# Patient Record
Sex: MALE | Race: WHITE | URBAN - METROPOLITAN AREA
[De-identification: names, ages, dates, MRNs, and addresses within clinical notes are randomized per-mention and may not be internally consistent; named-entity substitution may affect disease eponyms.]

---

## 2020-01-01 ENCOUNTER — APPOINTMENT (OUTPATIENT)
Dept: NUCLEAR MEDICINE | Age: 65
DRG: 720 | End: 2020-01-01
Attending: ORTHOPAEDIC SURGERY
Payer: MEDICAID

## 2020-01-01 ENCOUNTER — APPOINTMENT (OUTPATIENT)
Dept: INTERVENTIONAL RADIOLOGY/VASCULAR | Age: 65
DRG: 720 | End: 2020-01-01
Attending: INTERNAL MEDICINE
Payer: MEDICAID

## 2020-01-01 ENCOUNTER — APPOINTMENT (OUTPATIENT)
Dept: GENERAL RADIOLOGY | Age: 65
DRG: 720 | End: 2020-01-01
Attending: INTERNAL MEDICINE
Payer: MEDICAID

## 2020-01-01 ENCOUNTER — HOSPITAL ENCOUNTER (INPATIENT)
Age: 65
LOS: 12 days | Discharge: NURSING, REHAB OR LONG-TERM CARE | DRG: 720 | End: 2021-01-07
Attending: EMERGENCY MEDICINE | Admitting: INTERNAL MEDICINE
Payer: MEDICAID

## 2020-01-01 ENCOUNTER — APPOINTMENT (OUTPATIENT)
Dept: NUCLEAR MEDICINE | Age: 65
DRG: 720 | End: 2020-01-01
Attending: INTERNAL MEDICINE
Payer: MEDICAID

## 2020-01-01 ENCOUNTER — APPOINTMENT (OUTPATIENT)
Dept: ULTRASOUND IMAGING | Age: 65
DRG: 720 | End: 2020-01-01
Attending: INTERNAL MEDICINE
Payer: MEDICAID

## 2020-01-01 ENCOUNTER — APPOINTMENT (OUTPATIENT)
Dept: CT IMAGING | Age: 65
DRG: 720 | End: 2020-01-01
Attending: EMERGENCY MEDICINE
Payer: MEDICAID

## 2020-01-01 ENCOUNTER — APPOINTMENT (OUTPATIENT)
Dept: NON INVASIVE DIAGNOSTICS | Age: 65
DRG: 720 | End: 2020-01-01
Attending: INTERNAL MEDICINE
Payer: MEDICAID

## 2020-01-01 DIAGNOSIS — R52 INTRACTABLE PAIN: ICD-10-CM

## 2020-01-01 DIAGNOSIS — K74.60 CIRRHOSIS OF LIVER WITH ASCITES, UNSPECIFIED HEPATIC CIRRHOSIS TYPE (HCC): ICD-10-CM

## 2020-01-01 DIAGNOSIS — R18.8 CIRRHOSIS OF LIVER WITH ASCITES, UNSPECIFIED HEPATIC CIRRHOSIS TYPE (HCC): ICD-10-CM

## 2020-01-01 DIAGNOSIS — R91.8 LUNG MASS: Primary | ICD-10-CM

## 2020-01-01 LAB
25(OH)D3 SERPL-MCNC: 17.7 NG/ML (ref 30–100)
ABO + RH BLD: NORMAL
ALBUMIN SERPL-MCNC: 2 G/DL (ref 3.5–5)
ALBUMIN SERPL-MCNC: 2.1 G/DL (ref 3.5–5)
ALBUMIN SERPL-MCNC: 2.2 G/DL (ref 3.5–5)
ALBUMIN SERPL-MCNC: 2.5 G/DL (ref 3.5–5)
ALBUMIN/GLOB SERPL: 0.4 {RATIO} (ref 1.1–2.2)
ALBUMIN/GLOB SERPL: 0.5 {RATIO} (ref 1.1–2.2)
ALP SERPL-CCNC: 111 U/L (ref 45–117)
ALP SERPL-CCNC: 121 U/L (ref 45–117)
ALP SERPL-CCNC: 122 U/L (ref 45–117)
ALP SERPL-CCNC: 125 U/L (ref 45–117)
ALP SERPL-CCNC: 137 U/L (ref 45–117)
ALP SERPL-CCNC: 145 U/L (ref 45–117)
ALT SERPL-CCNC: 12 U/L (ref 12–78)
ALT SERPL-CCNC: 13 U/L (ref 12–78)
ALT SERPL-CCNC: 16 U/L (ref 12–78)
ALT SERPL-CCNC: 17 U/L (ref 12–78)
ALT SERPL-CCNC: 18 U/L (ref 12–78)
ALT SERPL-CCNC: 20 U/L (ref 12–78)
AMMONIA PLAS-SCNC: 107 UMOL/L
AMMONIA PLAS-SCNC: 62 UMOL/L
AMMONIA PLAS-SCNC: 63 UMOL/L
AMMONIA PLAS-SCNC: 97 UMOL/L
ANION GAP SERPL CALC-SCNC: 5 MMOL/L (ref 5–15)
ANION GAP SERPL CALC-SCNC: 6 MMOL/L (ref 5–15)
ANION GAP SERPL CALC-SCNC: 6 MMOL/L (ref 5–15)
ANION GAP SERPL CALC-SCNC: 7 MMOL/L (ref 5–15)
APPEARANCE UR: CLEAR
ARTERIAL PATENCY WRIST A: ABNORMAL
AST SERPL W P-5'-P-CCNC: 27 U/L (ref 15–37)
AST SERPL W P-5'-P-CCNC: 29 U/L (ref 15–37)
AST SERPL W P-5'-P-CCNC: 32 U/L (ref 15–37)
AST SERPL W P-5'-P-CCNC: 45 U/L (ref 15–37)
AST SERPL W P-5'-P-CCNC: 53 U/L (ref 15–37)
AST SERPL W P-5'-P-CCNC: 83 U/L (ref 15–37)
ATRIAL RATE: 120 BPM
BACTERIA SPEC CULT: ABNORMAL
BACTERIA URNS QL MICRO: NEGATIVE /HPF
BASE DEFICIT BLDA-SCNC: 4.8 MMOL/L (ref 0–2)
BASOPHILS # BLD: 0 K/UL (ref 0–0.1)
BASOPHILS NFR BLD: 0 % (ref 0–1)
BASOPHILS NFR BLD: 1 % (ref 0–1)
BDY SITE: ABNORMAL
BILIRUB DIRECT SERPL-MCNC: 0.4 MG/DL (ref 0–0.2)
BILIRUB SERPL-MCNC: 0.8 MG/DL (ref 0.2–1)
BILIRUB SERPL-MCNC: 0.8 MG/DL (ref 0.2–1)
BILIRUB SERPL-MCNC: 0.9 MG/DL (ref 0.2–1)
BILIRUB SERPL-MCNC: 1 MG/DL (ref 0.2–1)
BILIRUB UR QL: NEGATIVE
BLOOD GROUP ANTIBODIES SERPL: NEGATIVE
BUN SERPL-MCNC: 19 MG/DL (ref 6–20)
BUN SERPL-MCNC: 19 MG/DL (ref 6–20)
BUN SERPL-MCNC: 20 MG/DL (ref 6–20)
BUN SERPL-MCNC: 21 MG/DL (ref 6–20)
BUN SERPL-MCNC: 21 MG/DL (ref 6–20)
BUN SERPL-MCNC: 36 MG/DL (ref 6–20)
BUN/CREAT SERPL: 12 (ref 12–20)
BUN/CREAT SERPL: 13 (ref 12–20)
BUN/CREAT SERPL: 13 (ref 12–20)
BUN/CREAT SERPL: 14 (ref 12–20)
BUN/CREAT SERPL: 14 (ref 12–20)
BUN/CREAT SERPL: 19 (ref 12–20)
CA-I BLD-MCNC: 7.8 MG/DL (ref 8.5–10.1)
CA-I BLD-MCNC: 8.1 MG/DL (ref 8.5–10.1)
CA-I BLD-MCNC: 8.5 MG/DL (ref 8.5–10.1)
CA-I BLD-MCNC: 8.6 MG/DL (ref 8.5–10.1)
CA-I BLD-MCNC: 8.7 MG/DL (ref 8.5–10.1)
CA-I BLD-MCNC: 8.7 MG/DL (ref 8.5–10.1)
CALCULATED P AXIS, ECG09: 61 DEGREES
CALCULATED R AXIS, ECG10: -146 DEGREES
CALCULATED T AXIS, ECG11: 42 DEGREES
CHLORIDE SERPL-SCNC: 109 MMOL/L (ref 97–108)
CHLORIDE SERPL-SCNC: 110 MMOL/L (ref 97–108)
CHLORIDE SERPL-SCNC: 112 MMOL/L (ref 97–108)
CHLORIDE SERPL-SCNC: 114 MMOL/L (ref 97–108)
CHLORIDE SERPL-SCNC: 115 MMOL/L (ref 97–108)
CHLORIDE SERPL-SCNC: 116 MMOL/L (ref 97–108)
CO2 SERPL-SCNC: 20 MMOL/L (ref 21–32)
CO2 SERPL-SCNC: 23 MMOL/L (ref 21–32)
CO2 SERPL-SCNC: 24 MMOL/L (ref 21–32)
CO2 SERPL-SCNC: 24 MMOL/L (ref 21–32)
CO2 SERPL-SCNC: 25 MMOL/L (ref 21–32)
CO2 SERPL-SCNC: 25 MMOL/L (ref 21–32)
COLOR UR: ABNORMAL
CREAT SERPL-MCNC: 1.47 MG/DL (ref 0.7–1.3)
CREAT SERPL-MCNC: 1.49 MG/DL (ref 0.7–1.3)
CREAT SERPL-MCNC: 1.5 MG/DL (ref 0.7–1.3)
CREAT SERPL-MCNC: 1.55 MG/DL (ref 0.7–1.3)
CREAT SERPL-MCNC: 1.56 MG/DL (ref 0.7–1.3)
CREAT SERPL-MCNC: 1.91 MG/DL (ref 0.7–1.3)
CRP SERPL-MCNC: 9.24 MG/DL (ref 0–0.6)
DIAGNOSIS, 93000: NORMAL
DIFFERENTIAL METHOD BLD: ABNORMAL
ECHO PV PEAK INSTANTANEOUS GRADIENT SYSTOLIC: 7 MMHG
ECHO PV REGURGITANT MAX VELOCITY: 130 CM/S
ECHO TV MAX VELOCITY: 131 CM/S
ECHO TV REGURGITANT PEAK GRADIENT: 7 MMHG
EOSINOPHIL # BLD: 0 K/UL (ref 0–0.4)
EOSINOPHIL # BLD: 0.1 K/UL (ref 0–0.4)
EOSINOPHIL # BLD: 0.2 K/UL (ref 0–0.4)
EOSINOPHIL NFR BLD: 0 % (ref 0–7)
EOSINOPHIL NFR BLD: 0 % (ref 0–7)
EOSINOPHIL NFR BLD: 2 % (ref 0–7)
EOSINOPHIL NFR BLD: 3 % (ref 0–7)
ERYTHROCYTE [DISTWIDTH] IN BLOOD BY AUTOMATED COUNT: 19.7 % (ref 11.5–14.5)
ERYTHROCYTE [DISTWIDTH] IN BLOOD BY AUTOMATED COUNT: 19.8 % (ref 11.5–14.5)
ERYTHROCYTE [DISTWIDTH] IN BLOOD BY AUTOMATED COUNT: 20 % (ref 11.5–14.5)
ERYTHROCYTE [DISTWIDTH] IN BLOOD BY AUTOMATED COUNT: 20.7 % (ref 11.5–14.5)
ERYTHROCYTE [DISTWIDTH] IN BLOOD BY AUTOMATED COUNT: 21 % (ref 11.5–14.5)
GAS FLOW.O2 O2 DELIVERY SYS: 6 L/MIN
GLOBULIN SER CALC-MCNC: 4.6 G/DL (ref 2–4)
GLOBULIN SER CALC-MCNC: 4.8 G/DL (ref 2–4)
GLOBULIN SER CALC-MCNC: 4.9 G/DL (ref 2–4)
GLOBULIN SER CALC-MCNC: 5.2 G/DL (ref 2–4)
GLOBULIN SER CALC-MCNC: 5.5 G/DL (ref 2–4)
GLOBULIN SER CALC-MCNC: 5.6 G/DL (ref 2–4)
GLUCOSE BLD STRIP.AUTO-MCNC: 126 MG/DL (ref 65–100)
GLUCOSE SERPL-MCNC: 101 MG/DL (ref 65–100)
GLUCOSE SERPL-MCNC: 109 MG/DL (ref 65–100)
GLUCOSE SERPL-MCNC: 120 MG/DL (ref 65–100)
GLUCOSE SERPL-MCNC: 125 MG/DL (ref 65–100)
GLUCOSE SERPL-MCNC: 127 MG/DL (ref 65–100)
GLUCOSE SERPL-MCNC: 128 MG/DL (ref 65–100)
GLUCOSE UR STRIP.AUTO-MCNC: NEGATIVE MG/DL
HCO3 BLDA-SCNC: 20 MMOL/L (ref 22–26)
HCT VFR BLD AUTO: 28.3 % (ref 36.6–50.3)
HCT VFR BLD AUTO: 29.9 % (ref 36.6–50.3)
HCT VFR BLD AUTO: 31.7 % (ref 36.6–50.3)
HCT VFR BLD AUTO: 31.9 % (ref 36.6–50.3)
HCT VFR BLD AUTO: 32.9 % (ref 36.6–50.3)
HGB BLD-MCNC: 10.1 G/DL (ref 12.1–17)
HGB BLD-MCNC: 8.5 G/DL (ref 12.1–17)
HGB BLD-MCNC: 9 G/DL (ref 12.1–17)
HGB BLD-MCNC: 9.2 G/DL (ref 12.1–17)
HGB BLD-MCNC: 9.6 G/DL (ref 12.1–17)
HGB UR QL STRIP: ABNORMAL
IMM GRANULOCYTES # BLD AUTO: 0 K/UL (ref 0–0.04)
IMM GRANULOCYTES NFR BLD AUTO: 0 % (ref 0–0.5)
INR PPP: 1.4 (ref 0.9–1.1)
INR PPP: 1.5 (ref 0.9–1.1)
INR PPP: 1.7 (ref 0.9–1.1)
KETONES UR QL STRIP.AUTO: NEGATIVE MG/DL
LACTATE SERPL-SCNC: 1.4 MMOL/L (ref 0.4–2)
LEUKOCYTE ESTERASE UR QL STRIP.AUTO: NEGATIVE
LYMPHOCYTES # BLD: 0.7 K/UL (ref 0.8–3.5)
LYMPHOCYTES # BLD: 0.7 K/UL (ref 0.8–3.5)
LYMPHOCYTES # BLD: 1.1 K/UL (ref 0.8–3.5)
LYMPHOCYTES NFR BLD: 10 % (ref 12–49)
LYMPHOCYTES NFR BLD: 11 % (ref 12–49)
LYMPHOCYTES NFR BLD: 24 % (ref 12–49)
LYMPHOCYTES NFR BLD: 6 % (ref 12–49)
MCH RBC QN AUTO: 26.4 PG (ref 26–34)
MCH RBC QN AUTO: 26.5 PG (ref 26–34)
MCH RBC QN AUTO: 26.9 PG (ref 26–34)
MCH RBC QN AUTO: 27.4 PG (ref 26–34)
MCH RBC QN AUTO: 27.6 PG (ref 26–34)
MCHC RBC AUTO-ENTMCNC: 28.8 G/DL (ref 30–36.5)
MCHC RBC AUTO-ENTMCNC: 30 G/DL (ref 30–36.5)
MCHC RBC AUTO-ENTMCNC: 30.1 G/DL (ref 30–36.5)
MCHC RBC AUTO-ENTMCNC: 30.3 G/DL (ref 30–36.5)
MCHC RBC AUTO-ENTMCNC: 30.7 G/DL (ref 30–36.5)
MCV RBC AUTO: 87.6 FL (ref 80–99)
MCV RBC AUTO: 87.7 FL (ref 80–99)
MCV RBC AUTO: 91.3 FL (ref 80–99)
MCV RBC AUTO: 91.4 FL (ref 80–99)
MCV RBC AUTO: 91.7 FL (ref 80–99)
MONOCYTES # BLD: 0.8 K/UL (ref 0–1)
MONOCYTES # BLD: 1.7 K/UL (ref 0–1)
MONOCYTES # BLD: 1.8 K/UL (ref 0–1)
MONOCYTES NFR BLD: 17 % (ref 5–13)
MONOCYTES NFR BLD: 18 % (ref 5–13)
MONOCYTES NFR BLD: 22 % (ref 5–13)
MONOCYTES NFR BLD: 23 % (ref 5–13)
MUCOUS THREADS URNS QL MICRO: ABNORMAL /LPF
NEUTS SEG # BLD: 2.4 K/UL (ref 1.8–8)
NEUTS SEG # BLD: 5.1 K/UL (ref 1.8–8)
NEUTS SEG # BLD: 7.8 K/UL (ref 1.8–8)
NEUTS SEG NFR BLD: 54 % (ref 32–75)
NEUTS SEG NFR BLD: 67 % (ref 32–75)
NEUTS SEG NFR BLD: 67 % (ref 32–75)
NEUTS SEG NFR BLD: 75 % (ref 32–75)
NITRITE UR QL STRIP.AUTO: NEGATIVE
P-R INTERVAL, ECG05: 192 MS
PCO2 BLDA: 55 MMHG (ref 35–45)
PERFORMED BY, TECHID: ABNORMAL
PH BLDA: 7.23 [PH] (ref 7.35–7.45)
PH UR STRIP: 6 [PH] (ref 5–8)
PLATELET # BLD AUTO: 101 K/UL (ref 150–400)
PLATELET # BLD AUTO: 160 K/UL (ref 150–400)
PLATELET # BLD AUTO: 167 K/UL (ref 150–400)
PLATELET # BLD AUTO: 96 K/UL (ref 150–400)
PLATELET # BLD AUTO: 99 K/UL (ref 150–400)
PMV BLD AUTO: 10 FL (ref 8.9–12.9)
PMV BLD AUTO: 10.3 FL (ref 8.9–12.9)
PMV BLD AUTO: 10.4 FL (ref 8.9–12.9)
PMV BLD AUTO: 10.5 FL (ref 8.9–12.9)
PMV BLD AUTO: 9.6 FL (ref 8.9–12.9)
PO2 BLDA: 106 MMHG (ref 75–100)
POTASSIUM SERPL-SCNC: 3.2 MMOL/L (ref 3.5–5.1)
POTASSIUM SERPL-SCNC: 3.4 MMOL/L (ref 3.5–5.1)
POTASSIUM SERPL-SCNC: 3.5 MMOL/L (ref 3.5–5.1)
POTASSIUM SERPL-SCNC: 3.6 MMOL/L (ref 3.5–5.1)
POTASSIUM SERPL-SCNC: 3.8 MMOL/L (ref 3.5–5.1)
POTASSIUM SERPL-SCNC: 4.1 MMOL/L (ref 3.5–5.1)
PROCALCITONIN SERPL-MCNC: 0.38 NG/ML
PROT SERPL-MCNC: 6.7 G/DL (ref 6.4–8.2)
PROT SERPL-MCNC: 7 G/DL (ref 6.4–8.2)
PROT SERPL-MCNC: 7.2 G/DL (ref 6.4–8.2)
PROT SERPL-MCNC: 7.4 G/DL (ref 6.4–8.2)
PROT SERPL-MCNC: 7.6 G/DL (ref 6.4–8.2)
PROT SERPL-MCNC: 7.7 G/DL (ref 6.4–8.2)
PROT UR STRIP-MCNC: NEGATIVE MG/DL
PROTHROMBIN TIME: 16.9 SEC (ref 11.9–14.7)
PROTHROMBIN TIME: 18.5 SEC (ref 11.9–14.7)
PROTHROMBIN TIME: 19.7 SEC (ref 11.9–14.7)
Q-T INTERVAL, ECG07: 334 MS
QRS DURATION, ECG06: 100 MS
QTC CALCULATION (BEZET), ECG08: 472 MS
RBC # BLD AUTO: 3.1 M/UL (ref 4.1–5.7)
RBC # BLD AUTO: 3.26 M/UL (ref 4.1–5.7)
RBC # BLD AUTO: 3.49 M/UL (ref 4.1–5.7)
RBC # BLD AUTO: 3.62 M/UL (ref 4.1–5.7)
RBC # BLD AUTO: 3.75 M/UL (ref 4.1–5.7)
RBC #/AREA URNS HPF: ABNORMAL /HPF (ref 0–5)
SAO2 % BLD: 97 %
SAO2% DEVICE SAO2% SENSOR NAME: ABNORMAL
SODIUM SERPL-SCNC: 139 MMOL/L (ref 136–145)
SODIUM SERPL-SCNC: 140 MMOL/L (ref 136–145)
SODIUM SERPL-SCNC: 140 MMOL/L (ref 136–145)
SODIUM SERPL-SCNC: 142 MMOL/L (ref 136–145)
SODIUM SERPL-SCNC: 145 MMOL/L (ref 136–145)
SODIUM SERPL-SCNC: 145 MMOL/L (ref 136–145)
SP GR UR REFRACTOMETRY: 1.01 (ref 1–1.03)
SPECIAL REQUESTS,SREQ: ABNORMAL
SPECIMEN EXP DATE BLD: NORMAL
UROBILINOGEN UR QL STRIP.AUTO: 0.1 EU/DL (ref 0.1–1)
VANCOMYCIN SERPL-MCNC: 13.9 UG/ML
VENTRICULAR RATE, ECG03: 120 BPM
WBC # BLD AUTO: 10.6 K/UL (ref 4.1–11.1)
WBC # BLD AUTO: 4.4 K/UL (ref 4.1–11.1)
WBC # BLD AUTO: 6.7 K/UL (ref 4.1–11.1)
WBC # BLD AUTO: 7.4 K/UL (ref 4.1–11.1)
WBC # BLD AUTO: 7.6 K/UL (ref 4.1–11.1)
WBC URNS QL MICRO: ABNORMAL /HPF (ref 0–4)

## 2020-01-01 PROCEDURE — 74011000250 HC RX REV CODE- 250: Performed by: INTERNAL MEDICINE

## 2020-01-01 PROCEDURE — 80076 HEPATIC FUNCTION PANEL: CPT

## 2020-01-01 PROCEDURE — 80048 BASIC METABOLIC PNL TOTAL CA: CPT

## 2020-01-01 PROCEDURE — 76770 US EXAM ABDO BACK WALL COMP: CPT

## 2020-01-01 PROCEDURE — 49083 ABD PARACENTESIS W/IMAGING: CPT

## 2020-01-01 PROCEDURE — 77010033678 HC OXYGEN DAILY

## 2020-01-01 PROCEDURE — 83605 ASSAY OF LACTIC ACID: CPT

## 2020-01-01 PROCEDURE — 92610 EVALUATE SWALLOWING FUNCTION: CPT

## 2020-01-01 PROCEDURE — 65270000029 HC RM PRIVATE

## 2020-01-01 PROCEDURE — 94640 AIRWAY INHALATION TREATMENT: CPT

## 2020-01-01 PROCEDURE — 87186 SC STD MICRODIL/AGAR DIL: CPT

## 2020-01-01 PROCEDURE — 85025 COMPLETE CBC W/AUTO DIFF WBC: CPT

## 2020-01-01 PROCEDURE — 74011000258 HC RX REV CODE- 258: Performed by: INTERNAL MEDICINE

## 2020-01-01 PROCEDURE — 82803 BLOOD GASES ANY COMBINATION: CPT

## 2020-01-01 PROCEDURE — 74011250636 HC RX REV CODE- 250/636: Performed by: NURSE PRACTITIONER

## 2020-01-01 PROCEDURE — A9503 TC99M MEDRONATE: HCPCS

## 2020-01-01 PROCEDURE — 74011000250 HC RX REV CODE- 250: Performed by: HOSPITALIST

## 2020-01-01 PROCEDURE — 96361 HYDRATE IV INFUSION ADD-ON: CPT

## 2020-01-01 PROCEDURE — 81001 URINALYSIS AUTO W/SCOPE: CPT

## 2020-01-01 PROCEDURE — 74011250636 HC RX REV CODE- 250/636: Performed by: INTERNAL MEDICINE

## 2020-01-01 PROCEDURE — 85610 PROTHROMBIN TIME: CPT

## 2020-01-01 PROCEDURE — 74176 CT ABD & PELVIS W/O CONTRAST: CPT

## 2020-01-01 PROCEDURE — 94760 N-INVAS EAR/PLS OXIMETRY 1: CPT

## 2020-01-01 PROCEDURE — 99285 EMERGENCY DEPT VISIT HI MDM: CPT

## 2020-01-01 PROCEDURE — 36415 COLL VENOUS BLD VENIPUNCTURE: CPT

## 2020-01-01 PROCEDURE — 80053 COMPREHEN METABOLIC PANEL: CPT

## 2020-01-01 PROCEDURE — 74011250637 HC RX REV CODE- 250/637: Performed by: INTERNAL MEDICINE

## 2020-01-01 PROCEDURE — 71045 X-RAY EXAM CHEST 1 VIEW: CPT

## 2020-01-01 PROCEDURE — 74011250636 HC RX REV CODE- 250/636: Performed by: EMERGENCY MEDICINE

## 2020-01-01 PROCEDURE — 87077 CULTURE AEROBIC IDENTIFY: CPT

## 2020-01-01 PROCEDURE — 99223 1ST HOSP IP/OBS HIGH 75: CPT | Performed by: INTERNAL MEDICINE

## 2020-01-01 PROCEDURE — A9521 TC99M EXAMETAZIME: HCPCS

## 2020-01-01 PROCEDURE — 82140 ASSAY OF AMMONIA: CPT

## 2020-01-01 PROCEDURE — 87040 BLOOD CULTURE FOR BACTERIA: CPT

## 2020-01-01 PROCEDURE — 97530 THERAPEUTIC ACTIVITIES: CPT

## 2020-01-01 PROCEDURE — 86901 BLOOD TYPING SEROLOGIC RH(D): CPT

## 2020-01-01 PROCEDURE — 74011250636 HC RX REV CODE- 250/636: Performed by: HOSPITALIST

## 2020-01-01 PROCEDURE — 86140 C-REACTIVE PROTEIN: CPT

## 2020-01-01 PROCEDURE — 82962 GLUCOSE BLOOD TEST: CPT

## 2020-01-01 PROCEDURE — 97165 OT EVAL LOW COMPLEX 30 MIN: CPT

## 2020-01-01 PROCEDURE — 90715 TDAP VACCINE 7 YRS/> IM: CPT | Performed by: EMERGENCY MEDICINE

## 2020-01-01 PROCEDURE — 96376 TX/PRO/DX INJ SAME DRUG ADON: CPT

## 2020-01-01 PROCEDURE — 85027 COMPLETE CBC AUTOMATED: CPT

## 2020-01-01 PROCEDURE — 80202 ASSAY OF VANCOMYCIN: CPT

## 2020-01-01 PROCEDURE — 90471 IMMUNIZATION ADMIN: CPT

## 2020-01-01 PROCEDURE — 97162 PT EVAL MOD COMPLEX 30 MIN: CPT

## 2020-01-01 PROCEDURE — 93005 ELECTROCARDIOGRAM TRACING: CPT

## 2020-01-01 PROCEDURE — 74011000250 HC RX REV CODE- 250: Performed by: EMERGENCY MEDICINE

## 2020-01-01 PROCEDURE — 0W9G3ZZ DRAINAGE OF PERITONEAL CAVITY, PERCUTANEOUS APPROACH: ICD-10-PCS | Performed by: RADIOLOGY

## 2020-01-01 PROCEDURE — 96375 TX/PRO/DX INJ NEW DRUG ADDON: CPT

## 2020-01-01 PROCEDURE — 82306 VITAMIN D 25 HYDROXY: CPT

## 2020-01-01 PROCEDURE — C8929 TTE W OR WO FOL WCON,DOPPLER: HCPCS

## 2020-01-01 PROCEDURE — 96374 THER/PROPH/DIAG INJ IV PUSH: CPT

## 2020-01-01 PROCEDURE — 84145 PROCALCITONIN (PCT): CPT

## 2020-01-01 RX ORDER — FLUTICASONE PROPIONATE 50 MCG
2 SPRAY, SUSPENSION (ML) NASAL DAILY
Status: DISCONTINUED | OUTPATIENT
Start: 2020-01-01 | End: 2021-01-01 | Stop reason: HOSPADM

## 2020-01-01 RX ORDER — MORPHINE SULFATE 4 MG/ML
4 INJECTION INTRAVENOUS ONCE
Status: COMPLETED | OUTPATIENT
Start: 2020-01-01 | End: 2020-01-01

## 2020-01-01 RX ORDER — IPRATROPIUM BROMIDE 0.5 MG/2.5ML
500 SOLUTION RESPIRATORY (INHALATION)
Status: DISCONTINUED | OUTPATIENT
Start: 2020-01-01 | End: 2020-01-01

## 2020-01-01 RX ORDER — GUAIFENESIN 400 MG/1
400 TABLET ORAL
COMMUNITY
End: 2021-01-01 | Stop reason: ALTCHOICE

## 2020-01-01 RX ORDER — SODIUM CHLORIDE 9 MG/ML
1000 INJECTION, SOLUTION INTRAVENOUS ONCE
Status: COMPLETED | OUTPATIENT
Start: 2020-01-01 | End: 2020-01-01

## 2020-01-01 RX ORDER — MORPHINE SULFATE 2 MG/ML
2 INJECTION, SOLUTION INTRAMUSCULAR; INTRAVENOUS ONCE
Status: COMPLETED | OUTPATIENT
Start: 2020-01-01 | End: 2020-01-01

## 2020-01-01 RX ORDER — FOLIC ACID 1 MG/1
1 TABLET ORAL DAILY
COMMUNITY

## 2020-01-01 RX ORDER — PROPRANOLOL HYDROCHLORIDE 20 MG/1
20 TABLET ORAL 2 TIMES DAILY
Status: ON HOLD | COMMUNITY
End: 2021-01-01 | Stop reason: SDUPTHER

## 2020-01-01 RX ORDER — ACETAMINOPHEN 325 MG/1
650 TABLET ORAL
Status: DISCONTINUED | OUTPATIENT
Start: 2020-01-01 | End: 2021-01-01 | Stop reason: HOSPADM

## 2020-01-01 RX ORDER — LORAZEPAM 1 MG/1
2 TABLET ORAL ONCE
Status: COMPLETED | OUTPATIENT
Start: 2020-01-01 | End: 2020-01-01

## 2020-01-01 RX ORDER — DIAZEPAM 10 MG/2ML
5 INJECTION INTRAMUSCULAR
Status: COMPLETED | OUTPATIENT
Start: 2020-01-01 | End: 2020-01-01

## 2020-01-01 RX ORDER — POLYETHYLENE GLYCOL 3350 17 G/17G
17 POWDER, FOR SOLUTION ORAL DAILY PRN
Status: DISCONTINUED | OUTPATIENT
Start: 2020-01-01 | End: 2021-01-01 | Stop reason: HOSPADM

## 2020-01-01 RX ORDER — PANTOPRAZOLE SODIUM 40 MG/1
40 TABLET, DELAYED RELEASE ORAL
Status: DISCONTINUED | OUTPATIENT
Start: 2020-01-01 | End: 2021-01-01 | Stop reason: HOSPADM

## 2020-01-01 RX ORDER — FOLIC ACID 1 MG/1
1 TABLET ORAL DAILY
Status: DISCONTINUED | OUTPATIENT
Start: 2020-01-01 | End: 2021-01-01 | Stop reason: HOSPADM

## 2020-01-01 RX ORDER — MORPHINE SULFATE 2 MG/ML
2 INJECTION, SOLUTION INTRAMUSCULAR; INTRAVENOUS
Status: DISCONTINUED | OUTPATIENT
Start: 2020-01-01 | End: 2021-01-01 | Stop reason: HOSPADM

## 2020-01-01 RX ORDER — CYCLOBENZAPRINE HCL 10 MG
10 TABLET ORAL 3 TIMES DAILY
Status: COMPLETED | OUTPATIENT
Start: 2020-01-01 | End: 2020-01-01

## 2020-01-01 RX ORDER — FUROSEMIDE 10 MG/ML
40 INJECTION INTRAMUSCULAR; INTRAVENOUS ONCE
Status: COMPLETED | OUTPATIENT
Start: 2020-01-01 | End: 2020-01-01

## 2020-01-01 RX ORDER — SODIUM CHLORIDE 0.9 % (FLUSH) 0.9 %
5-40 SYRINGE (ML) INJECTION AS NEEDED
Status: DISCONTINUED | OUTPATIENT
Start: 2020-01-01 | End: 2021-01-01 | Stop reason: HOSPADM

## 2020-01-01 RX ORDER — HEPARIN SODIUM 10000 [USP'U]/ML
5000 INJECTION, SOLUTION INTRAVENOUS; SUBCUTANEOUS EVERY 8 HOURS
Status: DISCONTINUED | OUTPATIENT
Start: 2020-01-01 | End: 2020-01-01

## 2020-01-01 RX ORDER — ACETAMINOPHEN 650 MG/1
650 SUPPOSITORY RECTAL
Status: DISCONTINUED | OUTPATIENT
Start: 2020-01-01 | End: 2021-01-01 | Stop reason: HOSPADM

## 2020-01-01 RX ORDER — PROMETHAZINE HYDROCHLORIDE 25 MG/1
12.5 TABLET ORAL
Status: DISCONTINUED | OUTPATIENT
Start: 2020-01-01 | End: 2021-01-01 | Stop reason: HOSPADM

## 2020-01-01 RX ORDER — OXYCODONE HYDROCHLORIDE 5 MG/1
5 TABLET ORAL
Status: DISCONTINUED | OUTPATIENT
Start: 2020-01-01 | End: 2021-01-01

## 2020-01-01 RX ORDER — KETOROLAC TROMETHAMINE 15 MG/ML
15 INJECTION, SOLUTION INTRAMUSCULAR; INTRAVENOUS ONCE
Status: COMPLETED | OUTPATIENT
Start: 2020-01-01 | End: 2020-01-01

## 2020-01-01 RX ORDER — SODIUM CHLORIDE 0.9 % (FLUSH) 0.9 %
5-40 SYRINGE (ML) INJECTION EVERY 8 HOURS
Status: DISCONTINUED | OUTPATIENT
Start: 2020-01-01 | End: 2020-01-01

## 2020-01-01 RX ORDER — PROPRANOLOL HYDROCHLORIDE 10 MG/1
10 TABLET ORAL 2 TIMES DAILY
Status: DISCONTINUED | OUTPATIENT
Start: 2020-01-01 | End: 2021-01-01 | Stop reason: HOSPADM

## 2020-01-01 RX ORDER — IPRATROPIUM BROMIDE AND ALBUTEROL SULFATE 2.5; .5 MG/3ML; MG/3ML
3 SOLUTION RESPIRATORY (INHALATION)
Status: DISCONTINUED | OUTPATIENT
Start: 2020-01-01 | End: 2020-01-01

## 2020-01-01 RX ORDER — PHYTONADIONE 10 MG/ML
10 INJECTION, EMULSION INTRAMUSCULAR; INTRAVENOUS; SUBCUTANEOUS DAILY
Status: COMPLETED | OUTPATIENT
Start: 2020-01-01 | End: 2020-01-01

## 2020-01-01 RX ORDER — IPRATROPIUM BROMIDE AND ALBUTEROL SULFATE 2.5; .5 MG/3ML; MG/3ML
3 SOLUTION RESPIRATORY (INHALATION)
Status: DISCONTINUED | OUTPATIENT
Start: 2020-01-01 | End: 2021-01-01 | Stop reason: HOSPADM

## 2020-01-01 RX ORDER — ONDANSETRON 2 MG/ML
4 INJECTION INTRAMUSCULAR; INTRAVENOUS
Status: DISCONTINUED | OUTPATIENT
Start: 2020-01-01 | End: 2021-01-01 | Stop reason: HOSPADM

## 2020-01-01 RX ORDER — HEPARIN SODIUM 5000 [USP'U]/ML
5000 INJECTION, SOLUTION INTRAVENOUS; SUBCUTANEOUS EVERY 8 HOURS
Status: DISCONTINUED | OUTPATIENT
Start: 2020-01-01 | End: 2021-01-01 | Stop reason: HOSPADM

## 2020-01-01 RX ORDER — FUROSEMIDE 40 MG/1
40 TABLET ORAL DAILY
Status: DISCONTINUED | OUTPATIENT
Start: 2020-01-01 | End: 2021-01-01 | Stop reason: HOSPADM

## 2020-01-01 RX ORDER — FUROSEMIDE 40 MG/1
40 TABLET ORAL DAILY
Status: ON HOLD | COMMUNITY
End: 2021-01-01 | Stop reason: SDUPTHER

## 2020-01-01 RX ORDER — ISOSORBIDE MONONITRATE 30 MG/1
30 TABLET, EXTENDED RELEASE ORAL DAILY
COMMUNITY

## 2020-01-01 RX ORDER — ISOSORBIDE MONONITRATE 30 MG/1
30 TABLET, EXTENDED RELEASE ORAL DAILY
Status: DISCONTINUED | OUTPATIENT
Start: 2020-01-01 | End: 2021-01-01 | Stop reason: HOSPADM

## 2020-01-01 RX ORDER — PROPRANOLOL HYDROCHLORIDE 10 MG/1
20 TABLET ORAL 2 TIMES DAILY
Status: DISCONTINUED | OUTPATIENT
Start: 2020-01-01 | End: 2020-01-01

## 2020-01-01 RX ADMIN — OXYCODONE 5 MG: 5 TABLET ORAL at 14:05

## 2020-01-01 RX ADMIN — CYCLOBENZAPRINE 10 MG: 10 TABLET, FILM COATED ORAL at 21:18

## 2020-01-01 RX ADMIN — VANCOMYCIN HYDROCHLORIDE 1000 MG: 1 INJECTION, POWDER, LYOPHILIZED, FOR SOLUTION INTRAVENOUS at 17:58

## 2020-01-01 RX ADMIN — MORPHINE SULFATE 2 MG: 2 INJECTION, SOLUTION INTRAMUSCULAR; INTRAVENOUS at 02:59

## 2020-01-01 RX ADMIN — HEPARIN SODIUM 5000 UNITS: 5000 INJECTION INTRAVENOUS; SUBCUTANEOUS at 20:39

## 2020-01-01 RX ADMIN — HEPARIN SODIUM 5000 UNITS: 5000 INJECTION INTRAVENOUS; SUBCUTANEOUS at 05:50

## 2020-01-01 RX ADMIN — RIFAXIMIN 550 MG: 550 TABLET ORAL at 17:27

## 2020-01-01 RX ADMIN — PROPRANOLOL HYDROCHLORIDE 10 MG: 10 TABLET ORAL at 09:59

## 2020-01-01 RX ADMIN — LACTULOSE 45 ML: 20 SOLUTION ORAL at 14:05

## 2020-01-01 RX ADMIN — CEFAZOLIN SODIUM 1 G: 1 INJECTION, POWDER, FOR SOLUTION INTRAMUSCULAR; INTRAVENOUS at 03:30

## 2020-01-01 RX ADMIN — CYCLOBENZAPRINE 10 MG: 10 TABLET, FILM COATED ORAL at 17:27

## 2020-01-01 RX ADMIN — HEPARIN SODIUM 5000 UNITS: 5000 INJECTION INTRAVENOUS; SUBCUTANEOUS at 13:09

## 2020-01-01 RX ADMIN — MORPHINE SULFATE 4 MG: 4 INJECTION INTRAVENOUS at 05:10

## 2020-01-01 RX ADMIN — LACTULOSE 45 ML: 20 SOLUTION ORAL at 21:18

## 2020-01-01 RX ADMIN — IPRATROPIUM BROMIDE AND ALBUTEROL SULFATE 3 ML: .5; 3 SOLUTION RESPIRATORY (INHALATION) at 09:32

## 2020-01-01 RX ADMIN — MORPHINE SULFATE 2 MG: 2 INJECTION, SOLUTION INTRAMUSCULAR; INTRAVENOUS at 11:58

## 2020-01-01 RX ADMIN — IPRATROPIUM BROMIDE AND ALBUTEROL SULFATE 3 ML: .5; 3 SOLUTION RESPIRATORY (INHALATION) at 09:04

## 2020-01-01 RX ADMIN — SODIUM CHLORIDE 1000 ML: 9 INJECTION, SOLUTION INTRAVENOUS at 06:34

## 2020-01-01 RX ADMIN — CEFTRIAXONE 1 G: 1 INJECTION, POWDER, FOR SOLUTION INTRAMUSCULAR; INTRAVENOUS at 15:02

## 2020-01-01 RX ADMIN — HEPARIN SODIUM 5000 UNITS: 5000 INJECTION INTRAVENOUS; SUBCUTANEOUS at 05:19

## 2020-01-01 RX ADMIN — LACTULOSE 45 ML: 20 SOLUTION ORAL at 10:08

## 2020-01-01 RX ADMIN — LACTULOSE 45 ML: 20 SOLUTION ORAL at 23:39

## 2020-01-01 RX ADMIN — MORPHINE SULFATE 2 MG: 2 INJECTION, SOLUTION INTRAMUSCULAR; INTRAVENOUS at 22:55

## 2020-01-01 RX ADMIN — HEPARIN SODIUM 5000 UNITS: 5000 INJECTION INTRAVENOUS; SUBCUTANEOUS at 05:03

## 2020-01-01 RX ADMIN — LACTULOSE 45 ML: 20 SOLUTION ORAL at 10:10

## 2020-01-01 RX ADMIN — CYCLOBENZAPRINE 10 MG: 10 TABLET, FILM COATED ORAL at 10:08

## 2020-01-01 RX ADMIN — PERFLUTREN 2 ML: 6.52 INJECTION, SUSPENSION INTRAVENOUS at 15:30

## 2020-01-01 RX ADMIN — FOLIC ACID 1 MG: 1 TABLET ORAL at 09:24

## 2020-01-01 RX ADMIN — ISOSORBIDE MONONITRATE 30 MG: 30 TABLET, EXTENDED RELEASE ORAL at 09:06

## 2020-01-01 RX ADMIN — LACTULOSE 45 ML: 20 SOLUTION ORAL at 15:00

## 2020-01-01 RX ADMIN — SODIUM CHLORIDE 500 ML: 9 INJECTION, SOLUTION INTRAVENOUS at 21:45

## 2020-01-01 RX ADMIN — HEPARIN SODIUM 5000 UNITS: 5000 INJECTION INTRAVENOUS; SUBCUTANEOUS at 22:34

## 2020-01-01 RX ADMIN — CYCLOBENZAPRINE 10 MG: 10 TABLET, FILM COATED ORAL at 15:02

## 2020-01-01 RX ADMIN — PANTOPRAZOLE SODIUM 40 MG: 40 TABLET, DELAYED RELEASE ORAL at 05:19

## 2020-01-01 RX ADMIN — FLUTICASONE PROPIONATE 2 SPRAY: 50 SPRAY, METERED NASAL at 09:24

## 2020-01-01 RX ADMIN — MORPHINE SULFATE 2 MG: 2 INJECTION, SOLUTION INTRAMUSCULAR; INTRAVENOUS at 10:10

## 2020-01-01 RX ADMIN — CYCLOBENZAPRINE 10 MG: 10 TABLET, FILM COATED ORAL at 20:39

## 2020-01-01 RX ADMIN — IPRATROPIUM BROMIDE AND ALBUTEROL SULFATE 3 ML: .5; 3 SOLUTION RESPIRATORY (INHALATION) at 01:02

## 2020-01-01 RX ADMIN — LACTULOSE 45 ML: 20 SOLUTION ORAL at 22:34

## 2020-01-01 RX ADMIN — FOLIC ACID 1 MG: 1 TABLET ORAL at 09:06

## 2020-01-01 RX ADMIN — RIFAXIMIN 550 MG: 550 TABLET ORAL at 09:24

## 2020-01-01 RX ADMIN — PROPRANOLOL HYDROCHLORIDE 10 MG: 10 TABLET ORAL at 21:18

## 2020-01-01 RX ADMIN — CYCLOBENZAPRINE 10 MG: 10 TABLET, FILM COATED ORAL at 09:06

## 2020-01-01 RX ADMIN — SODIUM CHLORIDE 1000 ML: 9 INJECTION, SOLUTION INTRAVENOUS at 04:12

## 2020-01-01 RX ADMIN — LACTULOSE 45 ML: 20 SOLUTION ORAL at 17:50

## 2020-01-01 RX ADMIN — KETOROLAC TROMETHAMINE 15 MG: 15 INJECTION, SOLUTION INTRAMUSCULAR; INTRAVENOUS at 02:59

## 2020-01-01 RX ADMIN — LACTULOSE 45 ML: 20 SOLUTION ORAL at 09:23

## 2020-01-01 RX ADMIN — OXYCODONE 5 MG: 5 TABLET ORAL at 10:12

## 2020-01-01 RX ADMIN — AMPICILLIN SODIUM AND SULBACTAM SODIUM 3 G: 2; 1 INJECTION, POWDER, FOR SOLUTION INTRAMUSCULAR; INTRAVENOUS at 23:19

## 2020-01-01 RX ADMIN — LORAZEPAM 2 MG: 1 TABLET ORAL at 13:50

## 2020-01-01 RX ADMIN — PROPRANOLOL HYDROCHLORIDE 10 MG: 10 TABLET ORAL at 10:10

## 2020-01-01 RX ADMIN — RIFAXIMIN 550 MG: 550 TABLET ORAL at 17:52

## 2020-01-01 RX ADMIN — IPRATROPIUM BROMIDE AND ALBUTEROL SULFATE 3 ML: .5; 3 SOLUTION RESPIRATORY (INHALATION) at 01:22

## 2020-01-01 RX ADMIN — Medication 10 ML: at 14:00

## 2020-01-01 RX ADMIN — ONDANSETRON 4 MG: 2 INJECTION INTRAMUSCULAR; INTRAVENOUS at 16:12

## 2020-01-01 RX ADMIN — LACTULOSE 45 ML: 20 SOLUTION ORAL at 09:07

## 2020-01-01 RX ADMIN — LACTULOSE 45 ML: 20 SOLUTION ORAL at 17:26

## 2020-01-01 RX ADMIN — PROPRANOLOL HYDROCHLORIDE 10 MG: 10 TABLET ORAL at 22:34

## 2020-01-01 RX ADMIN — OXYCODONE 5 MG: 5 TABLET ORAL at 05:19

## 2020-01-01 RX ADMIN — CEFTRIAXONE 1 G: 1 INJECTION, POWDER, FOR SOLUTION INTRAMUSCULAR; INTRAVENOUS at 15:55

## 2020-01-01 RX ADMIN — DIAZEPAM 5 MG: 5 INJECTION, SOLUTION INTRAMUSCULAR; INTRAVENOUS at 03:03

## 2020-01-01 RX ADMIN — RIFAXIMIN 550 MG: 550 TABLET ORAL at 21:18

## 2020-01-01 RX ADMIN — IPRATROPIUM BROMIDE AND ALBUTEROL SULFATE 3 ML: .5; 3 SOLUTION RESPIRATORY (INHALATION) at 19:42

## 2020-01-01 RX ADMIN — PROPRANOLOL HYDROCHLORIDE 10 MG: 10 TABLET ORAL at 09:06

## 2020-01-01 RX ADMIN — IPRATROPIUM BROMIDE AND ALBUTEROL SULFATE 3 ML: .5; 3 SOLUTION RESPIRATORY (INHALATION) at 02:00

## 2020-01-01 RX ADMIN — PROPRANOLOL HYDROCHLORIDE 10 MG: 10 TABLET ORAL at 10:08

## 2020-01-01 RX ADMIN — AMPICILLIN SODIUM AND SULBACTAM SODIUM 3 G: 2; 1 INJECTION, POWDER, FOR SOLUTION INTRAMUSCULAR; INTRAVENOUS at 06:24

## 2020-01-01 RX ADMIN — PROPRANOLOL HYDROCHLORIDE 20 MG: 10 TABLET ORAL at 20:39

## 2020-01-01 RX ADMIN — LACTULOSE 45 ML: 20 SOLUTION ORAL at 15:55

## 2020-01-01 RX ADMIN — LACTULOSE 45 ML: 20 SOLUTION ORAL at 20:37

## 2020-01-01 RX ADMIN — PANTOPRAZOLE SODIUM 40 MG: 40 TABLET, DELAYED RELEASE ORAL at 05:50

## 2020-01-01 RX ADMIN — VANCOMYCIN HYDROCHLORIDE 1000 MG: 1 INJECTION, POWDER, LYOPHILIZED, FOR SOLUTION INTRAVENOUS at 05:03

## 2020-01-01 RX ADMIN — MORPHINE SULFATE 4 MG: 4 INJECTION INTRAVENOUS at 07:40

## 2020-01-01 RX ADMIN — AMPICILLIN SODIUM AND SULBACTAM SODIUM 3 G: 2; 1 INJECTION, POWDER, FOR SOLUTION INTRAMUSCULAR; INTRAVENOUS at 18:11

## 2020-01-01 RX ADMIN — FLUTICASONE PROPIONATE 2 SPRAY: 50 SPRAY, METERED NASAL at 10:17

## 2020-01-01 RX ADMIN — FUROSEMIDE 40 MG: 10 INJECTION, SOLUTION INTRAMUSCULAR; INTRAVENOUS at 22:34

## 2020-01-01 RX ADMIN — IPRATROPIUM BROMIDE AND ALBUTEROL SULFATE 3 ML: .5; 3 SOLUTION RESPIRATORY (INHALATION) at 16:53

## 2020-01-01 RX ADMIN — RIFAXIMIN 550 MG: 550 TABLET ORAL at 15:14

## 2020-01-01 RX ADMIN — HEPARIN SODIUM 5000 UNITS: 5000 INJECTION INTRAVENOUS; SUBCUTANEOUS at 13:00

## 2020-01-01 RX ADMIN — PHYTONADIONE 10 MG: 10 INJECTION, EMULSION INTRAMUSCULAR; INTRAVENOUS; SUBCUTANEOUS at 09:24

## 2020-01-01 RX ADMIN — IPRATROPIUM BROMIDE AND ALBUTEROL SULFATE 3 ML: .5; 3 SOLUTION RESPIRATORY (INHALATION) at 19:56

## 2020-01-01 RX ADMIN — IPRATROPIUM BROMIDE AND ALBUTEROL SULFATE 3 ML: .5; 3 SOLUTION RESPIRATORY (INHALATION) at 19:37

## 2020-01-01 RX ADMIN — HEPARIN SODIUM 5000 UNITS: 5000 INJECTION INTRAVENOUS; SUBCUTANEOUS at 22:57

## 2020-01-01 RX ADMIN — RIFAXIMIN 550 MG: 550 TABLET ORAL at 09:59

## 2020-01-01 RX ADMIN — FOLIC ACID 1 MG: 1 TABLET ORAL at 09:59

## 2020-01-01 RX ADMIN — PHYTONADIONE 10 MG: 10 INJECTION, EMULSION INTRAMUSCULAR; INTRAVENOUS; SUBCUTANEOUS at 09:06

## 2020-01-01 RX ADMIN — RIFAXIMIN 550 MG: 550 TABLET ORAL at 15:02

## 2020-01-01 RX ADMIN — ISOSORBIDE MONONITRATE 30 MG: 30 TABLET, EXTENDED RELEASE ORAL at 10:07

## 2020-01-01 RX ADMIN — RIFAXIMIN 550 MG: 550 TABLET ORAL at 10:08

## 2020-01-01 RX ADMIN — RIFAXIMIN 550 MG: 550 TABLET ORAL at 21:46

## 2020-01-01 RX ADMIN — HEPARIN SODIUM 5000 UNITS: 5000 INJECTION INTRAVENOUS; SUBCUTANEOUS at 12:16

## 2020-01-01 RX ADMIN — CYCLOBENZAPRINE 10 MG: 10 TABLET, FILM COATED ORAL at 15:32

## 2020-01-01 RX ADMIN — OXYCODONE 5 MG: 5 TABLET ORAL at 21:46

## 2020-01-01 RX ADMIN — RIFAXIMIN 550 MG: 550 TABLET ORAL at 17:50

## 2020-01-01 RX ADMIN — RIFAXIMIN 550 MG: 550 TABLET ORAL at 10:26

## 2020-01-01 RX ADMIN — PHYTONADIONE 10 MG: 10 INJECTION, EMULSION INTRAMUSCULAR; INTRAVENOUS; SUBCUTANEOUS at 15:02

## 2020-01-01 RX ADMIN — IPRATROPIUM BROMIDE AND ALBUTEROL SULFATE 3 ML: .5; 3 SOLUTION RESPIRATORY (INHALATION) at 07:40

## 2020-01-01 RX ADMIN — ACETAMINOPHEN 650 MG: 325 TABLET, FILM COATED ORAL at 20:39

## 2020-01-01 RX ADMIN — FLUTICASONE PROPIONATE 2 SPRAY: 50 SPRAY, METERED NASAL at 09:58

## 2020-01-01 RX ADMIN — PROPRANOLOL HYDROCHLORIDE 10 MG: 10 TABLET ORAL at 22:53

## 2020-01-01 RX ADMIN — PROPRANOLOL HYDROCHLORIDE 10 MG: 10 TABLET ORAL at 21:46

## 2020-01-01 RX ADMIN — LACTULOSE 45 ML: 20 SOLUTION ORAL at 15:14

## 2020-01-01 RX ADMIN — RIFAXIMIN 550 MG: 550 TABLET ORAL at 23:39

## 2020-01-01 RX ADMIN — HEPARIN SODIUM 5000 UNITS: 5000 INJECTION INTRAVENOUS; SUBCUTANEOUS at 11:54

## 2020-01-01 RX ADMIN — MORPHINE SULFATE 2 MG: 2 INJECTION, SOLUTION INTRAMUSCULAR; INTRAVENOUS at 15:32

## 2020-01-01 RX ADMIN — RIFAXIMIN 550 MG: 550 TABLET ORAL at 09:06

## 2020-01-01 RX ADMIN — PANTOPRAZOLE SODIUM 40 MG: 40 TABLET, DELAYED RELEASE ORAL at 05:44

## 2020-01-01 RX ADMIN — HEPARIN SODIUM 5000 UNITS: 5000 INJECTION INTRAVENOUS; SUBCUTANEOUS at 05:00

## 2020-01-01 RX ADMIN — PANTOPRAZOLE SODIUM 40 MG: 40 TABLET, DELAYED RELEASE ORAL at 05:03

## 2020-01-01 RX ADMIN — MORPHINE SULFATE 2 MG: 2 INJECTION, SOLUTION INTRAMUSCULAR; INTRAVENOUS at 13:09

## 2020-01-01 RX ADMIN — IPRATROPIUM BROMIDE AND ALBUTEROL SULFATE 3 ML: .5; 3 SOLUTION RESPIRATORY (INHALATION) at 21:18

## 2020-01-01 RX ADMIN — PROPRANOLOL HYDROCHLORIDE 10 MG: 10 TABLET ORAL at 20:35

## 2020-01-01 RX ADMIN — LACTULOSE 45 ML: 20 SOLUTION ORAL at 22:53

## 2020-01-01 RX ADMIN — IPRATROPIUM BROMIDE AND ALBUTEROL SULFATE 3 ML: .5; 3 SOLUTION RESPIRATORY (INHALATION) at 01:24

## 2020-01-01 RX ADMIN — HEPARIN SODIUM 5000 UNITS: 5000 INJECTION INTRAVENOUS; SUBCUTANEOUS at 21:18

## 2020-01-01 RX ADMIN — LACTULOSE 45 ML: 20 SOLUTION ORAL at 21:46

## 2020-01-01 RX ADMIN — FUROSEMIDE 40 MG: 40 TABLET ORAL at 09:59

## 2020-01-01 RX ADMIN — CYCLOBENZAPRINE 10 MG: 10 TABLET, FILM COATED ORAL at 20:35

## 2020-01-01 RX ADMIN — ISOSORBIDE MONONITRATE 30 MG: 30 TABLET, EXTENDED RELEASE ORAL at 09:59

## 2020-01-01 RX ADMIN — OXYCODONE 5 MG: 5 TABLET ORAL at 20:35

## 2020-01-01 RX ADMIN — VANCOMYCIN HYDROCHLORIDE 1000 MG: 1 INJECTION, POWDER, LYOPHILIZED, FOR SOLUTION INTRAVENOUS at 05:43

## 2020-01-01 RX ADMIN — CYCLOBENZAPRINE 10 MG: 10 TABLET, FILM COATED ORAL at 10:10

## 2020-01-01 RX ADMIN — LACTULOSE 45 ML: 20 SOLUTION ORAL at 15:01

## 2020-01-01 RX ADMIN — SODIUM CHLORIDE 1500 MG: 9 INJECTION, SOLUTION INTRAVENOUS at 20:36

## 2020-01-01 RX ADMIN — ISOSORBIDE MONONITRATE 30 MG: 30 TABLET, EXTENDED RELEASE ORAL at 09:24

## 2020-01-01 RX ADMIN — HEPARIN SODIUM 5000 UNITS: 5000 INJECTION INTRAVENOUS; SUBCUTANEOUS at 20:35

## 2020-01-01 RX ADMIN — CEFTRIAXONE 1 G: 1 INJECTION, POWDER, FOR SOLUTION INTRAMUSCULAR; INTRAVENOUS at 22:10

## 2020-01-01 RX ADMIN — AZITHROMYCIN MONOHYDRATE 500 MG: 500 INJECTION, POWDER, LYOPHILIZED, FOR SOLUTION INTRAVENOUS at 22:10

## 2020-01-01 RX ADMIN — FUROSEMIDE 40 MG: 40 TABLET ORAL at 09:05

## 2020-01-01 RX ADMIN — RIFAXIMIN 550 MG: 550 TABLET ORAL at 20:36

## 2020-01-01 RX ADMIN — CEFTRIAXONE 1 G: 1 INJECTION, POWDER, FOR SOLUTION INTRAMUSCULAR; INTRAVENOUS at 15:14

## 2020-01-01 RX ADMIN — PROPRANOLOL HYDROCHLORIDE 10 MG: 10 TABLET ORAL at 09:24

## 2020-01-01 RX ADMIN — HEPARIN SODIUM 5000 UNITS: 5000 INJECTION INTRAVENOUS; SUBCUTANEOUS at 21:46

## 2020-01-01 RX ADMIN — PANTOPRAZOLE SODIUM 40 MG: 40 TABLET, DELAYED RELEASE ORAL at 06:25

## 2020-01-01 RX ADMIN — OXYCODONE 5 MG: 5 TABLET ORAL at 22:34

## 2020-01-01 RX ADMIN — HEPARIN SODIUM 5000 UNITS: 5000 INJECTION INTRAVENOUS; SUBCUTANEOUS at 05:43

## 2020-01-01 RX ADMIN — VANCOMYCIN HYDROCHLORIDE 1000 MG: 1 INJECTION, POWDER, LYOPHILIZED, FOR SOLUTION INTRAVENOUS at 18:11

## 2020-01-01 RX ADMIN — AMPICILLIN SODIUM AND SULBACTAM SODIUM 3 G: 2; 1 INJECTION, POWDER, FOR SOLUTION INTRAMUSCULAR; INTRAVENOUS at 13:00

## 2020-01-01 RX ADMIN — AZITHROMYCIN MONOHYDRATE 500 MG: 500 INJECTION, POWDER, LYOPHILIZED, FOR SOLUTION INTRAVENOUS at 22:38

## 2020-01-01 RX ADMIN — RIFAXIMIN 550 MG: 550 TABLET ORAL at 22:34

## 2020-01-01 RX ADMIN — LACTULOSE 45 ML: 20 SOLUTION ORAL at 10:01

## 2020-01-01 RX ADMIN — ONDANSETRON 4 MG: 2 INJECTION INTRAMUSCULAR; INTRAVENOUS at 22:50

## 2020-01-01 RX ADMIN — FUROSEMIDE 40 MG: 40 TABLET ORAL at 09:24

## 2020-01-01 RX ADMIN — IPRATROPIUM BROMIDE AND ALBUTEROL SULFATE 3 ML: .5; 3 SOLUTION RESPIRATORY (INHALATION) at 14:15

## 2020-01-01 RX ADMIN — TETANUS TOXOID, REDUCED DIPHTHERIA TOXOID AND ACELLULAR PERTUSSIS VACCINE, ADSORBED 0.5 ML: 5; 2.5; 8; 8; 2.5 SUSPENSION INTRAMUSCULAR at 03:30

## 2020-01-01 RX ADMIN — RIFAXIMIN 550 MG: 550 TABLET ORAL at 15:55

## 2020-01-01 RX ADMIN — CEFTRIAXONE 1 G: 1 INJECTION, POWDER, FOR SOLUTION INTRAMUSCULAR; INTRAVENOUS at 20:36

## 2020-01-01 RX ADMIN — IPRATROPIUM BROMIDE AND ALBUTEROL SULFATE 3 ML: .5; 3 SOLUTION RESPIRATORY (INHALATION) at 20:36

## 2020-01-01 RX ADMIN — RIFAXIMIN 550 MG: 550 TABLET ORAL at 22:53

## 2020-12-26 PROBLEM — K74.60 LIVER CIRRHOSIS (HCC): Status: ACTIVE | Noted: 2020-01-01

## 2020-12-26 PROBLEM — S22.080B: Status: ACTIVE | Noted: 2020-01-01

## 2020-12-26 PROBLEM — E87.6 HYPOKALEMIA: Status: ACTIVE | Noted: 2020-01-01

## 2020-12-26 PROBLEM — M54.9 INTRACTABLE BACK PAIN: Status: ACTIVE | Noted: 2020-01-01

## 2020-12-26 PROBLEM — S32.010B: Status: ACTIVE | Noted: 2020-01-01

## 2020-12-26 PROBLEM — R91.1 PULMONARY NODULE: Status: ACTIVE | Noted: 2020-01-01

## 2020-12-26 NOTE — ED NOTES
TRANSFER - OUT REPORT: 
 
Verbal report given to erasmo(name) on Vero Koroma  being transferred to (unit) for routine progression of care Report consisted of patients Situation, Background, Assessment and  
Recommendations(SBAR). Information from the following report(s) SBAR was reviewed with the receiving nurse. Lines:  
Peripheral IV 12/26/20 Left Forearm (Active) Site Assessment Clean, dry, & intact 12/26/20 0314 Phlebitis Assessment 0 12/26/20 0314 Infiltration Assessment 0 12/26/20 0314 Dressing Status Clean, dry, & intact 12/26/20 0314 Dressing Type Transparent 12/26/20 0314 Hub Color/Line Status Pink 12/26/20 0314 Opportunity for questions and clarification was provided. Patient transported with: 
 [employer]

## 2020-12-26 NOTE — PROGRESS NOTES
During admission, pt extremely drowsy and not able to answer questions; spouse assisted with admission. Pt. Complaining of inability to urinate, orders received for tellez catheter. Tellez inserted using sterile technique, drained 900 ml. Tea-colored urine. Throughout the evening pt. Became more lethargic and unresponsive. Written orders received from Dr. Tootie Gregory for NG tube insertion and bilateral mittens for medication administration. Will continue to monitor.

## 2020-12-26 NOTE — ED PROVIDER NOTES
EMERGENCY DEPARTMENT HISTORY AND PHYSICAL EXAM 
 
 
Date: 12/26/2020 Patient Name: Wild Lakhani History of Presenting Illness Chief Complaint Patient presents with  Back Pain History Provided By: Patient HPI: Wild Lakhani, 60 y.o. male with a past medical history significant hypertension presents to the ED with intense bilateral lower back pain which stopped him from ambulating earlier today. Pain began 3 days ago does not recall any trauma or injury. Tonight was woken with pain was unable to get up out of bed which brought him to the ED. There are no other complaints, changes, or physical findings at this time. PCP: Whit Gregorio MD 
 
Current Outpatient Medications Medication Sig Dispense Refill  propranoloL (INDERAL) 20 mg tablet Take 20 mg by mouth two (2) times a day.  folic acid (FOLVITE) 1 mg tablet Take 1 mg by mouth daily.  isosorbide mononitrate ER (IMDUR) 30 mg tablet Take 30 mg by mouth daily.  furosemide (LASIX) 40 mg tablet Take 40 mg by mouth daily.  tiotropium (Spiriva with HandiHaler) 18 mcg inhalation capsule Take 1 Cap by inhalation daily.  guaiFENesin (ORGANIDIN) 400 mg tablet Take 400 mg by mouth every four (4) hours as needed for Congestion. Past History Past Medical History: 
History reviewed. No pertinent past medical history. Past Surgical History: 
History reviewed. No pertinent surgical history. Family History: 
History reviewed. No pertinent family history. Social History: 
Social History Tobacco Use  Smoking status: Current Every Day Smoker Packs/day: 1.00 Substance Use Topics  Alcohol use: Yes  Drug use: Not Currently Allergies: 
No Known Allergies Review of Systems *Review of Systems Constitutional: Positive for activity change, appetite change and fatigue. HENT: Negative for congestion. Respiratory: Negative for chest tightness and shortness of breath. Cardiovascular: Negative for chest pain and palpitations. Gastrointestinal: Positive for abdominal distention. Negative for abdominal pain, nausea and vomiting. Genitourinary: Positive for flank pain. Negative for difficulty urinating. Musculoskeletal: Negative for myalgias. Skin: Negative for color change and wound. Neurological: Negative for dizziness and numbness. Psychiatric/Behavioral: Negative for confusion. Physical Exam  
 
Physical Exam 
Vitals signs and nursing note reviewed. Constitutional:   
   General: He is not in acute distress. Appearance: Normal appearance. He is not ill-appearing or toxic-appearing. HENT:  
   Head: Normocephalic and atraumatic. Right Ear: External ear normal.  
   Left Ear: External ear normal.  
   Nose: Nose normal.  
   Mouth/Throat:  
   Mouth: Mucous membranes are moist.  
Eyes:  
   Pupils: Pupils are equal, round, and reactive to light. Neck: Musculoskeletal: Normal range of motion and neck supple. Cardiovascular:  
   Rate and Rhythm: Regular rhythm. Tachycardia present. Heart sounds: No murmur. Pulmonary:  
   Effort: Pulmonary effort is normal.  
   Breath sounds: Normal breath sounds. Abdominal:  
   General: There is distension. Palpations: Abdomen is soft. Tenderness: There is abdominal tenderness (Mild). There is no rebound. Hernia: No hernia is present. Musculoskeletal:     
   General: Tenderness present. Comments: Pain with rom at the hips sensation inact . Skin: 
   General: Skin is warm and dry. Capillary Refill: Capillary refill takes less than 2 seconds. Neurological:  
   General: No focal deficit present. Mental Status: He is alert and oriented to person, place, and time. Mental status is at baseline. Psychiatric:     
   Mood and Affect: Mood normal.  
 
 
 
Lab and Diagnostic Study Results Labs - Recent Results (from the past 12 hour(s)) CBC WITH AUTOMATED DIFF Collection Time: 12/26/20  3:00 AM  
Result Value Ref Range WBC 4.4 4.1 - 11.1 K/uL  
 RBC 3.62 (L) 4.10 - 5.70 M/uL HGB 9.6 (L) 12.1 - 17.0 g/dL HCT 31.7 (L) 36.6 - 50.3 % MCV 87.6 80.0 - 99.0 FL  
 MCH 26.5 26.0 - 34.0 PG  
 MCHC 30.3 30.0 - 36.5 g/dL RDW 20.0 (H) 11.5 - 14.5 % PLATELET 792 (L) 024 - 400 K/uL MPV 9.6 8.9 - 12.9 FL  
 NEUTROPHILS 54 32 - 75 % LYMPHOCYTES 24 12 - 49 % MONOCYTES 18 (H) 5 - 13 % EOSINOPHILS 3 0 - 7 % BASOPHILS 1 0 - 1 % IMMATURE GRANULOCYTES 0 0.0 - 0.5 % ABS. NEUTROPHILS 2.4 1.8 - 8.0 K/UL  
 ABS. LYMPHOCYTES 1.1 0.8 - 3.5 K/UL  
 ABS. MONOCYTES 0.8 0.0 - 1.0 K/UL  
 ABS. EOSINOPHILS 0.1 0.0 - 0.4 K/UL  
 ABS. BASOPHILS 0.0 0.0 - 0.1 K/UL  
 ABS. IMM. GRANS. 0.0 0.00 - 0.04 K/UL  
 DF AUTOMATED METABOLIC PANEL, BASIC Collection Time: 12/26/20  3:00 AM  
Result Value Ref Range Sodium 140 136 - 145 mmol/L Potassium 3.2 (L) 3.5 - 5.1 mmol/L Chloride 110 (H) 97 - 108 mmol/L  
 CO2 25 21 - 32 mmol/L Anion gap 5 5 - 15 mmol/L Glucose 120 (H) 65 - 100 mg/dL BUN 19 6 - 20 mg/dL Creatinine 1.56 (H) 0.70 - 1.30 mg/dL BUN/Creatinine ratio 12 12 - 20 GFR est AA 55 (L) >60 ml/min/1.73m2 GFR est non-AA 45 (L) >60 ml/min/1.73m2 Calcium 8.7 8.5 - 10.1 mg/dL URINALYSIS W/MICROSCOPIC Collection Time: 12/26/20  3:30 AM  
Result Value Ref Range Color Yellow/Straw Appearance Clear Clear Specific gravity 1.014 1.003 - 1.030    
 pH (UA) 6.0 5.0 - 8.0 Protein Negative Negative mg/dL Glucose Negative Negative mg/dL Ketone Negative Negative mg/dL Bilirubin Negative Negative Blood Large (A) Negative Urobilinogen 0.1 0.1 - 1.0 EU/dL Nitrites Negative Negative Leukocyte Esterase Negative Negative WBC 0-4 0 - 4 /hpf  
 RBC 5-10 0 - 5 /hpf Bacteria Negative Negative /hpf Mucus Trace (A) Negative /lpf Radiologic Studies -  
[unfilled] CT Results  (Last 48 hours) 12/26/20 0508  CT ABD PELV WO CONT Final result Impression:  IMPRESSION:  
   
Liver cirrhosis. Mild splenomegaly. Upper abdominal and paraesophageal varices. Ascites. No bowel obstruction. Right lower lobe pulmonary nodule may represent tumor or other. Follow up is  
recommended to exclude malignancy. The results were called and verbally  
communicated to Dr. Maxine Elliott, on 12/26/2020 at 6:07 AM. Thoracolumbar compression fractures of indeterminate age, may be old. Small nonobstructive renal stones. Cholelithiasis. Small umbilical hernia. Narrative:  CT abdomen and pelvis without contrast  
   
Dose reduction: All CT scans at this facility are performed using dose reduction  
optimization techniques as appropriate to a performed exam including the  
following: Automated exposure control, adjustments of the mA and/or kV according  
to patient size, or use of iterative reconstruction technique. INDICATION: Abdominal pain FINDINGS:  
   
There is an approximately 2.5 cm irregular pulmonary nodule in the right lower  
lobe. Mild dependent atelectasis. Pacemaker. Coronary calcification. Cirrhotic  
changes of the liver. Varices in the upper abdomen including paraesophageal  
region. Spleen is slightly enlarged. Pancreas is unremarkable on this  
noncontrast study. Cholelithiasis. Small bilateral nonobstructive renal stones. No aneurysm of abdominal aorta. Atherosclerosis. Slightly prominent upper  
abdominal nodes. No bowel obstruction. Colonic stool and underdistention limit the evaluation for  
lesion/mass. No inflammatory changes to suggest diverticulitis or appendicitis. No discrete abscess. No free intraperitoneal air. Small to moderate amount of  
ascites in the abdomen and pelvis. Small umbilical hernia containing omentum/fat and small amount of fluid. Old right lower rib and spinous process fractures. Bony demineralization. Moderate compression fractures of T12, L3 and L4  
vertebral bodies with mild retropulsion into the canal.  
   
  
  
 
CXR Results  (Last 48 hours) None Medical Decision Making and ED Course - I am the first and primary provider for this patient AND AM THE PRIMARY PROVIDER OF RECORD. - I reviewed the vital signs, available nursing notes, past medical history, past surgical history, family history and social history. - Initial assessment performed. The patients presenting problems have been discussed, and the staff are in agreement with the care plan formulated and outlined with them. I have encouraged them to ask questions as they arise throughout their visit. Vital Signs-Reviewed the patient's vital signs. Patient Vitals for the past 12 hrs: 
 Temp Pulse Resp BP SpO2  
12/26/20 0742  100 20 (!) 109/55 97 % 12/26/20 0600  100 18 (!) 112/52 100 % 12/26/20 0500  (!) 103 20 (!) 110/54 100 % 12/26/20 0400  (!) 102 19 (!) 100/57 100 % 12/26/20 0337  (!) 103 24 (!) 113/52 99 % 12/26/20 0310     94 % 12/26/20 0300  (!) 104 24 (!) 111/55 98 % 12/26/20 0250 99.5 °F (37.5 °C) (!) 104 19 129/63 94 % Records Reviewed: Nursing Notes ED Course:  
 
Provider Notes (Medical Decision Making):  
Patient is a 52-year-old male presenting to the emergency department with bilateral lower back pain and difficulty ambulating due to pain. He has no midline tenderness. Having persistent pain in his back decreased ability to ambulate due to pain. Sensation intact distally. Decreased range of motion of the hips due to pain but he is able to flex extend feet with good strength. Abdomen is mildly distended. Mild abdominal discomfort to palpation. Liver feels enlarged. Consultations: Consultations: Hospitalist Consultant: Dr. Stokes Less: We have asked for emergent assistance with regard to this patient. We have discussed the patients HPI, ROS, PE and results this far. They will come and evaluate the patient for admission. Disposition Disposition:  
Likely admission, if poor response to fluid and pain control will  Admit for observation. Diagnosis Clinical Impression: 1. Lung mass 2. Cirrhosis of liver with ascites, unspecified hepatic cirrhosis type ([address] 3. Intractable pain Attestations: 
 
Luís Kitchen MD 
 
Please note that this dictation was completed with [employer], the computer voice recognition software. Quite often unanticipated grammatical, syntax, homophones, and other interpretive errors are inadvertently transcribed by the computer software. Please disregard these errors. Please excuse any errors that have escaped final proofreading. Thank you.

## 2020-12-26 NOTE — PROGRESS NOTES
Admission skin assessment by Chet Machado RN and Fanta Reis RN. Skin is warm, dry, and intact. No evidence of skin breakdown at this time.

## 2020-12-26 NOTE — PROGRESS NOTES
Unsuccessful attempt to insert NG tube. Pt. Became alert and combative. Blood present on tip of NG and it was decided not to advance further r/t history of varices. Pt. Alert enough to take PO meds. Will continue to monitor.

## 2020-12-26 NOTE — H&P
History & Physical 
 
Primary Care Provider: Rosario Robles MD 
Source of Information: Patient staff, ED physician, records. History of Presenting Illness:  
Maik Schmid is a 60 y.o. male who presents with history significant for cirrhosis and appears to follow at [facility] liver clinic, hypertension, appears to have an AICD device in his left upper chest but is unable to provide details. He admits to alcohol, states currently drinking less than he used to. He presents to the emergency room with bilateral lower back pain rendering her unable to ambulate earlier today. Pain started about 3 days ago and he does not recall any trauma or injury. He is quite somnolent now, awakens but provides limited details. CT scan of the abdomen revealing thoracolumbar compression fractures of indeterminate age. Also commenting on liver cirrhosis, mild splenomegaly and mild ascites as well as positive for varices. Here in the ED he has received morphine, Toradol, Valium, contributing to his somnolence. In lieu of his intractable back pain and inability to walk is referred to us for admission and further evaluation. Review of Systems: Limited as he is quite somnolent now. Review of Systems Constitutional: Negative for chills, diaphoresis and fever. HENT: Negative. Eyes: Negative. Respiratory: Negative for cough, shortness of breath and wheezing. Cardiovascular: Negative for chest pain and leg swelling. Gastrointestinal: Negative for abdominal pain. Positive for abdominal distention Musculoskeletal: Positive for back pain. Skin: Negative. Neurological: Positive for weakness. Legs attributing to lower back Endo/Heme/Allergies: Negative. Constitutional: Positive for activity change, appetite change and fatigue. HENT: Negative for congestion. Respiratory: Negative for chest tightness and shortness of breath. Cardiovascular: Negative for chest pain and palpitations. Gastrointestinal: Positive for abdominal distention. Negative for abdominal pain, nausea and vomiting. Genitourinary: Positive for flank pain. Negative for difficulty urinating. Musculoskeletal: Negative for myalgias. Skin: Negative for color change and wound. Neurological: Negative for dizziness and numbness. Psychiatric/Behavioral: Negative for confusion. History reviewed. No pertinent past medical history. History reviewed. No pertinent surgical history. Prior to Admission medications Medication Sig Start Date End Date Taking? Authorizing Provider  
propranoloL (INDERAL) 20 mg tablet Take 20 mg by mouth two (2) times a day. Yes Other, MD Leslie  
folic acid (FOLVITE) 1 mg tablet Take 1 mg by mouth daily. Yes Star, MD Leslie  
isosorbide mononitrate ER (IMDUR) 30 mg tablet Take 30 mg by mouth daily. Yes Star, MD Leslie  
furosemide (LASIX) 40 mg tablet Take 40 mg by mouth daily. Yes Star, MD Leslie  
tiotropium (Spiriva with HandiHaler) 18 mcg inhalation capsule Take 1 Cap by inhalation daily. Yes Star, MD Leslie  
guaiFENesin (ORGANIDIN) 400 mg tablet Take 400 mg by mouth every four (4) hours as needed for Congestion. Yes Other, MD Leslie  
 
No Known Allergies History reviewed. No pertinent family history. SOCIAL HISTORY: 
Patient resides: 
[facility] With [facility] Smoking history:  
None Former Chronic Alcohol history:  
None Social   
Chronic x Ambulates:  
Independently x  
w/cane   
w/walker   
w/wc CODE STATUS: 
DNR Full x Other Objective:  
 
Physical Exam:  
 
Visit Vitals BP (!) 109/55 Pulse 100 Temp 99.5 °F (37.5 °C) Resp 20 Ht 6' 1\" (1.854 m) Wt 99.8 kg (220 lb) SpO2 97% BMI 29.03 kg/m² O2 Flow Rate (L/min): 2 l/min O2 Device: Nasal cannula General:   He appears very somnolent, he does awaken to verbal but appears confused. Does follow commands. Head:  Normocephalic, without obvious abnormality, atraumatic. Eyes:  PERRL, anicteric, there is some conjunctival injection. Throat:  Oral mucosa dry, edentulous. Neck: Supple, symmetrical, trachea midline, no adenopathy, no JVD. Back:    Limited exam as he is lying supine in bed, pain to limited movement. Lungs:   Clear to auscultation bilaterally. Diminished diffusely adventitious sounds appreciated Heart:  Regular rate and rhythm, S1, S2 normal, no murmur, click, rub or gallop. Abdomen:   Soft, non-tender. Distended. Bowel sounds normal. No masses,  No organomegaly. Extremities: Extremities normal, atraumatic, no cyanosis or edema. Pulses: 2+ and symmetric all extremities. Skin:  Warm and dry, anicteric Data Review:  
 
Recent Days: 
Recent Labs  
  12/26/20 
0300 WBC 4.4 HGB 9.6* HCT 31.7* * Recent Labs  
  12/26/20 
0300   
K 3.2*  
* CO2 25 * BUN 19  
CREA 1.56* CA 8.7 No results for input(s): PH, PCO2, PO2, HCO3, FIO2 in the last 72 hours. 24 Hour Results: 
Recent Results (from the past 24 hour(s)) CBC WITH AUTOMATED DIFF Collection Time: 12/26/20  3:00 AM  
Result Value Ref Range WBC 4.4 4.1 - 11.1 K/uL  
 RBC 3.62 (L) 4.10 - 5.70 M/uL HGB 9.6 (L) 12.1 - 17.0 g/dL HCT 31.7 (L) 36.6 - 50.3 % MCV 87.6 80.0 - 99.0 FL  
 MCH 26.5 26.0 - 34.0 PG  
 MCHC 30.3 30.0 - 36.5 g/dL RDW 20.0 (H) 11.5 - 14.5 % PLATELET 509 (L) 468 - 400 K/uL MPV 9.6 8.9 - 12.9 FL  
 NEUTROPHILS 54 32 - 75 % LYMPHOCYTES 24 12 - 49 % MONOCYTES 18 (H) 5 - 13 % EOSINOPHILS 3 0 - 7 % BASOPHILS 1 0 - 1 % IMMATURE GRANULOCYTES 0 0.0 - 0.5 % ABS. NEUTROPHILS 2.4 1.8 - 8.0 K/UL  
 ABS. LYMPHOCYTES 1.1 0.8 - 3.5 K/UL  
 ABS. MONOCYTES 0.8 0.0 - 1.0 K/UL  
 ABS. EOSINOPHILS 0.1 0.0 - 0.4 K/UL ABS. BASOPHILS 0.0 0.0 - 0.1 K/UL  
 ABS. IMM. GRANS. 0.0 0.00 - 0.04 K/UL  
 DF AUTOMATED METABOLIC PANEL, BASIC Collection Time: 12/26/20  3:00 AM  
Result Value Ref Range Sodium 140 136 - 145 mmol/L Potassium 3.2 (L) 3.5 - 5.1 mmol/L Chloride 110 (H) 97 - 108 mmol/L  
 CO2 25 21 - 32 mmol/L Anion gap 5 5 - 15 mmol/L Glucose 120 (H) 65 - 100 mg/dL BUN 19 6 - 20 mg/dL Creatinine 1.56 (H) 0.70 - 1.30 mg/dL BUN/Creatinine ratio 12 12 - 20 GFR est AA 55 (L) >60 ml/min/1.73m2 GFR est non-AA 45 (L) >60 ml/min/1.73m2 Calcium 8.7 8.5 - 10.1 mg/dL URINALYSIS W/MICROSCOPIC Collection Time: 12/26/20  3:30 AM  
Result Value Ref Range Color Yellow/Straw Appearance Clear Clear Specific gravity 1.014 1.003 - 1.030    
 pH (UA) 6.0 5.0 - 8.0 Protein Negative Negative mg/dL Glucose Negative Negative mg/dL Ketone Negative Negative mg/dL Bilirubin Negative Negative Blood Large (A) Negative Urobilinogen 0.1 0.1 - 1.0 EU/dL Nitrites Negative Negative Leukocyte Esterase Negative Negative WBC 0-4 0 - 4 /hpf  
 RBC 5-10 0 - 5 /hpf Bacteria Negative Negative /hpf Mucus Trace (A) Negative /lpf  
 
CT Results  (Last 48 hours) 12/26/20 0508  CT ABD PELV WO CONT Final result Impression:  IMPRESSION:  
   
Liver cirrhosis. Mild splenomegaly. Upper abdominal and paraesophageal varices. Ascites. No bowel obstruction. Right lower lobe pulmonary nodule may represent tumor or other. Follow up is  
recommended to exclude malignancy. The results were called and verbally  
communicated to Dr. Cecily Silverio, on 12/26/2020 at 6:07 AM. Thoracolumbar compression fractures of indeterminate age, may be old. Small nonobstructive renal stones. Cholelithiasis. Small umbilical hernia.   
  
 Narrative:  CT abdomen and pelvis without contrast  
   
 Dose reduction: All CT scans at this facility are performed using dose reduction  
optimization techniques as appropriate to a performed exam including the  
following: Automated exposure control, adjustments of the mA and/or kV according  
to patient size, or use of iterative reconstruction technique. INDICATION: Abdominal pain FINDINGS:  
   
There is an approximately 2.5 cm irregular pulmonary nodule in the right lower  
lobe. Mild dependent atelectasis. Pacemaker. Coronary calcification. Cirrhotic  
changes of the liver. Varices in the upper abdomen including paraesophageal  
region. Spleen is slightly enlarged. Pancreas is unremarkable on this  
noncontrast study. Cholelithiasis. Small bilateral nonobstructive renal stones. No aneurysm of abdominal aorta. Atherosclerosis. Slightly prominent upper  
abdominal nodes. No bowel obstruction. Colonic stool and underdistention limit the evaluation for  
lesion/mass. No inflammatory changes to suggest diverticulitis or appendicitis. No discrete abscess. No free intraperitoneal air. Small to moderate amount of  
ascites in the abdomen and pelvis. Small umbilical hernia containing omentum/fat  
and small amount of fluid. Old right lower rib and spinous process fractures. Bony demineralization. Moderate compression fractures of T12, L3 and L4  
vertebral bodies with mild retropulsion into the canal.  
   
  
  
 
 
Imaging:  
 
Assessment:  
 
Active Problems: 
  Liver cirrhosis ([address] (12/26/2020) Pulmonary nodule (12/26/2020) Intractable back pain (12/26/2020) Open compression fracture of thoracolumbar vertebra ([address] (12/26/2020) Plan: --Intractable back pain: Thoracolumbar compression fractures seen on CT of the abdomen. Been given morphine, Toradol, Valium here in the ED and he is somnolent. He does not recall any traumatic injury. The pain started 3 days ago. We will get orthopedic surgery involved for further recommendations. Hold PT pending Ortho recommendations. Pain control. --Encephalopathy: Patient is quite somnolent for me, appear to be more alert earlier per staff. Likely result of morphine, Valium though ammonia level is pending in lieu of his history of cirrhosis and continued alcohol ingestion. Supportive care. 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 Hgb 9.6: This appears chronic, he appears to follow at [facility] liver clinic. Appears to be on propranolol though his bottle of medicine at bedside is empty, refills remain as well as Lasix. LFTs are pending as well as coags. --Chronic alcohol abuse: States that he does not drink as much now as he did before. Quantifies it 2 beers now daily. Ativan as needed, CIWA. --Appears to have an AICD in his left upper chest that was relatively recently placed but he is unable to provide me with details. Trying to get a hold of significant other to assist with details. Have been able to contact yet. --Hypertension: Appears to only take propranolol and Lasix as well as isosorbide. We will continue these as tolerated. --CHOCO versus CKD: Gentle hydration and follow and will avoid nephrotoxic drugs. VTEP: Heparin subcu GIP: PPI Full CODE STATUS presumptively pending discussion with family member. Disposition: Inpatient admission anticipating more than a 2 night stay, orthopedic consult. Not sure if somnolence now is related to his medications here for pain versus hepatic encephalopathy, ammonia level is pending. Admits to ongoing alcohol, monitor CIWA. Signed By: Renee Woods MD   
 December 26, 2020

## 2020-12-26 NOTE — PROGRESS NOTES
Becca Earthly level returned at 107. Hepatic encephalopathy. Will start lactulose 30 g 3 times daily, achieving a goal of 3 BMs per 24 hours and rifaximin. Will follow ammonia level in the morning. Get SLP for bedside swallow eval to see safety on swallowing. He is complaining to staff also that he cannot urinate. Get a renal ultrasound to see if there is an obstruction, his creatinine is 1.56 letter know if this is CHOCO or chronic disease probably chronic but will follow renal ultrasound, base Marquez catheter and follow DANIELA's closely.

## 2020-12-27 NOTE — CONSULTS
FRACTURE CONSULT NOTE Subjective:  
 
Date of Consultation:  December 27, 2020 Referring Physician:  Hospitalist Dr Ray Stockton Jerome Worrell is a 60 y.o. male who is being seen for Low back pain diffuse back. Injury occurred  many years ago Workup has revealed T12, L3, L4 compression fractures. Patient's ambulatory status includes Cane, Type: Single [employer] and their home environment is home. Patient has history of cirrhosis for last 10 years. Patient also has a recent history of AICD replacement. He denies any prior history of heart attacks or strokes. He denies any history of recent fall he does remember a remote fall. He denies any loss of bowel bladder control. Patient's pain started suddenly last Friday and is bad enough that he cannot move around or even turn in the bed. Patient's ER work-up has included a CT scan of the abdomen pelvis which shows a lumbar spine compression fractures. Chest CT shows a right lower lobe nodule in the lung. Patient has gallstones and kidney stones. Patient's CT also shows atherosclerosis with calcification in coronary arteries. Patient says his cirrhosis is secondary to alcohol use. Patient Active Problem List  
 Diagnosis Date Noted  Liver cirrhosis ([address] 12/26/2020  Pulmonary nodule 12/26/2020  Intractable back pain 12/26/2020  Open compression fracture of thoracolumbar vertebra ([address] 12/26/2020  Hypokalemia 12/26/2020 History reviewed. No pertinent family history. Social History Tobacco Use  Smoking status: Current Every Day Smoker Packs/day: 1.00 Substance Use Topics  Alcohol use: Yes Past Medical History:  
Diagnosis Date  AICD (automatic cardioverter/defibrillator) present  CAD (coronary artery disease)  Cirrhosis of liver ([address]  Gallstones  Hypertension  Nodule of lower lobe of right lung  Renal stones Past Surgical History:  
Procedure Laterality Date  HX PACEMAKER PLACEMENT Prior to Admission medications Medication Sig Start Date End Date Taking? Authorizing Provider  
propranoloL (INDERAL) 20 mg tablet Take 20 mg by mouth two (2) times a day. Yes Other, MD Leslie  
folic acid (FOLVITE) 1 mg tablet Take 1 mg by mouth daily. Yes Other, MD Leslie  
isosorbide mononitrate ER (IMDUR) 30 mg tablet Take 30 mg by mouth daily. Yes Other, MD Leslie  
furosemide (LASIX) 40 mg tablet Take 40 mg by mouth daily. Yes Other, MD Leslie  
tiotropium (Spiriva with HandiHaler) 18 mcg inhalation capsule Take 1 Cap by inhalation daily. Yes Other, MD Leslie  
guaiFENesin (ORGANIDIN) 400 mg tablet Take 400 mg by mouth every four (4) hours as needed for Congestion. Yes Other, MD Leslie  
FLUTICASONE PROPION-SALMETEROL IN Take  by inhalation. Yes Provider, Historical  
 
Current Facility-Administered Medications Medication Dose Route Frequency  propranoloL (INDERAL) tablet 10 mg  10 mg Oral BID  [START ON 12/28/2020] tiotropium (SPIRIVA) inhalation capsule 18 mcg  1 Cap Inhalation DAILY  albuterol-ipratropium (DUO-NEB) 2.5 MG-0.5 MG/3 ML  3 mL Nebulization Q6H PRN  
 sodium chloride (NS) flush 5-40 mL  5-40 mL IntraVENous PRN  
 acetaminophen (TYLENOL) tablet 650 mg  650 mg Oral Q6H PRN Or  
 acetaminophen (TYLENOL) suppository 650 mg  650 mg Rectal Q6H PRN  polyethylene glycol (MIRALAX) packet 17 g  17 g Oral DAILY PRN  promethazine (PHENERGAN) tablet 12.5 mg  12.5 mg Oral Q6H PRN Or  
 ondansetron (ZOFRAN) injection 4 mg  4 mg IntraVENous Q6H PRN  
 isosorbide mononitrate ER (IMDUR) tablet 30 mg  30 mg Oral DAILY  pantoprazole (PROTONIX) tablet 40 mg  40 mg Oral ACB  morphine injection 2 mg  2 mg IntraVENous Q4H PRN  
 cyclobenzaprine (FLEXERIL) tablet 10 mg  10 mg Oral TID  heparin (porcine) injection 5,000 Units  5,000 Units SubCUTAneous Q8H  
 lactulose (CHRONULAC) 10 gram/15 mL solution 45 mL  30 g Oral TID  rifAXIMin (XIFAXAN) tablet 550 mg  550 mg Oral TID  cefTRIAXone (ROCEPHIN) 1 g in sterile water (preservative free) 10 mL IV syringe  1 g IntraVENous Q24H  
 azithromycin (ZITHROMAX) 500 mg in 0.9% sodium chloride 250 mL (VIAL-MATE)  500 mg IntraVENous Q24H No Known Allergies Review of Systems:  A comprehensive review of systems was negative except for that written in the HPI. Mental Status: no dementia Objective:  
 
Patient Vitals for the past 8 hrs: 
 BP Temp Pulse Resp SpO2  
20 0935     97 % 20 0801 (!) 99/55 98.2 °F (36.8 °C) 91 19 96 % Temp (24hrs), Av.8 °F (37.7 °C), Min:98.2 °F (36.8 °C), Max:101.8 °F (38.8 °C) EXAM:  
General:  Alert, cooperative, well noursished, well developed, appears stated age Eyes:  Sclera anicteric. PERRL Mouth/Throat: Mucous membranes normal, oral pharynx clear Neck: Supple Lungs:    No distress, bilaterally good effort expansion CV:  Regular rate and rhythm Abdomen:   Soft, non-tender. bowel sounds normal. distended Extremities: No cyanosis or edema Skin: Skin color, texture, turgor normal. no acute rash or lesions Lymph nodes: Cervical and supraclavicular normal  
Musculoskeletal:   Lumbar spine is tender. Tenderness is diffuse in the lumbar spine. Patient is unable to logroll in the bed. Straight leg raising test is absent. Hip, knee ankle and foot muscle strength is 5/5 though the hip extension is with pain. Light sensory touch is normal in both lower extremities. Deep tendon reflexes are 1/2 knees and ankles. Lines/Devices:  Intact, no erythema, drainage or tenderness Psych: Alert and oriented, normal mood affect given the setting Imaging Review: compression fracture T12, L3, L4 spine on CT abdomen. No significant retropulsion. Labs:  
Recent Results (from the past 24 hour(s)) EKG, 12 LEAD, INITIAL Collection Time: 20  9:04 PM  
Result Value Ref Range Ventricular Rate 120 BPM  
 Atrial Rate 120 BPM  
 P-R Interval 192 ms QRS Duration 100 ms Q-T Interval 334 ms QTC Calculation (Bezet) 472 ms Calculated P Axis 61 degrees Calculated R Axis -146 degrees Calculated T Axis 42 degrees Diagnosis Sinus tachycardia Right superior axis deviation Low voltage QRS Abnormal ECG No previous ECGs available Confirmed by [facility] Melisa TREJO [phone]) on 12/27/2020 10:48:58 AM 
  
CBC WITH AUTOMATED DIFF Collection Time: 12/27/20 12:29 AM  
Result Value Ref Range WBC 7.6 4.1 - 11.1 K/uL  
 RBC 3.10 (L) 4.10 - 5.70 M/uL HGB 8.5 (L) 12.1 - 17.0 g/dL HCT 28.3 (L) 36.6 - 50.3 % MCV 91.3 80.0 - 99.0 FL  
 MCH 27.4 26.0 - 34.0 PG  
 MCHC 30.0 30.0 - 36.5 g/dL  
 RDW 19.7 (H) 11.5 - 14.5 % PLATELET 96 (L) 895 - 400 K/uL MPV 10.4 8.9 - 12.9 FL  
 NEUTROPHILS 67 32 - 75 % LYMPHOCYTES 10 (L) 12 - 49 % MONOCYTES 23 (H) 5 - 13 % EOSINOPHILS 0 0 - 7 % BASOPHILS 0 0 - 1 % IMMATURE GRANULOCYTES 0 0.0 - 0.5 % ABS. NEUTROPHILS 5.1 1.8 - 8.0 K/UL  
 ABS. LYMPHOCYTES 0.7 (L) 0.8 - 3.5 K/UL  
 ABS. MONOCYTES 1.7 (H) 0.0 - 1.0 K/UL  
 ABS. EOSINOPHILS 0.0 0.0 - 0.4 K/UL  
 ABS. BASOPHILS 0.0 0.0 - 0.1 K/UL  
 ABS. IMM. GRANS. 0.0 0.00 - 0.04 K/UL  
 DF AUTOMATED METABOLIC PANEL, COMPREHENSIVE Collection Time: 12/27/20 12:29 AM  
Result Value Ref Range Sodium 145 136 - 145 mmol/L Potassium 3.4 (L) 3.5 - 5.1 mmol/L Chloride 116 (H) 97 - 108 mmol/L  
 CO2 24 21 - 32 mmol/L Anion gap 5 5 - 15 mmol/L Glucose 128 (H) 65 - 100 mg/dL BUN 20 6 - 20 mg/dL Creatinine 1.55 (H) 0.70 - 1.30 mg/dL BUN/Creatinine ratio 13 12 - 20 GFR est AA 55 (L) >60 ml/min/1.73m2 GFR est non-AA 45 (L) >60 ml/min/1.73m2 Calcium 7.8 (L) 8.5 - 10.1 mg/dL Bilirubin, total 0.8 0.2 - 1.0 mg/dL AST (SGOT) 27 15 - 37 U/L  
 ALT (SGPT) 12 12 - 78 U/L Alk. phosphatase 121 (H) 45 - 117 U/L Protein, total 6.7 6.4 - 8.2 g/dL Albumin 2.1 (L) 3.5 - 5.0 g/dL Globulin 4.6 (H) 2.0 - 4.0 g/dL A-G Ratio 0.5 (L) 1.1 - 2.2 LACTIC ACID Collection Time: 12/27/20 12:29 AM  
Result Value Ref Range Lactic acid 1.4 0.4 - 2.0 mmol/L METABOLIC PANEL, COMPREHENSIVE Collection Time: 12/27/20  3:58 AM  
Result Value Ref Range Sodium 145 136 - 145 mmol/L Potassium 3.5 3.5 - 5.1 mmol/L Chloride 115 (H) 97 - 108 mmol/L  
 CO2 24 21 - 32 mmol/L Anion gap 6 5 - 15 mmol/L Glucose 101 (H) 65 - 100 mg/dL BUN 21 (H) 6 - 20 mg/dL Creatinine 1.50 (H) 0.70 - 1.30 mg/dL BUN/Creatinine ratio 14 12 - 20 GFR est AA 57 (L) >60 ml/min/1.73m2 GFR est non-AA 47 (L) >60 ml/min/1.73m2 Calcium 8.1 (L) 8.5 - 10.1 mg/dL Bilirubin, total 0.9 0.2 - 1.0 mg/dL AST (SGOT) 29 15 - 37 U/L  
 ALT (SGPT) 13 12 - 78 U/L Alk. phosphatase 125 (H) 45 - 117 U/L Protein, total 7.0 6.4 - 8.2 g/dL Albumin 2.2 (L) 3.5 - 5.0 g/dL Globulin 4.8 (H) 2.0 - 4.0 g/dL A-G Ratio 0.5 (L) 1.1 - 2.2    
CBC WITH AUTOMATED DIFF Collection Time: 12/27/20  3:58 AM  
Result Value Ref Range WBC 7.4 4.1 - 11.1 K/uL  
 RBC 3.26 (L) 4.10 - 5.70 M/uL HGB 9.0 (L) 12.1 - 17.0 g/dL HCT 29.9 (L) 36.6 - 50.3 % MCV 91.7 80.0 - 99.0 FL  
 MCH 27.6 26.0 - 34.0 PG  
 MCHC 30.1 30.0 - 36.5 g/dL  
 RDW 19.8 (H) 11.5 - 14.5 % PLATELET 99 (L) 445 - 400 K/uL MPV 10.5 8.9 - 12.9 FL  
 NEUTROPHILS 67 32 - 75 % LYMPHOCYTES 11 (L) 12 - 49 % MONOCYTES 22 (H) 5 - 13 % EOSINOPHILS 0 0 - 7 % BASOPHILS 0 0 - 1 % AMMONIA Collection Time: 12/27/20  3:58 AM  
Result Value Ref Range Ammonia 97 (H) <32 umol/L Impression: Active Problems: 
  Liver cirrhosis ([facility] (12/26/2020) Pulmonary nodule (12/26/2020) Intractable back pain (12/26/2020) Open compression fracture of thoracolumbar vertebra ([address] (12/26/2020) Hypokalemia (12/26/2020) Low platelet count Plan: The diagnosis of thoracic T12 and lumbar 3 4 compression fracture was reviewed with the patient. Patient will need further work-up including vitamin D level, PT/INR to see any coagulopathy. Patient's platelets are low. Option of kyphoplasty for pain relief was discussed with patient. Patient CT scan and information will be passed on to the spine team and next week they can decide if he is a candidate based on other comorbidities and the labs for PT/INR. Patient is neurologically intact and the compression fractures appear to be stable without involvement of the spinal cord area. Patient does have a pacemaker and hence cannot have a MRI carried out. Patient may benefit from TLSO a lumbar corset for mobilization but secondary to patient's ascites and abdominal distention not sure if he can fit into it too well. Pain medication can be continued by the primary care team. 
Patient will be followed up next week by the spine team Dr. Justyn Roger MD

## 2020-12-27 NOTE — PROGRESS NOTES
Diet rec: Thin/soft-regular,1:1 assist/supervision. STRICT aspiration and GERD precautions advised. Monitor for overt s/sx of aspiration. Only provided po intake when pt with fully alert. ST to follow for diet tolerance and MBS as/if indicated. Clinician questioning GI consult and ENT consult given pt reports of lumps/mass removal in throat region ~1yr ago, though pt is a poor historian. Please see full note for details.

## 2020-12-27 NOTE — PROGRESS NOTES
Hospitalist Progress Note Daily Progress Note: 12/27/2020 Subjective: The patient is seen for follow  up. He was found to have markedly increased ammonia, hepatic encephalopathy in the emergency department subsequent to being seen yesterday. His mentation is much better though his ammonia levels improved to 97. He is only had one large bowel movement and I am increasing lactulose to 4 times daily. He was not on this at home he states. Fever last night to 101.8 Still complains of severe back pain but is marginally better Urine output 1575 Problem List: 
Problem List as of 12/27/2020 Never Reviewed Codes Class Noted - Resolved Liver cirrhosis (HCC) ICD-10-CM: K74.60 ICD-9-CM: 571.5  12/26/2020 - Present Pulmonary nodule ICD-10-CM: R91.1 ICD-9-CM: 793.11  12/26/2020 - Present Intractable back pain ICD-10-CM: M54.9 ICD-9-CM: 724.5  12/26/2020 - Present Open compression fracture of thoracolumbar vertebra (HCC) ICD-10-CM: S22.080B, S32.010B ICD-9-CM: 805.3  12/26/2020 - Present Hypokalemia ICD-10-CM: E87.6 ICD-9-CM: 276.8  12/26/2020 - Present Medications reviewed Current Facility-Administered Medications Medication Dose Route Frequency  propranoloL (INDERAL) tablet 10 mg  10 mg Oral BID  sodium chloride (NS) flush 5-40 mL  5-40 mL IntraVENous PRN  
 acetaminophen (TYLENOL) tablet 650 mg  650 mg Oral Q6H PRN Or  
 acetaminophen (TYLENOL) suppository 650 mg  650 mg Rectal Q6H PRN  polyethylene glycol (MIRALAX) packet 17 g  17 g Oral DAILY PRN  promethazine (PHENERGAN) tablet 12.5 mg  12.5 mg Oral Q6H PRN Or  
 ondansetron (ZOFRAN) injection 4 mg  4 mg IntraVENous Q6H PRN  
 isosorbide mononitrate ER (IMDUR) tablet 30 mg  30 mg Oral DAILY  pantoprazole (PROTONIX) tablet 40 mg  40 mg Oral ACB  morphine injection 2 mg  2 mg IntraVENous Q4H PRN  
 cyclobenzaprine (FLEXERIL) tablet 10 mg  10 mg Oral TID  heparin (porcine) injection 5,000 Units  5,000 Units SubCUTAneous Q8H  
 lactulose (CHRONULAC) 10 gram/15 mL solution 45 mL  30 g Oral TID  rifAXIMin (XIFAXAN) tablet 550 mg  550 mg Oral TID  cefTRIAXone (ROCEPHIN) 1 g in sterile water (preservative free) 10 mL IV syringe  1 g IntraVENous Q24H  
 azithromycin (ZITHROMAX) 500 mg in 0.9% sodium chloride 250 mL (VIAL-MATE)  500 mg IntraVENous Q24H Review of Systems:  
Review of Systems Constitutional: Positive for fever and malaise/fatigue. HENT: Negative. Eyes: Negative. Respiratory: Negative for shortness of breath. Cardiovascular: Positive for leg swelling. Gastrointestinal: Negative for blood in stool, melena, nausea and vomiting. Genitourinary: Negative. Musculoskeletal: Positive for back pain. Skin: Negative. Neurological: Positive for weakness. Endo/Heme/Allergies: Negative. Psychiatric/Behavioral: Negative. Objective:  
Physical Exam:  
 
Visit Vitals BP (!) 99/55 (BP 1 Location: Left arm, BP Patient Position: At rest;Supine; Head of bed elevated (Comment degrees)) Pulse 91 Temp 98.2 °F (36.8 °C) Resp 19 Ht 6' 1\" (1.854 m) Wt 97 kg (213 lb 13.5 oz) SpO2 97% BMI 28.21 kg/m² O2 Flow Rate (L/min): 2 l/min O2 Device: Nasal cannula Temp (24hrs), Av.8 °F (37.7 °C), Min:98.2 °F (36.8 °C), Max:101.8 °F (38.8 °C) No intake/output data recorded. 1901 -  0700 In: 2500 [P.O.:800; I.V.:1700] Out: 1575 [[id number]] General:  Alert, cooperative, still appears a little lethargic but is alert, cooperative appears lucid Lungs:   Clear to auscultation bilaterally. Diminished Chest wall:  No tenderness or deformity. Heart:  Regular rate and rhythm, S1, S2 normal, no murmur, click, rub or gallop. Abdomen:   Soft, Distended nontender bowel sounds normal. No masses,  No organomegaly.   
Extremities:  Back pain limits mobility of lower extremities,  
 Pulses: 2+ and symmetric all extremities. Skin:  Decreased turgor Neurologic: CNII-XII intact. No gross sensory or motor deficits Data Review:  
   
Recent Days: 
Recent Labs  
  12/27/20 
0358 12/27/20 
0029 12/26/20 
0300 WBC 7.4 7.6 4.4 HGB 9.0* 8.5* 9.6* HCT 29.9* 28.3* 31.7*  
PLT 99* 96* 101* Recent Labs  
  12/27/20 
0358 12/27/20 
0029 12/26/20 
1200 12/26/20 
1129 12/26/20 
0300  145  --   --  140  
K 3.5 3.4*  --   --  3.2*  
* 116*  --   --  110* CO2 24 24  --   --  25 * 128*  --   --  120* BUN 21* 20  --   --  19  
CREA 1.50* 1.55*  --   --  1.56* CA 8.1* 7.8*  --   --  8.7 ALB 2.2* 2.1* 2.5*  --   --   
TBILI 0.9 0.8 1.0  --   --   
ALT 13 12 16  --   --   
INR  --   --   --  1.5*  -- No results for input(s): PH, PCO2, PO2, HCO3, FIO2 in the last 72 hours. 24 Hour Results: 
Recent Results (from the past 24 hour(s)) EKG, 12 LEAD, INITIAL Collection Time: 12/26/20  9:04 PM  
Result Value Ref Range Ventricular Rate 120 BPM  
 Atrial Rate 120 BPM  
 P-R Interval 192 ms QRS Duration 100 ms Q-T Interval 334 ms QTC Calculation (Bezet) 472 ms Calculated P Axis 61 degrees Calculated R Axis -146 degrees Calculated T Axis 42 degrees Diagnosis Sinus tachycardia Right superior axis deviation Low voltage QRS Abnormal ECG No previous ECGs available [facility] [phone]) on 12/27/2020 10:48:58 AM 
  
CBC WITH AUTOMATED DIFF Collection Time: 12/27/20 12:29 AM  
Result Value Ref Range WBC 7.6 4.1 - 11.1 K/uL  
 RBC 3.10 (L) 4.10 - 5.70 M/uL HGB 8.5 (L) 12.1 - 17.0 g/dL HCT 28.3 (L) 36.6 - 50.3 % MCV 91.3 80.0 - 99.0 FL  
 MCH 27.4 26.0 - 34.0 PG  
 MCHC 30.0 30.0 - 36.5 g/dL  
 RDW 19.7 (H) 11.5 - 14.5 % PLATELET 96 (L) 447 - 400 K/uL MPV 10.4 8.9 - 12.9 FL  
 NEUTROPHILS 67 32 - 75 % LYMPHOCYTES 10 (L) 12 - 49 % MONOCYTES 23 (H) 5 - 13 % EOSINOPHILS 0 0 - 7 % BASOPHILS 0 0 - 1 % IMMATURE GRANULOCYTES 0 0.0 - 0.5 % ABS. NEUTROPHILS 5.1 1.8 - 8.0 K/UL  
 ABS. LYMPHOCYTES 0.7 (L) 0.8 - 3.5 K/UL  
 ABS. MONOCYTES 1.7 (H) 0.0 - 1.0 K/UL  
 ABS. EOSINOPHILS 0.0 0.0 - 0.4 K/UL  
 ABS. BASOPHILS 0.0 0.0 - 0.1 K/UL  
 ABS. IMM. GRANS. 0.0 0.00 - 0.04 K/UL  
 DF AUTOMATED METABOLIC PANEL, COMPREHENSIVE Collection Time: 12/27/20 12:29 AM  
Result Value Ref Range Sodium 145 136 - 145 mmol/L Potassium 3.4 (L) 3.5 - 5.1 mmol/L Chloride 116 (H) 97 - 108 mmol/L  
 CO2 24 21 - 32 mmol/L Anion gap 5 5 - 15 mmol/L Glucose 128 (H) 65 - 100 mg/dL BUN 20 6 - 20 mg/dL Creatinine 1.55 (H) 0.70 - 1.30 mg/dL BUN/Creatinine ratio 13 12 - 20 GFR est AA 55 (L) >60 ml/min/1.73m2 GFR est non-AA 45 (L) >60 ml/min/1.73m2 Calcium 7.8 (L) 8.5 - 10.1 mg/dL Bilirubin, total 0.8 0.2 - 1.0 mg/dL AST (SGOT) 27 15 - 37 U/L  
 ALT (SGPT) 12 12 - 78 U/L Alk. phosphatase 121 (H) 45 - 117 U/L Protein, total 6.7 6.4 - 8.2 g/dL Albumin 2.1 (L) 3.5 - 5.0 g/dL Globulin 4.6 (H) 2.0 - 4.0 g/dL A-G Ratio 0.5 (L) 1.1 - 2.2 LACTIC ACID Collection Time: 12/27/20 12:29 AM  
Result Value Ref Range Lactic acid 1.4 0.4 - 2.0 mmol/L METABOLIC PANEL, COMPREHENSIVE Collection Time: 12/27/20  3:58 AM  
Result Value Ref Range Sodium 145 136 - 145 mmol/L Potassium 3.5 3.5 - 5.1 mmol/L Chloride 115 (H) 97 - 108 mmol/L  
 CO2 24 21 - 32 mmol/L Anion gap 6 5 - 15 mmol/L Glucose 101 (H) 65 - 100 mg/dL BUN 21 (H) 6 - 20 mg/dL Creatinine 1.50 (H) 0.70 - 1.30 mg/dL BUN/Creatinine ratio 14 12 - 20 GFR est AA 57 (L) >60 ml/min/1.73m2 GFR est non-AA 47 (L) >60 ml/min/1.73m2 Calcium 8.1 (L) 8.5 - 10.1 mg/dL Bilirubin, total 0.9 0.2 - 1.0 mg/dL AST (SGOT) 29 15 - 37 U/L  
 ALT (SGPT) 13 12 - 78 U/L Alk. phosphatase 125 (H) 45 - 117 U/L Protein, total 7.0 6.4 - 8.2 g/dL Albumin 2.2 (L) 3.5 - 5.0 g/dL Globulin 4.8 (H) 2.0 - 4.0 g/dL A-G Ratio 0.5 (L) 1.1 - 2.2    
CBC WITH AUTOMATED DIFF Collection Time: 12/27/20  3:58 AM  
Result Value Ref Range WBC 7.4 4.1 - 11.1 K/uL  
 RBC 3.26 (L) 4.10 - 5.70 M/uL HGB 9.0 (L) 12.1 - 17.0 g/dL HCT 29.9 (L) 36.6 - 50.3 % MCV 91.7 80.0 - 99.0 FL  
 MCH 27.6 26.0 - 34.0 PG  
 MCHC 30.1 30.0 - 36.5 g/dL  
 RDW 19.8 (H) 11.5 - 14.5 % PLATELET 99 (L) 999 - 400 K/uL MPV 10.5 8.9 - 12.9 FL  
 NEUTROPHILS 67 32 - 75 % LYMPHOCYTES 11 (L) 12 - 49 % MONOCYTES 22 (H) 5 - 13 % EOSINOPHILS 0 0 - 7 % BASOPHILS 0 0 - 1 % AMMONIA Collection Time: 12/27/20  3:58 AM  
Result Value Ref Range Ammonia 97 (H) <32 umol/L Assessment/  
 
Patient Active Problem List  
Diagnosis Code  [facility] K74.60  Pulmonary nodule R91.1  Intractable back pain M54.9  Open compression fracture of thoracolumbar vertebra (HCC) S22.080B, S32.010B  Hypokalemia E87.6  
   
 
--Hepatic encephalopathy mentation is improving though ammonia still at 97 
--Liver cirrhosis, history of alcohol 
--Compression fracture thoracal lumbar vertebra, Ortho input appreciated 
--Coagulopathy secondary to his cirrhosis, INR is 1.7 
--Hypokalemia --Appears to have had a recent AICD in place, patient is still limited in terms of his recollection. Denies any cardiomyopathy though that is unlikely does not appear to be fluid overloaded or in failure, 
--Apparently a history of COPD, he states he takes nebulizer scheduled at home and Spiriva, respiratory status is stable --Bacteremia, initial blood culture reporting gram-positive cocci in chains 
--CHOCO versus CKD, creatinine marginally better Plan: 
Continue lactulose and rifaximin, continue propranolol 10 mg twice daily Orthopedic input is appreciated, His INR is elevated to 1.7 secondary to his liver disease we will add vitamin K for 3 days then check INR 
 In lieu of initial blood culture positive, will add vancomycin, will repeat blood cultures Have not been able to get a hold of family members to see if we can define better when that AICD was placed and why. VTEP: Heparin subcu Full CODE STATUS Disposition pending course Care Plan discussed with: Patient/Family, Nurse and Consultant Ortho Total time spent with patient: 30 minutes.  
 
Kate Joseph MD

## 2020-12-27 NOTE — PROGRESS NOTES
SPEECH LANGUAGE PATHOLOGY BEDSIDE SWALLOW EVALUATIONS Patient: Gen Polanco  (3 y.o. ) Date: 12/27/2020 Primary Diagnosis: liver cirrhosis, per RN lumbar compression fx Precautions:  Aspiration/GERD and Fall ASSESSMENT : 
RN reports consult s/t change in status yesterday afternoon and reduced alertness for po intake. RN reports pt is more alert and cooperative today. Pt repositioned upright with HOB at ~70 degrees per patient tolerance s/t back pain. Ox3. Administering po trials of thin from cup and solids. Poor dentition present. Pt with slow mastication, suspected s/t dentition. Pt talking throughout evaluation and education provided not to talk with bolus in oral cavity. Pt with delayed belching and dry cough post po trials. 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 Pt reports that lumps/masses were removed from throat last year but unable to identify location or cause, poor historian. Pt is impulsive with PO intake and may benefit from supervision during po intake. Upon digital palpation: pt's swallow initiation appears very mildly delayed and HLE appears functional.  
 
Patient will benefit from skilled intervention to address the above impairments. Patients rehabilitation potential is considered to be Good PLAN : 
Recommendations and Planned Interventions: 
Diet: Thin/soft-regular,1:1 assist/supervision. STRICT aspiration and GERD precautions advised. Monitor for overt s/sx of aspiration. Only provided po intake when pt with fully alert. ST to follow for diet tolerance and MBS as/if indicated. Clinician questioning GI consult and ENT consult given pt reports of lumps/mass removal in throat region ~1yr ago, though pt is a poor historian. Frequency/Duration: Patient will be followed by speech-language pathology daily to address goals. Discharge Recommendations: To Be Determined SUBJECTIVE:  
 pt reports of lumps/mass removal ~1yr ago in throat region, though pt is a poor historian. OBJECTIVE:  
 
History reviewed. No pertinent past medical history. CXR Results  (Last 48 hours) 12/26/20 1217  XR CHEST PORT Final result Impression:  Impression: No acute findings. Narrative:  Study: XR CHEST PORT Clinical indication: admit, encephalopathy, cirrhosis, pacer- aicd? chest no  
details. Comparison: None. Findings:  
   
Left chest wall ICD lead projecting over the right ventricle. No consolidative airspace disease, pleural effusion or pneumothorax. Prominent  
central coarsened interstitial markings bilaterally. Cardiomediastinal contours are within normal limits. No pulmonary edema. No acute osseous abdomen identified. Current Diet:  DIET CARDIAC Cognitive and Communication Status: 
Neurologic State: Confused, Restless Orientation Level: Oriented to person, Oriented to place, Oriented to situation Cognition: Impaired decision making Swallowing Evaluation:  
Oral Assessment: 
Oral Assessment Dentition: Limited;Poor P.O. Trials: 
Patient Position: Upright with HOB ~70 degrees s/t to tolerance and compression fx present per chart review Consistency Presented: Thin liquid;Mechanical soft; Solid Bolus Acceptance: No impairment Bolus Formation/Control: No impairment Propulsion: No impairment Oral Residue: None Initiation of Swallow: Delayed (# of seconds) Laryngeal Elevation: Decreased;Weak Aspiration Signs/Symptoms: Delayed cough/throat clear Pharyngeal Phase Characteristics: Poor endurance After treatment:  
Patient left in no apparent distress in bed COMMUNICATION/EDUCATION:  
Patient was educated regarding his deficit(s) of dysphagia, though requires additional training and education concerning safe swallow strategies. The patient's plan of care including recommendations, planned interventions, and recommended diet changes were discussed with: Registered nurse. Patient understands intent and goals of therapy, but is neutral about his/her participation. Thank you for this referral. 
Sussy See M.S., CCC-SLP Time Calculation: 12 mins Problem: Dysphagia (Adult) Goal: *Acute Goals and Plan of Care (Insert Text) Description: Speech Therapy Swallow Goals Initiated 12/27/2020 
-Patient will tolerate Thin/Soft-regular without clinical indicators of aspiration given moderate cues within 2-3 day(s). [ ] Not met  [ ]  MET   [ ] Progressing  [ ] Jyl Dawna 
-Patient will tolerate PO trials without clinical indicators of aspiration given moderate cues within 2-3 day(s). [ ] Not met  [ ]  MET   [ ] Progressing  [ ] Jyl Dawna 
-Patient will participate in modified barium swallow study within 7 day(s) as/if indicated. [ ] Not met  [ ]  MET   [ ] Progressing  [ ] Jyl Dawna 
-Patient will demonstrate understanding of swallow safety precautions and aspiration precautions, diet recs with minimal cues within 2-3 day(s). [ ] Not met  [ ]  MET   [ ] Misael   [ ] Jyl Dawna Outcome: Not Met Problem: Patient Education: Go to Patient Education Activity Goal: Patient/Family Education Outcome: Not Met

## 2020-12-27 NOTE — PROGRESS NOTES
Was paged that patient has elevated HR in the 130s, temp of 103 and now requiring 4L via NC. Of note, blood cultures recently drawn, CXR recently shows no acute process. Will recheck CBC, CMP, lactic acid and EKG. Will worsening shortness of breath and hypoxia, will start on ceftriaxone and azithromycin overnight. If hypoxia worsens, will further evaluate.   BP in 110s/50s, will give 500cc bolus of NS

## 2020-12-28 NOTE — PROGRESS NOTES
Vancomycin Update: 12/28/20 Day:2 Patients's Random level came back at 13.9 today, 16 hrs after receiving the dose of 1500 mg yesterday. Patient is started on a regimen of Vanc 1g q 12hr from today and scheduled a TROUGH on 12/30 AM.

## 2020-12-28 NOTE — PROGRESS NOTES
Reason for Admission:   Liver Cirrhosis RUR Score:          15 Plan for utilizing home health:      No HH prior, patient is open to using if needed. PCP: First and Last name:  Rachel Nava Name of Practice:  
 Are you a current patient: Yes/No: yes Approximate date of last visit: 3 weeks ago. Can you participate in a virtual visit with your PCP:  
                 
Current Advanced Directive/Advance Care Plan: No advanced directive, Patient has a son, patient does not know his sons telephone number. Transition of Care Plan:                   
 
Patient lives in a house with daughter in law and her . Patient uses a cane in the home. Patient is open to SNF or IRF if needed when discharged from the hospital.  CM will continue to follow while hospitalized.

## 2020-12-28 NOTE — PROGRESS NOTES
Problem: Falls - Risk of 
Goal: *Absence of Falls Description: Document Bharat Thompson Fall Risk and appropriate interventions in the flowsheet. Outcome: Progressing Towards Goal 
Note: Fall Risk Interventions: 
Mobility Interventions: Bed/chair exit alarm, PT Consult for mobility concerns, PT Consult for assist device competence, OT consult for ADLs Medication Interventions: Bed/chair exit alarm, Patient to call before getting OOB, Teach patient to arise slowly Elimination Interventions: Bed/chair exit alarm, Call light in reach, Patient to call for help with toileting needs History of Falls Interventions: Bed/chair exit alarm, Door open when patient unattended, Consult care management for discharge planning Problem: Pressure Injury - Risk of 
Goal: *Prevention of pressure injury Description: Document Misha Scale and appropriate interventions in the flowsheet. Outcome: Progressing Towards Goal 
Note: Pressure Injury Interventions: 
Sensory Interventions: Assess changes in LOC, Discuss PT/OT consult with provider, Float heels, Keep linens dry and wrinkle-free, Maintain/enhance activity level, Minimize linen layers, Turn and reposition approx. every two hours (pillows and wedges if needed), Monitor skin under medical devices Activity Interventions: PT/OT evaluation Mobility Interventions: HOB 30 degrees or less, PT/OT evaluation Nutrition Interventions: Document food/fluid/supplement intake, Offer support with meals,snacks and hydration Friction and Shear Interventions: HOB 30 degrees or less, Minimize layers, Transferring/repositioning devices, Feet elevated on foot rest

## 2020-12-28 NOTE — PROGRESS NOTES
Consult for Vancomycin Dosing by Pharmacy by Dr. Tommy Eisenmenger Consult provided for this 60y.o. year old male , for indication of gram + cocci in chains,bacteremia Day of Therapy: 1 Goal of Level(s): 15-20mcg/dL Other Current Antibiotics: Azithromycin,rocephin(Dr. Bunn) Significant Cultures:  
     
Results Procedure Component Value Units Date/Time CULTURE, BLOOD, PAIRED [[id number]] Order Status: Sent Specimen: Blood CULTURE, BLOOD, PAIRED [[id number]] Collected: 12/26/20 1500 Order Status: Completed Specimen: Blood Updated: 12/27/20 1451 Special Requests: No Special Requests Culture result: One of four bottles has been flagged positive by instrument. Bottle has been sent to [facility] laboratory to assess for possible growth. Gram Positive Cocci in pairs and chains CALLED TO AND READ BACK BY George Quintero on 12.27.2020 at 14:50 by tlw Serum Creatinine Creatinine Date Value Ref Range Status 12/27/2020 1.50 (H) 0.70 - 1.30 mg/dL Final  
12/27/2020 1.55 (H) 0.70 - 1.30 mg/dL Final  
12/26/2020 1.56 (H) 0.70 - 1.30 mg/dL Final  
  
Creatinine Clearance Estimated Creatinine Clearance: 60.9 mL/min (A) (based on SCr of 1.5 mg/dL (H)). BUN Lab Results Component Value Date/Time BUN 21 (H) 12/27/2020 03:58 AM  
  
WBC Lab Results Component Value Date/Time WBC 7.4 12/27/2020 03:58 AM  
  
Temp 98.7 °F (37.1 °C) Last Level: No results found for: VANCT No results found for: [facility] Ht Readings from Last 1 Encounters:  
12/27/20 185 cm (72.84\") Wt Readings from Last 1 Encounters:  
12/27/20 97 kg (213 lb 13.5 oz) Ideal body weight: 79.5 kg (175 lb 5 oz) Adjusted ideal body weight: 86.5 kg (190 lb 11.6 oz) New Regimen: Pharmacy ordered Vancomycin 1500mg iv today at 2000. Random level scheduled for 12 noon on 12/28/20 Pharmacy to follow daily and will make changes to dose and/or frequency based on clinical status. _________________________________ JAKE Person [facility]

## 2020-12-28 NOTE — PROGRESS NOTES
Hospitalist Progress Note Daily Progress Note: 12/28/2020 This is a 76-year-old gentleman who with a history of cardiomyopathy, alcoholic cirrhosis, AICD in place, COPD and continued smoking, hypertension, diabetes mellitus type 2, chronic CHF. Presented to the emergency department complaining of severe low back pain. Little is known of his history initially as he was very confused was found to have an hepatic encephalopathy with an ammonia level of 108. Started on rifaximin and lactulose, mentation improved, ammonia only marginally. He had Steri-Strips in place and chest x-ray suggestive of AICD though not able to contact next of kin. Seen by orthopedic surgery secondary to thoracolumbar compression fractures under evaluation. Blood cultures drawn on presentation grew gram-positive cocci in all 4 bottles, started on vancomycin. Subsequent blood culture at 4 hours showing no growth. Finally was able to speak with significant other who describes AICD change, initially placed 10 years ago, at [facility] about a month ago. She was also able to report to me patient's history more clearly. Apparently he does have a history of compression fractures in the past and has a brace for this at home which she wears infrequently. Pt now recalls alcohol on xmas day and recalls a fall. Also reported that he weekly gets paracentesis at [facility]. ID + Ortho on consult. Subjective: The patient is seen for follow  up. He's much more alert. Back pain remains severe and is unable to bear weight. Ortho input is noted and to be seen by back specialist following. S.O at bedside define prior hx and mentions that he has had a brace for his back- apparently prior compression fx, which he infrequently wears after previous improvement. She is also able to tell me that aicd was changed 1 month ago. He remains afebrile, bp low normal, blood cs from 12/26 x 4 growing gpg, 12/27 repeat no growth at 4 hrs. uop ~900(net 325) S.O. inquired about transfer to [facility] all his doctors are\" but after our discussion she indicated ok not to transfer. Problem List: 
Problem List as of 12/28/2020 Date Reviewed: 12/27/2020 Codes Class Noted - Resolved Liver cirrhosis (HCC) ICD-10-CM: K74.60 ICD-9-CM: 571.5  12/26/2020 - Present Pulmonary nodule ICD-10-CM: R91.1 ICD-9-CM: 793.11  12/26/2020 - Present Intractable back pain ICD-10-CM: M54.9 ICD-9-CM: 724.5  12/26/2020 - Present Open compression fracture of thoracolumbar vertebra (HCC) ICD-10-CM: S22.080B, S32.010B ICD-9-CM: 805.3  12/26/2020 - Present Hypokalemia ICD-10-CM: E87.6 ICD-9-CM: 276.8  12/26/2020 - Present Medications reviewed Current Facility-Administered Medications Medication Dose Route Frequency  propranoloL (INDERAL) tablet 10 mg  10 mg Oral BID  lactulose (CHRONULAC) 10 gram/15 mL solution 45 mL  30 g Oral QID  albuterol-ipratropium (DUO-NEB) 2.5 MG-0.5 MG/3 ML  3 mL Nebulization Q6H RT  
 oxyCODONE IR (ROXICODONE) tablet 5 mg  5 mg Oral Q4H PRN  phytonadione (vitamin K1) (AQUA-MEPHYTON) injection 10 mg  10 mg SubCUTAneous DAILY  VANCOMYCIN INFORMATION NOTE   Other Rx Dosing/Monitoring  vancomycin random level schedule for 12 Noon on 12/28/20   Other ONCE  
 sodium chloride (NS) flush 5-40 mL  5-40 mL IntraVENous PRN  
 acetaminophen (TYLENOL) tablet 650 mg  650 mg Oral Q6H PRN Or  
 acetaminophen (TYLENOL) suppository 650 mg  650 mg Rectal Q6H PRN  polyethylene glycol (MIRALAX) packet 17 g  17 g Oral DAILY PRN  promethazine (PHENERGAN) tablet 12.5 mg  12.5 mg Oral Q6H PRN Or  
 ondansetron (ZOFRAN) injection 4 mg  4 mg IntraVENous Q6H PRN  
 isosorbide mononitrate ER (IMDUR) tablet 30 mg  30 mg Oral DAILY  pantoprazole (PROTONIX) tablet 40 mg  40 mg Oral ACB  morphine injection 2 mg  2 mg IntraVENous Q4H PRN  
  cyclobenzaprine (FLEXERIL) tablet 10 mg  10 mg Oral TID  heparin (porcine) injection 5,000 Units  5,000 Units SubCUTAneous Q8H  
 rifAXIMin (XIFAXAN) tablet 550 mg  550 mg Oral TID  cefTRIAXone (ROCEPHIN) 1 g in sterile water (preservative free) 10 mL IV syringe  1 g IntraVENous Q24H  
 azithromycin (ZITHROMAX) 500 mg in 0.9% sodium chloride 250 mL (VIAL-MATE)  500 mg IntraVENous Q24H Review of Systems:  
Review of Systems Constitutional: Positive for malaise/fatigue. Negative for fever. HENT: Negative. Eyes: Negative. Respiratory: Negative for shortness of breath. Cardiovascular: Positive for leg swelling. Gastrointestinal: Negative for blood in stool, melena, nausea and vomiting. Genitourinary: Negative. Musculoskeletal: Positive for back pain. Skin: Negative. Neurological: Positive for weakness. Endo/Heme/Allergies: Negative. Psychiatric/Behavioral: Negative. Objective:  
Physical Exam:  
 
Visit Vitals /72 Pulse 94 Temp 98.2 °F (36.8 °C) Resp 18 Ht 6' 0.84\" (1.85 m) Wt 97 kg (213 lb 13.5 oz) SpO2 90% BMI 28.34 kg/m² O2 Flow Rate (L/min): 1 l/min O2 Device: Nasal cannula Temp (24hrs), Av.4 °F (36.9 °C), Min:98.2 °F (36.8 °C), Max:98.7 °F (37.1 °C) 
   07 -  1900 In: 1200 [P.O.:1200] Out: 635 [Urine:875]   1901 -  0700 In: 1500 [P.O.:800; I.V.:700] Out: 1575 [ZNZ:[id number]] General:  Alert, cooperative, lucid, no withdrawal s/s Lungs:   Clear to auscultation bilaterally. Diminished Chest wall:  No tenderness or deformity. Left upper chest aicd/stri strips present, no erythema or purulence or tenderness. Heart:  Regular rate and rhythm, S1, S2 normal, no murmur, click, rub or gallop. Abdomen:   Soft, Distended nontender bowel sounds normal. No masses,  No organomegaly. Extremities:  Back pain limits mobility of lower extremities, Pain significant with minimal raise off bed of le's. Pulses: 2+ and symmetric all extremities. Skin:  Decreased turgor Neurologic: CNII-XII intact. No gross sensory or motor deficits Data Review:  
   
Recent Days: 
Recent Labs  
  12/27/20 
0358 12/27/20 
0029 12/26/20 
0300 WBC 7.4 7.6 4.4 HGB 9.0* 8.5* 9.6* HCT 29.9* 28.3* 31.7*  
PLT 99* 96* 101* Recent Labs  
  12/28/20 
0755 12/27/20 
1516 12/27/20 
0358 12/27/20 
0029 12/26/20 
1129 12/26/20 
1129   --  145 145  --   --   
K 4.1  --  3.5 3.4*  --   --   
*  --  115* 116*  --   --   
CO2 20*  --  24 24  --   --   
*  --  101* 128*  --   --   
BUN 19  --  21* 20  --   --   
CREA 1.47*  --  1.50* 1.55*  --   --   
CA 8.5  --  8.1* 7.8*  --   --   
ALB 2.1*  --  2.2* 2.1*   < >  --   
TBILI 0.8  --  0.9 0.8   < >  --   
ALT 20  --  13 12   < >  --   
INR  --  1.7*  --   --   --  1.5*  
 < > = values in this interval not displayed. No results for input(s): PH, PCO2, PO2, HCO3, FIO2 in the last 72 hours. 24 Hour Results: 
Recent Results (from the past 24 hour(s)) PROTHROMBIN TIME + INR Collection Time: 12/27/20  3:16 PM  
Result Value Ref Range Prothrombin time 19.7 (H) 11.9 - 14.7 sec INR 1.7 (H) 0.9 - 1.1 ECHO ADULT COMPLETE Collection Time: 12/27/20  4:18 PM  
Result Value Ref Range Pulmonic Regurgitant End Max Velocity 130.00 cm/s Pulmonic Valve Systolic Peak Instantaneous Gradient 7.00 mmHg Tricuspid Valve Max Velocity 131.00 cm/s Triscuspid Valve Regurgitation Peak Gradient 7.00 mmHg CULTURE, BLOOD, PAIRED Collection Time: 12/27/20  8:15 PM  
 Specimen: Blood Result Value Ref Range Special Requests: No Special Requests Culture result: No growth after 4 hours METABOLIC PANEL, COMPREHENSIVE Collection Time: 12/28/20  7:55 AM  
Result Value Ref Range Sodium 139 136 - 145 mmol/L Potassium 4.1 3.5 - 5.1 mmol/L Chloride 112 (H) 97 - 108 mmol/L  
 CO2 20 (L) 21 - 32 mmol/L  Anion gap 7 5 - 15 mmol/L  
 Glucose 127 (H) 65 - 100 mg/dL BUN 19 6 - 20 mg/dL Creatinine 1.47 (H) 0.70 - 1.30 mg/dL BUN/Creatinine ratio 13 12 - 20 GFR est AA 58 (L) >60 ml/min/1.73m2 GFR est non-AA 48 (L) >60 ml/min/1.73m2 Calcium 8.5 8.5 - 10.1 mg/dL Bilirubin, total 0.8 0.2 - 1.0 mg/dL AST (SGOT) 83 (H) 15 - 37 U/L  
 ALT (SGPT) 20 12 - 78 U/L Alk. phosphatase 145 (H) 45 - 117 U/L Protein, total 7.6 6.4 - 8.2 g/dL Albumin 2.1 (L) 3.5 - 5.0 g/dL Globulin 5.5 (H) 2.0 - 4.0 g/dL A-G Ratio 0.4 (L) 1.1 - 2.2 Assessment/  
 
Patient Active Problem List  
Diagnosis Code  [facility] K74.60  Pulmonary nodule R91.1  Intractable back pain M54.9  Open compression fracture of thoracolumbar vertebra (HCC) S22.080B, S32.010B  Hypokalemia E87.6  
   
 
--Hepatic encephalopathy mentation is improved --Bacteremia, initial blood culture reporting gram-positive cocci in chains(12/26), repeat 12/27 no growth at 4 hrs. Vanco initiated 12/27. He had aicd changed 1 month ago, site not revealing tenderness, erythema or purulence. --Alcoholic Liver cirrhosis, per S.O. gets paracentesis weekly. --Compression fracture thoracal lumbar vertebra, Ortho input appreciated, under eval. Hx better defined today, he apparently has a brace - dlso?- and used previously but now infrequently as back pain had improved 
--Coagulopathy secondary to his cirrhosis, INR is 1.7 
--Hypokalemia repleted 
--dilated cardiomyopathy,  
--Chronic systolic chf- compensated 
--Aicd in place, device change at [facility] 1 month ago. Site with steri strip- not tender, redor purulence.  
--COPD not in exacerbation 
--Continued smoking 
--CHOCO versus CKD, cr remains stable ~ 1.5. Plan: 
Continue lactulose and rifaximin, continue propranolol 10 mg twice daily Orthopedic input is appreciated, follow vt d, bone scan, to be seen by spine team. It is divulged 12/28 that he has what sounds like DLSO brace at home. It's reported that he now uses infrequently as his prior pain had improved. Pt also today recalls alcohol xmas and a fall. Cont pain management Home meds confirmed with S.O. today. Continue vanco, follow blcs not yet identified. Ceftriaxone continued empirically. Azithro and ceftriaxone started 12/26 when spiked fever, no evidence resp process. His INR is elevated to 1.7 secondary to his liver disease we will continue vitamin K for 3 days then check INR 
 
POA Queen Yavn [phone] Significant other: Dacia Edge [phone] VTEP: Heparin subcu Full CODE STATUS Disposition pending course His cardio at [facility]: James Marking [phone] Care Plan discussed with: Patient/Family, Nurse and Consultant Ortho Total time spent with patient: 30 minutes.  
 
Renee Woods MD

## 2020-12-29 NOTE — PROGRESS NOTES
Hospitalist Progress Note Daily Progress Note: 12/29/2020 This is a 78-year-old gentleman who with a history of cardiomyopathy, alcoholic cirrhosis, AICD in place, COPD and continued smoking, hypertension, diabetes mellitus type 2, chronic CHF. Presented to the emergency department complaining of severe low back pain. Little is known of his history initially as he was very confused was found to have an hepatic encephalopathy with an ammonia level of 108. Started on rifaximin and lactulose, mentation improved, ammonia only marginally. He had Steri-Strips in place and chest x-ray suggestive of AICD though not able to contact next of kin. Seen by orthopedic surgery secondary to thoracolumbar compression fractures under evaluation. Blood cultures drawn on presentation grew gram-positive cocci in all 4 bottles, started on vancomycin. Subsequent blood culture at 4 hours showing no growth. Finally was able to speak with significant other who describes AICD change, initially placed 10 years ago, at [facility] about a month ago. She was also able to report to me patient's history more clearly. Apparently he does have a history of compression fractures in the past and has a brace for this at home which she wears infrequently. Pt now recalls alcohol on xmas day and recalls a fall. Also reported that he weekly gets paracentesis at [facility]. ID + Ortho on consult. Subjective:  
12/29 patient seen and evaluated at bedside, no acute events overnight, patient appears to be more much more alert at this time, patient is concerned about his abdominal distention, no new complaints, discussed with RN at bedside Problem List: 
Problem List as of 12/29/2020 Date Reviewed: 12/27/2020 Codes Class Noted - Resolved Liver cirrhosis (HCC) ICD-10-CM: K74.60 ICD-9-CM: 571.5  12/26/2020 - Present Pulmonary nodule ICD-10-CM: R91.1 ICD-9-CM: 793.11  12/26/2020 - Present Intractable back pain ICD-10-CM: M54.9 ICD-9-CM: 724.5  12/26/2020 - Present Open compression fracture of thoracolumbar vertebra (HCC) ICD-10-CM: S22.080B, S32.010B ICD-9-CM: 805.3  12/26/2020 - Present Hypokalemia ICD-10-CM: E87.6 ICD-9-CM: 276.8  12/26/2020 - Present Medications reviewed Current Facility-Administered Medications Medication Dose Route Frequency  fluticasone propionate (FLONASE) 50 mcg/actuation nasal spray 2 Spray  2 Spray Both Nostrils DAILY  folic acid (FOLVITE) tablet 1 mg  1 mg Oral DAILY  furosemide (LASIX) tablet 40 mg  40 mg Oral DAILY  lactulose (CHRONULAC) 10 gram/15 mL solution 45 mL  30 g Oral TID  cefTRIAXone (ROCEPHIN) 1 g in sterile water (preservative free) 10 mL IV syringe  1 g IntraVENous Q24H  
 vancomycin (VANCOCIN) 1,000 mg in 0.9% sodium chloride 250 mL (VIAL-MATE)  1,000 mg IntraVENous Q12H  propranoloL (INDERAL) tablet 10 mg  10 mg Oral BID  albuterol-ipratropium (DUO-NEB) 2.5 MG-0.5 MG/3 ML  3 mL Nebulization Q6H RT  
 oxyCODONE IR (ROXICODONE) tablet 5 mg  5 mg Oral Q4H PRN  phytonadione (vitamin K1) (AQUA-MEPHYTON) injection 10 mg  10 mg SubCUTAneous DAILY  VANCOMYCIN INFORMATION NOTE   Other Rx Dosing/Monitoring  sodium chloride (NS) flush 5-40 mL  5-40 mL IntraVENous PRN  
 acetaminophen (TYLENOL) tablet 650 mg  650 mg Oral Q6H PRN Or  
 acetaminophen (TYLENOL) suppository 650 mg  650 mg Rectal Q6H PRN  polyethylene glycol (MIRALAX) packet 17 g  17 g Oral DAILY PRN  promethazine (PHENERGAN) tablet 12.5 mg  12.5 mg Oral Q6H PRN Or  
 ondansetron (ZOFRAN) injection 4 mg  4 mg IntraVENous Q6H PRN  
 isosorbide mononitrate ER (IMDUR) tablet 30 mg  30 mg Oral DAILY  pantoprazole (PROTONIX) tablet 40 mg  40 mg Oral ACB  morphine injection 2 mg  2 mg IntraVENous Q4H PRN  
 heparin (porcine) injection 5,000 Units  5,000 Units SubCUTAneous Q8H  
  rifAXIMin (XIFAXAN) tablet 550 mg  550 mg Oral TID Review of Systems:  
Review of Systems Constitutional: Positive for malaise/fatigue. Negative for fever. HENT: Negative. Eyes: Negative. Respiratory: Negative for shortness of breath. Cardiovascular: Positive for leg swelling. Gastrointestinal: Negative for blood in stool, melena, nausea and vomiting. Genitourinary: Negative. Musculoskeletal: Positive for back pain. Skin: Negative. Neurological: Positive for weakness. Endo/Heme/Allergies: Negative. Psychiatric/Behavioral: Negative. Objective:  
Physical Exam:  
 
Visit Vitals /66 Pulse (!) 112 Temp 98.3 °F (36.8 °C) Resp 18 Ht 6' 0.84\" (1.85 m) Wt 97 kg (213 lb 13.5 oz) SpO2 93% BMI 28.34 kg/m² O2 Flow Rate (L/min): 2 l/min O2 Device: Room air Temp (24hrs), Av.2 °F (36.8 °C), Min:97.9 °F (36.6 °C), Max:98.4 °F (36.9 °C) No intake/output data recorded.  1901 -  0700 In:  [P.O.:] Out: 1875 [LQCEJ:7371] General:  Alert, cooperative, lucid, no withdrawal s/s Lungs:   Clear to auscultation bilaterally. Diminished Chest wall:  No tenderness or deformity. Left upper chest aicd/stri strips present, no erythema or purulence or tenderness. Heart:  Regular rate and rhythm, S1, S2 normal, no murmur, click, rub or gallop. Abdomen:   Soft, Distended nontender bowel sounds normal. No masses,  No organomegaly. Extremities:  Back pain limits mobility of lower extremities, Pain significant with minimal raise off bed of le's. Pulses: 2+ and symmetric all extremities. Skin:  Decreased turgor Neurologic: CNII-XII intact. No gross sensory or motor deficits Data Review:  
   
Recent Days: 
Recent Labs  
  20 
0358 20 
0029 WBC 7.4 7.6 HGB 9.0* 8.5* HCT 29.9* 28.3*  
PLT 99* 96* Recent Labs  
  20 
0910 20 
0755 20 
1516 20 
0358 20 
0029 NA  --  139  --  145 145 K  --  4.1  --  3.5 3.4*  
CL  --  112*  --  115* 116* CO2  --  20*  --  24 24 GLU  --  127*  --  101* 128* BUN  --  19  --  21* 20  
CREA  --  1.47*  --  1.50* 1.55* CA  --  8.5  --  8.1* 7.8* ALB  --  2.1*  --  2.2* 2.1* TBILI  --  0.8  --  0.9 0.8 ALT  --  20  --  13 12 INR 1.4*  --  1.7*  --   -- No results for input(s): PH, PCO2, PO2, HCO3, FIO2 in the last 72 hours. 24 Hour Results: 
Recent Results (from the past 24 hour(s)) PROTHROMBIN TIME + INR Collection Time: 12/29/20  9:10 AM  
Result Value Ref Range Prothrombin time 16.9 (H) 11.9 - 14.7 sec INR 1.4 (H) 0.9 - 1.1 Assessment/  
 
Patient Active Problem List  
Diagnosis Code  Liver cirrhosis ([facility] [address] K74.60  Pulmonary nodule R91.1  Intractable back pain M54.9  Open compression fracture of thoracolumbar vertebra (HCC) S22.080B, S32.010B  Hypokalemia E87.6 Acute hepatic encephalopathypatient presented with significantly altered mental status consistent with acute hepatic encephalopathy, currently mental status is improved Continue to trend serum ammonia levels Continue to monitor mental status Continue lactulose, titrate to 2-3 bowel movements per day Gastroenterology consult Thoracic lumbar compression fracturesresulting in significant decrease in mobility, currently patient is not complaining of any pain at this time Orthopedic consult appreciated, patient to be evaluated by spine surgery for further evaluation with regards to operative intervention Follow physical therapy recommendations Chronic kidney disease stage IIIcurrently serum creatinine remained stable Continue to trend serum creatinine Gram-positive bacteremiaof note patient was found to have gram-positive bacteremia in 1 out of 4 bottles, could be secondary to contaminant Follow-up surveillance cultures Follow-up sensitivities Continue vancomycin for antibiotic coverage Follow-up infectious disease recommendations Abdominal ascitessecondary to alcoholic liver cirrhosis, of note patient has periodic therapeutic paracentesis Obtain gastroenterology consult Continue therapeutic paracentesis as per schedule ProphylaxisHeparin subcu FENcardiac diet, replete potassium and magnesium Full code, will clarify about surrogate decision-maker Dispositionpending clinical improvement/orthopedic surgery/physical therapy/Occupational Therapy, patient will likely need placement. Total noncritical care time spent 35 minutes Care Plan discussed with: Patient/Family, Nurse and Consultant Bridgette Pope MD

## 2020-12-29 NOTE — PROGRESS NOTES
Problem: Falls - Risk of 
Goal: *Absence of Falls Description: Document Bharat Thompson Fall Risk and appropriate interventions in the flowsheet. Outcome: Progressing Towards Goal 
Note: Fall Risk Interventions: 
Mobility Interventions: PT Consult for mobility concerns, OT consult for ADLs, Bed/chair exit alarm, Communicate number of staff needed for ambulation/transfer, Strengthening exercises (ROM-active/passive) Mentation Interventions: Bed/chair exit alarm, Adequate sleep, hydration, pain control, Door open when patient unattended, Evaluate medications/consider consulting pharmacy, Toileting rounds, Reorient patient, More frequent rounding Medication Interventions: Bed/chair exit alarm, Patient to call before getting OOB, Teach patient to arise slowly Elimination Interventions: Bed/chair exit alarm, Call light in reach, Toileting schedule/hourly rounds History of Falls Interventions: Bed/chair exit alarm, Door open when patient unattended, Room close to nurse's station Problem: Pressure Injury - Risk of 
Goal: *Prevention of pressure injury Description: Document Misha Scale and appropriate interventions in the flowsheet. Outcome: Progressing Towards Goal 
Note: Pressure Injury Interventions: 
Sensory Interventions: Keep linens dry and wrinkle-free Moisture Interventions: Minimize layers Activity Interventions: PT/OT evaluation Mobility Interventions: PT/OT evaluation Nutrition Interventions: Offer support with meals,snacks and hydration Friction and Shear Interventions: HOB 30 degrees or less, Minimize layers, Transferring/repositioning devices, Feet elevated on foot rest

## 2020-12-30 PROBLEM — S32.040A CLOSED COMPRESSION FRACTURE OF L4 LUMBAR VERTEBRA, INITIAL ENCOUNTER (HCC): Status: ACTIVE | Noted: 2020-01-01

## 2020-12-30 NOTE — PROGRESS NOTES
Nurse notified me that patient had coffee ground emesis episode 30 minutes ago - emesis was noted on gown and sheet per nurse. Pt admitted for back pain - chart and recent lab results reviewed. Has a history of etoh abuse and cirrhosis. Order STAT type and screen, H&H, send emesis for gastric occult. GI was consulted earlier.

## 2020-12-30 NOTE — PROGRESS NOTES
Bedside shift change report given to Stan (oncoming nurse) by Amira Sepulveda (offgoing nurse). Report included the following information SBAR.

## 2020-12-30 NOTE — PROGRESS NOTES
Pt off the floor for procedure, no tx, will cont to follow. Nsg reports pt tolerated meds with water without overt difficulty or s/s aspiration.

## 2020-12-30 NOTE — PROGRESS NOTES
Progress Note Patient: Isela Matthew MRN: [id number]  SSN: [id number] YOB: 1955  Age: 60 y.o. Sex: male Admit Date: 12/26/2020 LOS: 4 days Subjective:  
 
Back pain minimal even with movement. Patient relatively lucid despite sedation for paracentesis today. Patient seen in IR suite holding area. Reports long history of intermittently flaring back pain which worsened before presentation but has subsided since admission 4 days ago. He also had some delirium which is improving. Objective:  
 
Vitals:  
 12/30/20 1033 12/30/20 1044 12/30/20 1052 12/30/20 1057 BP: 136/76 127/77 127/77 126/72 Pulse: (!) 117 (!) 113 (!) 113 (!) 113 Resp: 22 22 22 23 Temp:      
SpO2: 97% 96% 90% 90% Weight:      
Height:      
  
 
Intake and Output: 
Current Shift: No intake/output data recorded. Last three shifts: 12/28 1901 - 12/30 0700 In: 800 [P.O.:800] Out: 1000 [Urine:1000] Physical Exam:  
GENERAL: alert, cooperative, no distress, Low back nontender with more tenderness in TL junction Lower extremity motor and sensory exam normal. 
 
 
Lab/Data Review: All lab results for the last 24 hours reviewed. Bone scan shows only acute L4 compression fx though CT shows multiple TL compression fractures. Assessment:  
 
Active Problems: 
  Liver cirrhosis ([facility] [address] (12/26/2020) Pulmonary nodule (12/26/2020) Intractable back pain (12/26/2020) Closed compression fracture of L4 lumbar vertebra, initial encounter ([address] (12/26/2020) Hypokalemia (12/26/2020) Plan: For the compression fx at L4, the patient is at high risk for perioperative complications from anesthesia. I see no urgency to consider surgery at this time and patient does not wish to consider surgery, in any event. Will have him wear brace, which he has at home, upon discharge. Continue PT and OT for mobilization and follow up as outpatient in 3-4 weeks. Will sign off for now. Please reconsult if patient wishes to revisit surgery for his L4 fx. Thank you. Signed By: Jania Das MD   
 December 30, 2020

## 2020-12-30 NOTE — PROGRESS NOTES
Patient had coffee ground emesis. Dr. Suzanne Allan informed. Order made for type and screen, H&H, and gastric occult. Zofran given IV.

## 2020-12-30 NOTE — PROGRESS NOTES
Hospitalist Progress Note Daily Progress Note: 12/30/2020 This is a 71-year-old gentleman who with a history of cardiomyopathy, alcoholic cirrhosis, AICD in place, COPD and continued smoking, hypertension, diabetes mellitus type 2, chronic CHF. Presented to the emergency department complaining of severe low back pain. Little is known of his history initially as he was very confused was found to have an hepatic encephalopathy with an ammonia level of 108. Started on rifaximin and lactulose, mentation improved, ammonia only marginally. He had Steri-Strips in place and chest x-ray suggestive of AICD though not able to contact next of kin. Seen by orthopedic surgery secondary to thoracolumbar compression fractures under evaluation. Blood cultures drawn on presentation grew gram-positive cocci in all 4 bottles, started on vancomycin. Subsequent blood culture at 4 hours showing no growth. Finally was able to speak with significant other who describes AICD change, initially placed 10 years ago, at [facility] about a month ago. She was also able to report to me patient's history more clearly. Apparently he does have a history of compression fractures in the past and has a brace for this at home which she wears infrequently. Pt now recalls alcohol on xmas day and recalls a fall. Also reported that he weekly gets paracentesis at [facility]. ID + Ortho on consult. Subjective:  
12/30 patient seen and examined at bedside, of note patient is much more alert and oriented, currently has no active complaints, patient status post therapeutic paracentesis, tolerated procedure well, discussed with RN at bedside  
problem List: 
Problem List as of 12/30/2020 Date Reviewed: 12/27/2020 Codes Class Noted - Resolved Liver cirrhosis (HCC) ICD-10-CM: K74.60 ICD-9-CM: 571.5  12/26/2020 - Present Pulmonary nodule ICD-10-CM: R91.1 ICD-9-CM: 793.11  12/26/2020 - Present Intractable back pain ICD-10-CM: M54.9 ICD-9-CM: 724.5  12/26/2020 - Present Closed [facility] of L[facility] lumbar [facility] ICD-10-CM: Y03.403Q ICD-9-CM: 805.4  12/26/2020 - Present Hypokalemia ICD-10-CM: E87.6 ICD-9-CM: 276.8  12/26/2020 - Present Medications reviewed Current Facility-Administered Medications Medication Dose Route Frequency  fluticasone propionate (FLONASE) 50 mcg/actuation nasal spray 2 Spray  2 Spray Both Nostrils DAILY  folic acid (FOLVITE) tablet 1 mg  1 mg Oral DAILY  furosemide (LASIX) tablet 40 mg  40 mg Oral DAILY  lactulose (CHRONULAC) 10 gram/15 mL solution 45 mL  30 g Oral TID  cefTRIAXone (ROCEPHIN) 1 g in sterile water (preservative free) 10 mL IV syringe  1 g IntraVENous Q24H  
 vancomycin (VANCOCIN) 1,000 mg in 0.9% sodium chloride 250 mL (VIAL-MATE)  1,000 mg IntraVENous Q12H  propranoloL (INDERAL) tablet 10 mg  10 mg Oral BID  albuterol-ipratropium (DUO-NEB) 2.5 MG-0.5 MG/3 ML  3 mL Nebulization Q6H RT  
 oxyCODONE IR (ROXICODONE) tablet 5 mg  5 mg Oral Q4H PRN  
 VANCOMYCIN INFORMATION NOTE   Other Rx Dosing/Monitoring  sodium chloride (NS) flush 5-40 mL  5-40 mL IntraVENous PRN  
 acetaminophen (TYLENOL) tablet 650 mg  650 mg Oral Q6H PRN Or  
 acetaminophen (TYLENOL) suppository 650 mg  650 mg Rectal Q6H PRN  polyethylene glycol (MIRALAX) packet 17 g  17 g Oral DAILY PRN  promethazine (PHENERGAN) tablet 12.5 mg  12.5 mg Oral Q6H PRN Or  
 ondansetron (ZOFRAN) injection 4 mg  4 mg IntraVENous Q6H PRN  
 isosorbide mononitrate ER (IMDUR) tablet 30 mg  30 mg Oral DAILY  pantoprazole (PROTONIX) tablet 40 mg  40 mg Oral ACB  morphine injection 2 mg  2 mg IntraVENous Q4H PRN  
 heparin (porcine) injection 5,000 Units  5,000 Units SubCUTAneous Q8H  
 rifAXIMin (XIFAXAN) tablet 550 mg  550 mg Oral TID Review of Systems:  
Review of Systems Constitutional: Positive for malaise/fatigue. Negative for fever. HENT: Negative. Eyes: Negative. Respiratory: Negative for shortness of breath. Cardiovascular: Positive for leg swelling. Gastrointestinal: Negative for blood in stool, melena, nausea and vomiting. Genitourinary: Negative. Musculoskeletal: Positive for back pain. Skin: Negative. Neurological: Positive for weakness. Endo/Heme/Allergies: Negative. Psychiatric/Behavioral: Negative. Objective:  
Physical Exam:  
 
Visit Vitals /72 Pulse (!) 113 Temp 97.9 °F (36.6 °C) Resp 23 Ht 6' 0.84\" (1.85 m) Wt 97 kg (213 lb 13.5 oz) SpO2 90% BMI 28.34 kg/m² O2 Flow Rate (L/min): 2 l/min O2 Device: Nasal cannula Temp (24hrs), Av.4 °F (36.9 °C), Min:97.9 °F (36.6 °C), Max:99.2 °F (37.3 °C) 
  701 - 1900 In: -  
Out: 1200 [Urine:1200]   1901 -  0700 In: 800 [P.O.:800] Out: 1000 [Urine:1000] General:  Alert, cooperative, lucid, no withdrawal s/s Lungs:   Clear to auscultation bilaterally. Diminished Chest wall:  No tenderness or deformity. Left upper chest aicd/stri strips present, no erythema or purulence or tenderness. Heart:  Regular rate and rhythm, S1, S2 normal, no murmur, click, rub or gallop. Abdomen:   Soft, Distended nontender bowel sounds normal. No masses,  No organomegaly. Extremities:  Back pain limits mobility of lower extremities, Pain significant with minimal raise off bed of le's. Pulses: 2+ and symmetric all extremities. Skin:  Decreased turgor Neurologic: CNII-XII intact. No gross sensory or motor deficits Data Review:  
   
Recent Days: 
Recent Labs  
  20 WBC 6.7 HGB 10.1* HCT 32.9*  
 Recent Labs  
  20 
1804 20 
0910 20 
0755 20 
1516   --  139  --   
K 3.6  --  4.1  --   
*  --  112*  --   
CO2 25  --  20*  --   
*  --  127*  --   
BUN 21*  --  19  --   
 CREA 1.49*  --  1.47*  --   
CA 8.7  --  8.5  --   
ALB 2.1*  --  2.1*  --   
TBILI 1.0  --  0.8  --   
ALT 18  --  20  --   
INR  --  1.4*  --  1.7*  
 
No results for input(s): PH, PCO2, PO2, HCO3, FIO2 in the last 72 hours. 
 
24 Hour Results: 
Recent Results (from the past 24 hour(s))  
CBC W/O DIFF  
 Collection Time: 12/29/20  6:04 PM  
Result Value Ref Range  
 WBC 6.7 4.1 - 11.1 K/uL  
 RBC 3.75 (L) 4.10 - 5.70 M/uL  
 HGB 10.1 (L) 12.1 - 17.0 g/dL  
 HCT 32.9 (L) 36.6 - 50.3 %  
 MCV 87.7 80.0 - 99.0 FL  
 MCH 26.9 26.0 - 34.0 PG  
 MCHC 30.7 30.0 - 36.5 g/dL  
 RDW 20.7 (H) 11.5 - 14.5 %  
 PLATELET 160 150 - 400 K/uL  
 MPV 10.0 8.9 - 12.9 FL  
METABOLIC PANEL, COMPREHENSIVE  
 Collection Time: 12/29/20  6:04 PM  
Result Value Ref Range  
 Sodium 140 136 - 145 mmol/L  
 Potassium 3.6 3.5 - 5.1 mmol/L  
 Chloride 109 (H) 97 - 108 mmol/L  
 CO2 25 21 - 32 mmol/L  
 Anion gap 6 5 - 15 mmol/L  
 Glucose 125 (H) 65 - 100 mg/dL  
 BUN 21 (H) 6 - 20 mg/dL  
 Creatinine 1.49 (H) 0.70 - 1.30 mg/dL  
 BUN/Creatinine ratio 14 12 - 20    
 GFR est AA 58 (L) >60 ml/min/1.73m2  
 GFR est non-AA 47 (L) >60 ml/min/1.73m2  
 Calcium 8.7 8.5 - 10.1 mg/dL  
 Bilirubin, total 1.0 0.2 - 1.0 mg/dL  
 AST (SGOT) 53 (H) 15 - 37 U/L  
 ALT (SGPT) 18 12 - 78 U/L  
 Alk. phosphatase 122 (H) 45 - 117 U/L  
 Protein, total 7.7 6.4 - 8.2 g/dL  
 Albumin 2.1 (L) 3.5 - 5.0 g/dL  
 Globulin 5.6 (H) 2.0 - 4.0 g/dL  
 A-G Ratio 0.4 (L) 1.1 - 2.2    
AMMONIA  
 Collection Time: 12/29/20  6:04 PM  
Result Value Ref Range  
 Ammonia 63 (H) <32 umol/L  
GLUCOSE, POC  
 Collection Time: 12/29/20  7:36 PM  
Result Value Ref Range  
 Glucose (POC) 126 (H) 65 - 100 mg/dL  
 Performed by LEIGH HOUGH   
TYPE & SCREEN  
 Collection Time: 12/30/20  3:38 AM  
Result Value Ref Range  
 Crossmatch Expiration 01/02/2021,2359   
 ABO/Rh(D) O Positive   
 Antibody screen Negative   
 
 
 
 
Assessment/  
 
Patient Active Problem List  
Diagnosis Code  
  [facility] K74.60  Pulmonary nodule R91.1  Intractable back pain M54.9  Closed compression fracture of L4 lumbar vertebra[facility] S32.040A  Hypokalemia E87.6 Acute hepatic encephalopathypatient presented with significantly altered mental status consistent with acute hepatic encephalopathy, currently mental status is improved Continue to trend serum ammonia levels Continue to monitor mental status Continue lactulose, titrate to 2-3 bowel movements per day Gastroenterology consult Thoracic lumbar compression fracturesresulting in significant decrease in mobility, currently patient is not complaining of any pain at this time Orthopedic consult appreciated, patient more suited for conservative management at this time Follow physical therapy recommendations Chronic kidney disease stage IIIcurrently serum creatinine remained stable Continue to trend serum creatinine Gram-positive bacteremiafollow-up sensitivities Follow-up surveillance cultures Continue vancomycin for antibiotic coverage Thanks disease consult appreciated Abdominal ascitescurrently status post therapeutic paracentesis, tolerated procedure well Interventional radiology consult appreciated, will continue to follow Gastroenterology consult appreciated, will continue to follow ProphylaxisHeparin subcu FENcardiac diet, replete potassium and magnesium Full code, will clarify about surrogate decision-maker Dispositionpending clinical improvement/physical therapy/Occupational Therapy, patient will likely need placement. Total noncritical care time spent 35 minutes Care Plan discussed with: Patient/Family, Nurse and Consultant Bridgette Alex MD

## 2020-12-30 NOTE — CONSULTS
Consult Date: 12/29/2020 Consults:  Blood cultures with GPC, in alcoholic cirrhosis Subjective This is a 60year old male who initially presented to the ED because of low back pain. CT scan showed thoracolumbar compression fractures of indeterminate age. On presentation he was febrile to 101.8 but WBC was normal.  Blood cultures at that time have today grown Streptococcus anginosus/oralis. ID has been consulted for this reason. Patient was started yesterday on IV Vancomycin. CXR showed left AICD but no evidence of pneumonia. TTE showed no vegetations. Bone scan showed a bandlike increased uptake is seen in the L4 vertebra, felt to indicate that the compression fracture is less than 3years old.  
   Patient is lying completely naked in bed. Surprising he was oriented to place and year, but poor historian. Past Medical History:  
Diagnosis Date  AICD (automatic cardioverter/defibrillator) present  CAD (coronary artery disease)  Cirrhosis of liver ([address]  Gallstones  Hypertension  Nodule of lower lobe of right lung  Renal stones Past Surgical History:  
Procedure Laterality Date  HX PACEMAKER PLACEMENT History reviewed. No pertinent family history. Social History Tobacco Use  Smoking status: Current Every Day Smoker Packs/day: 1.00 Substance Use Topics  Alcohol use: Yes Current Facility-Administered Medications Medication Dose Route Frequency Provider Last Rate Last Admin  fluticasone propionate (FLONASE) 50 mcg/actuation nasal spray 2 Spray  2 Spray Both Nostrils DAILY Jayesh MAR MD   2 [place] at 55/43/73 4402  
 folic acid (FOLVITE) tablet 1 mg  1 mg Oral DAILY Jayesh MAR MD   1 mg at 12/29/20 7152  furosemide (LASIX) tablet 40 mg  40 mg Oral DAILY Jayesh MAR MD   40 mg at 12/29/20 6241  lactulose (CHRONULAC) 10 gram/15 mL solution 45 mL  30 g Oral TID Elma MAR MD   45 mL at 12/29/20 2253  cefTRIAXone (ROCEPHIN) 1 g in sterile water (preservative free) 10 mL IV syringe  1 g IntraVENous Q24H Prudence Jamie Tenorio MD   1 g at 12/29/20 1555  
 vancomycin (VANCOCIN) 1,000 mg in 0.9% sodium chloride 250 mL (VIAL-MATE)  1,000 mg IntraVENous Q12H Elma MAR MD   1,000 mg at 12/29/20 1811  
 propranoloL (INDERAL) tablet 10 mg  10 mg Oral BID Elma MAR MD   10 mg at 12/29/20 2253  albuterol-ipratropium (DUO-NEB) 2.5 MG-0.5 MG/3 ML  3 mL Nebulization Q6H RT Elma MAR MD   3 mL at 12/29/20 1942  oxyCODONE IR (ROXICODONE) tablet 5 mg  5 mg Oral Q4H PRN Elma MAR MD   5 mg at 12/28/20 1012  phytonadione (vitamin K1) (AQUA-MEPHYTON) injection 10 mg  10 mg SubCUTAneous DAILY Elma MAR MD   10 mg at 12/29/20 6535  VANCOMYCIN INFORMATION NOTE   Other Rx Dosing/Monitoring Camille Smiley MD      
 sodium chloride (NS) flush 5-40 mL  5-40 mL IntraVENous PRN Elma MAR MD      
 acetaminophen (TYLENOL) tablet 650 mg  650 mg Oral Q6H PRN Elma MAR MD   650 mg at 12/26/20 2039 Or  acetaminophen (TYLENOL) suppository 650 mg  650 mg Rectal Q6H PRN Camille Smiley MD      
 polyethylene glycol (MIRALAX) packet 17 g  17 g Oral DAILY PRN Camille Smiley MD      
 promethazine (PHENERGAN) tablet 12.5 mg  12.5 mg Oral Q6H PRN Elma MAR MD      
 Or  
 ondansetron [facility]) injection 4 mg  4 mg IntraVENous Q6H PRN Elma MAR MD   4 mg at 12/29/20 2250  isosorbide mononitrate ER (IMDUR) tablet 30 mg  30 mg Oral DAILY Elma MAR MD   30 mg at 12/29/20 5148  pantoprazole (PROTONIX) tablet 40 mg  40 mg Oral ACB Elma MAR MD   40 mg at 12/29/20 007  morphine injection 2 mg  2 mg IntraVENous Q4H PRN Elma MAR MD   2 mg at 12/29/20 0862  heparin (porcine) injection 5,000 Units  5,000 Units SubCUTAneous Q8H Vin Yo NP   5,000 Units at 12/29/20 2257  rifAXIMin (XIFAXAN) tablet 550 mg  550 mg Oral TID Chika Hogue MD   550 mg at 12/29/20 2253 Review of Systems Constitutional: Positive for chills and fever. HENT: Negative. Eyes: Negative. Respiratory: Negative. Cardiovascular: Negative. Gastrointestinal: Negative. Endocrine: Negative. Genitourinary: Negative. Musculoskeletal: Positive for back pain. Skin: Negative. Allergic/Immunologic: Negative. Neurological: Negative. Hematological: Negative. Psychiatric/Behavioral: Negative. Objective Vital signs for last 24 hours: 
Visit Vitals /71 Pulse (!) 124 Temp 98.1 °F (36.7 °C) Resp 18 Ht 6' 0.84\" (1.85 m) Wt 213 lb 13.5 oz (97 kg) SpO2 94% BMI 28.34 kg/m² Intake/Output this shift: 
Current Shift: No intake/output data recorded. Last 3 Shifts: 12/28 0701 - 12/29 1900 In: 2000 [P.O.:2000] Out: 1875 [TBCZX:3511] Data Review:  
Recent Results (from the past 24 hour(s)) PROTHROMBIN TIME + INR Collection Time: 12/29/20  9:10 AM  
Result Value Ref Range Prothrombin time 16.9 (H) 11.9 - 14.7 sec INR 1.4 (H) 0.9 - 1.1    
CBC W/O DIFF Collection Time: 12/29/20  6:04 PM  
Result Value Ref Range WBC 6.7 4.1 - 11.1 K/uL  
 RBC 3.75 (L) 4.10 - 5.70 M/uL  
 HGB 10.1 (L) 12.1 - 17.0 g/dL HCT 32.9 (L) 36.6 - 50.3 % MCV 87.7 80.0 - 99.0 FL  
 MCH 26.9 26.0 - 34.0 PG  
 MCHC 30.7 30.0 - 36.5 g/dL RDW 20.7 (H) 11.5 - 14.5 % PLATELET 739 529 - 149 K/uL MPV 10.0 8.9 - 34.1 FL  
METABOLIC PANEL, COMPREHENSIVE Collection Time: 12/29/20  6:04 PM  
Result Value Ref Range Sodium 140 136 - 145 mmol/L Potassium 3.6 3.5 - 5.1 mmol/L Chloride 109 (H) 97 - 108 mmol/L  
 CO2 25 21 - 32 mmol/L Anion gap 6 5 - 15 mmol/L Glucose 125 (H) 65 - 100 mg/dL BUN 21 (H) 6 - 20 mg/dL Creatinine 1.49 (H) 0.70 - 1.30 mg/dL BUN/Creatinine ratio 14 12 - 20 GFR est AA 58 (L) >60 ml/min/1.73m2 GFR est non-AA 47 (L) >60 ml/min/1.73m2 Calcium 8.7 8.5 - 10.1 mg/dL Bilirubin, total 1.0 0.2 - 1.0 mg/dL AST (SGOT) 53 (H) 15 - 37 U/L  
 ALT (SGPT) 18 12 - 78 U/L Alk. phosphatase 122 (H) 45 - 117 U/L Protein, total 7.7 6.4 - 8.2 g/dL Albumin 2.1 (L) 3.5 - 5.0 g/dL Globulin 5.6 (H) 2.0 - 4.0 g/dL A-G Ratio 0.4 (L) 1.1 - 2.2 AMMONIA Collection Time: 12/29/20  6:04 PM  
Result Value Ref Range Ammonia 63 (H) <32 umol/L  
GLUCOSE, POC Collection Time: 12/29/20  7:36 PM  
Result Value Ref Range Glucose (POC) 126 (H) 65 - 100 mg/dL Performed by Lurena Osgood   
 
NM bone scan (12/28) Physical Exam 
Vitals signs and nursing note reviewed. Constitutional:   
   General: He is not in acute distress. Appearance: He is ill-appearing. HENT:  
   Head: Normocephalic and atraumatic. Nose: Nose normal.  
   Mouth/Throat:  
   Pharynx: Oropharynx is clear. Eyes:  
   Pupils: Pupils are equal, round, and reactive to light. Neck: Musculoskeletal: Neck supple. Cardiovascular:  
   Rate and Rhythm: Normal rate and regular rhythm. Heart sounds: No murmur. Pulmonary:  
   Breath sounds: No wheezing, rhonchi or rales. Abdominal:  
   General: There is distension (with everted umbilicus). Palpations: Abdomen is soft. Tenderness: There is abdominal tenderness. Genitourinary: 
   Penis: Normal.   
   Comments: No Marquez Musculoskeletal:  
   Right lower leg: No edema. Left lower leg: No edema. Skin: 
   Findings: No rash. Neurological:  
   Mental Status: He is alert. He is disoriented.   
Psychiatric:     
   Mood and Affect: Mood normal.     
   Behavior: Behavior normal.  
 
 
ASSESSMENT/PLAN 
 
 1. Bacteremia with Streptococcus mitis/oralis and Streptococcus anginosus, clinical significance unclear, TTE negative for vegetations. 2. Fever, possibly secondary to #1. 3. Abnormal uptake L4 vertebra, consistent with compression fracture 4. Alcoholic cirrhosis Comment:  The blood isolates identified are part of the oral jose j however, they can cause clinical disease, especially endocarditis, though TTE was negative. Secondly they can cause secondary seeding in joint spaces and visceral organs. They could also be contaminants, however, he had fever when they were collected. Both organisms sensitiver to Unasyn. 1. Discontinue Vancomycin 2. Start IV Unasyn 3. Follow-up repeat blood cultures 3. In am, check ESR, CRP, procalcitonin 4. Order Ceretec scan (Bone scan labelled osteoclasts, but Ceretec labels WBC) Tyler Bailey MD

## 2020-12-30 NOTE — CONSULTS
Consult Patient: Sudheer Mortensen MRN: [id number]  SSN: [id number] YOB: 1955  Age: 60 y.o. Sex: male Subjective:  
  
Sudheer Mortensen is a 60 y.o. male who is being seen for   mental status change, hepatic encephalopathy cirrhosis, history from the medical records, patient is confused, Patient with history significant for cirrhosis the hospital emergency room with back pain, with minimal hospital for the possible fractures, had confusion with ascites, patient was in the hospital room today for evaluation of ascites, cirrhosis, encephalopathy, patient admits to alcohol, states currently drinking less than he used to.he does not recall any trauma or injury. CT scan of the abdomen revealing thoracolumbar compression fractures of indeterminate age. Has been followed By orthopedic surgery Past Medical History:  
Diagnosis Date  AICD (automatic cardioverter/defibrillator) present  CAD (coronary artery disease)  Cirrhosis of liver ([address]  Gallstones  Hypertension  Nodule of lower lobe of right lung  Renal stones Past Surgical History:  
Procedure Laterality Date  HX PACEMAKER PLACEMENT    
 IR PARACENTESIS ABD W IMAGE  12/30/2020 History reviewed. No pertinent family history. Social History Tobacco Use  Smoking status: Current Every Day Smoker Packs/day: 1.00 Substance Use Topics  Alcohol use: Yes Current Facility-Administered Medications Medication Dose Route Frequency Provider Last Rate Last Admin  fluticasone propionate (FLONASE) 50 mcg/actuation nasal spray 2 Spray  2 Spray Both Nostrils DAILY Tawny Brittle A, MD   2 Spray at 45/35/58 9364  
 folic acid (FOLVITE) tablet 1 mg  1 mg Oral DAILY Tawny Brittle A, MD   1 mg at 12/30/20 9872  furosemide (LASIX) tablet 40 mg  40 mg Oral DAILY Tawny Brittle A, MD   40 mg at 12/30/20 6414  lactulose (CHRONULAC) 10 gram/15 mL solution 45 mL  30 g Oral TID Render Katya MAR MD   45 mL at 12/30/20 1514  cefTRIAXone (ROCEPHIN) 1 g in sterile water (preservative free) 10 mL IV syringe  1 g IntraVENous Q24H Jamie Brush MD   1 g at 12/30/20 1514  
 vancomycin (VANCOCIN) 1,000 mg in 0.9% sodium chloride 250 mL (VIAL-MATE)  1,000 mg IntraVENous Q12H Render Katya MAR MD   1,000 mg at 12/30/20 1983  propranoloL (INDERAL) tablet 10 mg  10 mg Oral BID Render Katya MAR MD   10 mg at 12/30/20 0924  
 albuterol-ipratropium (DUO-NEB) 2.5 MG-0.5 MG/3 ML  3 mL Nebulization Q6H RT Render Katya MAR MD   3 mL at 12/30/20 0200  
 oxyCODONE IR (ROXICODONE) tablet 5 mg  5 mg Oral Q4H PRN Dayanna MAR MD   5 mg at 12/28/20 1012  VANCOMYCIN INFORMATION NOTE   Other Rx Dosing/Monitoring Kassi Carrasco MD      
 sodium chloride (NS) flush 5-40 mL  5-40 mL IntraVENous PRN Dayanna MAR MD      
 acetaminophen (TYLENOL) tablet 650 mg  650 mg Oral Q6H PRN Dayanna MAR MD   650 mg at 12/26/20 2039 Or  acetaminophen (TYLENOL) suppository 650 mg  650 mg Rectal Q6H PRN Kassi Carrasco MD      
 polyethylene glycol (MIRALAX) packet 17 g  17 g Oral DAILY PRN Kassi Carrasco MD      
 promethazine (PHENERGAN) tablet 12.5 mg  12.5 mg Oral Q6H PRN Dayanna MAR MD      
 Or  
 ondansetron [facility]) injection 4 mg  4 mg IntraVENous Q6H PRN Dayanna MAR MD   4 mg at 12/29/20 2250  isosorbide mononitrate ER (IMDUR) tablet 30 mg  30 mg Oral DAILY Render Katya MAR MD   30 mg at 12/30/20 6057  pantoprazole (PROTONIX) tablet 40 mg  40 mg Oral ACB Dayanna MAR MD   40 mg at 12/30/20 6680  morphine injection 2 mg  2 mg IntraVENous Q4H PRN Dayanna MAR MD   2 mg at 12/29/20 3726  heparin (porcine) injection 5,000 Units  5,000 Units SubCUTAneous Q8H Vin Yo NP   5,000 Units at 12/30/20 2411  rifAXIMin (XIFAXAN) tablet 550 mg  550 mg Oral TID Radha MAR MD   550 mg at 12/30/20 1514 No Known Allergies Review of Systems: 
Review of Systems Unable to perform ROS: Mental acuity Objective:  
 
Vitals:  
 12/30/20 1044 12/30/20 1052 12/30/20 1057 12/30/20 1510 BP: 127/77 127/77 126/72 118/65 Pulse: (!) 113 (!) 113 (!) 113 (!) 111 Resp: 22 22 23 20 Temp:    98.1 °F (36.7 °C) SpO2: 96% 90% 90% 91% Weight:      
Height:      
  
 
Physical Exam: 
Physical Exam  
Constitutional: He appears lethargic. He appears cachectic. He is cooperative. He is easily aroused. HENT:  
Head: Atraumatic. Eyes: Scleral icterus is present. Neck: Normal carotid pulses, no hepatojugular reflux and no JVD present. Carotid bruit is not present. Cardiovascular: Normal pulses. An irregular rhythm present. Pulmonary/Chest: No accessory muscle usage. No respiratory distress. Abdominal: He exhibits shifting dullness, fluid wave, ascites and mass. He exhibits no distension, no pulsatile liver and no abdominal bruit. There is abdominal tenderness in the right upper quadrant. There is no rebound and no CVA tenderness. Neurological: He is easily aroused. He appears lethargic. Recent Results (from the past 24 hour(s)) CBC W/O DIFF Collection Time: 12/29/20  6:04 PM  
Result Value Ref Range WBC 6.7 4.1 - 11.1 K/uL  
 RBC 3.75 (L) 4.10 - 5.70 M/uL  
 HGB 10.1 (L) 12.1 - 17.0 g/dL HCT 32.9 (L) 36.6 - 50.3 % MCV 87.7 80.0 - 99.0 FL  
 MCH 26.9 26.0 - 34.0 PG  
 MCHC 30.7 30.0 - 36.5 g/dL RDW 20.7 (H) 11.5 - 14.5 % PLATELET 971 607 - 703 K/uL MPV 10.0 8.9 - 67.1 FL  
METABOLIC PANEL, COMPREHENSIVE Collection Time: 12/29/20  6:04 PM  
Result Value Ref Range Sodium 140 136 - 145 mmol/L Potassium 3.6 3.5 - 5.1 mmol/L Chloride 109 (H) 97 - 108 mmol/L  
 CO2 25 21 - 32 mmol/L Anion gap 6 5 - 15 mmol/L Glucose 125 (H) 65 - 100 mg/dL BUN 21 (H) 6 - 20 mg/dL Creatinine 1.49 (H) 0.70 - 1.30 mg/dL BUN/Creatinine ratio 14 12 - 20 GFR est AA 58 (L) >60 ml/min/1.73m2 GFR est non-AA 47 (L) >60 ml/min/1.73m2 Calcium 8.7 8.5 - 10.1 mg/dL Bilirubin, total 1.0 0.2 - 1.0 mg/dL AST (SGOT) 53 (H) 15 - 37 U/L  
 ALT (SGPT) 18 12 - 78 U/L Alk. phosphatase 122 (H) 45 - 117 U/L Protein, total 7.7 6.4 - 8.2 g/dL Albumin 2.1 (L) 3.5 - 5.0 g/dL Globulin 5.6 (H) 2.0 - 4.0 g/dL A-G Ratio 0.4 (L) 1.1 - 2.2 AMMONIA Collection Time: 12/29/20  6:04 PM  
Result Value Ref Range Ammonia 63 (H) <32 umol/L  
GLUCOSE, POC Collection Time: 12/29/20  7:36 PM  
Result Value Ref Range Glucose (POC) 126 (H) 65 - 100 mg/dL Performed by [address] [place] [address] Collection Time: 12/30/20  3:38 AM  
Result Value Ref Range Crossmatch Expiration 01/02/2021,2359 ABO/Rh(D) O Positive Antibody screen Negative IR PARACENTESIS ABD W IMAGE Final Result Impression:   
Successful paracentesis. NM BONE SCAN WH BODY Final Result US RETROPERITONEUM COMP Final Result Impression: No hydronephrosis. Ascites. Cirrhotic morphology. Splenomegaly. XR CHEST PORT Final Result Impression: No acute findings. CT ABD PELV WO CONT Final Result IMPRESSION:  
  
Liver cirrhosis. Mild splenomegaly. Upper abdominal and paraesophageal varices. Ascites. No bowel obstruction. Right lower lobe pulmonary nodule may represent tumor or other. Follow up is  
recommended to exclude malignancy. The results were called and verbally  
communicated to Dr. Fallon Apodaca, on 12/26/2020 at 6:07 AM. Thoracolumbar compression fractures of indeterminate age, may be old. Small nonobstructive renal stones. Cholelithiasis. Small umbilical hernia. NM INFLAM PROC [facility]    (Results Pending) Assessment:  
 
Hospital Problems  Date Reviewed: 12/27/2020 Codes Class Noted POA Liver cirrhosis (HCC) ICD-10-CM: K74.60 ICD-9-CM: 571.5  12/26/2020 Unknown Pulmonary nodule ICD-10-CM: R91.1 ICD-9-CM: 793.11  12/26/2020 Unknown Intractable back pain ICD-10-CM: M54.9 ICD-9-CM: 724.5  12/26/2020 Yes Closed compression fracture of L4 lumbar vertebra, initial encounter [facility] ICD-10-CM: K76.638N ICD-9-CM: 805.4  12/26/2020 Yes Hypokalemia ICD-10-CM: E87.6 ICD-9-CM: 276.8  12/26/2020 Yes End-stage liver disease, likely alcohol abuse, Back pain, has been followed by the orthopedic surgeon for fracture of lumbar Mental status changes, confusion, hepatic encephalopathy , decrease ammonia level Ascites, portal hypertension, esophageal varices, was followed by [facility] hepatology clinic 
anemia, no evidence of active GI bleed Plan:  
Continue on the lactulose rifaximin, for hepatic cephalopathy Follow the electrolytes, hepatic function, 
Continue vancomycin for possible infection, 
Continue on beta-blocker for hypertension long-acting nitroglycerin for portal hypertension Continue diuretic for ascites, due to the patient chronic back pain, unknown source of infection, a paracentesis may be indicated Signed By: Boni Tobin MD   
 December 30, 2020 Thank you for allowing me to participate in this patients care Cc Referring Physician Truman Gibbs MD

## 2020-12-30 NOTE — PROGRESS NOTES
Problem: Self Care Deficits Care Plan (Adult) Goal: *Acute Goals and Plan of Care (Insert Text) Description: GOAL 1:  Pt will tolerate sitting EOB X 15 MINUTES in prep for ADL TASKS. GOAL 2:  Pt will perform daily BUE HEP GIVEN MIN ASSIST BY CG TO STRENGTHEN BUE NEEDED FOR ADL TASKS. GOAL 3:  Pt will perform 5 sit to stand EOB transfers daily in one minute at [facility] for functional transfer training. Outcome: Not Met OCCUPATIONAL THERAPY EVALUATION Patient: Chapis Duff (64 y.o. male) Date: 12/30/2020 Primary Diagnosis: Intractable back pain [M54.9] Open compression fracture of thoracolumbar vertebra ([facility] [S22.080B, S32.010B] Liver cirrhosis ([facility] [Y19.43] Pulmonary nodule [R91.1] Precautions: falls ASSESSMENT Based on the objective data described below, the patient presents with overall functional decline with transfers and all activities of daily living due to decreased strength, activity tolerance and LB weakness. Current Level of Function Impacting Discharge (ADLs/self-care): decreased standing tolerance and transfers. Functional Outcome Measure: The patient scored 12/24on the AM-PAC outcome measure which is indicative of maximum assistance needed due to decline with activity tolerance and strength Other factors to consider for discharge: good support system Patient will benefit from skilled therapy intervention to address the above noted impairments. PLAN : 
Recommendations and Planned Interventions: self care training, functional mobility training, therapeutic exercise, balance training, and family training/education Frequency/Duration: Patient will be followed by occupational therapy 3 times a week to address goals. Recommendation for discharge: (in order for the patient to meet his/her long term goals) Elio Stroud This discharge recommendation: 
Has been made in collaboration with the attending provider and/or case management IF patient discharges home will need the following DME: shower chair SUBJECTIVE:  
Patient stated MY BACK IS HURTING. OBJECTIVE DATA SUMMARY:  
HISTORY:  
Past Medical History:  
Diagnosis Date AICD (automatic cardioverter/defibrillator) present CAD (coronary artery disease) Cirrhosis of liver ([address] Gallstones Hypertension Nodule of lower lobe of right lung Renal stones Past Surgical History:  
Procedure Laterality Date HX PACEMAKER PLACEMENT    
 IR PARACENTESIS ABD W IMAGE  12/30/2020 Expanded or extensive additional review of patient history:  
 
Home Situation Home Environment: Private residence # Steps to Enter: 1 Rails to Enter: No 
One/Two Story Residence: One story Living Alone: No 
Support Systems: Spouse/Significant Other/Partner, Child(jen) Patient Expects to be Discharged to[de-identified] Private residence Current DME Used/Available at Home: Cane, straight PLOF: Pt WAS INDP for ADLS/IADLS, CANE USE with mobility prior to admission. Hand dominance: Right EXAMINATION OF PERFORMANCE DEFICITS: 
Cognitive/Behavioral Status: 
Neurologic State: Confused Orientation Level: Oriented X4 Edema: BLE Hearing: Auditory Auditory Impairment: None Range of Motion: 
 
AROM: Generally decreased, functional 
 
 
Strength: 
 
Strength: Grossly decreased, non-functional 
 
 
Coordination: 
Coordination: Grossly decreased, non-functional 
Fine Motor Skills-Upper: Left Impaired;Right Impaired Gross Motor Skills-Upper: Left Impaired;Right Impaired Tone: Normal 
Sensation: Intact Balance: 
Sitting - Static: Good (unsupported) Sitting - Dynamic: Fair (occasional) Standing: With support Standing - Static: Constant support Standing - Dynamic : Constant support Functional Mobility and Transfers for ADLs: 
Bed Mobility: 
Supine to Sit: Maximum assistance;Assist x2 Transfers: 
Sit to Stand: Minimum assistance;Assist x2 
 Stand to Sit: Minimum assistance Bed to Chair: Minimum assistance ADL Assessment: 
Feeding: Maximum assistance Oral Facial Hygiene/Grooming: Maximum assistance Bathing: Maximum assistance Upper Body Dressing: Maximum assistance Lower Body Dressing: Maximum assistance Toileting: Total assistance(CATHETER) ADL Intervention and task modifications: 
 
 
Upper Body Bathing Bathing Assistance: Maximum assistance Lower Body Bathing Bathing Assistance: Maximum assistance Upper Body Dressing Assistance Dressing Assistance: Maximum assistance Lower Body Dressing Assistance Dressing Assistance: Maximum assistance [employer] AM-PACTM \"6 Clicks\" Daily Activity Inpatient Short Form How much help from another person does the patient currently need. .. Total; A Lot A Little None 1. Putting on and taking off regular lower body clothing? []  1 [x]  2 []  3 []  4  
2. Bathing (including washing, rinsing, drying)? []  1 [x]  2 []  3 []  4  
3. Toileting, which includes using toilet, bedpan or urinal? [] 1 [x]  2 []  3 []  4  
4. Putting on and taking off regular upper body clothing? []  1 []  2 [x]  3 []  4  
5. Taking care of personal grooming such as brushing teeth? []  1 []  2 [x]  3 []  4  
6. Eating meals? []  1 []  2 [x]  3 []  4  
© 2007, Trustees of [facility] [employer], under license to [employer]. All rights reserved 12/24 Interpretation of Tool:  Represents clinically-significant functional categories (i.e. Activities of daily living). Percentage of Impairment CH 
 
0% 
 CI 
 
1-19% CJ 
 
20-39% CK 
 
40-59% CL 
 
60-79% CM 
 
80-99% CN  
 
100% AMPA Score 6-24 24 23 20-22 15-19 10-14 7-9 6 Occupational Therapy Evaluation Charge Determination History Examination Decision-Making MEDIUM Complexity : Expanded review of history including physical, cognitive and psychosocial  history  MEDIUM Complexity : 3-5 performance deficits relating to physical, cognitive , or psychosocial skils that result in activity limitations and / or participation restrictions MEDIUM Complexity : Patient may present with comorbidities that affect occupational performnce. Miniml to moderate modification of tasks or assistance (eg, physical or verbal ) with assesment(s) is necessary to enable patient to complete evaluation Based on the above components, the patient evaluation is determined to be of the following complexity level: MEDIUM Pain Ratin/10 middle of back Activity Tolerance:  
Poor After treatment patient left in no apparent distress:   
Sitting in chair COMMUNICATION/EDUCATION:  
The patients plan of care was discussed with: Physical therapist.  
 
Patient/family have participated as able in goal setting and plan of care. This patients plan of care is appropriate for delegation to [facility]. Thank you for this referral. 
Mynor Dove OT Time Calculation: 47 mins

## 2020-12-30 NOTE — PROGRESS NOTES
PT consult received and acknowledged . Currently , pt off the floor - angio. PT will reattempt for PT eval at a later time . Thanks .

## 2020-12-31 NOTE — PROGRESS NOTES
Problem: Falls - Risk of 
Goal: *Absence of Falls Description: Document Maxx Sol Fall Risk and appropriate interventions in the flowsheet. Outcome: Progressing Towards Goal 
Note: Fall Risk Interventions: 
Mobility Interventions: Bed/chair exit alarm, OT consult for ADLs, PT Consult for mobility concerns Mentation Interventions: Bed/chair exit alarm, More frequent rounding, Reorient patient Medication Interventions: Bed/chair exit alarm Elimination Interventions: Bed/chair exit alarm, Call light in reach, Toileting schedule/hourly rounds History of Falls Interventions: Bed/chair exit alarm Problem: Pressure Injury - Risk of 
Goal: *Prevention of pressure injury Description: Document Misha Scale and appropriate interventions in the flowsheet. Outcome: Progressing Towards Goal 
Note: Pressure Injury Interventions: 
Sensory Interventions: Assess changes in LOC, Discuss PT/OT consult with provider, Float heels, Keep linens dry and wrinkle-free, Maintain/enhance activity level, Minimize linen layers, Monitor skin under medical devices, Turn and reposition approx. every two hours (pillows and wedges if needed) Moisture Interventions: Absorbent underpads, Apply protective barrier, creams and emollients, Internal/External fecal devices, Internal/External urinary devices, Moisture barrier, Minimize layers, Maintain skin hydration (lotion/cream), Check for incontinence Q2 hours and as needed Activity Interventions: Increase time out of bed, PT/OT evaluation Mobility Interventions: PT/OT evaluation, Turn and reposition approx. every two hours(pillow and wedges) Nutrition Interventions: Document food/fluid/supplement intake, Offer support with meals,snacks and hydration Friction and Shear Interventions: Apply protective barrier, creams and emollients, Feet elevated on foot rest, Minimize layers, Transferring/repositioning devices

## 2020-12-31 NOTE — WOUND CARE
Lin Rivera called for P500. Spoke with Arlette Valadez confirmation number [id number]. Heel boots delivered to room followed up on  care plan with KALINA Lau.

## 2020-12-31 NOTE — PROGRESS NOTES
PHYSICAL THERAPY TREATMENT Patient: Kiet Villalba (20 y.o. male) Date: 12/31/2020 Diagnosis: Intractable back pain [M54.9] Open compression fracture of thoracolumbar vertebra ([facility] [address] [S22.080B, S32.010B] Liver cirrhosis ([facility] [address] [M49.69] Pulmonary nodule [R91.1] <principal problem not specified> Precautions:   
Chart, physical therapy assessment, plan of care and goals were reviewed. ASSESSMENT Patient continues with skilled PT services and is progressing towards goals. Pt. Semi supine in bed hollering help me continuously. Once spoken to calmed down. Pt. Is very confused with bilateral safety mitts on, Flexi tub, urinary catheter on. Pt. Oriented to self, believes it is 2021, in [facility], and wants to go outside for a cigarette. Is not safe or appropriate for ambulation at this time. Followed commands for semi supine LE TE and was able to drink water with therapist holding jug. SN notified of patients oriented status. Recommend DC to HHPT with 24/7 family care Current Level of Function Impacting Discharge (mobility/balance): cognition Other factors to consider for discharge: TBD once cognition orients PLAN : 
Patient continues to benefit from skilled intervention to address the above impairments. Continue treatment per established plan of care. to address goals. Recommendation for discharge: (in order for the patient to meet his/her long term goals) Physical therapy at least 2 days/week in the home This discharge recommendation: 
Has been made in collaboration with the attending provider and/or case management IF patient discharges home will need the following DME: to be determined (TBD) SUBJECTIVE:  
Patient stated help me, help me, help me.  OBJECTIVE DATA SUMMARY:  
Critical Behavior: 
Neurologic State: Confused Orientation Level: Oriented to person, Disoriented to place, Disoriented to situation, Disoriented to time Cognition: Decreased attention/concentration, Decreased command following, Poor safety awareness, Impulsive, Memory loss, Impaired decision making Functional Mobility Training: 
Held due to safety and risk of flexi tube coming out Therapeutic Exercises:  
Therapeutic Exercises:  
 
 
EXERCISE Sets Reps Active Active Assist  
Passive Self ROM Comments Ankle Pumps  10 [x] [] [] [] Quad Sets/Glut Sets   [] [] [] [] Hamstring Sets   [] [] [] [] Short Arc Quads   [] [] [] [] Heel Slides  10 [x] [] [] [] Straight Leg Raises   [] [] [] [] Hip abd/add  10 [x] [] [] [] Long Arc Quads   [] [] [] [] Marching   [] [] [] []   
   [] [] [] []   
 
 
Pain Ratin Activity Tolerance:  
Poor Please refer to the flowsheet for vital signs taken during this treatment. After treatment patient left in no apparent distress:  
Supine in bed COMMUNICATION/COLLABORATION:  
The patients plan of care was discussed with: Physical therapy assistant. Janna Johnson Time Calculation: 11 mins

## 2020-12-31 NOTE — WOUND CARE
IP WOUND CONSULT Oswaldo Ruiz MEDICAL RECORD NUMBER:  [id number] AGE: 60 y.o. GENDER: male  : 1955 TODAY'S DATE:  2020 GENERAL  
 
[] Follow-up [x] New Consult Oswaldo Ruiz is a 60 y.o. male referred by:  
[x] Physician 
[] Nursing 
[] Other: PAST MEDICAL HISTORY Past Medical History:  
Diagnosis Date  AICD (automatic cardioverter/defibrillator) present  CAD (coronary artery disease)  Cirrhosis of liver ([facility] [address]  Gallstones  Hypertension  Nodule of lower lobe of right lung  Renal stones PAST SURGICAL HISTORY Past Surgical History:  
Procedure Laterality Date  HX PACEMAKER PLACEMENT    
 IR PARACENTESIS ABD W IMAGE  2020 FAMILY HISTORY History reviewed. No pertinent family history. ALLERGIES No Known Allergies MEDICATIONS No current facility-administered medications on file prior to encounter. Current Outpatient Medications on File Prior to Encounter Medication Sig Dispense Refill  propranoloL (INDERAL) 20 mg tablet Take 20 mg by mouth two (2) times a day.  folic acid (FOLVITE) 1 mg tablet Take 1 mg by mouth daily.  isosorbide mononitrate ER (IMDUR) 30 mg tablet Take 30 mg by mouth daily.  furosemide (LASIX) 40 mg tablet Take 40 mg by mouth daily.  tiotropium (Spiriva with HandiHaler) 18 mcg inhalation capsule Take 1 Cap by inhalation daily.  guaiFENesin (ORGANIDIN) 400 mg tablet Take 400 mg by mouth every four (4) hours as needed for Congestion.  FLUTICASONE PROPION-SALMETEROL IN Take  by inhalation. [unfilled] Visit Vitals /61 (BP 1 Location: Right arm) Pulse (!) 113 Temp 97.9 °F (36.6 °C) Resp 20 Ht 6' 0.84\" (1.85 m) Wt 97 kg (213 lb 13.5 oz) SpO2 95% BMI 28.34 kg/m² ASSESSMENT Wound Identification: Left buttocks Wound Type: Moisture associated skin damage Dressing change: Cleansed with Remedy foam, Z paste copiously applied Verbal consent for picture: Yes Contributing Factors: diabetes, decreased mobility, incontinence of stool and malnutrition Wound Buttocks Left (Active) Wound Image   12/31/20 1046 Wound Etiology Other (Comment) 12/31/20 1046 Dressing Status Other (Comment) 12/31/20 1046 Cleansed Other (Comment) 12/31/20 1046 Dressing/Treatment Zinc paste 12/31/20 1046 Wound Length (cm) 4 cm 12/31/20 1046 Wound Width (cm) 2 cm 12/31/20 1046 Wound Surface Area (cm^2) 8 cm^2 12/31/20 1046 Wound Assessment Pink/red 12/31/20 1046 Drainage Amount None 12/31/20 1046 Wound Odor None 12/31/20 1046 Edges Defined edges 12/31/20 1046 Wound Thickness Description Partial thickness 12/31/20 1046 Number of days: 1 PLAN Skin Care & Pressure Relief Recommendations Speciality bed P500 ordered,  keep area clean and dry and apply z paste as ordered, turn every two hours, use pressure off loading boots while in bed. Report changes immediately to wound care nurse if reevaluation needed. Physician/Provider notified:  
 
 
Teaching completed with:  
[] Patient          
[] Family member      
[] Caregiver [x] Nursing 
[] Other Patient/Caregiver Teaching: 
Level of patient/caregiver understanding able to:  
[] Indicates understanding       [] Needs reinforcement 
[] Unsuccessful      [] Verbal Understanding 
[] Demonstrated understanding       [] No evidence of learning 
[] Refused teaching         [x] N/A Electronically signed by Benjamin Angulo RN on 12/31/2020 at 10:49 AM

## 2020-12-31 NOTE — PROGRESS NOTES
Pt off the floor for testing per nsg. Pt reportedly tolerating thin liquids without overt s/s aspiration. Will cont to follow.

## 2020-12-31 NOTE — PROGRESS NOTES
Hospitalist Progress Note Daily Progress Note: 12/31/2020 This is a 67-year-old gentleman who with a history of cardiomyopathy, alcoholic cirrhosis, AICD in place, COPD and continued smoking, hypertension, diabetes mellitus type 2, chronic CHF. Presented to the emergency department complaining of severe low back pain. Little is known of his history initially as he was very confused was found to have an hepatic encephalopathy with an ammonia level of 108. Started on rifaximin and lactulose, mentation improved, ammonia only marginally. He had Steri-Strips in place and chest x-ray suggestive of AICD though not able to contact next of kin. Seen by orthopedic surgery secondary to thoracolumbar compression fractures under evaluation. Blood cultures drawn on presentation grew gram-positive cocci in all 4 bottles, started on vancomycin. Subsequent blood culture at 4 hours showing no growth. Finally was able to speak with significant other who describes AICD change, initially placed 10 years ago, at [facility] about a month ago. She was also able to report to me patient's history more clearly. Apparently he does have a history of compression fractures in the past and has a brace for this at home which she wears infrequently. Pt now recalls alcohol on xmas day and recalls a fall. Also reported that he weekly gets paracentesis at [facility]. ID + Ortho on consult. Subjective:  
12/31 patient seen and examined at bedside, no acute events overnight, patient status post therapeutic paracentesis, tolerated procedure well, discussed with RN at bedside  
problem List: 
Problem List as of 12/31/2020 Date Reviewed: 12/27/2020 Codes Class Noted - Resolved Liver cirrhosis (HCC) ICD-10-CM: K74.60 ICD-9-CM: 571.5  12/26/2020 - Present Pulmonary nodule ICD-10-CM: R91.1 ICD-9-CM: 793.11  12/26/2020 - Present Intractable back pain ICD-10-CM: M54.9 ICD-9-CM: 724.5  12/26/2020 - Present Closed compression fracture of L4 lumbar vertebra, initial encounter ([facility] [address] ICD-10-CM: Y46.609F ICD-9-CM: 805.4  12/26/2020 - Present Hypokalemia ICD-10-CM: E87.6 ICD-9-CM: 276.8  12/26/2020 - Present Medications reviewed Current Facility-Administered Medications Medication Dose Route Frequency  [COMPLETED] technetium exametazime (Tc-CERETEC) injection 57.4 millicurie  07.7 millicurie IntraVENous RAD ONCE  
 ampicillin-sulbactam (UNASYN) 3 g in 0.9% sodium chloride (MBP/ADV) 100 mL MBP  3 g IntraVENous Q6H  
 fluticasone propionate (FLONASE) 50 mcg/actuation nasal spray 2 Spray  2 Spray Both Nostrils DAILY  folic acid (FOLVITE) tablet 1 mg  1 mg Oral DAILY  furosemide (LASIX) tablet 40 mg  40 mg Oral DAILY  lactulose (CHRONULAC) 10 gram/15 mL solution 45 mL  30 g Oral TID  propranoloL (INDERAL) tablet 10 mg  10 mg Oral BID  albuterol-ipratropium (DUO-NEB) 2.5 MG-0.5 MG/3 ML  3 mL Nebulization Q6H RT  
 oxyCODONE IR (ROXICODONE) tablet 5 mg  5 mg Oral Q4H PRN  
 sodium chloride (NS) flush 5-40 mL  5-40 mL IntraVENous PRN  
 acetaminophen (TYLENOL) tablet 650 mg  650 mg Oral Q6H PRN Or  
 acetaminophen (TYLENOL) suppository 650 mg  650 mg Rectal Q6H PRN  polyethylene glycol (MIRALAX) packet 17 g  17 g Oral DAILY PRN  promethazine (PHENERGAN) tablet 12.5 mg  12.5 mg Oral Q6H PRN Or  
 ondansetron (ZOFRAN) injection 4 mg  4 mg IntraVENous Q6H PRN  
 isosorbide mononitrate ER (IMDUR) tablet 30 mg  30 mg Oral DAILY  pantoprazole (PROTONIX) tablet 40 mg  40 mg Oral ACB  morphine injection 2 mg  2 mg IntraVENous Q4H PRN  
 heparin (porcine) injection 5,000 Units  5,000 Units SubCUTAneous Q8H  
 rifAXIMin (XIFAXAN) tablet 550 mg  550 mg Oral TID Review of Systems:  
Review of Systems Constitutional: Positive for malaise/fatigue. Negative for fever. HENT: Negative. Eyes: Negative. Respiratory: Negative for shortness of breath. Cardiovascular: Positive for leg swelling. Gastrointestinal: Negative for blood in stool, melena, nausea and vomiting. Genitourinary: Negative. Musculoskeletal: Positive for back pain. Skin: Negative. Neurological: Positive for weakness. Endo/Heme/Allergies: Negative. Psychiatric/Behavioral: Negative. Objective:  
Physical Exam:  
 
Visit Vitals /61 (BP 1 Location: Right arm) Pulse (!) 113 Temp 97.9 °F (36.6 °C) Resp 20 Ht 6' 0.84\" (1.85 m) Wt 97 kg (213 lb 13.5 oz) SpO2 95% BMI 28.34 kg/m² O2 Flow Rate (L/min): 4 l/min O2 Device: Nasal cannula Temp (24hrs), Av.1 °F (36.7 °C), Min:97.9 °F (36.6 °C), Max:98.3 °F (36.8 °C) No intake/output data recorded.  1901 -  0700 In: 720 [P.O.:720] Out: 3400 [VSTZJ:5511] General:  Alert, cooperative, lucid, no withdrawal s/s Lungs:   Clear to auscultation bilaterally. Diminished Chest wall:  No tenderness or deformity. Left upper chest aicd/stri strips present, no erythema or purulence or tenderness. Heart:  Regular rate and rhythm, S1, S2 normal, no murmur, click, rub or gallop. Abdomen:   Soft, Distended nontender bowel sounds normal. No masses,  No organomegaly. Extremities:  Back pain limits mobility of lower extremities, Pain significant with minimal raise off bed of le's. Pulses: 2+ and symmetric all extremities. Skin:  Decreased turgor Neurologic: CNII-XII intact. No gross sensory or motor deficits Data Review:  
   
Recent Days: 
Recent Labs  
  20 
180 WBC 6.7 HGB 10.1* HCT 32.9*  
 Recent Labs  
  20 
1804 20 
0910   --   
K 3.6  --   
*  --   
CO2 25  --   
*  --   
BUN 21*  --   
CREA 1.49*  --   
CA 8.7  --   
ALB 2.1*  --   
TBILI 1.0  --   
ALT 18  --   
INR  --  1.4* Recent Labs 20 PH 7.23* PCO2 55* PO2 106* HCO3 20*  
 
 
24 Hour Results: 
Recent Results (from the past 24 hour(s)) BLOOD GAS, ARTERIAL Collection Time: 12/30/20  9:05 PM  
Result Value Ref Range pH 7.23 (LL) 7.35 - 7.45    
 PCO2 55 (H) 35 - 45 mmHg PO2 106 (H) 75 - 100 mmHg O2 SAT 97 >95 % BICARBONATE 20 (L) 22 - 26 mmol/L  
 BASE DEFICIT 4.8 (H) 0 - 2 mmol/L  
 O2 METHOD Nasal Cannula O2 FLOW RATE 6 L/min SITE Right Radial    
 ALVAREZ'S TEST PASS Assessment/  
 
Patient Active Problem List  
Diagnosis Code  Liver cirrhosis ([facility] K74.60  Pulmonary nodule R91.1  Intractable back pain M54.9  Closed compression fracture of L4 lumbar vertebra, initial encounter ([facility] S32.040A  Hypokalemia E87.6 Acute hepatic encephalopathypatient presented with significantly altered mental status consistent with acute hepatic encephalopathy, currently mental status is improved Continue to trend serum ammonia levels Continue to monitor mental status Continue lactulose, titrate to 2-3 bowel movements per day Gastroenterology consult Thoracic lumbar compression fracturesresulting in significant decrease in mobility, currently patient is not complaining of any pain at this time Orthopedic consult appreciated, patient more suited for conservative management at this time Follow physical therapy recommendations Chronic kidney disease stage IIIcurrently serum creatinine remained stable Continue to trend serum creatinine Gram-positive bacteremiafollow-up sensitivities Follow up Galium scan to rule out vertebral osteomyelitis Follow-up surveillance cultures Continue unasyn for Abx coverage Thanks disease consult appreciated Abdominal ascitescurrently status post therapeutic paracentesis, tolerated procedure well Interventional radiology consult appreciated, will continue to follow Gastroenterology consult appreciated, will continue to follow ProphylaxisHeparin subcu FENcardiac diet, replete potassium and magnesium Full code, will clarify about surrogate decision-maker Dispositionpending clinical improvement/physical therapy/Occupational Therapy, patient will likely need placement. Total noncritical care time spent 35 minutes Care Plan discussed with: Patient/Family, Nurse and Consultant Ortho Alex Morin MD

## 2020-12-31 NOTE — PROGRESS NOTES
Progress Note Patient: Pattie Castellanos MRN: [id number]  SSN: [id number] YOB: 1955  Age: 60 y.o. Sex: male Admit Date: 12/26/2020 LOS: 5 days Subjective: More awake and alert. No pain or GI bleeding Past Medical History:  
Diagnosis Date  AICD (automatic cardioverter/defibrillator) present  CAD (coronary artery disease)  Cirrhosis of liver ([facility] [address]  Gallstones  Hypertension  Nodule of lower lobe of right lung  Renal stones Current Facility-Administered Medications:  
  ampicillin-sulbactam (UNASYN) 3 g in 0.9% sodium chloride (MBP/ADV) 100 mL MBP, 3 g, IntraVENous, Q6H, Mu Palacios MD, Last Rate: 200 mL/hr at 12/31/20 0624, 3 g at 12/31/20 9695   fluticasone propionate (FLONASE) 50 mcg/actuation nasal spray 2 Spray, 2 Spray, Both Nostrils, DAILY, Kassi Carrasco MD, 2 Colp at 12/31/20 7847   folic acid (FOLVITE) tablet 1 mg, 1 mg, Oral, DAILY, Jamie Brush MD, 1 mg at 12/31/20 3865   furosemide (LASIX) tablet 40 mg, 40 mg, Oral, DAILY, Jamie Brush MD, 40 mg at 12/31/20 2417   lactulose (CHRONULAC) 10 gram/15 mL solution 45 mL, 30 g, Oral, TID, Jamie Brush MD, 45 mL at 12/31/20 1001   propranoloL (INDERAL) tablet 10 mg, 10 mg, Oral, BID, Jamie Brush MD, 10 mg at 12/31/20 3415   albuterol-ipratropium (DUO-NEB) 2.5 MG-0.5 MG/3 ML, 3 mL, Nebulization, Q6H RT, Jamie Brush MD, 3 mL at 12/31/20 9356   oxyCODONE IR (ROXICODONE) tablet 5 mg, 5 mg, Oral, Q4H PRN, Dayanna MAR MD, 5 mg at 12/30/20 2234   sodium chloride (NS) flush 5-40 mL, 5-40 mL, IntraVENous, PRN, Kassi Carrasco MD 
  acetaminophen (TYLENOL) tablet 650 mg, 650 mg, Oral, Q6H PRN, 650 mg at 12/26/20 2039 **OR** acetaminophen (TYLENOL) suppository 650 mg, 650 mg, Rectal, Q6H PRN, Jamie Brush MD 
  polyethylene glycol (MIRALAX) packet 17 g, 17 g, Oral, DAILY PRN, Kassi Carrasco MD 
   promethazine (PHENERGAN) tablet 12.5 mg, 12.5 mg, Oral, Q6H PRN **OR** ondansetron (ZOFRAN) injection 4 mg, 4 mg, IntraVENous, Q6H PRN, Jamie Hudson MD, 4 mg at 12/29/20 2250   isosorbide mononitrate ER (IMDUR) tablet 30 mg, 30 mg, Oral, DAILY, Jamie Hudson MD, 30 mg at 12/31/20 8005   pantoprazole (PROTONIX) tablet 40 mg, 40 mg, Oral, ACB, Jamie Hudson MD, 40 mg at 12/31/20 6455   morphine injection 2 mg, 2 mg, IntraVENous, Q4H PRN, Demetrice Pandey MD, 2 mg at 12/29/20 2255   heparin (porcine) injection 5,000 Units, 5,000 Units, SubCUTAneous, Q8H, Vin Yo NP, 5,000 Units at 12/31/20 0500 
  rifAXIMin (XIFAXAN) tablet 550 mg, 550 mg, Oral, TID, Zac MAR MD, 550 mg at 12/31/20 5874 Objective:  
 
Vitals:  
 12/31/20 0005 12/31/20 0325 12/31/20 3386 12/31/20 4392 BP: 111/63 116/61 Pulse: 98 (!) 106 (!) 113 Resp: 20 18 20 Temp: 98.3 °F (36.8 °C) 98.1 °F (36.7 °C) 97.9 °F (36.6 °C) SpO2: 95% 95%  95% Weight:      
Height:      
  
 
Intake and Output: 
Current Shift: No intake/output data recorded. Last three shifts: 12/29 1901 - 12/31 0700 In: 720 [P.O.:720] Out: 3400 [POJUD:1209] Physical Exam:  
Physical Exam  
Constitutional: He is cooperative. He has a sickly appearance. He appears ill. He appears distressed. HENT:  
Head: Normocephalic and atraumatic. Eyes: Scleral icterus is present. Neck: Normal carotid pulses and no hepatojugular reflux present. Cardiovascular: Regular rhythm. Pulmonary/Chest: No apnea and no bradypnea. Abdominal: He exhibits distension, fluid wave, ascites and mass. Skin: Bruising noted. Lab/Data Review: 
Recent Results (from the past 24 hour(s)) BLOOD GAS, ARTERIAL Collection Time: 12/30/20  9:05 PM  
Result Value Ref Range pH 7.23 (LL) 7.35 - 7.45    
 PCO2 55 (H) 35 - 45 mmHg PO2 106 (H) 75 - 100 mmHg O2 SAT 97 >95 %  BICARBONATE 20 (L) 22 - 26 mmol/L  
 BASE DEFICIT 4.8 (H) 0 - 2 mmol/L  
 O2 METHOD Nasal Cannula O2 FLOW RATE 6 L/min SITE Right Radial    
 ALVAREZ'S TEST PASS    
  
 
XR CHEST PORT Final Result Impression: No acute cardiopulmonary findings. IR PARACENTESIS ABD W IMAGE Final Result Impression:   
Successful paracentesis. NM BONE SCAN WH BODY Final Result US RETROPERITONEUM COMP Final Result Impression: No hydronephrosis. Ascites. Cirrhotic morphology. Splenomegaly. XR CHEST PORT Final Result Impression: No acute findings. CT ABD PELV WO CONT Final Result IMPRESSION:  
  
Liver cirrhosis. Mild splenomegaly. Upper abdominal and paraesophageal varices. Ascites. No bowel obstruction. Right lower lobe pulmonary nodule may represent tumor or other. Follow up is  
recommended to exclude malignancy. The results were called and verbally  
communicated to Dr. Beth Quiroga, on 12/26/2020 at 6:07 AM. Thoracolumbar compression fractures of indeterminate age, may be old. Small nonobstructive renal stones. Cholelithiasis. Small umbilical hernia. NM INFLAM PROC LTD    (Results Pending) Assessment:  
 
Active Problems: 
  Liver cirrhosis ([facility] [address] (12/26/2020) Pulmonary nodule (12/26/2020) Intractable back pain (12/26/2020) Closed compression fracture of L4 lumbar vertebra, initial encounter ([facility] [address] (12/26/2020) Hypokalemia (12/26/2020) End-stage liver disease, likely alcohol abuse, Back pain, has been followed by the orthopedic surgeon for fracture of lumbar Mental status changes, confusion, hepatic encephalopathy , decrease ammonia level Better mental status today Ascites, portal hypertension, esophageal varices, was followed by [facility] 
anemia, no evidence of active GI bleed Plan:  
Continue on the lactulose rifaximin, for hepatic cephalopathy Follow the electrolytes, hepatic function, 
 Continue vancomycin for possible infection, 
Continue on beta-blocker for hypertension long-acting nitroglycerin for portal hypertension Continue diuretic for ascites, had paracentesis by IR but no lab sent. Will follow Plan:  
 
 
Signed By: Kai Hanson MD   
 December 31, 2020 Thank you for allowing me to participate in this patients care Cc Referring Physician Amy Melgar MD

## 2021-01-01 ENCOUNTER — APPOINTMENT (OUTPATIENT)
Dept: CT IMAGING | Age: 66
DRG: 433 | End: 2021-01-01
Attending: EMERGENCY MEDICINE
Payer: MEDICARE

## 2021-01-01 ENCOUNTER — HOSPITAL ENCOUNTER (INPATIENT)
Age: 66
LOS: 3 days | Discharge: HOME HEALTH | DRG: 280 | End: 2021-10-26
Attending: STUDENT IN AN ORGANIZED HEALTH CARE EDUCATION/TRAINING PROGRAM | Admitting: HOSPITALIST
Payer: MEDICAID

## 2021-01-01 ENCOUNTER — APPOINTMENT (OUTPATIENT)
Dept: GENERAL RADIOLOGY | Age: 66
DRG: 870 | End: 2021-01-01
Attending: EMERGENCY MEDICINE
Payer: MEDICARE

## 2021-01-01 ENCOUNTER — APPOINTMENT (OUTPATIENT)
Dept: GENERAL RADIOLOGY | Age: 66
DRG: 280 | End: 2021-01-01
Attending: STUDENT IN AN ORGANIZED HEALTH CARE EDUCATION/TRAINING PROGRAM
Payer: MEDICAID

## 2021-01-01 ENCOUNTER — APPOINTMENT (OUTPATIENT)
Dept: GENERAL RADIOLOGY | Age: 66
DRG: 870 | End: 2021-01-01
Attending: INTERNAL MEDICINE
Payer: MEDICARE

## 2021-01-01 ENCOUNTER — APPOINTMENT (OUTPATIENT)
Dept: CT IMAGING | Age: 66
DRG: 870 | End: 2021-01-01
Attending: EMERGENCY MEDICINE
Payer: MEDICARE

## 2021-01-01 ENCOUNTER — APPOINTMENT (OUTPATIENT)
Dept: CT IMAGING | Age: 66
DRG: 280 | End: 2021-01-01
Attending: STUDENT IN AN ORGANIZED HEALTH CARE EDUCATION/TRAINING PROGRAM
Payer: MEDICAID

## 2021-01-01 ENCOUNTER — HOSPITAL ENCOUNTER (INPATIENT)
Age: 66
LOS: 2 days | Discharge: HOME HEALTH | DRG: 433 | End: 2021-10-21
Attending: EMERGENCY MEDICINE | Admitting: INTERNAL MEDICINE
Payer: MEDICARE

## 2021-01-01 ENCOUNTER — HOSPICE ADMISSION (OUTPATIENT)
Dept: HOSPICE | Facility: HOSPICE | Age: 66
End: 2021-01-01

## 2021-01-01 ENCOUNTER — APPOINTMENT (OUTPATIENT)
Dept: GENERAL RADIOLOGY | Age: 66
DRG: 720 | End: 2021-01-01
Attending: INTERNAL MEDICINE
Payer: MEDICAID

## 2021-01-01 ENCOUNTER — APPOINTMENT (OUTPATIENT)
Dept: CT IMAGING | Age: 66
DRG: 433 | End: 2021-01-01
Attending: HOSPITALIST
Payer: MEDICARE

## 2021-01-01 ENCOUNTER — HOSPITAL ENCOUNTER (INPATIENT)
Age: 66
LOS: 7 days | DRG: 870 | End: 2021-11-12
Attending: EMERGENCY MEDICINE | Admitting: INTERNAL MEDICINE
Payer: MEDICARE

## 2021-01-01 ENCOUNTER — APPOINTMENT (OUTPATIENT)
Dept: GENERAL RADIOLOGY | Age: 66
DRG: 433 | End: 2021-01-01
Attending: EMERGENCY MEDICINE
Payer: MEDICARE

## 2021-01-01 VITALS
BODY MASS INDEX: 30.74 KG/M2 | DIASTOLIC BLOOD PRESSURE: 53 MMHG | OXYGEN SATURATION: 91 % | RESPIRATION RATE: 18 BRPM | WEIGHT: 231.92 LBS | SYSTOLIC BLOOD PRESSURE: 92 MMHG | HEIGHT: 73 IN | TEMPERATURE: 97.8 F | HEART RATE: 94 BPM

## 2021-01-01 VITALS
WEIGHT: 235.23 LBS | BODY MASS INDEX: 31.86 KG/M2 | TEMPERATURE: 97.9 F | HEIGHT: 72 IN | HEART RATE: 102 BPM | OXYGEN SATURATION: 7 % | SYSTOLIC BLOOD PRESSURE: 110 MMHG | RESPIRATION RATE: 17 BRPM | DIASTOLIC BLOOD PRESSURE: 66 MMHG

## 2021-01-01 VITALS
BODY MASS INDEX: 30.48 KG/M2 | SYSTOLIC BLOOD PRESSURE: 107 MMHG | OXYGEN SATURATION: 95 % | TEMPERATURE: 98.3 F | HEART RATE: 89 BPM | DIASTOLIC BLOOD PRESSURE: 67 MMHG | HEIGHT: 73 IN | WEIGHT: 230 LBS | RESPIRATION RATE: 18 BRPM

## 2021-01-01 VITALS
WEIGHT: 230 LBS | DIASTOLIC BLOOD PRESSURE: 69 MMHG | BODY MASS INDEX: 34.86 KG/M2 | TEMPERATURE: 97.6 F | SYSTOLIC BLOOD PRESSURE: 125 MMHG | OXYGEN SATURATION: 92 % | HEIGHT: 68 IN | HEART RATE: 85 BPM | RESPIRATION RATE: 22 BRPM

## 2021-01-01 DIAGNOSIS — K76.82 HEPATIC ENCEPHALOPATHY: Primary | ICD-10-CM

## 2021-01-01 DIAGNOSIS — E87.29 RESPIRATORY ACIDOSIS: ICD-10-CM

## 2021-01-01 DIAGNOSIS — R06.89 HYPERCAPNIA: Primary | ICD-10-CM

## 2021-01-01 DIAGNOSIS — E72.20 HYPERAMMONEMIA (HCC): ICD-10-CM

## 2021-01-01 DIAGNOSIS — R41.82 ALTERED MENTAL STATUS, UNSPECIFIED ALTERED MENTAL STATUS TYPE: ICD-10-CM

## 2021-01-01 LAB
ABO + RH BLD: NORMAL
ALBUMIN SERPL-MCNC: 1.8 G/DL (ref 3.5–5)
ALBUMIN SERPL-MCNC: 1.9 G/DL (ref 3.5–5)
ALBUMIN SERPL-MCNC: 1.9 G/DL (ref 3.5–5)
ALBUMIN SERPL-MCNC: 2 G/DL (ref 3.5–5)
ALBUMIN SERPL-MCNC: 2.1 G/DL (ref 3.5–5)
ALBUMIN SERPL-MCNC: 2.3 G/DL (ref 3.5–5)
ALBUMIN/GLOB SERPL: 0.3 {RATIO} (ref 1.1–2.2)
ALBUMIN/GLOB SERPL: 0.4 {RATIO} (ref 1.1–2.2)
ALP SERPL-CCNC: 104 U/L (ref 45–117)
ALP SERPL-CCNC: 112 U/L (ref 45–117)
ALP SERPL-CCNC: 115 U/L (ref 45–117)
ALP SERPL-CCNC: 116 U/L (ref 45–117)
ALP SERPL-CCNC: 123 U/L (ref 45–117)
ALP SERPL-CCNC: 129 U/L (ref 45–117)
ALP SERPL-CCNC: 98 U/L (ref 45–117)
ALP SERPL-CCNC: 98 U/L (ref 45–117)
ALT SERPL-CCNC: 15 U/L (ref 12–78)
ALT SERPL-CCNC: 17 U/L (ref 12–78)
ALT SERPL-CCNC: 18 U/L (ref 12–78)
ALT SERPL-CCNC: 21 U/L (ref 12–78)
ALT SERPL-CCNC: 24 U/L (ref 12–78)
ALT SERPL-CCNC: 25 U/L (ref 12–78)
ALT SERPL-CCNC: 25 U/L (ref 12–78)
ALT SERPL-CCNC: 27 U/L (ref 12–78)
AMMONIA PLAS-SCNC: 104 UMOL/L
AMMONIA PLAS-SCNC: 143 UMOL/L
AMMONIA PLAS-SCNC: 148 UMOL/L
AMMONIA PLAS-SCNC: 31 UMOL/L
AMMONIA PLAS-SCNC: 38 UMOL/L
AMMONIA PLAS-SCNC: 42 UMOL/L
AMMONIA PLAS-SCNC: 53 UMOL/L
AMMONIA PLAS-SCNC: 56 UMOL/L
AMMONIA PLAS-SCNC: 76 UMOL/L
AMPHET UR QL SCN: NEGATIVE
ANION GAP SERPL CALC-SCNC: 4 MMOL/L (ref 5–15)
ANION GAP SERPL CALC-SCNC: 4 MMOL/L (ref 5–15)
ANION GAP SERPL CALC-SCNC: 5 MMOL/L (ref 5–15)
ANION GAP SERPL CALC-SCNC: 6 MMOL/L (ref 5–15)
ANION GAP SERPL CALC-SCNC: 7 MMOL/L (ref 5–15)
APPEARANCE UR: ABNORMAL
APTT PPP: 34.8 SEC (ref 21.2–34.1)
ARTERIAL PATENCY WRIST A: ABNORMAL
ARTERIAL PATENCY WRIST A: POSITIVE
ARTERIAL PATENCY WRIST A: POSITIVE
AST SERPL W P-5'-P-CCNC: 26 U/L (ref 15–37)
AST SERPL W P-5'-P-CCNC: 29 U/L (ref 15–37)
AST SERPL W P-5'-P-CCNC: 31 U/L (ref 15–37)
AST SERPL W P-5'-P-CCNC: 35 U/L (ref 15–37)
AST SERPL W P-5'-P-CCNC: 36 U/L (ref 15–37)
AST SERPL W P-5'-P-CCNC: 37 U/L (ref 15–37)
AST SERPL W P-5'-P-CCNC: 42 U/L (ref 15–37)
AST SERPL W P-5'-P-CCNC: 66 U/L (ref 15–37)
ATRIAL RATE: 106 BPM
ATRIAL RATE: 83 BPM
BACTERIA SPEC CULT: NORMAL
BACTERIA URNS QL MICRO: NEGATIVE /HPF
BARBITURATES UR QL SCN: NEGATIVE
BASE DEFICIT BLDA-SCNC: 2.5 MMOL/L (ref 0–2)
BASE DEFICIT BLDA-SCNC: 2.6 MMOL/L (ref 0–2)
BASE DEFICIT BLDA-SCNC: 2.8 MMOL/L (ref 0–2)
BASE DEFICIT BLDA-SCNC: 3.2 MMOL/L (ref 0–2)
BASE DEFICIT BLDA-SCNC: 3.5 MMOL/L (ref 0–2)
BASE DEFICIT BLDA-SCNC: 3.8 MMOL/L (ref 0–2)
BASE DEFICIT BLDA-SCNC: 4.4 MMOL/L (ref 0–2)
BASE DEFICIT BLDA-SCNC: 5.2 MMOL/L (ref 0–2)
BASE DEFICIT BLDA-SCNC: 5.9 MMOL/L (ref 0–2)
BASOPHILS # BLD: 0 K/UL (ref 0–0.1)
BASOPHILS # BLD: 0.1 K/UL (ref 0–0.1)
BASOPHILS NFR BLD: 0 % (ref 0–1)
BASOPHILS NFR BLD: 1 % (ref 0–1)
BDY SITE: ABNORMAL
BENZODIAZ UR QL: NEGATIVE
BILIRUB SERPL-MCNC: 0.7 MG/DL (ref 0.2–1)
BILIRUB SERPL-MCNC: 0.8 MG/DL (ref 0.2–1)
BILIRUB SERPL-MCNC: 0.8 MG/DL (ref 0.2–1)
BILIRUB SERPL-MCNC: 0.9 MG/DL (ref 0.2–1)
BILIRUB SERPL-MCNC: 1.1 MG/DL (ref 0.2–1)
BILIRUB SERPL-MCNC: 1.2 MG/DL (ref 0.2–1)
BILIRUB SERPL-MCNC: 1.3 MG/DL (ref 0.2–1)
BILIRUB SERPL-MCNC: 1.3 MG/DL (ref 0.2–1)
BILIRUB UR QL: NEGATIVE
BLOOD GROUP ANTIBODIES SERPL: NEGATIVE
BNP SERPL-MCNC: 1245 PG/ML
BNP SERPL-MCNC: 405 PG/ML
BUN SERPL-MCNC: 13 MG/DL (ref 6–20)
BUN SERPL-MCNC: 16 MG/DL (ref 6–20)
BUN SERPL-MCNC: 21 MG/DL (ref 6–20)
BUN SERPL-MCNC: 21 MG/DL (ref 6–20)
BUN SERPL-MCNC: 23 MG/DL (ref 6–20)
BUN SERPL-MCNC: 27 MG/DL (ref 6–20)
BUN SERPL-MCNC: 27 MG/DL (ref 6–20)
BUN SERPL-MCNC: 28 MG/DL (ref 6–20)
BUN SERPL-MCNC: 32 MG/DL (ref 6–20)
BUN SERPL-MCNC: 37 MG/DL (ref 6–20)
BUN SERPL-MCNC: 38 MG/DL (ref 6–20)
BUN SERPL-MCNC: 40 MG/DL (ref 6–20)
BUN SERPL-MCNC: 41 MG/DL (ref 6–20)
BUN SERPL-MCNC: 53 MG/DL (ref 6–20)
BUN/CREAT SERPL: 11 (ref 12–20)
BUN/CREAT SERPL: 13 (ref 12–20)
BUN/CREAT SERPL: 14 (ref 12–20)
BUN/CREAT SERPL: 15 (ref 12–20)
BUN/CREAT SERPL: 15 (ref 12–20)
BUN/CREAT SERPL: 19 (ref 12–20)
BUN/CREAT SERPL: 20 (ref 12–20)
BUN/CREAT SERPL: 21 (ref 12–20)
BUN/CREAT SERPL: 23 (ref 12–20)
BUN/CREAT SERPL: 26 (ref 12–20)
BUN/CREAT SERPL: 32 (ref 12–20)
BUN/CREAT SERPL: 8 (ref 12–20)
BUN/CREAT SERPL: 8 (ref 12–20)
BUN/CREAT SERPL: 9 (ref 12–20)
CA-I BLD-MCNC: 7.4 MG/DL (ref 8.5–10.1)
CA-I BLD-MCNC: 8.1 MG/DL (ref 8.5–10.1)
CA-I BLD-MCNC: 8.2 MG/DL (ref 8.5–10.1)
CA-I BLD-MCNC: 8.4 MG/DL (ref 8.5–10.1)
CA-I BLD-MCNC: 8.5 MG/DL (ref 8.5–10.1)
CA-I BLD-MCNC: 8.6 MG/DL (ref 8.5–10.1)
CA-I BLD-MCNC: 8.8 MG/DL (ref 8.5–10.1)
CA-I BLD-MCNC: 8.8 MG/DL (ref 8.5–10.1)
CA-I BLD-MCNC: 9 MG/DL (ref 8.5–10.1)
CA-I BLD-MCNC: 9 MG/DL (ref 8.5–10.1)
CA-I BLD-MCNC: 9.3 MG/DL (ref 8.5–10.1)
CALCULATED P AXIS, ECG09: 49 DEGREES
CALCULATED P AXIS, ECG09: 76 DEGREES
CALCULATED R AXIS, ECG10: -118 DEGREES
CALCULATED R AXIS, ECG10: -99 DEGREES
CALCULATED T AXIS, ECG11: 25 DEGREES
CALCULATED T AXIS, ECG11: 37 DEGREES
CANNABINOIDS UR QL SCN: NEGATIVE
CHLORIDE SERPL-SCNC: 104 MMOL/L (ref 97–108)
CHLORIDE SERPL-SCNC: 105 MMOL/L (ref 97–108)
CHLORIDE SERPL-SCNC: 106 MMOL/L (ref 97–108)
CHLORIDE SERPL-SCNC: 107 MMOL/L (ref 97–108)
CHLORIDE SERPL-SCNC: 109 MMOL/L (ref 97–108)
CHLORIDE SERPL-SCNC: 110 MMOL/L (ref 97–108)
CHLORIDE SERPL-SCNC: 111 MMOL/L (ref 97–108)
CHLORIDE SERPL-SCNC: 113 MMOL/L (ref 97–108)
CHLORIDE SERPL-SCNC: 114 MMOL/L (ref 97–108)
CHLORIDE SERPL-SCNC: 116 MMOL/L (ref 97–108)
CHLORIDE SERPL-SCNC: 116 MMOL/L (ref 97–108)
CHLORIDE SERPL-SCNC: 117 MMOL/L (ref 97–108)
CHLORIDE SERPL-SCNC: 118 MMOL/L (ref 97–108)
CO2 SERPL-SCNC: 20 MMOL/L (ref 21–32)
CO2 SERPL-SCNC: 20 MMOL/L (ref 21–32)
CO2 SERPL-SCNC: 21 MMOL/L (ref 21–32)
CO2 SERPL-SCNC: 22 MMOL/L (ref 21–32)
CO2 SERPL-SCNC: 23 MMOL/L (ref 21–32)
CO2 SERPL-SCNC: 23 MMOL/L (ref 21–32)
CO2 SERPL-SCNC: 24 MMOL/L (ref 21–32)
CO2 SERPL-SCNC: 25 MMOL/L (ref 21–32)
CO2 SERPL-SCNC: 26 MMOL/L (ref 21–32)
CO2 SERPL-SCNC: 28 MMOL/L (ref 21–32)
CO2 SERPL-SCNC: 29 MMOL/L (ref 21–32)
COCAINE UR QL SCN: NEGATIVE
COLOR UR: ABNORMAL
COVID-19 RAPID TEST, COVR: NOT DETECTED
COVID-19 RAPID TEST, COVR: NOT DETECTED
CREAT SERPL-MCNC: 1.47 MG/DL (ref 0.7–1.3)
CREAT SERPL-MCNC: 1.51 MG/DL (ref 0.7–1.3)
CREAT SERPL-MCNC: 1.55 MG/DL (ref 0.7–1.3)
CREAT SERPL-MCNC: 1.58 MG/DL (ref 0.7–1.3)
CREAT SERPL-MCNC: 1.61 MG/DL (ref 0.7–1.3)
CREAT SERPL-MCNC: 1.62 MG/DL (ref 0.7–1.3)
CREAT SERPL-MCNC: 1.64 MG/DL (ref 0.7–1.3)
CREAT SERPL-MCNC: 1.64 MG/DL (ref 0.7–1.3)
CREAT SERPL-MCNC: 1.67 MG/DL (ref 0.7–1.3)
CREAT SERPL-MCNC: 1.79 MG/DL (ref 0.7–1.3)
CREAT SERPL-MCNC: 1.8 MG/DL (ref 0.7–1.3)
CREAT SERPL-MCNC: 1.81 MG/DL (ref 0.7–1.3)
CREAT SERPL-MCNC: 1.88 MG/DL (ref 0.7–1.3)
CREAT SERPL-MCNC: 1.91 MG/DL (ref 0.7–1.3)
CREAT SERPL-MCNC: 1.93 MG/DL (ref 0.7–1.3)
CREAT SERPL-MCNC: 1.98 MG/DL (ref 0.7–1.3)
CRP SERPL-MCNC: 5.86 MG/DL (ref 0–0.6)
CRP SERPL-MCNC: 8.26 MG/DL (ref 0–0.6)
CRP SERPL-MCNC: 9.75 MG/DL (ref 0–0.6)
DIAGNOSIS, 93000: NORMAL
DIAGNOSIS, 93000: NORMAL
DIFFERENTIAL METHOD BLD: ABNORMAL
DRUG SCRN COMMENT,DRGCM: NORMAL
EOSINOPHIL # BLD: 0 K/UL (ref 0–0.4)
EOSINOPHIL # BLD: 0.1 K/UL (ref 0–0.4)
EOSINOPHIL # BLD: 0.2 K/UL (ref 0–0.4)
EOSINOPHIL # BLD: 0.2 K/UL (ref 0–0.4)
EOSINOPHIL NFR BLD: 0 % (ref 0–7)
EOSINOPHIL NFR BLD: 1 % (ref 0–7)
EOSINOPHIL NFR BLD: 3 % (ref 0–7)
EOSINOPHIL NFR BLD: 3 % (ref 0–7)
EPAP/CPAP/PEEP, PAPEEP: 5
EPAP/CPAP/PEEP, PAPEEP: 6
EPAP/CPAP/PEEP, PAPEEP: 8
ERYTHROCYTE [DISTWIDTH] IN BLOOD BY AUTOMATED COUNT: 16.5 % (ref 11.5–14.5)
ERYTHROCYTE [DISTWIDTH] IN BLOOD BY AUTOMATED COUNT: 17.2 % (ref 11.5–14.5)
ERYTHROCYTE [DISTWIDTH] IN BLOOD BY AUTOMATED COUNT: 17.4 % (ref 11.5–14.5)
ERYTHROCYTE [DISTWIDTH] IN BLOOD BY AUTOMATED COUNT: 17.6 % (ref 11.5–14.5)
ERYTHROCYTE [DISTWIDTH] IN BLOOD BY AUTOMATED COUNT: 17.6 % (ref 11.5–14.5)
ERYTHROCYTE [DISTWIDTH] IN BLOOD BY AUTOMATED COUNT: 18 % (ref 11.5–14.5)
ERYTHROCYTE [DISTWIDTH] IN BLOOD BY AUTOMATED COUNT: 18.4 % (ref 11.5–14.5)
ERYTHROCYTE [DISTWIDTH] IN BLOOD BY AUTOMATED COUNT: 18.7 % (ref 11.5–14.5)
ERYTHROCYTE [DISTWIDTH] IN BLOOD BY AUTOMATED COUNT: 18.7 % (ref 11.5–14.5)
ERYTHROCYTE [DISTWIDTH] IN BLOOD BY AUTOMATED COUNT: 18.9 % (ref 11.5–14.5)
ERYTHROCYTE [DISTWIDTH] IN BLOOD BY AUTOMATED COUNT: 18.9 % (ref 11.5–14.5)
ERYTHROCYTE [DISTWIDTH] IN BLOOD BY AUTOMATED COUNT: 20.9 % (ref 11.5–14.5)
ERYTHROCYTE [DISTWIDTH] IN BLOOD BY AUTOMATED COUNT: 21.2 % (ref 11.5–14.5)
ERYTHROCYTE [DISTWIDTH] IN BLOOD BY AUTOMATED COUNT: 21.3 % (ref 11.5–14.5)
ERYTHROCYTE [SEDIMENTATION RATE] IN BLOOD: 77 MM/HR
ERYTHROCYTE [SEDIMENTATION RATE] IN BLOOD: 93 MM/HR
ETHANOL SERPL-MCNC: <4 MG/DL
ETHANOL SERPL-MCNC: <4 MG/DL
FIO2 ON VENT: 100 %
FIO2 ON VENT: 35 %
FIO2 ON VENT: 40 %
FIO2 ON VENT: 40 %
FIO2 ON VENT: 50 %
FIO2 ON VENT: 60 %
GAS FLOW.O2 O2 DELIVERY SYS: 4 L/MIN
GAS FLOW.O2 SETTING OXYMISER: 14 L/MIN
GAS FLOW.O2 SETTING OXYMISER: 16 L/MIN
GAS FLOW.O2 SETTING OXYMISER: 16 L/MIN
GAS FLOW.O2 SETTING OXYMISER: 18 L/MIN
GLOBULIN SER CALC-MCNC: 4.6 G/DL (ref 2–4)
GLOBULIN SER CALC-MCNC: 5.1 G/DL (ref 2–4)
GLOBULIN SER CALC-MCNC: 5.2 G/DL (ref 2–4)
GLOBULIN SER CALC-MCNC: 5.3 G/DL (ref 2–4)
GLOBULIN SER CALC-MCNC: 5.5 G/DL (ref 2–4)
GLOBULIN SER CALC-MCNC: 6 G/DL (ref 2–4)
GLUCOSE BLD STRIP.AUTO-MCNC: 125 MG/DL (ref 65–117)
GLUCOSE BLD STRIP.AUTO-MCNC: 153 MG/DL (ref 65–117)
GLUCOSE BLD STRIP.AUTO-MCNC: 162 MG/DL (ref 65–117)
GLUCOSE BLD STRIP.AUTO-MCNC: 165 MG/DL (ref 65–117)
GLUCOSE BLD STRIP.AUTO-MCNC: 172 MG/DL (ref 65–117)
GLUCOSE BLD STRIP.AUTO-MCNC: 181 MG/DL (ref 65–117)
GLUCOSE BLD STRIP.AUTO-MCNC: 187 MG/DL (ref 65–117)
GLUCOSE BLD STRIP.AUTO-MCNC: 194 MG/DL (ref 65–117)
GLUCOSE BLD STRIP.AUTO-MCNC: 195 MG/DL (ref 65–117)
GLUCOSE BLD STRIP.AUTO-MCNC: 215 MG/DL (ref 65–117)
GLUCOSE BLD STRIP.AUTO-MCNC: 232 MG/DL (ref 65–117)
GLUCOSE BLD STRIP.AUTO-MCNC: 360 MG/DL (ref 65–117)
GLUCOSE SERPL-MCNC: 109 MG/DL (ref 65–100)
GLUCOSE SERPL-MCNC: 110 MG/DL (ref 65–100)
GLUCOSE SERPL-MCNC: 114 MG/DL (ref 65–100)
GLUCOSE SERPL-MCNC: 123 MG/DL (ref 65–100)
GLUCOSE SERPL-MCNC: 129 MG/DL (ref 65–100)
GLUCOSE SERPL-MCNC: 136 MG/DL (ref 65–100)
GLUCOSE SERPL-MCNC: 144 MG/DL (ref 65–100)
GLUCOSE SERPL-MCNC: 145 MG/DL (ref 65–100)
GLUCOSE SERPL-MCNC: 174 MG/DL (ref 65–100)
GLUCOSE SERPL-MCNC: 177 MG/DL (ref 65–100)
GLUCOSE SERPL-MCNC: 201 MG/DL (ref 65–100)
GLUCOSE SERPL-MCNC: 232 MG/DL (ref 65–100)
GLUCOSE SERPL-MCNC: 275 MG/DL (ref 65–100)
GLUCOSE SERPL-MCNC: 295 MG/DL (ref 65–100)
GLUCOSE SERPL-MCNC: 310 MG/DL (ref 65–100)
GLUCOSE SERPL-MCNC: 90 MG/DL (ref 65–100)
GLUCOSE UR STRIP.AUTO-MCNC: NEGATIVE MG/DL
HAV IGM SER QL: NONREACTIVE
HBV CORE IGM SER QL: NONREACTIVE
HBV SURFACE AG SER QL: <0.1 INDEX
HBV SURFACE AG SER QL: NEGATIVE
HCO3 BLDA-SCNC: 19 MMOL/L (ref 22–26)
HCO3 BLDA-SCNC: 20 MMOL/L (ref 22–26)
HCO3 BLDA-SCNC: 21 MMOL/L (ref 22–26)
HCO3 BLDA-SCNC: 22 MMOL/L (ref 22–26)
HCT VFR BLD AUTO: 25.6 % (ref 36.6–50.3)
HCT VFR BLD AUTO: 26.8 % (ref 36.6–50.3)
HCT VFR BLD AUTO: 28 % (ref 36.6–50.3)
HCT VFR BLD AUTO: 35.7 % (ref 36.6–50.3)
HCT VFR BLD AUTO: 37.6 % (ref 36.6–50.3)
HCT VFR BLD AUTO: 37.6 % (ref 36.6–50.3)
HCT VFR BLD AUTO: 38.3 % (ref 36.6–50.3)
HCT VFR BLD AUTO: 38.4 % (ref 36.6–50.3)
HCT VFR BLD AUTO: 38.4 % (ref 36.6–50.3)
HCT VFR BLD AUTO: 39.3 % (ref 36.6–50.3)
HCT VFR BLD AUTO: 39.5 % (ref 36.6–50.3)
HCT VFR BLD AUTO: 39.5 % (ref 36.6–50.3)
HCT VFR BLD AUTO: 41.1 % (ref 36.6–50.3)
HCT VFR BLD AUTO: 42.4 % (ref 36.6–50.3)
HCV AB SER IA-ACNC: 0.34 INDEX
HCV AB SERPL QL IA: NONREACTIVE
HGB BLD-MCNC: 11.8 G/DL (ref 12.1–17)
HGB BLD-MCNC: 12.1 G/DL (ref 12.1–17)
HGB BLD-MCNC: 12.2 G/DL (ref 12.1–17)
HGB BLD-MCNC: 12.2 G/DL (ref 12.1–17)
HGB BLD-MCNC: 12.3 G/DL (ref 12.1–17)
HGB BLD-MCNC: 12.3 G/DL (ref 12.1–17)
HGB BLD-MCNC: 12.4 G/DL (ref 12.1–17)
HGB BLD-MCNC: 12.5 G/DL (ref 12.1–17)
HGB BLD-MCNC: 13 G/DL (ref 12.1–17)
HGB BLD-MCNC: 7.7 G/DL (ref 12.1–17)
HGB BLD-MCNC: 8.1 G/DL (ref 12.1–17)
HGB BLD-MCNC: 8.3 G/DL (ref 12.1–17)
HGB UR QL STRIP: ABNORMAL
HYALINE CASTS URNS QL MICRO: ABNORMAL /LPF (ref 0–5)
IMM GRANULOCYTES # BLD AUTO: 0 K/UL (ref 0–0.04)
IMM GRANULOCYTES # BLD AUTO: 0.2 K/UL (ref 0–0.04)
IMM GRANULOCYTES NFR BLD AUTO: 0 % (ref 0–0.5)
IMM GRANULOCYTES NFR BLD AUTO: 1 % (ref 0–0.5)
IMM GRANULOCYTES NFR BLD AUTO: 2 % (ref 0–0.5)
INR PPP: 1.3 (ref 0.9–1.1)
INR PPP: 1.4 (ref 0.9–1.1)
IPAP/PIP, IPAPIP: 0
KETONES UR QL STRIP.AUTO: NEGATIVE MG/DL
LACTATE SERPL-SCNC: 0.8 MMOL/L (ref 0.4–2)
LACTATE SERPL-SCNC: 1 MMOL/L (ref 0.4–2)
LACTATE SERPL-SCNC: 2 MMOL/L (ref 0.4–2)
LEUKOCYTE ESTERASE UR QL STRIP.AUTO: NEGATIVE
LYMPHOCYTES # BLD: 0.1 K/UL (ref 0.8–3.5)
LYMPHOCYTES # BLD: 0.2 K/UL (ref 0.8–3.5)
LYMPHOCYTES # BLD: 0.2 K/UL (ref 0.8–3.5)
LYMPHOCYTES # BLD: 0.3 K/UL (ref 0.8–3.5)
LYMPHOCYTES # BLD: 0.3 K/UL (ref 0.8–3.5)
LYMPHOCYTES # BLD: 0.7 K/UL (ref 0.8–3.5)
LYMPHOCYTES # BLD: 0.8 K/UL (ref 0.8–3.5)
LYMPHOCYTES # BLD: 1 K/UL (ref 0.8–3.5)
LYMPHOCYTES NFR BLD: 13 % (ref 12–49)
LYMPHOCYTES NFR BLD: 17 % (ref 12–49)
LYMPHOCYTES NFR BLD: 2 % (ref 12–49)
LYMPHOCYTES NFR BLD: 2 % (ref 12–49)
LYMPHOCYTES NFR BLD: 3 % (ref 12–49)
LYMPHOCYTES NFR BLD: 3 % (ref 12–49)
LYMPHOCYTES NFR BLD: 4 % (ref 12–49)
LYMPHOCYTES NFR BLD: 5 % (ref 12–49)
LYMPHOCYTES NFR BLD: 7 % (ref 12–49)
LYMPHOCYTES NFR BLD: 9 % (ref 12–49)
MAGNESIUM SERPL-MCNC: 2 MG/DL (ref 1.6–2.4)
MAGNESIUM SERPL-MCNC: 2 MG/DL (ref 1.6–2.4)
MAGNESIUM SERPL-MCNC: 2.1 MG/DL (ref 1.6–2.4)
MAGNESIUM SERPL-MCNC: 2.2 MG/DL (ref 1.6–2.4)
MAGNESIUM SERPL-MCNC: 2.2 MG/DL (ref 1.6–2.4)
MCH RBC QN AUTO: 26.7 PG (ref 26–34)
MCH RBC QN AUTO: 26.7 PG (ref 26–34)
MCH RBC QN AUTO: 27 PG (ref 26–34)
MCH RBC QN AUTO: 31.4 PG (ref 26–34)
MCH RBC QN AUTO: 31.5 PG (ref 26–34)
MCH RBC QN AUTO: 31.8 PG (ref 26–34)
MCH RBC QN AUTO: 32 PG (ref 26–34)
MCH RBC QN AUTO: 32.1 PG (ref 26–34)
MCH RBC QN AUTO: 32.2 PG (ref 26–34)
MCH RBC QN AUTO: 32.7 PG (ref 26–34)
MCHC RBC AUTO-ENTMCNC: 29.6 G/DL (ref 30–36.5)
MCHC RBC AUTO-ENTMCNC: 30.1 G/DL (ref 30–36.5)
MCHC RBC AUTO-ENTMCNC: 30.2 G/DL (ref 30–36.5)
MCHC RBC AUTO-ENTMCNC: 30.2 G/DL (ref 30–36.5)
MCHC RBC AUTO-ENTMCNC: 30.7 G/DL (ref 30–36.5)
MCHC RBC AUTO-ENTMCNC: 30.9 G/DL (ref 30–36.5)
MCHC RBC AUTO-ENTMCNC: 31.3 G/DL (ref 30–36.5)
MCHC RBC AUTO-ENTMCNC: 31.6 G/DL (ref 30–36.5)
MCHC RBC AUTO-ENTMCNC: 32 G/DL (ref 30–36.5)
MCHC RBC AUTO-ENTMCNC: 32.2 G/DL (ref 30–36.5)
MCHC RBC AUTO-ENTMCNC: 32.4 G/DL (ref 30–36.5)
MCHC RBC AUTO-ENTMCNC: 32.6 G/DL (ref 30–36.5)
MCHC RBC AUTO-ENTMCNC: 32.6 G/DL (ref 30–36.5)
MCHC RBC AUTO-ENTMCNC: 33.1 G/DL (ref 30–36.5)
MCV RBC AUTO: 100 FL (ref 80–99)
MCV RBC AUTO: 100 FL (ref 80–99)
MCV RBC AUTO: 100.5 FL (ref 80–99)
MCV RBC AUTO: 100.5 FL (ref 80–99)
MCV RBC AUTO: 101.5 FL (ref 80–99)
MCV RBC AUTO: 101.8 FL (ref 80–99)
MCV RBC AUTO: 104.4 FL (ref 80–99)
MCV RBC AUTO: 105.9 FL (ref 80–99)
MCV RBC AUTO: 88.9 FL (ref 80–99)
MCV RBC AUTO: 89.3 FL (ref 80–99)
MCV RBC AUTO: 90 FL (ref 80–99)
MCV RBC AUTO: 97.3 FL (ref 80–99)
MCV RBC AUTO: 97.9 FL (ref 80–99)
MCV RBC AUTO: 98.2 FL (ref 80–99)
METHADONE UR QL: NEGATIVE
MONOCYTES # BLD: 0.1 K/UL (ref 0–1)
MONOCYTES # BLD: 0.3 K/UL (ref 0–1)
MONOCYTES # BLD: 0.4 K/UL (ref 0–1)
MONOCYTES # BLD: 1 K/UL (ref 0–1)
MONOCYTES # BLD: 1.4 K/UL (ref 0–1)
MONOCYTES # BLD: 1.7 K/UL (ref 0–1)
MONOCYTES NFR BLD: 16 % (ref 5–13)
MONOCYTES NFR BLD: 23 % (ref 5–13)
MONOCYTES NFR BLD: 24 % (ref 5–13)
MONOCYTES NFR BLD: 3 % (ref 5–13)
MONOCYTES NFR BLD: 5 % (ref 5–13)
MONOCYTES NFR BLD: 7 % (ref 5–13)
MONOCYTES NFR BLD: 7 % (ref 5–13)
MONOCYTES NFR BLD: 8 % (ref 5–13)
MRSA DNA SPEC QL NAA+PROBE: NOT DETECTED
NEUTS SEG # BLD: 2.2 K/UL (ref 1.8–8)
NEUTS SEG # BLD: 3.6 K/UL (ref 1.8–8)
NEUTS SEG # BLD: 3.6 K/UL (ref 1.8–8)
NEUTS SEG # BLD: 4.3 K/UL (ref 1.8–8)
NEUTS SEG # BLD: 4.5 K/UL (ref 1.8–8)
NEUTS SEG # BLD: 4.6 K/UL (ref 1.8–8)
NEUTS SEG # BLD: 4.7 K/UL (ref 1.8–8)
NEUTS SEG # BLD: 5 K/UL (ref 1.8–8)
NEUTS SEG # BLD: 5.2 K/UL (ref 1.8–8)
NEUTS SEG # BLD: 7.9 K/UL (ref 1.8–8)
NEUTS SEG NFR BLD: 55 % (ref 32–75)
NEUTS SEG NFR BLD: 59 % (ref 32–75)
NEUTS SEG NFR BLD: 71 % (ref 32–75)
NEUTS SEG NFR BLD: 85 % (ref 32–75)
NEUTS SEG NFR BLD: 88 % (ref 32–75)
NEUTS SEG NFR BLD: 89 % (ref 32–75)
NEUTS SEG NFR BLD: 89 % (ref 32–75)
NEUTS SEG NFR BLD: 90 % (ref 32–75)
NEUTS SEG NFR BLD: 91 % (ref 32–75)
NEUTS SEG NFR BLD: 92 % (ref 32–75)
NITRITE UR QL STRIP.AUTO: NEGATIVE
NRBC # BLD: 0 K/UL (ref 0–0.01)
NRBC BLD-RTO: 0 PER 100 WBC
OPIATES UR QL: NEGATIVE
P-R INTERVAL, ECG05: 168 MS
P-R INTERVAL, ECG05: 182 MS
PCO2 BLDA: 33 MMHG (ref 35–45)
PCO2 BLDA: 33 MMHG (ref 35–45)
PCO2 BLDA: 35 MMHG (ref 35–45)
PCO2 BLDA: 37 MMHG (ref 35–45)
PCO2 BLDA: 39 MMHG (ref 35–45)
PCO2 BLDA: 42 MMHG (ref 35–45)
PCO2 BLDA: 49 MMHG (ref 35–45)
PCO2 BLDA: 56 MMHG (ref 35–45)
PCO2 BLDA: 61 MMHG (ref 35–45)
PCP UR QL: NEGATIVE
PEEP MAX SETTING VENT: 16 CM[H2O]
PERFORMED BY, TECHID: ABNORMAL
PH BLDA: 7.24 [PH] (ref 7.35–7.45)
PH BLDA: 7.26 [PH] (ref 7.35–7.45)
PH BLDA: 7.3 [PH] (ref 7.35–7.45)
PH BLDA: 7.33 [PH] (ref 7.35–7.45)
PH BLDA: 7.34 [PH] (ref 7.35–7.45)
PH BLDA: 7.34 [PH] (ref 7.35–7.45)
PH BLDA: 7.37 [PH] (ref 7.35–7.45)
PH BLDA: 7.39 [PH] (ref 7.35–7.45)
PH BLDA: 7.39 [PH] (ref 7.35–7.45)
PH UR STRIP: 5 [PH] (ref 5–8)
PLATELET # BLD AUTO: 102 K/UL (ref 150–400)
PLATELET # BLD AUTO: 116 K/UL (ref 150–400)
PLATELET # BLD AUTO: 120 K/UL (ref 150–400)
PLATELET # BLD AUTO: 145 K/UL (ref 150–400)
PLATELET # BLD AUTO: 148 K/UL (ref 150–400)
PLATELET # BLD AUTO: 149 K/UL (ref 150–400)
PLATELET # BLD AUTO: 61 K/UL (ref 150–400)
PLATELET # BLD AUTO: 70 K/UL (ref 150–400)
PLATELET # BLD AUTO: 77 K/UL (ref 150–400)
PLATELET # BLD AUTO: 81 K/UL (ref 150–400)
PLATELET # BLD AUTO: 85 K/UL (ref 150–400)
PLATELET # BLD AUTO: 88 K/UL (ref 150–400)
PLATELET # BLD AUTO: 91 K/UL (ref 150–400)
PLATELET # BLD AUTO: 94 K/UL (ref 150–400)
PMV BLD AUTO: 10 FL (ref 8.9–12.9)
PMV BLD AUTO: 10 FL (ref 8.9–12.9)
PMV BLD AUTO: 10.1 FL (ref 8.9–12.9)
PMV BLD AUTO: 10.3 FL (ref 8.9–12.9)
PMV BLD AUTO: 10.3 FL (ref 8.9–12.9)
PMV BLD AUTO: 10.4 FL (ref 8.9–12.9)
PMV BLD AUTO: 10.8 FL (ref 8.9–12.9)
PMV BLD AUTO: 11 FL (ref 8.9–12.9)
PMV BLD AUTO: 11 FL (ref 8.9–12.9)
PMV BLD AUTO: 11.1 FL (ref 8.9–12.9)
PMV BLD AUTO: 11.1 FL (ref 8.9–12.9)
PMV BLD AUTO: 9.7 FL (ref 8.9–12.9)
PMV BLD AUTO: 9.8 FL (ref 8.9–12.9)
PMV BLD AUTO: 9.9 FL (ref 8.9–12.9)
PO2 BLDA: 110 MMHG (ref 75–100)
PO2 BLDA: 286 MMHG (ref 75–100)
PO2 BLDA: 57 MMHG (ref 75–100)
PO2 BLDA: 67 MMHG (ref 75–100)
PO2 BLDA: 67 MMHG (ref 75–100)
PO2 BLDA: 72 MMHG (ref 75–100)
PO2 BLDA: 80 MMHG (ref 75–100)
PO2 BLDA: 82 MMHG (ref 75–100)
PO2 BLDA: 85 MMHG (ref 75–100)
POTASSIUM SERPL-SCNC: 2.9 MMOL/L (ref 3.5–5.1)
POTASSIUM SERPL-SCNC: 3.1 MMOL/L (ref 3.5–5.1)
POTASSIUM SERPL-SCNC: 3.1 MMOL/L (ref 3.5–5.1)
POTASSIUM SERPL-SCNC: 3.3 MMOL/L (ref 3.5–5.1)
POTASSIUM SERPL-SCNC: 3.3 MMOL/L (ref 3.5–5.1)
POTASSIUM SERPL-SCNC: 3.7 MMOL/L (ref 3.5–5.1)
POTASSIUM SERPL-SCNC: 4.1 MMOL/L (ref 3.5–5.1)
POTASSIUM SERPL-SCNC: 4.5 MMOL/L (ref 3.5–5.1)
POTASSIUM SERPL-SCNC: 4.6 MMOL/L (ref 3.5–5.1)
POTASSIUM SERPL-SCNC: 4.7 MMOL/L (ref 3.5–5.1)
POTASSIUM SERPL-SCNC: 4.9 MMOL/L (ref 3.5–5.1)
POTASSIUM SERPL-SCNC: 5 MMOL/L (ref 3.5–5.1)
POTASSIUM SERPL-SCNC: 5.1 MMOL/L (ref 3.5–5.1)
POTASSIUM SERPL-SCNC: 5.3 MMOL/L (ref 3.5–5.1)
PROCALCITONIN SERPL-MCNC: 0.12 NG/ML
PROCALCITONIN SERPL-MCNC: 0.17 NG/ML
PROCALCITONIN SERPL-MCNC: 0.3 NG/ML
PROT SERPL-MCNC: 6.6 G/DL (ref 6.4–8.2)
PROT SERPL-MCNC: 7 G/DL (ref 6.4–8.2)
PROT SERPL-MCNC: 7 G/DL (ref 6.4–8.2)
PROT SERPL-MCNC: 7.2 G/DL (ref 6.4–8.2)
PROT SERPL-MCNC: 7.4 G/DL (ref 6.4–8.2)
PROT SERPL-MCNC: 7.4 G/DL (ref 6.4–8.2)
PROT SERPL-MCNC: 7.5 G/DL (ref 6.4–8.2)
PROT SERPL-MCNC: 8 G/DL (ref 6.4–8.2)
PROT UR STRIP-MCNC: 30 MG/DL
PROTHROMBIN TIME: 15.5 SEC (ref 11.9–14.7)
PROTHROMBIN TIME: 17.4 SEC (ref 11.9–14.7)
Q-T INTERVAL, ECG07: 358 MS
Q-T INTERVAL, ECG07: 440 MS
QRS DURATION, ECG06: 106 MS
QRS DURATION, ECG06: 90 MS
QTC CALCULATION (BEZET), ECG08: 475 MS
QTC CALCULATION (BEZET), ECG08: 517 MS
RBC # BLD AUTO: 2.88 M/UL (ref 4.1–5.7)
RBC # BLD AUTO: 3 M/UL (ref 4.1–5.7)
RBC # BLD AUTO: 3.11 M/UL (ref 4.1–5.7)
RBC # BLD AUTO: 3.67 M/UL (ref 4.1–5.7)
RBC # BLD AUTO: 3.76 M/UL (ref 4.1–5.7)
RBC # BLD AUTO: 3.82 M/UL (ref 4.1–5.7)
RBC # BLD AUTO: 3.84 M/UL (ref 4.1–5.7)
RBC # BLD AUTO: 3.84 M/UL (ref 4.1–5.7)
RBC # BLD AUTO: 3.88 M/UL (ref 4.1–5.7)
RBC # BLD AUTO: 3.88 M/UL (ref 4.1–5.7)
RBC # BLD AUTO: 3.89 M/UL (ref 4.1–5.7)
RBC # BLD AUTO: 3.9 M/UL (ref 4.1–5.7)
RBC # BLD AUTO: 3.91 M/UL (ref 4.1–5.7)
RBC # BLD AUTO: 4.06 M/UL (ref 4.1–5.7)
RBC #/AREA URNS HPF: ABNORMAL /HPF (ref 0–5)
SAO2 % BLD: 88 %
SAO2 % BLD: 92 %
SAO2 % BLD: 92 %
SAO2 % BLD: 95 %
SAO2 % BLD: 96 %
SAO2 % BLD: 97 %
SAO2 % BLD: 97 %
SAO2 % BLD: 99 %
SAO2 % BLD: >100 %
SAO2% DEVICE SAO2% SENSOR NAME: ABNORMAL
SARS-COV-2, COV2: NORMAL
SERVICE CMNT-IMP: ABNORMAL
SODIUM SERPL-SCNC: 135 MMOL/L (ref 136–145)
SODIUM SERPL-SCNC: 136 MMOL/L (ref 136–145)
SODIUM SERPL-SCNC: 138 MMOL/L (ref 136–145)
SODIUM SERPL-SCNC: 138 MMOL/L (ref 136–145)
SODIUM SERPL-SCNC: 139 MMOL/L (ref 136–145)
SODIUM SERPL-SCNC: 141 MMOL/L (ref 136–145)
SODIUM SERPL-SCNC: 142 MMOL/L (ref 136–145)
SODIUM SERPL-SCNC: 144 MMOL/L (ref 136–145)
SODIUM SERPL-SCNC: 145 MMOL/L (ref 136–145)
SODIUM SERPL-SCNC: 147 MMOL/L (ref 136–145)
SP GR UR REFRACTOMETRY: 1.01 (ref 1–1.03)
SP1: NORMAL
SP2: NORMAL
SP3: NORMAL
SPECIAL REQUESTS,SREQ: NORMAL
SPECIMEN EXP DATE BLD: NORMAL
SPECIMEN SITE: ABNORMAL
SPECIMEN SOURCE: NORMAL
SPECIMEN SOURCE: NORMAL
THERAPEUTIC RANGE,PTTT: ABNORMAL SEC (ref 82–109)
TROPONIN-HIGH SENSITIVITY: 10 NG/L (ref 0–76)
TROPONIN-HIGH SENSITIVITY: 9 NG/L (ref 0–76)
UA: UC IF INDICATED,UAUC: ABNORMAL
UROBILINOGEN UR QL STRIP.AUTO: 0.1 EU/DL (ref 0.1–1)
VENTILATION MODE VENT: ABNORMAL
VENTRICULAR RATE, ECG03: 106 BPM
VENTRICULAR RATE, ECG03: 83 BPM
VT SETTING VENT: 500 ML
WBC # BLD AUTO: 11 K/UL (ref 4.1–11.1)
WBC # BLD AUTO: 3.4 K/UL (ref 4.1–11.1)
WBC # BLD AUTO: 4.1 K/UL (ref 4.1–11.1)
WBC # BLD AUTO: 4.3 K/UL (ref 4.1–11.1)
WBC # BLD AUTO: 4.8 K/UL (ref 4.1–11.1)
WBC # BLD AUTO: 5 K/UL (ref 4.1–11.1)
WBC # BLD AUTO: 5 K/UL (ref 4.1–11.1)
WBC # BLD AUTO: 5.4 K/UL (ref 4.1–11.1)
WBC # BLD AUTO: 5.6 K/UL (ref 4.1–11.1)
WBC # BLD AUTO: 5.8 K/UL (ref 4.1–11.1)
WBC # BLD AUTO: 5.8 K/UL (ref 4.1–11.1)
WBC # BLD AUTO: 6 K/UL (ref 4.1–11.1)
WBC # BLD AUTO: 6.3 K/UL (ref 4.1–11.1)
WBC # BLD AUTO: 7.4 K/UL (ref 4.1–11.1)
WBC URNS QL MICRO: ABNORMAL /HPF (ref 0–4)

## 2021-01-01 PROCEDURE — 74011250636 HC RX REV CODE- 250/636: Performed by: NURSE PRACTITIONER

## 2021-01-01 PROCEDURE — 97530 THERAPEUTIC ACTIVITIES: CPT

## 2021-01-01 PROCEDURE — 80053 COMPREHEN METABOLIC PANEL: CPT

## 2021-01-01 PROCEDURE — 77010033678 HC OXYGEN DAILY

## 2021-01-01 PROCEDURE — 94760 N-INVAS EAR/PLS OXIMETRY 1: CPT

## 2021-01-01 PROCEDURE — 94640 AIRWAY INHALATION TREATMENT: CPT

## 2021-01-01 PROCEDURE — 36600 WITHDRAWAL OF ARTERIAL BLOOD: CPT

## 2021-01-01 PROCEDURE — 71045 X-RAY EXAM CHEST 1 VIEW: CPT

## 2021-01-01 PROCEDURE — 36415 COLL VENOUS BLD VENIPUNCTURE: CPT

## 2021-01-01 PROCEDURE — 84145 PROCALCITONIN (PCT): CPT

## 2021-01-01 PROCEDURE — 74011000258 HC RX REV CODE- 258: Performed by: INTERNAL MEDICINE

## 2021-01-01 PROCEDURE — 74011250637 HC RX REV CODE- 250/637: Performed by: INTERNAL MEDICINE

## 2021-01-01 PROCEDURE — 74011250637 HC RX REV CODE- 250/637: Performed by: HOSPITALIST

## 2021-01-01 PROCEDURE — 65610000006 HC RM INTENSIVE CARE

## 2021-01-01 PROCEDURE — 82140 ASSAY OF AMMONIA: CPT

## 2021-01-01 PROCEDURE — 74011000250 HC RX REV CODE- 250: Performed by: INTERNAL MEDICINE

## 2021-01-01 PROCEDURE — 72125 CT NECK SPINE W/O DYE: CPT

## 2021-01-01 PROCEDURE — 65270000029 HC RM PRIVATE

## 2021-01-01 PROCEDURE — 74011250636 HC RX REV CODE- 250/636: Performed by: INTERNAL MEDICINE

## 2021-01-01 PROCEDURE — 85025 COMPLETE CBC W/AUTO DIFF WBC: CPT

## 2021-01-01 PROCEDURE — 74011250637 HC RX REV CODE- 250/637: Performed by: EMERGENCY MEDICINE

## 2021-01-01 PROCEDURE — 74011250636 HC RX REV CODE- 250/636: Performed by: HOSPITALIST

## 2021-01-01 PROCEDURE — 74011000250 HC RX REV CODE- 250: Performed by: EMERGENCY MEDICINE

## 2021-01-01 PROCEDURE — 74011250637 HC RX REV CODE- 250/637: Performed by: PHYSICIAN ASSISTANT

## 2021-01-01 PROCEDURE — 51798 US URINE CAPACITY MEASURE: CPT

## 2021-01-01 PROCEDURE — 97165 OT EVAL LOW COMPLEX 30 MIN: CPT

## 2021-01-01 PROCEDURE — 82962 GLUCOSE BLOOD TEST: CPT

## 2021-01-01 PROCEDURE — 80048 BASIC METABOLIC PNL TOTAL CA: CPT

## 2021-01-01 PROCEDURE — 70450 CT HEAD/BRAIN W/O DYE: CPT

## 2021-01-01 PROCEDURE — 83880 ASSAY OF NATRIURETIC PEPTIDE: CPT

## 2021-01-01 PROCEDURE — 99232 SBSQ HOSP IP/OBS MODERATE 35: CPT | Performed by: INTERNAL MEDICINE

## 2021-01-01 PROCEDURE — 93005 ELECTROCARDIOGRAM TRACING: CPT

## 2021-01-01 PROCEDURE — 74011000258 HC RX REV CODE- 258: Performed by: EMERGENCY MEDICINE

## 2021-01-01 PROCEDURE — 83605 ASSAY OF LACTIC ACID: CPT

## 2021-01-01 PROCEDURE — 82803 BLOOD GASES ANY COMBINATION: CPT

## 2021-01-01 PROCEDURE — 85652 RBC SED RATE AUTOMATED: CPT

## 2021-01-01 PROCEDURE — 02HV33Z INSERTION OF INFUSION DEVICE INTO SUPERIOR VENA CAVA, PERCUTANEOUS APPROACH: ICD-10-PCS | Performed by: EMERGENCY MEDICINE

## 2021-01-01 PROCEDURE — 86901 BLOOD TYPING SEROLOGIC RH(D): CPT

## 2021-01-01 PROCEDURE — 85610 PROTHROMBIN TIME: CPT

## 2021-01-01 PROCEDURE — 85027 COMPLETE CBC AUTOMATED: CPT

## 2021-01-01 PROCEDURE — 0BH17EZ INSERTION OF ENDOTRACHEAL AIRWAY INTO TRACHEA, VIA NATURAL OR ARTIFICIAL OPENING: ICD-10-PCS | Performed by: INTERNAL MEDICINE

## 2021-01-01 PROCEDURE — 87635 SARS-COV-2 COVID-19 AMP PRB: CPT

## 2021-01-01 PROCEDURE — 31500 INSERT EMERGENCY AIRWAY: CPT

## 2021-01-01 PROCEDURE — 85730 THROMBOPLASTIN TIME PARTIAL: CPT

## 2021-01-01 PROCEDURE — 83735 ASSAY OF MAGNESIUM: CPT

## 2021-01-01 PROCEDURE — 84484 ASSAY OF TROPONIN QUANT: CPT

## 2021-01-01 PROCEDURE — 51702 INSERT TEMP BLADDER CATH: CPT

## 2021-01-01 PROCEDURE — 94003 VENT MGMT INPAT SUBQ DAY: CPT

## 2021-01-01 PROCEDURE — 75810000455 HC PLCMT CENT VENOUS CATH LVL 2 5182

## 2021-01-01 PROCEDURE — 94002 VENT MGMT INPAT INIT DAY: CPT

## 2021-01-01 PROCEDURE — 74011250636 HC RX REV CODE- 250/636: Performed by: EMERGENCY MEDICINE

## 2021-01-01 PROCEDURE — 86140 C-REACTIVE PROTEIN: CPT

## 2021-01-01 PROCEDURE — 94640 AIRWAY INHALATION TREATMENT: CPT | Performed by: INTERNAL MEDICINE

## 2021-01-01 PROCEDURE — 97161 PT EVAL LOW COMPLEX 20 MIN: CPT

## 2021-01-01 PROCEDURE — 5A1955Z RESPIRATORY VENTILATION, GREATER THAN 96 CONSECUTIVE HOURS: ICD-10-PCS | Performed by: INTERNAL MEDICINE

## 2021-01-01 PROCEDURE — 87040 BLOOD CULTURE FOR BACTERIA: CPT

## 2021-01-01 PROCEDURE — 99285 EMERGENCY DEPT VISIT HI MDM: CPT

## 2021-01-01 PROCEDURE — 80074 ACUTE HEPATITIS PANEL: CPT

## 2021-01-01 PROCEDURE — 71250 CT THORAX DX C-: CPT

## 2021-01-01 PROCEDURE — 87641 MR-STAPH DNA AMP PROBE: CPT

## 2021-01-01 PROCEDURE — 82077 ASSAY SPEC XCP UR&BREATH IA: CPT

## 2021-01-01 PROCEDURE — 81001 URINALYSIS AUTO W/SCOPE: CPT

## 2021-01-01 PROCEDURE — 90471 IMMUNIZATION ADMIN: CPT

## 2021-01-01 PROCEDURE — 96360 HYDRATION IV INFUSION INIT: CPT

## 2021-01-01 PROCEDURE — 96375 TX/PRO/DX INJ NEW DRUG ADDON: CPT

## 2021-01-01 PROCEDURE — 5A09357 ASSISTANCE WITH RESPIRATORY VENTILATION, LESS THAN 24 CONSECUTIVE HOURS, CONTINUOUS POSITIVE AIRWAY PRESSURE: ICD-10-PCS | Performed by: HOSPITALIST

## 2021-01-01 PROCEDURE — 92526 ORAL FUNCTION THERAPY: CPT

## 2021-01-01 PROCEDURE — 74011250636 HC RX REV CODE- 250/636: Performed by: STUDENT IN AN ORGANIZED HEALTH CARE EDUCATION/TRAINING PROGRAM

## 2021-01-01 PROCEDURE — 74011000250 HC RX REV CODE- 250: Performed by: STUDENT IN AN ORGANIZED HEALTH CARE EDUCATION/TRAINING PROGRAM

## 2021-01-01 PROCEDURE — 80307 DRUG TEST PRSMV CHEM ANLYZR: CPT

## 2021-01-01 PROCEDURE — 74011000258 HC RX REV CODE- 258: Performed by: STUDENT IN AN ORGANIZED HEALTH CARE EDUCATION/TRAINING PROGRAM

## 2021-01-01 PROCEDURE — 92610 EVALUATE SWALLOWING FUNCTION: CPT

## 2021-01-01 PROCEDURE — 96365 THER/PROPH/DIAG IV INF INIT: CPT

## 2021-01-01 PROCEDURE — 90686 IIV4 VACC NO PRSV 0.5 ML IM: CPT | Performed by: HOSPITALIST

## 2021-01-01 RX ORDER — PANTOPRAZOLE SODIUM 40 MG/1
40 TABLET, DELAYED RELEASE ORAL
Qty: 30 TAB | Refills: 0 | Status: SHIPPED
Start: 2021-01-01 | End: 2021-01-01 | Stop reason: ALTCHOICE

## 2021-01-01 RX ORDER — ACETAMINOPHEN 325 MG/1
650 TABLET ORAL
Status: DISCONTINUED | OUTPATIENT
Start: 2021-01-01 | End: 2021-01-01

## 2021-01-01 RX ORDER — FOLIC ACID 1 MG/1
1 TABLET ORAL DAILY
Status: DISCONTINUED | OUTPATIENT
Start: 2021-01-01 | End: 2021-01-01 | Stop reason: HOSPADM

## 2021-01-01 RX ORDER — OXYCODONE HYDROCHLORIDE 5 MG/1
5 TABLET ORAL
Status: DISCONTINUED | OUTPATIENT
Start: 2021-01-01 | End: 2021-01-01 | Stop reason: HOSPADM

## 2021-01-01 RX ORDER — PANTOPRAZOLE SODIUM 20 MG/1
1 TABLET, DELAYED RELEASE ORAL DAILY
Status: ON HOLD | COMMUNITY
Start: 2021-01-01 | End: 2021-01-01 | Stop reason: SDUPTHER

## 2021-01-01 RX ORDER — MORPHINE SULFATE 2 MG/ML
2 INJECTION, SOLUTION INTRAMUSCULAR; INTRAVENOUS
Status: DISCONTINUED | OUTPATIENT
Start: 2021-01-01 | End: 2021-01-01

## 2021-01-01 RX ORDER — ACETAMINOPHEN 325 MG/1
650 TABLET ORAL
Status: DISCONTINUED | OUTPATIENT
Start: 2021-01-01 | End: 2021-01-01 | Stop reason: HOSPADM

## 2021-01-01 RX ORDER — SODIUM CHLORIDE 0.9 % (FLUSH) 0.9 %
5-40 SYRINGE (ML) INJECTION EVERY 8 HOURS
Status: DISCONTINUED | OUTPATIENT
Start: 2021-01-01 | End: 2021-01-01

## 2021-01-01 RX ORDER — ETOMIDATE 2 MG/ML
20 INJECTION INTRAVENOUS ONCE
Status: COMPLETED | OUTPATIENT
Start: 2021-01-01 | End: 2021-01-01

## 2021-01-01 RX ORDER — NOREPINEPHRINE BITARTRATE/D5W 8 MG/250ML
.5-16 PLASTIC BAG, INJECTION (ML) INTRAVENOUS
Status: DISCONTINUED | OUTPATIENT
Start: 2021-01-01 | End: 2021-01-01 | Stop reason: SDUPTHER

## 2021-01-01 RX ORDER — LORAZEPAM 2 MG/ML
2 INJECTION INTRAMUSCULAR
Status: DISCONTINUED | OUTPATIENT
Start: 2021-01-01 | End: 2021-01-01

## 2021-01-01 RX ORDER — METOCLOPRAMIDE HYDROCHLORIDE 5 MG/ML
5 INJECTION INTRAMUSCULAR; INTRAVENOUS EVERY 6 HOURS
Status: DISCONTINUED | OUTPATIENT
Start: 2021-01-01 | End: 2021-01-01

## 2021-01-01 RX ORDER — IPRATROPIUM BROMIDE AND ALBUTEROL SULFATE 2.5; .5 MG/3ML; MG/3ML
3 SOLUTION RESPIRATORY (INHALATION)
Status: DISCONTINUED | OUTPATIENT
Start: 2021-01-01 | End: 2021-01-01

## 2021-01-01 RX ORDER — PANTOPRAZOLE SODIUM 40 MG/1
40 TABLET, DELAYED RELEASE ORAL
Status: DISCONTINUED | OUTPATIENT
Start: 2021-01-01 | End: 2021-01-01

## 2021-01-01 RX ORDER — SODIUM CHLORIDE 0.9 % (FLUSH) 0.9 %
5-40 SYRINGE (ML) INJECTION AS NEEDED
Status: DISCONTINUED | OUTPATIENT
Start: 2021-01-01 | End: 2021-01-01 | Stop reason: HOSPADM

## 2021-01-01 RX ORDER — POTASSIUM CHLORIDE 20 MEQ/1
80 TABLET, EXTENDED RELEASE ORAL
Status: COMPLETED | OUTPATIENT
Start: 2021-01-01 | End: 2021-01-01

## 2021-01-01 RX ORDER — SPIRONOLACTONE 25 MG/1
25 TABLET ORAL DAILY
Status: DISCONTINUED | OUTPATIENT
Start: 2021-01-01 | End: 2021-01-01

## 2021-01-01 RX ORDER — LORAZEPAM 2 MG/ML
2 INJECTION INTRAMUSCULAR
Status: DISCONTINUED | OUTPATIENT
Start: 2021-01-01 | End: 2021-11-13 | Stop reason: HOSPADM

## 2021-01-01 RX ORDER — POTASSIUM CHLORIDE 20 MEQ/1
40 TABLET, EXTENDED RELEASE ORAL
Status: COMPLETED | OUTPATIENT
Start: 2021-01-01 | End: 2021-01-01

## 2021-01-01 RX ORDER — NOREPINEPHRINE BITARTRATE/D5W 4MG/250ML
.5-16 PLASTIC BAG, INJECTION (ML) INTRAVENOUS
Status: DISCONTINUED | OUTPATIENT
Start: 2021-01-01 | End: 2021-01-01 | Stop reason: CLARIF

## 2021-01-01 RX ORDER — SODIUM CHLORIDE 0.9 % (FLUSH) 0.9 %
5-40 SYRINGE (ML) INJECTION EVERY 8 HOURS
Status: DISCONTINUED | OUTPATIENT
Start: 2021-01-01 | End: 2021-01-01 | Stop reason: HOSPADM

## 2021-01-01 RX ORDER — IPRATROPIUM BROMIDE AND ALBUTEROL SULFATE 2.5; .5 MG/3ML; MG/3ML
3 SOLUTION RESPIRATORY (INHALATION)
Status: COMPLETED | OUTPATIENT
Start: 2021-01-01 | End: 2021-01-01

## 2021-01-01 RX ORDER — POLYETHYLENE GLYCOL 3350 17 G/17G
17 POWDER, FOR SOLUTION ORAL DAILY
Qty: 1 PACKET | Refills: 0 | Status: SHIPPED
Start: 2021-01-01 | End: 2021-01-01 | Stop reason: ALTCHOICE

## 2021-01-01 RX ORDER — ONDANSETRON 2 MG/ML
4 INJECTION INTRAMUSCULAR; INTRAVENOUS
Status: DISCONTINUED | OUTPATIENT
Start: 2021-01-01 | End: 2021-01-01 | Stop reason: HOSPADM

## 2021-01-01 RX ORDER — IPRATROPIUM BROMIDE AND ALBUTEROL SULFATE 2.5; .5 MG/3ML; MG/3ML
3 SOLUTION RESPIRATORY (INHALATION)
Qty: 30 NEBULE | Refills: 0 | Status: SHIPPED
Start: 2021-01-01 | End: 2021-01-01 | Stop reason: ALTCHOICE

## 2021-01-01 RX ORDER — POLYETHYLENE GLYCOL 3350 17 G/17G
17 POWDER, FOR SOLUTION ORAL DAILY PRN
Status: DISCONTINUED | OUTPATIENT
Start: 2021-01-01 | End: 2021-01-01 | Stop reason: HOSPADM

## 2021-01-01 RX ORDER — FUROSEMIDE 10 MG/ML
40 INJECTION INTRAMUSCULAR; INTRAVENOUS DAILY
Status: DISCONTINUED | OUTPATIENT
Start: 2021-01-01 | End: 2021-01-01

## 2021-01-01 RX ORDER — PANTOPRAZOLE SODIUM 20 MG/1
20 TABLET, DELAYED RELEASE ORAL DAILY
Status: DISCONTINUED | OUTPATIENT
Start: 2021-01-01 | End: 2021-01-01 | Stop reason: HOSPADM

## 2021-01-01 RX ORDER — POLYETHYLENE GLYCOL 3350 17 G/17G
17 POWDER, FOR SOLUTION ORAL DAILY PRN
Status: DISCONTINUED | OUTPATIENT
Start: 2021-01-01 | End: 2021-01-01

## 2021-01-01 RX ORDER — GLYCOPYRROLATE 1 MG/1
1 TABLET ORAL 3 TIMES DAILY
Status: DISCONTINUED | OUTPATIENT
Start: 2021-01-01 | End: 2021-01-01

## 2021-01-01 RX ORDER — SODIUM CHLORIDE 0.9 % (FLUSH) 0.9 %
5-40 SYRINGE (ML) INJECTION AS NEEDED
Status: DISCONTINUED | OUTPATIENT
Start: 2021-01-01 | End: 2021-01-01

## 2021-01-01 RX ORDER — MORPHINE SULFATE 2 MG/ML
2 INJECTION, SOLUTION INTRAMUSCULAR; INTRAVENOUS ONCE
Status: COMPLETED | OUTPATIENT
Start: 2021-01-01 | End: 2021-01-01

## 2021-01-01 RX ORDER — PANTOPRAZOLE SODIUM 40 MG/1
40 TABLET, DELAYED RELEASE ORAL
Status: DISCONTINUED | OUTPATIENT
Start: 2021-01-01 | End: 2021-01-01 | Stop reason: HOSPADM

## 2021-01-01 RX ORDER — ONDANSETRON 2 MG/ML
4 INJECTION INTRAMUSCULAR; INTRAVENOUS
Status: DISCONTINUED | OUTPATIENT
Start: 2021-01-01 | End: 2021-01-01

## 2021-01-01 RX ORDER — IPRATROPIUM BROMIDE AND ALBUTEROL SULFATE 2.5; .5 MG/3ML; MG/3ML
3 SOLUTION RESPIRATORY (INHALATION)
Status: DISCONTINUED | OUTPATIENT
Start: 2021-01-01 | End: 2021-01-01 | Stop reason: HOSPADM

## 2021-01-01 RX ORDER — MIDAZOLAM IN 0.9 % SOD.CHLORID 1 MG/ML
0-10 PLASTIC BAG, INJECTION (ML) INTRAVENOUS
Status: DISCONTINUED | OUTPATIENT
Start: 2021-01-01 | End: 2021-01-01

## 2021-01-01 RX ORDER — ONDANSETRON 4 MG/1
4 TABLET, ORALLY DISINTEGRATING ORAL
Status: DISCONTINUED | OUTPATIENT
Start: 2021-01-01 | End: 2021-01-01 | Stop reason: HOSPADM

## 2021-01-01 RX ORDER — PANTOPRAZOLE SODIUM 20 MG/1
20 TABLET, DELAYED RELEASE ORAL DAILY
Qty: 30 TABLET | Refills: 2 | Status: SHIPPED | OUTPATIENT
Start: 2021-01-01

## 2021-01-01 RX ORDER — ENOXAPARIN SODIUM 100 MG/ML
40 INJECTION SUBCUTANEOUS DAILY
Status: DISCONTINUED | OUTPATIENT
Start: 2021-01-01 | End: 2021-01-01 | Stop reason: HOSPADM

## 2021-01-01 RX ORDER — ALBUTEROL SULFATE 90 UG/1
2 AEROSOL, METERED RESPIRATORY (INHALATION)
Status: DISCONTINUED | OUTPATIENT
Start: 2021-01-01 | End: 2021-01-01 | Stop reason: HOSPADM

## 2021-01-01 RX ORDER — ISOSORBIDE MONONITRATE 30 MG/1
30 TABLET, EXTENDED RELEASE ORAL DAILY
Status: DISCONTINUED | OUTPATIENT
Start: 2021-01-01 | End: 2021-01-01 | Stop reason: HOSPADM

## 2021-01-01 RX ORDER — FUROSEMIDE 40 MG/1
20 TABLET ORAL DAILY
Qty: 30 TABLET | Refills: 2 | Status: SHIPPED | OUTPATIENT
Start: 2021-01-01

## 2021-01-01 RX ORDER — ONDANSETRON 4 MG/1
4 TABLET, ORALLY DISINTEGRATING ORAL
Status: DISCONTINUED | OUTPATIENT
Start: 2021-01-01 | End: 2021-01-01

## 2021-01-01 RX ORDER — BUDESONIDE AND FORMOTEROL FUMARATE DIHYDRATE 160; 4.5 UG/1; UG/1
1 AEROSOL RESPIRATORY (INHALATION)
Status: DISCONTINUED | OUTPATIENT
Start: 2021-01-01 | End: 2021-01-01 | Stop reason: HOSPADM

## 2021-01-01 RX ORDER — ONDANSETRON 4 MG/1
1 TABLET, FILM COATED ORAL
COMMUNITY
Start: 2021-01-01

## 2021-01-01 RX ORDER — PROPRANOLOL HYDROCHLORIDE 20 MG/1
20 TABLET ORAL 2 TIMES DAILY
Qty: 60 TABLET | Refills: 2 | Status: SHIPPED | OUTPATIENT
Start: 2021-01-01 | End: 2021-11-20

## 2021-01-01 RX ORDER — ACETAMINOPHEN 325 MG/1
650 TABLET ORAL
Qty: 30 TAB | Refills: 0 | Status: SHIPPED | OUTPATIENT
Start: 2021-01-01 | End: 2021-01-01 | Stop reason: ALTCHOICE

## 2021-01-01 RX ORDER — OXYCODONE HYDROCHLORIDE 10 MG/1
10 TABLET ORAL
Status: DISCONTINUED | OUTPATIENT
Start: 2021-01-01 | End: 2021-01-01 | Stop reason: HOSPADM

## 2021-01-01 RX ORDER — SODIUM CHLORIDE 9 MG/ML
75 INJECTION, SOLUTION INTRAVENOUS CONTINUOUS
Status: DISCONTINUED | OUTPATIENT
Start: 2021-01-01 | End: 2021-01-01

## 2021-01-01 RX ORDER — SUCCINYLCHOLINE CHLORIDE 20 MG/ML
150 INJECTION INTRAMUSCULAR; INTRAVENOUS
Status: COMPLETED | OUTPATIENT
Start: 2021-01-01 | End: 2021-01-01

## 2021-01-01 RX ORDER — MORPHINE SULFATE 2 MG/ML
2 INJECTION, SOLUTION INTRAMUSCULAR; INTRAVENOUS
Status: DISCONTINUED | OUTPATIENT
Start: 2021-01-01 | End: 2021-11-13 | Stop reason: HOSPADM

## 2021-01-01 RX ORDER — ONDANSETRON 2 MG/ML
4 INJECTION INTRAMUSCULAR; INTRAVENOUS ONCE
Status: DISCONTINUED | OUTPATIENT
Start: 2021-01-01 | End: 2021-01-01

## 2021-01-01 RX ORDER — ALBUTEROL SULFATE 90 UG/1
2 AEROSOL, METERED RESPIRATORY (INHALATION)
COMMUNITY
Start: 2021-01-01

## 2021-01-01 RX ORDER — NOREPINEPHRINE BIT/0.9 % NACL 8 MG/250ML
.5-16 INFUSION BOTTLE (ML) INTRAVENOUS
Status: DISCONTINUED | OUTPATIENT
Start: 2021-01-01 | End: 2021-01-01

## 2021-01-01 RX ORDER — ENOXAPARIN SODIUM 100 MG/ML
40 INJECTION SUBCUTANEOUS DAILY
Status: DISCONTINUED | OUTPATIENT
Start: 2021-01-01 | End: 2021-01-01

## 2021-01-01 RX ORDER — ACETAMINOPHEN 650 MG/1
650 SUPPOSITORY RECTAL
Status: DISCONTINUED | OUTPATIENT
Start: 2021-01-01 | End: 2021-01-01 | Stop reason: HOSPADM

## 2021-01-01 RX ORDER — PROPRANOLOL HYDROCHLORIDE 10 MG/1
20 TABLET ORAL 2 TIMES DAILY
Status: DISCONTINUED | OUTPATIENT
Start: 2021-01-01 | End: 2021-01-01 | Stop reason: HOSPADM

## 2021-01-01 RX ORDER — DICLOFENAC SODIUM 10 MG/G
GEL TOPICAL DAILY
COMMUNITY
Start: 2021-01-01

## 2021-01-01 RX ORDER — IBUPROFEN 600 MG/1
1 TABLET ORAL
COMMUNITY
Start: 2021-01-01

## 2021-01-01 RX ORDER — ACETAMINOPHEN 650 MG/1
650 SUPPOSITORY RECTAL
Status: DISCONTINUED | OUTPATIENT
Start: 2021-01-01 | End: 2021-01-01

## 2021-01-01 RX ORDER — VECURONIUM BROMIDE FOR INJECTION 1 MG/ML
10 INJECTION, POWDER, LYOPHILIZED, FOR SOLUTION INTRAVENOUS
Status: COMPLETED | OUTPATIENT
Start: 2021-01-01 | End: 2021-01-01

## 2021-01-01 RX ORDER — HEPARIN SODIUM 5000 [USP'U]/ML
5000 INJECTION, SOLUTION INTRAVENOUS; SUBCUTANEOUS EVERY 12 HOURS
Status: DISCONTINUED | OUTPATIENT
Start: 2021-01-01 | End: 2021-01-01

## 2021-01-01 RX ORDER — PROPRANOLOL HYDROCHLORIDE 20 MG/1
20 TABLET ORAL 2 TIMES DAILY
Status: DISCONTINUED | OUTPATIENT
Start: 2021-01-01 | End: 2021-01-01 | Stop reason: HOSPADM

## 2021-01-01 RX ADMIN — HEPARIN SODIUM 5000 UNITS: 5000 INJECTION INTRAVENOUS; SUBCUTANEOUS at 05:46

## 2021-01-01 RX ADMIN — FLUTICASONE PROPIONATE 2 SPRAY: 50 SPRAY, METERED NASAL at 09:15

## 2021-01-01 RX ADMIN — HEPARIN SODIUM 5000 UNITS: 5000 INJECTION INTRAVENOUS; SUBCUTANEOUS at 12:07

## 2021-01-01 RX ADMIN — HEPARIN SODIUM 5000 UNITS: 5000 INJECTION INTRAVENOUS; SUBCUTANEOUS at 11:13

## 2021-01-01 RX ADMIN — LACTULOSE 45 ML: 10 SOLUTION ORAL at 11:11

## 2021-01-01 RX ADMIN — RIFAXIMIN 550 MG: 550 TABLET ORAL at 00:53

## 2021-01-01 RX ADMIN — PROPRANOLOL HYDROCHLORIDE 20 MG: 20 TABLET ORAL at 09:40

## 2021-01-01 RX ADMIN — IPRATROPIUM BROMIDE AND ALBUTEROL SULFATE 3 ML: .5; 3 SOLUTION RESPIRATORY (INHALATION) at 01:00

## 2021-01-01 RX ADMIN — FOLIC ACID 1 MG: 1 TABLET ORAL at 10:36

## 2021-01-01 RX ADMIN — HEPARIN SODIUM 5000 UNITS: 5000 INJECTION INTRAVENOUS; SUBCUTANEOUS at 21:08

## 2021-01-01 RX ADMIN — HEPARIN SODIUM 5000 UNITS: 5000 INJECTION INTRAVENOUS; SUBCUTANEOUS at 12:01

## 2021-01-01 RX ADMIN — Medication 10 ML: at 22:05

## 2021-01-01 RX ADMIN — RIFAXIMIN 550 MG: 550 TABLET ORAL at 06:13

## 2021-01-01 RX ADMIN — RIFAXIMIN 550 MG: 550 TABLET ORAL at 10:02

## 2021-01-01 RX ADMIN — METHYLPREDNISOLONE SODIUM SUCCINATE 40 MG: 40 INJECTION, POWDER, FOR SOLUTION INTRAMUSCULAR; INTRAVENOUS at 14:52

## 2021-01-01 RX ADMIN — LACTULOSE 45 ML: 20 SOLUTION ORAL at 09:15

## 2021-01-01 RX ADMIN — GLYCOPYRROLATE 1 MG: 1 TABLET ORAL at 09:14

## 2021-01-01 RX ADMIN — Medication 10 ML: at 05:21

## 2021-01-01 RX ADMIN — RIFAXIMIN 550 MG: 550 TABLET ORAL at 15:29

## 2021-01-01 RX ADMIN — IPRATROPIUM BROMIDE AND ALBUTEROL SULFATE 3 ML: .5; 3 SOLUTION RESPIRATORY (INHALATION) at 11:33

## 2021-01-01 RX ADMIN — IPRATROPIUM BROMIDE AND ALBUTEROL SULFATE 3 ML: .5; 3 SOLUTION RESPIRATORY (INHALATION) at 01:04

## 2021-01-01 RX ADMIN — OXYCODONE 5 MG: 5 TABLET ORAL at 18:07

## 2021-01-01 RX ADMIN — AMPICILLIN SODIUM AND SULBACTAM SODIUM 3 G: 2; 1 INJECTION, POWDER, FOR SOLUTION INTRAMUSCULAR; INTRAVENOUS at 12:24

## 2021-01-01 RX ADMIN — PIPERACILLIN AND TAZOBACTAM 3.38 G: 3; .375 INJECTION, POWDER, LYOPHILIZED, FOR SOLUTION INTRAVENOUS at 13:34

## 2021-01-01 RX ADMIN — HEPARIN SODIUM 5000 UNITS: 5000 INJECTION INTRAVENOUS; SUBCUTANEOUS at 23:53

## 2021-01-01 RX ADMIN — SODIUM CHLORIDE 75 ML/HR: 9 INJECTION, SOLUTION INTRAVENOUS at 17:53

## 2021-01-01 RX ADMIN — Medication 10 ML: at 13:34

## 2021-01-01 RX ADMIN — IPRATROPIUM BROMIDE AND ALBUTEROL SULFATE 3 ML: .5; 3 SOLUTION RESPIRATORY (INHALATION) at 09:35

## 2021-01-01 RX ADMIN — HEPARIN SODIUM 5000 UNITS: 5000 INJECTION INTRAVENOUS; SUBCUTANEOUS at 20:36

## 2021-01-01 RX ADMIN — AMPICILLIN SODIUM AND SULBACTAM SODIUM 3 G: 2; 1 INJECTION, POWDER, FOR SOLUTION INTRAMUSCULAR; INTRAVENOUS at 17:09

## 2021-01-01 RX ADMIN — METHYLPREDNISOLONE SODIUM SUCCINATE 40 MG: 40 INJECTION, POWDER, FOR SOLUTION INTRAMUSCULAR; INTRAVENOUS at 13:34

## 2021-01-01 RX ADMIN — ACETAMINOPHEN 650 MG: 325 TABLET ORAL at 12:25

## 2021-01-01 RX ADMIN — HEPARIN SODIUM 5000 UNITS: 5000 INJECTION INTRAVENOUS; SUBCUTANEOUS at 23:18

## 2021-01-01 RX ADMIN — VANCOMYCIN HYDROCHLORIDE 1000 MG: 1 INJECTION, POWDER, LYOPHILIZED, FOR SOLUTION INTRAVENOUS at 14:14

## 2021-01-01 RX ADMIN — PIPERACILLIN AND TAZOBACTAM 3.38 G: 3; .375 INJECTION, POWDER, LYOPHILIZED, FOR SOLUTION INTRAVENOUS at 21:02

## 2021-01-01 RX ADMIN — OXYCODONE HYDROCHLORIDE 10 MG: 10 TABLET ORAL at 09:37

## 2021-01-01 RX ADMIN — ISOSORBIDE MONONITRATE 30 MG: 30 TABLET, EXTENDED RELEASE ORAL at 09:45

## 2021-01-01 RX ADMIN — Medication 6 MG/HR: at 10:36

## 2021-01-01 RX ADMIN — GLYCOPYRROLATE 1 MG: 1 TABLET ORAL at 21:04

## 2021-01-01 RX ADMIN — SODIUM CHLORIDE 75 ML/HR: 9 INJECTION, SOLUTION INTRAVENOUS at 09:21

## 2021-01-01 RX ADMIN — Medication 2 MG/HR: at 14:38

## 2021-01-01 RX ADMIN — PIPERACILLIN AND TAZOBACTAM 3.38 G: 3; .375 INJECTION, POWDER, LYOPHILIZED, FOR SOLUTION INTRAVENOUS at 05:00

## 2021-01-01 RX ADMIN — LACTULOSE 45 ML: 10 SOLUTION ORAL at 21:48

## 2021-01-01 RX ADMIN — Medication 10 ML: at 19:31

## 2021-01-01 RX ADMIN — Medication 10 ML: at 21:07

## 2021-01-01 RX ADMIN — RIFAXIMIN 550 MG: 550 TABLET ORAL at 21:04

## 2021-01-01 RX ADMIN — Medication 10 ML: at 04:30

## 2021-01-01 RX ADMIN — Medication 7 MG/HR: at 18:18

## 2021-01-01 RX ADMIN — METHYLPREDNISOLONE SODIUM SUCCINATE 40 MG: 40 INJECTION, POWDER, FOR SOLUTION INTRAMUSCULAR; INTRAVENOUS at 13:02

## 2021-01-01 RX ADMIN — Medication 10 ML: at 05:18

## 2021-01-01 RX ADMIN — FOLIC ACID 1 MG: 1 TABLET ORAL at 08:37

## 2021-01-01 RX ADMIN — LACTULOSE 45 ML: 10 SOLUTION ORAL at 15:09

## 2021-01-01 RX ADMIN — IPRATROPIUM BROMIDE AND ALBUTEROL SULFATE 3 ML: .5; 3 SOLUTION RESPIRATORY (INHALATION) at 07:59

## 2021-01-01 RX ADMIN — PANTOPRAZOLE SODIUM 20 MG: 20 TABLET, DELAYED RELEASE ORAL at 09:40

## 2021-01-01 RX ADMIN — LACTULOSE 45 ML: 20 SOLUTION ORAL at 09:37

## 2021-01-01 RX ADMIN — IPRATROPIUM BROMIDE AND ALBUTEROL SULFATE 3 ML: .5; 3 SOLUTION RESPIRATORY (INHALATION) at 01:53

## 2021-01-01 RX ADMIN — HEPARIN SODIUM 5000 UNITS: 5000 INJECTION INTRAVENOUS; SUBCUTANEOUS at 23:46

## 2021-01-01 RX ADMIN — MORPHINE SULFATE 2 MG: 2 INJECTION, SOLUTION INTRAMUSCULAR; INTRAVENOUS at 11:15

## 2021-01-01 RX ADMIN — IPRATROPIUM BROMIDE AND ALBUTEROL SULFATE 3 ML: .5; 3 SOLUTION RESPIRATORY (INHALATION) at 03:07

## 2021-01-01 RX ADMIN — DEXMEDETOMIDINE HYDROCHLORIDE 0.5 MCG/KG/HR: 100 INJECTION, SOLUTION, CONCENTRATE INTRAVENOUS at 10:36

## 2021-01-01 RX ADMIN — LACTULOSE 45 ML: 20 SOLUTION ORAL at 10:01

## 2021-01-01 RX ADMIN — RIFAXIMIN 550 MG: 550 TABLET ORAL at 06:01

## 2021-01-01 RX ADMIN — FOLIC ACID 1 MG: 1 TABLET ORAL at 08:19

## 2021-01-01 RX ADMIN — Medication 10 ML: at 14:45

## 2021-01-01 RX ADMIN — LACTULOSE 45 ML: 20 SOLUTION ORAL at 09:38

## 2021-01-01 RX ADMIN — PIPERACILLIN AND TAZOBACTAM 3.38 G: 3; .375 INJECTION, POWDER, LYOPHILIZED, FOR SOLUTION INTRAVENOUS at 14:40

## 2021-01-01 RX ADMIN — HEPARIN SODIUM 5000 UNITS: 5000 INJECTION INTRAVENOUS; SUBCUTANEOUS at 23:41

## 2021-01-01 RX ADMIN — HEPARIN SODIUM 5000 UNITS: 5000 INJECTION INTRAVENOUS; SUBCUTANEOUS at 12:12

## 2021-01-01 RX ADMIN — IPRATROPIUM BROMIDE AND ALBUTEROL SULFATE 3 ML: .5; 3 SOLUTION RESPIRATORY (INHALATION) at 07:15

## 2021-01-01 RX ADMIN — METHYLPREDNISOLONE SODIUM SUCCINATE 40 MG: 40 INJECTION, POWDER, FOR SOLUTION INTRAMUSCULAR; INTRAVENOUS at 05:20

## 2021-01-01 RX ADMIN — METHYLPREDNISOLONE SODIUM SUCCINATE 40 MG: 40 INJECTION, POWDER, FOR SOLUTION INTRAMUSCULAR; INTRAVENOUS at 22:08

## 2021-01-01 RX ADMIN — HEPARIN SODIUM 5000 UNITS: 5000 INJECTION INTRAVENOUS; SUBCUTANEOUS at 12:42

## 2021-01-01 RX ADMIN — RIFAXIMIN 550 MG: 550 TABLET ORAL at 16:18

## 2021-01-01 RX ADMIN — OXYCODONE HYDROCHLORIDE 10 MG: 10 TABLET ORAL at 16:35

## 2021-01-01 RX ADMIN — HEPARIN SODIUM 5000 UNITS: 5000 INJECTION INTRAVENOUS; SUBCUTANEOUS at 13:37

## 2021-01-01 RX ADMIN — Medication 6 MG/HR: at 19:59

## 2021-01-01 RX ADMIN — AMPICILLIN SODIUM AND SULBACTAM SODIUM 3 G: 2; 1 INJECTION, POWDER, FOR SOLUTION INTRAMUSCULAR; INTRAVENOUS at 23:15

## 2021-01-01 RX ADMIN — METHYLPREDNISOLONE SODIUM SUCCINATE 40 MG: 40 INJECTION, POWDER, FOR SOLUTION INTRAMUSCULAR; INTRAVENOUS at 14:21

## 2021-01-01 RX ADMIN — FLUTICASONE PROPIONATE 2 SPRAY: 50 SPRAY, METERED NASAL at 10:06

## 2021-01-01 RX ADMIN — RIFAXIMIN 550 MG: 550 TABLET ORAL at 09:06

## 2021-01-01 RX ADMIN — AMPICILLIN SODIUM AND SULBACTAM SODIUM 3 G: 2; 1 INJECTION, POWDER, FOR SOLUTION INTRAMUSCULAR; INTRAVENOUS at 05:41

## 2021-01-01 RX ADMIN — METHYLPREDNISOLONE SODIUM SUCCINATE 40 MG: 40 INJECTION, POWDER, FOR SOLUTION INTRAMUSCULAR; INTRAVENOUS at 05:23

## 2021-01-01 RX ADMIN — RIFAXIMIN 550 MG: 550 TABLET ORAL at 21:51

## 2021-01-01 RX ADMIN — GLYCOPYRROLATE 1 MG: 1 TABLET ORAL at 17:28

## 2021-01-01 RX ADMIN — IPRATROPIUM BROMIDE AND ALBUTEROL SULFATE 3 ML: .5; 3 SOLUTION RESPIRATORY (INHALATION) at 06:57

## 2021-01-01 RX ADMIN — LORAZEPAM 2 MG: 2 INJECTION INTRAMUSCULAR; INTRAVENOUS at 14:41

## 2021-01-01 RX ADMIN — ENOXAPARIN SODIUM 40 MG: 40 INJECTION SUBCUTANEOUS at 11:33

## 2021-01-01 RX ADMIN — LACTULOSE 45 ML: 10 SOLUTION ORAL at 17:28

## 2021-01-01 RX ADMIN — IPRATROPIUM BROMIDE AND ALBUTEROL SULFATE 3 ML: .5; 3 SOLUTION RESPIRATORY (INHALATION) at 12:59

## 2021-01-01 RX ADMIN — METHYLPREDNISOLONE SODIUM SUCCINATE 125 MG: 125 INJECTION, POWDER, FOR SOLUTION INTRAMUSCULAR; INTRAVENOUS at 13:01

## 2021-01-01 RX ADMIN — INFLUENZA VIRUS VACCINE 0.5 ML: 15; 15; 15; 15 SUSPENSION INTRAMUSCULAR at 15:07

## 2021-01-01 RX ADMIN — PANTOPRAZOLE SODIUM 40 MG: 40 TABLET, DELAYED RELEASE ORAL at 09:07

## 2021-01-01 RX ADMIN — Medication 6 MG/HR: at 00:39

## 2021-01-01 RX ADMIN — RIFAXIMIN 550 MG: 550 TABLET ORAL at 20:07

## 2021-01-01 RX ADMIN — METHYLPREDNISOLONE SODIUM SUCCINATE 20 MG: 40 INJECTION, POWDER, FOR SOLUTION INTRAMUSCULAR; INTRAVENOUS at 20:15

## 2021-01-01 RX ADMIN — IPRATROPIUM BROMIDE AND ALBUTEROL SULFATE 3 ML: .5; 2.5 SOLUTION RESPIRATORY (INHALATION) at 13:56

## 2021-01-01 RX ADMIN — ISOSORBIDE MONONITRATE 30 MG: 30 TABLET, EXTENDED RELEASE ORAL at 08:37

## 2021-01-01 RX ADMIN — PIPERACILLIN AND TAZOBACTAM 3.38 G: 3; .375 INJECTION, POWDER, LYOPHILIZED, FOR SOLUTION INTRAVENOUS at 05:12

## 2021-01-01 RX ADMIN — IPRATROPIUM BROMIDE AND ALBUTEROL SULFATE 3 ML: .5; 3 SOLUTION RESPIRATORY (INHALATION) at 13:13

## 2021-01-01 RX ADMIN — IPRATROPIUM BROMIDE AND ALBUTEROL SULFATE 3 ML: .5; 2.5 SOLUTION RESPIRATORY (INHALATION) at 12:13

## 2021-01-01 RX ADMIN — LACTULOSE 45 ML: 20 SOLUTION ORAL at 22:16

## 2021-01-01 RX ADMIN — GLYCOPYRROLATE 1 MG: 1 TABLET ORAL at 15:58

## 2021-01-01 RX ADMIN — PIPERACILLIN AND TAZOBACTAM 3.38 G: 3; .375 INJECTION, POWDER, LYOPHILIZED, FOR SOLUTION INTRAVENOUS at 22:04

## 2021-01-01 RX ADMIN — METHYLPREDNISOLONE SODIUM SUCCINATE 20 MG: 40 INJECTION, POWDER, FOR SOLUTION INTRAMUSCULAR; INTRAVENOUS at 22:04

## 2021-01-01 RX ADMIN — Medication 10 ML: at 14:22

## 2021-01-01 RX ADMIN — Medication 10 ML: at 14:40

## 2021-01-01 RX ADMIN — BUDESONIDE AND FORMOTEROL FUMARATE DIHYDRATE 2 PUFF: 160; 4.5 AEROSOL RESPIRATORY (INHALATION) at 07:37

## 2021-01-01 RX ADMIN — RIFAXIMIN 550 MG: 550 TABLET ORAL at 15:39

## 2021-01-01 RX ADMIN — MORPHINE SULFATE 2 MG: 2 INJECTION, SOLUTION INTRAMUSCULAR; INTRAVENOUS at 04:29

## 2021-01-01 RX ADMIN — RIFAXIMIN 550 MG: 550 TABLET ORAL at 17:03

## 2021-01-01 RX ADMIN — IPRATROPIUM BROMIDE AND ALBUTEROL SULFATE 3 ML: .5; 3 SOLUTION RESPIRATORY (INHALATION) at 07:45

## 2021-01-01 RX ADMIN — IPRATROPIUM BROMIDE AND ALBUTEROL SULFATE 3 ML: .5; 2.5 SOLUTION RESPIRATORY (INHALATION) at 07:09

## 2021-01-01 RX ADMIN — PROPRANOLOL HYDROCHLORIDE 20 MG: 20 TABLET ORAL at 10:36

## 2021-01-01 RX ADMIN — HEPARIN SODIUM 5000 UNITS: 5000 INJECTION INTRAVENOUS; SUBCUTANEOUS at 23:48

## 2021-01-01 RX ADMIN — HEPARIN SODIUM 5000 UNITS: 5000 INJECTION INTRAVENOUS; SUBCUTANEOUS at 05:10

## 2021-01-01 RX ADMIN — PANTOPRAZOLE SODIUM 40 MG: 40 TABLET, DELAYED RELEASE ORAL at 06:04

## 2021-01-01 RX ADMIN — METOCLOPRAMIDE 5 MG: 5 INJECTION, SOLUTION INTRAMUSCULAR; INTRAVENOUS at 23:52

## 2021-01-01 RX ADMIN — AMPICILLIN SODIUM AND SULBACTAM SODIUM 3 G: 2; 1 INJECTION, POWDER, FOR SOLUTION INTRAMUSCULAR; INTRAVENOUS at 18:07

## 2021-01-01 RX ADMIN — PANTOPRAZOLE SODIUM 40 MG: 40 TABLET, DELAYED RELEASE ORAL at 08:53

## 2021-01-01 RX ADMIN — RIFAXIMIN 550 MG: 550 TABLET ORAL at 22:17

## 2021-01-01 RX ADMIN — FOLIC ACID 1 MG: 1 TABLET ORAL at 09:40

## 2021-01-01 RX ADMIN — IPRATROPIUM BROMIDE AND ALBUTEROL SULFATE 3 ML: .5; 2.5 SOLUTION RESPIRATORY (INHALATION) at 19:42

## 2021-01-01 RX ADMIN — RIFAXIMIN 550 MG: 550 TABLET ORAL at 21:33

## 2021-01-01 RX ADMIN — HEPARIN SODIUM 5000 UNITS: 5000 INJECTION INTRAVENOUS; SUBCUTANEOUS at 05:50

## 2021-01-01 RX ADMIN — AMPICILLIN SODIUM AND SULBACTAM SODIUM 3 G: 2; 1 INJECTION, POWDER, FOR SOLUTION INTRAMUSCULAR; INTRAVENOUS at 17:52

## 2021-01-01 RX ADMIN — FOLIC ACID 1 MG: 1 TABLET ORAL at 10:30

## 2021-01-01 RX ADMIN — RIFAXIMIN 550 MG: 550 TABLET ORAL at 15:44

## 2021-01-01 RX ADMIN — RIFAXIMIN 550 MG: 550 TABLET ORAL at 09:40

## 2021-01-01 RX ADMIN — LACTULOSE 45 ML: 20 SOLUTION ORAL at 08:38

## 2021-01-01 RX ADMIN — LACTULOSE 45 ML: 10 SOLUTION ORAL at 21:20

## 2021-01-01 RX ADMIN — SODIUM CHLORIDE 75 ML/HR: 9 INJECTION, SOLUTION INTRAVENOUS at 19:28

## 2021-01-01 RX ADMIN — Medication 10 ML: at 05:30

## 2021-01-01 RX ADMIN — HEPARIN SODIUM 5000 UNITS: 5000 INJECTION INTRAVENOUS; SUBCUTANEOUS at 05:20

## 2021-01-01 RX ADMIN — PIPERACILLIN AND TAZOBACTAM 3.38 G: 3; .375 INJECTION, POWDER, LYOPHILIZED, FOR SOLUTION INTRAVENOUS at 15:31

## 2021-01-01 RX ADMIN — AMPICILLIN SODIUM AND SULBACTAM SODIUM 3 G: 2; 1 INJECTION, POWDER, FOR SOLUTION INTRAMUSCULAR; INTRAVENOUS at 00:03

## 2021-01-01 RX ADMIN — DEXMEDETOMIDINE HYDROCHLORIDE 0.4 MCG/KG/HR: 100 INJECTION, SOLUTION, CONCENTRATE INTRAVENOUS at 16:21

## 2021-01-01 RX ADMIN — PROPRANOLOL HYDROCHLORIDE 10 MG: 10 TABLET ORAL at 21:07

## 2021-01-01 RX ADMIN — FOLIC ACID 1 MG: 1 TABLET ORAL at 10:04

## 2021-01-01 RX ADMIN — MORPHINE SULFATE 2 MG: 2 INJECTION, SOLUTION INTRAMUSCULAR; INTRAVENOUS at 18:34

## 2021-01-01 RX ADMIN — PIPERACILLIN AND TAZOBACTAM 3.38 G: 3; .375 INJECTION, POWDER, LYOPHILIZED, FOR SOLUTION INTRAVENOUS at 13:37

## 2021-01-01 RX ADMIN — METHYLPREDNISOLONE SODIUM SUCCINATE 40 MG: 40 INJECTION, POWDER, FOR SOLUTION INTRAMUSCULAR; INTRAVENOUS at 19:31

## 2021-01-01 RX ADMIN — AMPICILLIN SODIUM AND SULBACTAM SODIUM 3 G: 2; 1 INJECTION, POWDER, FOR SOLUTION INTRAMUSCULAR; INTRAVENOUS at 12:55

## 2021-01-01 RX ADMIN — IPRATROPIUM BROMIDE AND ALBUTEROL SULFATE 3 ML: .5; 2.5 SOLUTION RESPIRATORY (INHALATION) at 13:01

## 2021-01-01 RX ADMIN — FLUTICASONE PROPIONATE 2 SPRAY: 50 SPRAY, METERED NASAL at 09:07

## 2021-01-01 RX ADMIN — Medication 10 ML: at 06:00

## 2021-01-01 RX ADMIN — GLYCOPYRROLATE 1 MG: 1 TABLET ORAL at 17:12

## 2021-01-01 RX ADMIN — Medication 10 ML: at 21:40

## 2021-01-01 RX ADMIN — RIFAXIMIN 550 MG: 550 TABLET ORAL at 20:56

## 2021-01-01 RX ADMIN — RIFAXIMIN 550 MG: 550 TABLET ORAL at 18:19

## 2021-01-01 RX ADMIN — FUROSEMIDE 40 MG: 40 TABLET ORAL at 10:15

## 2021-01-01 RX ADMIN — PANTOPRAZOLE SODIUM 20 MG: 20 TABLET, DELAYED RELEASE ORAL at 10:36

## 2021-01-01 RX ADMIN — RIFAXIMIN 550 MG: 550 TABLET ORAL at 16:35

## 2021-01-01 RX ADMIN — RIFAXIMIN 550 MG: 550 TABLET ORAL at 11:34

## 2021-01-01 RX ADMIN — RIFAXIMIN 550 MG: 550 TABLET ORAL at 10:30

## 2021-01-01 RX ADMIN — HEPARIN SODIUM 5000 UNITS: 5000 INJECTION INTRAVENOUS; SUBCUTANEOUS at 13:34

## 2021-01-01 RX ADMIN — SUCCINYLCHOLINE CHLORIDE 150 MG: 20 INJECTION, SOLUTION INTRAMUSCULAR; INTRAVENOUS at 09:02

## 2021-01-01 RX ADMIN — RIFAXIMIN 550 MG: 550 TABLET ORAL at 09:45

## 2021-01-01 RX ADMIN — RIFAXIMIN 550 MG: 550 TABLET ORAL at 21:59

## 2021-01-01 RX ADMIN — LACTULOSE 45 ML: 20 SOLUTION ORAL at 15:29

## 2021-01-01 RX ADMIN — PANTOPRAZOLE SODIUM 40 MG: 40 TABLET, DELAYED RELEASE ORAL at 05:57

## 2021-01-01 RX ADMIN — IPRATROPIUM BROMIDE AND ALBUTEROL SULFATE 3 ML: .5; 2.5 SOLUTION RESPIRATORY (INHALATION) at 12:57

## 2021-01-01 RX ADMIN — OXYCODONE 5 MG: 5 TABLET ORAL at 15:36

## 2021-01-01 RX ADMIN — Medication 10 ML: at 22:00

## 2021-01-01 RX ADMIN — PROPRANOLOL HYDROCHLORIDE 10 MG: 10 TABLET ORAL at 21:34

## 2021-01-01 RX ADMIN — ISOSORBIDE MONONITRATE 30 MG: 30 TABLET, EXTENDED RELEASE ORAL at 09:37

## 2021-01-01 RX ADMIN — IPRATROPIUM BROMIDE AND ALBUTEROL SULFATE 3 ML: .5; 3 SOLUTION RESPIRATORY (INHALATION) at 13:50

## 2021-01-01 RX ADMIN — Medication 10 ML: at 06:34

## 2021-01-01 RX ADMIN — LACTULOSE 45 ML: 10 SOLUTION ORAL at 09:00

## 2021-01-01 RX ADMIN — LACTULOSE 45 ML: 20 SOLUTION ORAL at 15:44

## 2021-01-01 RX ADMIN — MORPHINE SULFATE 2 MG: 2 INJECTION, SOLUTION INTRAMUSCULAR; INTRAVENOUS at 14:41

## 2021-01-01 RX ADMIN — LACTULOSE 45 ML: 20 SOLUTION ORAL at 15:39

## 2021-01-01 RX ADMIN — LACTULOSE 45 ML: 10 SOLUTION ORAL at 08:01

## 2021-01-01 RX ADMIN — METHYLPREDNISOLONE SODIUM SUCCINATE 40 MG: 40 INJECTION, POWDER, FOR SOLUTION INTRAMUSCULAR; INTRAVENOUS at 21:01

## 2021-01-01 RX ADMIN — DEXMEDETOMIDINE HYDROCHLORIDE 0.5 MCG/KG/HR: 100 INJECTION, SOLUTION, CONCENTRATE INTRAVENOUS at 02:31

## 2021-01-01 RX ADMIN — PIPERACILLIN AND TAZOBACTAM 3.38 G: 3; .375 INJECTION, POWDER, LYOPHILIZED, FOR SOLUTION INTRAVENOUS at 22:08

## 2021-01-01 RX ADMIN — Medication 5 MG/HR: at 19:28

## 2021-01-01 RX ADMIN — DEXMEDETOMIDINE HYDROCHLORIDE 0.2 MCG/KG/HR: 100 INJECTION, SOLUTION, CONCENTRATE INTRAVENOUS at 23:47

## 2021-01-01 RX ADMIN — ISOSORBIDE MONONITRATE 30 MG: 30 TABLET, EXTENDED RELEASE ORAL at 10:15

## 2021-01-01 RX ADMIN — IPRATROPIUM BROMIDE AND ALBUTEROL SULFATE 3 ML: .5; 2.5 SOLUTION RESPIRATORY (INHALATION) at 07:24

## 2021-01-01 RX ADMIN — PANTOPRAZOLE SODIUM 40 MG: 40 TABLET, DELAYED RELEASE ORAL at 05:46

## 2021-01-01 RX ADMIN — PANTOPRAZOLE SODIUM 40 MG: 40 TABLET, DELAYED RELEASE ORAL at 05:56

## 2021-01-01 RX ADMIN — LACTULOSE 45 ML: 20 SOLUTION ORAL at 11:34

## 2021-01-01 RX ADMIN — PIPERACILLIN AND TAZOBACTAM 3.38 G: 3; .375 INJECTION, POWDER, LYOPHILIZED, FOR SOLUTION INTRAVENOUS at 21:49

## 2021-01-01 RX ADMIN — FLUTICASONE PROPIONATE 2 SPRAY: 50 SPRAY, METERED NASAL at 10:31

## 2021-01-01 RX ADMIN — Medication 5 MG/HR: at 01:22

## 2021-01-01 RX ADMIN — PIPERACILLIN AND TAZOBACTAM 3.38 G: 3; .375 INJECTION, POWDER, LYOPHILIZED, FOR SOLUTION INTRAVENOUS at 13:39

## 2021-01-01 RX ADMIN — PROPRANOLOL HYDROCHLORIDE 20 MG: 20 TABLET ORAL at 21:36

## 2021-01-01 RX ADMIN — IPRATROPIUM BROMIDE AND ALBUTEROL SULFATE 3 ML: .5; 3 SOLUTION RESPIRATORY (INHALATION) at 15:42

## 2021-01-01 RX ADMIN — DEXMEDETOMIDINE HYDROCHLORIDE 0.1 MCG/KG/HR: 100 INJECTION, SOLUTION, CONCENTRATE INTRAVENOUS at 14:42

## 2021-01-01 RX ADMIN — POTASSIUM CHLORIDE 40 MEQ: 1500 TABLET, EXTENDED RELEASE ORAL at 18:11

## 2021-01-01 RX ADMIN — IPRATROPIUM BROMIDE AND ALBUTEROL SULFATE 3 ML: .5; 3 SOLUTION RESPIRATORY (INHALATION) at 08:21

## 2021-01-01 RX ADMIN — PROPRANOLOL HYDROCHLORIDE 10 MG: 10 TABLET ORAL at 09:37

## 2021-01-01 RX ADMIN — HEPARIN SODIUM 5000 UNITS: 5000 INJECTION INTRAVENOUS; SUBCUTANEOUS at 05:55

## 2021-01-01 RX ADMIN — RIFAXIMIN 550 MG: 550 TABLET ORAL at 10:36

## 2021-01-01 RX ADMIN — AMPICILLIN SODIUM AND SULBACTAM SODIUM 3 G: 2; 1 INJECTION, POWDER, FOR SOLUTION INTRAMUSCULAR; INTRAVENOUS at 05:54

## 2021-01-01 RX ADMIN — PANTOPRAZOLE SODIUM 40 MG: 40 TABLET, DELAYED RELEASE ORAL at 07:56

## 2021-01-01 RX ADMIN — HEPARIN SODIUM 5000 UNITS: 5000 INJECTION INTRAVENOUS; SUBCUTANEOUS at 05:54

## 2021-01-01 RX ADMIN — RIFAXIMIN 550 MG: 550 TABLET ORAL at 21:07

## 2021-01-01 RX ADMIN — IPRATROPIUM BROMIDE AND ALBUTEROL SULFATE 3 ML: .5; 2.5 SOLUTION RESPIRATORY (INHALATION) at 19:15

## 2021-01-01 RX ADMIN — PANTOPRAZOLE SODIUM 40 MG: 40 TABLET, DELAYED RELEASE ORAL at 06:30

## 2021-01-01 RX ADMIN — METHYLPREDNISOLONE SODIUM SUCCINATE 40 MG: 40 INJECTION, POWDER, FOR SOLUTION INTRAMUSCULAR; INTRAVENOUS at 15:59

## 2021-01-01 RX ADMIN — PROPRANOLOL HYDROCHLORIDE 10 MG: 10 TABLET ORAL at 10:16

## 2021-01-01 RX ADMIN — PROPRANOLOL HYDROCHLORIDE 20 MG: 20 TABLET ORAL at 11:33

## 2021-01-01 RX ADMIN — SODIUM CHLORIDE 75 ML/HR: 9 INJECTION, SOLUTION INTRAVENOUS at 21:04

## 2021-01-01 RX ADMIN — PIPERACILLIN AND TAZOBACTAM 3.38 G: 3; .375 INJECTION, POWDER, LYOPHILIZED, FOR SOLUTION INTRAVENOUS at 05:15

## 2021-01-01 RX ADMIN — Medication 10 ML: at 05:50

## 2021-01-01 RX ADMIN — HEPARIN SODIUM 5000 UNITS: 5000 INJECTION INTRAVENOUS; SUBCUTANEOUS at 12:55

## 2021-01-01 RX ADMIN — FOLIC ACID 1 MG: 1 TABLET ORAL at 11:33

## 2021-01-01 RX ADMIN — RIFAXIMIN 550 MG: 550 TABLET ORAL at 09:15

## 2021-01-01 RX ADMIN — RIFAXIMIN 550 MG: 550 TABLET ORAL at 18:11

## 2021-01-01 RX ADMIN — METHYLPREDNISOLONE SODIUM SUCCINATE 40 MG: 40 INJECTION, POWDER, FOR SOLUTION INTRAMUSCULAR; INTRAVENOUS at 05:15

## 2021-01-01 RX ADMIN — FOLIC ACID 1 MG: 1 TABLET ORAL at 09:37

## 2021-01-01 RX ADMIN — IPRATROPIUM BROMIDE AND ALBUTEROL SULFATE 3 ML: .5; 3 SOLUTION RESPIRATORY (INHALATION) at 20:10

## 2021-01-01 RX ADMIN — OXYCODONE 5 MG: 5 TABLET ORAL at 22:08

## 2021-01-01 RX ADMIN — LACTULOSE 45 ML: 10 SOLUTION ORAL at 15:58

## 2021-01-01 RX ADMIN — DEXMEDETOMIDINE HYDROCHLORIDE 0.5 MCG/KG/HR: 100 INJECTION, SOLUTION, CONCENTRATE INTRAVENOUS at 19:44

## 2021-01-01 RX ADMIN — SODIUM CHLORIDE 75 ML/HR: 9 INJECTION, SOLUTION INTRAVENOUS at 05:09

## 2021-01-01 RX ADMIN — AMPICILLIN SODIUM AND SULBACTAM SODIUM 3 G: 2; 1 INJECTION, POWDER, FOR SOLUTION INTRAMUSCULAR; INTRAVENOUS at 01:35

## 2021-01-01 RX ADMIN — MORPHINE SULFATE 2 MG: 2 INJECTION, SOLUTION INTRAMUSCULAR; INTRAVENOUS at 06:35

## 2021-01-01 RX ADMIN — POTASSIUM CHLORIDE 40 MEQ: 1500 TABLET, EXTENDED RELEASE ORAL at 14:37

## 2021-01-01 RX ADMIN — LORAZEPAM 2 MG: 2 INJECTION INTRAMUSCULAR; INTRAVENOUS at 03:32

## 2021-01-01 RX ADMIN — AMPICILLIN SODIUM AND SULBACTAM SODIUM 3 G: 2; 1 INJECTION, POWDER, FOR SOLUTION INTRAMUSCULAR; INTRAVENOUS at 12:12

## 2021-01-01 RX ADMIN — LACTULOSE 45 ML: 20 SOLUTION ORAL at 09:00

## 2021-01-01 RX ADMIN — FOLIC ACID 1 MG: 1 TABLET ORAL at 09:15

## 2021-01-01 RX ADMIN — GLYCOPYRROLATE 1 MG: 1 TABLET ORAL at 15:31

## 2021-01-01 RX ADMIN — PANTOPRAZOLE SODIUM 40 MG: 40 TABLET, DELAYED RELEASE ORAL at 05:13

## 2021-01-01 RX ADMIN — Medication 5 MG/HR: at 16:41

## 2021-01-01 RX ADMIN — Medication 10 ML: at 15:57

## 2021-01-01 RX ADMIN — LACTULOSE 45 ML: 10 SOLUTION ORAL at 08:47

## 2021-01-01 RX ADMIN — LACTULOSE 45 ML: 20 SOLUTION ORAL at 20:36

## 2021-01-01 RX ADMIN — FUROSEMIDE 40 MG: 40 TABLET ORAL at 09:06

## 2021-01-01 RX ADMIN — SODIUM CHLORIDE 75 ML/HR: 9 INJECTION, SOLUTION INTRAVENOUS at 16:17

## 2021-01-01 RX ADMIN — SODIUM CHLORIDE 500 ML: 9 INJECTION, SOLUTION INTRAVENOUS at 10:34

## 2021-01-01 RX ADMIN — LACTULOSE 45 ML: 20 SOLUTION ORAL at 19:12

## 2021-01-01 RX ADMIN — IPRATROPIUM BROMIDE AND ALBUTEROL SULFATE 3 ML: .5; 3 SOLUTION RESPIRATORY (INHALATION) at 19:03

## 2021-01-01 RX ADMIN — Medication 10 ML: at 21:08

## 2021-01-01 RX ADMIN — IPRATROPIUM BROMIDE AND ALBUTEROL SULFATE 3 ML: .5; 2.5 SOLUTION RESPIRATORY (INHALATION) at 20:03

## 2021-01-01 RX ADMIN — HEPARIN SODIUM 5000 UNITS: 5000 INJECTION INTRAVENOUS; SUBCUTANEOUS at 22:16

## 2021-01-01 RX ADMIN — ENOXAPARIN SODIUM 40 MG: 40 INJECTION SUBCUTANEOUS at 11:02

## 2021-01-01 RX ADMIN — PROPRANOLOL HYDROCHLORIDE 10 MG: 10 TABLET ORAL at 08:18

## 2021-01-01 RX ADMIN — LORAZEPAM 2 MG: 2 INJECTION INTRAMUSCULAR; INTRAVENOUS at 21:05

## 2021-01-01 RX ADMIN — AMPICILLIN SODIUM AND SULBACTAM SODIUM 3 G: 2; 1 INJECTION, POWDER, FOR SOLUTION INTRAMUSCULAR; INTRAVENOUS at 05:20

## 2021-01-01 RX ADMIN — CEFTRIAXONE 1 G: 1 INJECTION, POWDER, FOR SOLUTION INTRAMUSCULAR; INTRAVENOUS at 11:01

## 2021-01-01 RX ADMIN — Medication 3 MCG/MIN: at 13:38

## 2021-01-01 RX ADMIN — PIPERACILLIN AND TAZOBACTAM 3.38 G: 3; .375 INJECTION, POWDER, LYOPHILIZED, FOR SOLUTION INTRAVENOUS at 05:23

## 2021-01-01 RX ADMIN — RIFAXIMIN 550 MG: 550 TABLET ORAL at 17:08

## 2021-01-01 RX ADMIN — FUROSEMIDE 40 MG: 40 TABLET ORAL at 08:19

## 2021-01-01 RX ADMIN — LACTULOSE 45 ML: 10 SOLUTION ORAL at 22:08

## 2021-01-01 RX ADMIN — IPRATROPIUM BROMIDE AND ALBUTEROL SULFATE 3 ML: .5; 3 SOLUTION RESPIRATORY (INHALATION) at 19:29

## 2021-01-01 RX ADMIN — RIFAXIMIN 550 MG: 550 TABLET ORAL at 08:53

## 2021-01-01 RX ADMIN — METOCLOPRAMIDE 5 MG: 5 INJECTION, SOLUTION INTRAMUSCULAR; INTRAVENOUS at 05:21

## 2021-01-01 RX ADMIN — MORPHINE SULFATE 2 MG: 2 INJECTION, SOLUTION INTRAMUSCULAR; INTRAVENOUS at 10:50

## 2021-01-01 RX ADMIN — LACTULOSE 45 ML: 20 SOLUTION ORAL at 17:08

## 2021-01-01 RX ADMIN — LACTULOSE 45 ML: 20 SOLUTION ORAL at 08:18

## 2021-01-01 RX ADMIN — SODIUM CHLORIDE 75 ML/HR: 9 INJECTION, SOLUTION INTRAVENOUS at 08:51

## 2021-01-01 RX ADMIN — Medication 5 MG/HR: at 17:26

## 2021-01-01 RX ADMIN — DEXMEDETOMIDINE HYDROCHLORIDE 0.5 MCG/KG/HR: 100 INJECTION, SOLUTION, CONCENTRATE INTRAVENOUS at 17:50

## 2021-01-01 RX ADMIN — METHYLPREDNISOLONE SODIUM SUCCINATE 40 MG: 40 INJECTION, POWDER, FOR SOLUTION INTRAMUSCULAR; INTRAVENOUS at 05:11

## 2021-01-01 RX ADMIN — IPRATROPIUM BROMIDE AND ALBUTEROL SULFATE 3 ML: .5; 2.5 SOLUTION RESPIRATORY (INHALATION) at 13:38

## 2021-01-01 RX ADMIN — IPRATROPIUM BROMIDE AND ALBUTEROL SULFATE 3 ML: .5; 3 SOLUTION RESPIRATORY (INHALATION) at 01:29

## 2021-01-01 RX ADMIN — METHYLPREDNISOLONE SODIUM SUCCINATE 40 MG: 40 INJECTION, POWDER, FOR SOLUTION INTRAMUSCULAR; INTRAVENOUS at 04:28

## 2021-01-01 RX ADMIN — FUROSEMIDE 40 MG: 40 TABLET ORAL at 09:37

## 2021-01-01 RX ADMIN — HEPARIN SODIUM 5000 UNITS: 5000 INJECTION INTRAVENOUS; SUBCUTANEOUS at 19:39

## 2021-01-01 RX ADMIN — PROPRANOLOL HYDROCHLORIDE 20 MG: 20 TABLET ORAL at 20:55

## 2021-01-01 RX ADMIN — LACTULOSE 45 ML: 20 SOLUTION ORAL at 18:11

## 2021-01-01 RX ADMIN — LORAZEPAM 2 MG: 2 INJECTION INTRAMUSCULAR; INTRAVENOUS at 13:29

## 2021-01-01 RX ADMIN — MORPHINE SULFATE 2 MG: 2 INJECTION, SOLUTION INTRAMUSCULAR; INTRAVENOUS at 13:29

## 2021-01-01 RX ADMIN — LACTULOSE 45 ML: 10 SOLUTION ORAL at 21:01

## 2021-01-01 RX ADMIN — HEPARIN SODIUM 5000 UNITS: 5000 INJECTION INTRAVENOUS; SUBCUTANEOUS at 12:02

## 2021-01-01 RX ADMIN — MORPHINE SULFATE 2 MG: 2 INJECTION, SOLUTION INTRAMUSCULAR; INTRAVENOUS at 23:01

## 2021-01-01 RX ADMIN — IPRATROPIUM BROMIDE AND ALBUTEROL SULFATE 3 ML: .5; 3 SOLUTION RESPIRATORY (INHALATION) at 01:35

## 2021-01-01 RX ADMIN — PROPRANOLOL HYDROCHLORIDE 20 MG: 10 TABLET ORAL at 08:37

## 2021-01-01 RX ADMIN — AMPICILLIN SODIUM AND SULBACTAM SODIUM 3 G: 2; 1 INJECTION, POWDER, FOR SOLUTION INTRAMUSCULAR; INTRAVENOUS at 18:06

## 2021-01-01 RX ADMIN — LACTULOSE 45 ML: 20 SOLUTION ORAL at 09:06

## 2021-01-01 RX ADMIN — IPRATROPIUM BROMIDE AND ALBUTEROL SULFATE 3 ML: .5; 2.5 SOLUTION RESPIRATORY (INHALATION) at 08:21

## 2021-01-01 RX ADMIN — FOLIC ACID 1 MG: 1 TABLET ORAL at 09:06

## 2021-01-01 RX ADMIN — Medication 10 ML: at 05:39

## 2021-01-01 RX ADMIN — METHYLPREDNISOLONE SODIUM SUCCINATE 40 MG: 40 INJECTION, POWDER, FOR SOLUTION INTRAMUSCULAR; INTRAVENOUS at 14:38

## 2021-01-01 RX ADMIN — LACTULOSE 45 ML: 10 SOLUTION ORAL at 17:10

## 2021-01-01 RX ADMIN — LACTULOSE 45 ML: 20 SOLUTION ORAL at 21:33

## 2021-01-01 RX ADMIN — FUROSEMIDE 40 MG: 10 INJECTION INTRAMUSCULAR; INTRAVENOUS at 12:06

## 2021-01-01 RX ADMIN — IPRATROPIUM BROMIDE AND ALBUTEROL SULFATE 3 ML: .5; 2.5 SOLUTION RESPIRATORY (INHALATION) at 08:12

## 2021-01-01 RX ADMIN — AMPICILLIN SODIUM AND SULBACTAM SODIUM 3 G: 2; 1 INJECTION, POWDER, FOR SOLUTION INTRAMUSCULAR; INTRAVENOUS at 05:10

## 2021-01-01 RX ADMIN — METHYLPREDNISOLONE SODIUM SUCCINATE 40 MG: 40 INJECTION, POWDER, FOR SOLUTION INTRAMUSCULAR; INTRAVENOUS at 21:20

## 2021-01-01 RX ADMIN — METHYLPREDNISOLONE SODIUM SUCCINATE 40 MG: 40 INJECTION, POWDER, FOR SOLUTION INTRAMUSCULAR; INTRAVENOUS at 13:37

## 2021-01-01 RX ADMIN — PANTOPRAZOLE SODIUM 40 MG: 40 TABLET, DELAYED RELEASE ORAL at 06:10

## 2021-01-01 RX ADMIN — LACTULOSE 45 ML: 20 SOLUTION ORAL at 18:19

## 2021-01-01 RX ADMIN — FLUTICASONE PROPIONATE 2 SPRAY: 50 SPRAY, METERED NASAL at 09:00

## 2021-01-01 RX ADMIN — RIFAXIMIN 550 MG: 550 TABLET ORAL at 20:36

## 2021-01-01 RX ADMIN — RIFAXIMIN 550 MG: 550 TABLET ORAL at 21:02

## 2021-01-01 RX ADMIN — VECURONIUM BROMIDE 10 MG: 10 INJECTION, POWDER, LYOPHILIZED, FOR SOLUTION INTRAVENOUS at 09:30

## 2021-01-01 RX ADMIN — Medication 5 MCG/MIN: at 11:05

## 2021-01-01 RX ADMIN — GLYCOPYRROLATE 1 MG: 1 TABLET ORAL at 08:47

## 2021-01-01 RX ADMIN — Medication 10 ML: at 05:34

## 2021-01-01 RX ADMIN — Medication 10 ML: at 05:19

## 2021-01-01 RX ADMIN — FUROSEMIDE 40 MG: 10 INJECTION INTRAMUSCULAR; INTRAVENOUS at 09:15

## 2021-01-01 RX ADMIN — IPRATROPIUM BROMIDE AND ALBUTEROL SULFATE 3 ML: .5; 3 SOLUTION RESPIRATORY (INHALATION) at 13:46

## 2021-01-01 RX ADMIN — ACETAMINOPHEN 650 MG: 325 TABLET, FILM COATED ORAL at 19:35

## 2021-01-01 RX ADMIN — MORPHINE SULFATE 2 MG: 2 INJECTION, SOLUTION INTRAMUSCULAR; INTRAVENOUS at 18:27

## 2021-01-01 RX ADMIN — GLYCOPYRROLATE 1 MG: 1 TABLET ORAL at 08:51

## 2021-01-01 RX ADMIN — LACTULOSE 45 ML: 20 SOLUTION ORAL at 10:39

## 2021-01-01 RX ADMIN — PIPERACILLIN AND TAZOBACTAM 3.38 G: 3; .375 INJECTION, POWDER, LYOPHILIZED, FOR SOLUTION INTRAVENOUS at 21:04

## 2021-01-01 RX ADMIN — IPRATROPIUM BROMIDE AND ALBUTEROL SULFATE 3 ML: .5; 2.5 SOLUTION RESPIRATORY (INHALATION) at 13:27

## 2021-01-01 RX ADMIN — HEPARIN SODIUM 5000 UNITS: 5000 INJECTION INTRAVENOUS; SUBCUTANEOUS at 05:41

## 2021-01-01 RX ADMIN — PANTOPRAZOLE SODIUM 40 MG: 40 TABLET, DELAYED RELEASE ORAL at 05:51

## 2021-01-01 RX ADMIN — LACTULOSE 45 ML: 20 SOLUTION ORAL at 04:29

## 2021-01-01 RX ADMIN — AMPICILLIN SODIUM AND SULBACTAM SODIUM 3 G: 2; 1 INJECTION, POWDER, FOR SOLUTION INTRAMUSCULAR; INTRAVENOUS at 17:03

## 2021-01-01 RX ADMIN — LACTULOSE 45 ML: 20 SOLUTION ORAL at 20:53

## 2021-01-01 RX ADMIN — ENOXAPARIN SODIUM 40 MG: 40 INJECTION SUBCUTANEOUS at 09:40

## 2021-01-01 RX ADMIN — AMPICILLIN SODIUM AND SULBACTAM SODIUM 3 G: 2; 1 INJECTION, POWDER, FOR SOLUTION INTRAMUSCULAR; INTRAVENOUS at 00:17

## 2021-01-01 RX ADMIN — FLUTICASONE PROPIONATE 2 SPRAY: 50 SPRAY, METERED NASAL at 08:19

## 2021-01-01 RX ADMIN — ENOXAPARIN SODIUM 40 MG: 40 INJECTION SUBCUTANEOUS at 08:38

## 2021-01-01 RX ADMIN — DEXMEDETOMIDINE HYDROCHLORIDE 1.5 MCG/KG/HR: 100 INJECTION, SOLUTION, CONCENTRATE INTRAVENOUS at 14:22

## 2021-01-01 RX ADMIN — DEXMEDETOMIDINE HYDROCHLORIDE 0.1 MCG/KG/HR: 100 INJECTION, SOLUTION, CONCENTRATE INTRAVENOUS at 17:13

## 2021-01-01 RX ADMIN — OXYCODONE HYDROCHLORIDE 10 MG: 10 TABLET ORAL at 09:06

## 2021-01-01 RX ADMIN — PANTOPRAZOLE SODIUM 20 MG: 20 TABLET, DELAYED RELEASE ORAL at 11:34

## 2021-01-01 RX ADMIN — AMPICILLIN SODIUM AND SULBACTAM SODIUM 3 G: 2; 1 INJECTION, POWDER, FOR SOLUTION INTRAMUSCULAR; INTRAVENOUS at 12:42

## 2021-01-01 RX ADMIN — Medication 5 MG/HR: at 19:27

## 2021-01-01 RX ADMIN — GLYCOPYRROLATE 1 MG: 1 TABLET ORAL at 21:01

## 2021-01-01 RX ADMIN — IPRATROPIUM BROMIDE AND ALBUTEROL SULFATE 3 ML: .5; 2.5 SOLUTION RESPIRATORY (INHALATION) at 08:23

## 2021-01-01 RX ADMIN — ENOXAPARIN SODIUM 40 MG: 40 INJECTION SUBCUTANEOUS at 09:45

## 2021-01-01 RX ADMIN — Medication 2 MG/HR: at 01:25

## 2021-01-01 RX ADMIN — SODIUM CHLORIDE 75 ML/HR: 9 INJECTION, SOLUTION INTRAVENOUS at 06:42

## 2021-01-01 RX ADMIN — METOCLOPRAMIDE 5 MG: 5 INJECTION, SOLUTION INTRAMUSCULAR; INTRAVENOUS at 17:10

## 2021-01-01 RX ADMIN — AMPICILLIN SODIUM AND SULBACTAM SODIUM 3 G: 2; 1 INJECTION, POWDER, FOR SOLUTION INTRAMUSCULAR; INTRAVENOUS at 18:08

## 2021-01-01 RX ADMIN — SODIUM CHLORIDE 75 ML/HR: 9 INJECTION, SOLUTION INTRAVENOUS at 14:22

## 2021-01-01 RX ADMIN — LACTULOSE 45 ML: 20 SOLUTION ORAL at 09:45

## 2021-01-01 RX ADMIN — BUDESONIDE AND FORMOTEROL FUMARATE DIHYDRATE 1 PUFF: 160; 4.5 AEROSOL RESPIRATORY (INHALATION) at 19:19

## 2021-01-01 RX ADMIN — LACTULOSE 45 ML: 20 SOLUTION ORAL at 20:07

## 2021-01-01 RX ADMIN — LACTULOSE 45 ML: 10 SOLUTION ORAL at 22:00

## 2021-01-01 RX ADMIN — LORAZEPAM 2 MG: 2 INJECTION INTRAMUSCULAR; INTRAVENOUS at 11:14

## 2021-01-01 RX ADMIN — IPRATROPIUM BROMIDE AND ALBUTEROL SULFATE 3 ML: .5; 3 SOLUTION RESPIRATORY (INHALATION) at 19:58

## 2021-01-01 RX ADMIN — LACTULOSE 45 ML: 20 SOLUTION ORAL at 00:53

## 2021-01-01 RX ADMIN — IPRATROPIUM BROMIDE AND ALBUTEROL SULFATE 3 ML: .5; 3 SOLUTION RESPIRATORY (INHALATION) at 01:25

## 2021-01-01 RX ADMIN — LACTULOSE 45 ML: 20 SOLUTION ORAL at 21:17

## 2021-01-01 RX ADMIN — PROPRANOLOL HYDROCHLORIDE 10 MG: 10 TABLET ORAL at 19:35

## 2021-01-01 RX ADMIN — FOLIC ACID 1 MG: 1 TABLET ORAL at 09:45

## 2021-01-01 RX ADMIN — HEPARIN SODIUM 5000 UNITS: 5000 INJECTION INTRAVENOUS; SUBCUTANEOUS at 12:29

## 2021-01-01 RX ADMIN — Medication 10 ML: at 13:02

## 2021-01-01 RX ADMIN — SODIUM CHLORIDE 75 ML/HR: 9 INJECTION, SOLUTION INTRAVENOUS at 21:20

## 2021-01-01 RX ADMIN — PIPERACILLIN AND TAZOBACTAM 3.38 G: 3; .375 INJECTION, POWDER, LYOPHILIZED, FOR SOLUTION INTRAVENOUS at 14:21

## 2021-01-01 RX ADMIN — AMPICILLIN SODIUM AND SULBACTAM SODIUM 3 G: 2; 1 INJECTION, POWDER, FOR SOLUTION INTRAMUSCULAR; INTRAVENOUS at 12:29

## 2021-01-01 RX ADMIN — LACTULOSE 45 ML: 20 SOLUTION ORAL at 10:16

## 2021-01-01 RX ADMIN — Medication 10 ML: at 22:08

## 2021-01-01 RX ADMIN — AMPICILLIN SODIUM AND SULBACTAM SODIUM 3 G: 2; 1 INJECTION, POWDER, FOR SOLUTION INTRAMUSCULAR; INTRAVENOUS at 12:01

## 2021-01-01 RX ADMIN — Medication 10 ML: at 14:38

## 2021-01-01 RX ADMIN — Medication 10 ML: at 13:38

## 2021-01-01 RX ADMIN — IPRATROPIUM BROMIDE AND ALBUTEROL SULFATE 3 ML: .5; 2.5 SOLUTION RESPIRATORY (INHALATION) at 13:33

## 2021-01-01 RX ADMIN — RIFAXIMIN 550 MG: 550 TABLET ORAL at 22:12

## 2021-01-01 RX ADMIN — FUROSEMIDE 40 MG: 40 TABLET ORAL at 10:03

## 2021-01-01 RX ADMIN — AMPICILLIN SODIUM AND SULBACTAM SODIUM 3 G: 2; 1 INJECTION, POWDER, FOR SOLUTION INTRAMUSCULAR; INTRAVENOUS at 05:47

## 2021-01-01 RX ADMIN — HEPARIN SODIUM 5000 UNITS: 5000 INJECTION INTRAVENOUS; SUBCUTANEOUS at 12:24

## 2021-01-01 RX ADMIN — ALBUTEROL SULFATE 2 PUFF: 108 AEROSOL, METERED RESPIRATORY (INHALATION) at 18:18

## 2021-01-01 RX ADMIN — IPRATROPIUM BROMIDE AND ALBUTEROL SULFATE 3 ML: .5; 2.5 SOLUTION RESPIRATORY (INHALATION) at 19:44

## 2021-01-01 RX ADMIN — Medication 10 ML: at 14:52

## 2021-01-01 RX ADMIN — LACTULOSE 45 ML: 20 SOLUTION ORAL at 17:02

## 2021-01-01 RX ADMIN — RIFAXIMIN 550 MG: 550 TABLET ORAL at 08:37

## 2021-01-01 RX ADMIN — RIFAXIMIN 550 MG: 550 TABLET ORAL at 09:37

## 2021-01-01 RX ADMIN — IPRATROPIUM BROMIDE AND ALBUTEROL SULFATE 3 ML: .5; 2.5 SOLUTION RESPIRATORY (INHALATION) at 19:29

## 2021-01-01 RX ADMIN — PANTOPRAZOLE SODIUM 40 MG: 40 TABLET, DELAYED RELEASE ORAL at 09:45

## 2021-01-01 RX ADMIN — METHYLPREDNISOLONE SODIUM SUCCINATE 40 MG: 40 INJECTION, POWDER, FOR SOLUTION INTRAMUSCULAR; INTRAVENOUS at 05:30

## 2021-01-01 RX ADMIN — Medication 10 ML: at 21:59

## 2021-01-01 RX ADMIN — Medication 5 MG/HR: at 08:37

## 2021-01-01 RX ADMIN — DEXMEDETOMIDINE HYDROCHLORIDE 1 MCG/KG/HR: 100 INJECTION, SOLUTION, CONCENTRATE INTRAVENOUS at 09:44

## 2021-01-01 RX ADMIN — Medication 6 MG/HR: at 12:17

## 2021-01-01 RX ADMIN — PANTOPRAZOLE SODIUM 40 MG: 40 TABLET, DELAYED RELEASE ORAL at 09:37

## 2021-01-01 RX ADMIN — LACTULOSE 45 ML: 10 SOLUTION ORAL at 16:23

## 2021-01-01 RX ADMIN — AMPICILLIN SODIUM AND SULBACTAM SODIUM 3 G: 2; 1 INJECTION, POWDER, FOR SOLUTION INTRAMUSCULAR; INTRAVENOUS at 23:38

## 2021-01-01 RX ADMIN — HEPARIN SODIUM 5000 UNITS: 5000 INJECTION INTRAVENOUS; SUBCUTANEOUS at 21:33

## 2021-01-01 RX ADMIN — Medication 10 ML: at 21:03

## 2021-01-01 RX ADMIN — IPRATROPIUM BROMIDE AND ALBUTEROL SULFATE 3 ML: .5; 3 SOLUTION RESPIRATORY (INHALATION) at 14:41

## 2021-01-01 RX ADMIN — Medication 5 MG/HR: at 04:02

## 2021-01-01 RX ADMIN — Medication 10 ML: at 21:05

## 2021-01-01 RX ADMIN — IPRATROPIUM BROMIDE AND ALBUTEROL SULFATE 3 ML: .5; 3 SOLUTION RESPIRATORY (INHALATION) at 21:51

## 2021-01-01 RX ADMIN — SODIUM CHLORIDE 75 ML/HR: 9 INJECTION, SOLUTION INTRAVENOUS at 22:04

## 2021-01-01 RX ADMIN — MORPHINE SULFATE 2 MG: 2 INJECTION, SOLUTION INTRAMUSCULAR; INTRAVENOUS at 02:33

## 2021-01-01 RX ADMIN — AMPICILLIN SODIUM AND SULBACTAM SODIUM 3 G: 2; 1 INJECTION, POWDER, FOR SOLUTION INTRAMUSCULAR; INTRAVENOUS at 05:55

## 2021-01-01 RX ADMIN — Medication 10 ML: at 23:52

## 2021-01-01 RX ADMIN — PIPERACILLIN AND TAZOBACTAM 3.38 G: 3; .375 INJECTION, POWDER, LYOPHILIZED, FOR SOLUTION INTRAVENOUS at 14:52

## 2021-01-01 RX ADMIN — PROPRANOLOL HYDROCHLORIDE 10 MG: 10 TABLET ORAL at 10:04

## 2021-01-01 RX ADMIN — PROPRANOLOL HYDROCHLORIDE 20 MG: 10 TABLET ORAL at 09:45

## 2021-01-01 RX ADMIN — PIPERACILLIN AND TAZOBACTAM 3.38 G: 3; .375 INJECTION, POWDER, LYOPHILIZED, FOR SOLUTION INTRAVENOUS at 05:21

## 2021-01-01 RX ADMIN — Medication 5 MG/HR: at 03:54

## 2021-01-01 RX ADMIN — METHYLPREDNISOLONE SODIUM SUCCINATE 40 MG: 40 INJECTION, POWDER, FOR SOLUTION INTRAMUSCULAR; INTRAVENOUS at 21:33

## 2021-01-01 RX ADMIN — ISOSORBIDE MONONITRATE 30 MG: 30 TABLET, EXTENDED RELEASE ORAL at 10:04

## 2021-01-01 RX ADMIN — Medication 10 ML: at 22:04

## 2021-01-01 RX ADMIN — METHYLPREDNISOLONE SODIUM SUCCINATE 40 MG: 40 INJECTION, POWDER, FOR SOLUTION INTRAMUSCULAR; INTRAVENOUS at 05:00

## 2021-01-01 RX ADMIN — METHYLPREDNISOLONE SODIUM SUCCINATE 40 MG: 40 INJECTION, POWDER, FOR SOLUTION INTRAMUSCULAR; INTRAVENOUS at 20:55

## 2021-01-01 RX ADMIN — PROPRANOLOL HYDROCHLORIDE 10 MG: 10 TABLET ORAL at 09:06

## 2021-01-01 RX ADMIN — GLYCOPYRROLATE 1 MG: 1 TABLET ORAL at 22:12

## 2021-01-01 RX ADMIN — LACTULOSE 45 ML: 10 SOLUTION ORAL at 22:12

## 2021-01-01 RX ADMIN — POTASSIUM CHLORIDE 80 MEQ: 1500 TABLET, EXTENDED RELEASE ORAL at 15:39

## 2021-01-01 RX ADMIN — AMPICILLIN SODIUM AND SULBACTAM SODIUM 3 G: 2; 1 INJECTION, POWDER, FOR SOLUTION INTRAMUSCULAR; INTRAVENOUS at 00:53

## 2021-01-01 RX ADMIN — IPRATROPIUM BROMIDE AND ALBUTEROL SULFATE 3 ML: .5; 3 SOLUTION RESPIRATORY (INHALATION) at 08:27

## 2021-01-01 RX ADMIN — IPRATROPIUM BROMIDE AND ALBUTEROL SULFATE 3 ML: .5; 3 SOLUTION RESPIRATORY (INHALATION) at 11:18

## 2021-01-01 RX ADMIN — METHYLPREDNISOLONE SODIUM SUCCINATE 20 MG: 40 INJECTION, POWDER, FOR SOLUTION INTRAMUSCULAR; INTRAVENOUS at 17:10

## 2021-01-01 RX ADMIN — Medication 6 MG/HR: at 02:33

## 2021-01-01 RX ADMIN — IPRATROPIUM BROMIDE AND ALBUTEROL SULFATE 3 ML: .5; 2.5 SOLUTION RESPIRATORY (INHALATION) at 05:58

## 2021-01-01 RX ADMIN — RIFAXIMIN 550 MG: 550 TABLET ORAL at 08:18

## 2021-01-01 RX ADMIN — METHYLPREDNISOLONE SODIUM SUCCINATE 40 MG: 40 INJECTION, POWDER, FOR SOLUTION INTRAMUSCULAR; INTRAVENOUS at 21:47

## 2021-01-01 RX ADMIN — METHYLPREDNISOLONE SODIUM SUCCINATE 20 MG: 40 INJECTION, POWDER, FOR SOLUTION INTRAMUSCULAR; INTRAVENOUS at 09:15

## 2021-01-01 RX ADMIN — FOLIC ACID 1 MG: 1 TABLET ORAL at 10:15

## 2021-01-01 RX ADMIN — LACTULOSE 45 ML: 20 SOLUTION ORAL at 10:30

## 2021-01-01 RX ADMIN — PIPERACILLIN AND TAZOBACTAM 3.38 G: 3; .375 INJECTION, POWDER, LYOPHILIZED, FOR SOLUTION INTRAVENOUS at 21:20

## 2021-01-01 RX ADMIN — IPRATROPIUM BROMIDE AND ALBUTEROL SULFATE 3 ML: .5; 2.5 SOLUTION RESPIRATORY (INHALATION) at 19:35

## 2021-01-01 RX ADMIN — RIFAXIMIN 550 MG: 550 TABLET ORAL at 10:15

## 2021-01-01 RX ADMIN — PROPRANOLOL HYDROCHLORIDE 20 MG: 10 TABLET ORAL at 20:43

## 2021-01-01 RX ADMIN — PIPERACILLIN AND TAZOBACTAM 3.38 G: 3; .375 INJECTION, POWDER, LYOPHILIZED, FOR SOLUTION INTRAVENOUS at 05:50

## 2021-01-01 RX ADMIN — HEPARIN SODIUM 5000 UNITS: 5000 INJECTION INTRAVENOUS; SUBCUTANEOUS at 12:17

## 2021-01-01 RX ADMIN — LORAZEPAM 2 MG: 2 INJECTION INTRAMUSCULAR; INTRAVENOUS at 18:27

## 2021-01-01 RX ADMIN — LACTULOSE 45 ML: 20 SOLUTION ORAL at 21:58

## 2021-01-01 RX ADMIN — GLYCOPYRROLATE 1 MG: 1 TABLET ORAL at 21:51

## 2021-01-01 RX ADMIN — POTASSIUM CHLORIDE 40 MEQ: 1500 TABLET, EXTENDED RELEASE ORAL at 12:15

## 2021-01-01 RX ADMIN — METOCLOPRAMIDE 5 MG: 5 INJECTION, SOLUTION INTRAMUSCULAR; INTRAVENOUS at 11:13

## 2021-01-01 RX ADMIN — HEPARIN SODIUM 5000 UNITS: 5000 INJECTION INTRAVENOUS; SUBCUTANEOUS at 20:07

## 2021-01-01 RX ADMIN — LACTULOSE 45 ML: 10 SOLUTION ORAL at 09:07

## 2021-01-01 RX ADMIN — POTASSIUM CHLORIDE 40 MEQ: 1500 TABLET, EXTENDED RELEASE ORAL at 14:25

## 2021-01-01 RX ADMIN — MORPHINE SULFATE 2 MG: 2 INJECTION, SOLUTION INTRAMUSCULAR; INTRAVENOUS at 21:04

## 2021-01-01 RX ADMIN — LACTULOSE 45 ML: 20 SOLUTION ORAL at 16:18

## 2021-01-01 RX ADMIN — ETOMIDATE 20 MG: 20 INJECTION, SOLUTION INTRAVENOUS at 09:02

## 2021-01-01 RX ADMIN — HEPARIN SODIUM 5000 UNITS: 5000 INJECTION INTRAVENOUS; SUBCUTANEOUS at 23:35

## 2021-01-01 RX ADMIN — RIFAXIMIN 550 MG: 550 TABLET ORAL at 21:21

## 2021-01-01 NOTE — PROGRESS NOTES
Hospitalist Progress Note Daily Progress Note: 1/1/2021 This is a 45-year-old gentleman who with a history of cardiomyopathy, alcoholic cirrhosis, AICD in place, COPD and continued smoking, hypertension, diabetes mellitus type 2, chronic CHF. Presented to the emergency department complaining of severe low back pain. Little is known of his history initially as he was very confused was found to have an hepatic encephalopathy with an ammonia level of 108. Started on rifaximin and lactulose, mentation improved, ammonia only marginally. He had Steri-Strips in place and chest x-ray suggestive of AICD though not able to contact next of kin. Seen by orthopedic surgery secondary to thoracolumbar compression fractures under evaluation. Blood cultures drawn on presentation grew gram-positive cocci in all 4 bottles, started on vancomycin. Subsequent blood culture at 4 hours showing no growth. Finally was able to speak with significant other who describes AICD change, initially placed 10 years ago, at [facility] about a month ago. She was also able to report to me patient's history more clearly. Apparently he does have a history of compression fractures in the past and has a brace for this at home which she wears infrequently. Pt now recalls alcohol on xmas day and recalls a fall. Also reported that he weekly gets paracentesis at [facility]. ID + Ortho on consult. Subjective:  
1/1/2021 patient seen and examined at bedside, no acute events overnight, patient status post therapeutic paracentesis, tolerated procedure well, discussed with RN at bedside  
problem List: 
Problem List as of 1/1/2021 Date Reviewed: 12/27/2020 Codes Class Noted - Resolved Liver cirrhosis (HCC) ICD-10-CM: K74.60 ICD-9-CM: 571.5  12/26/2020 - Present Pulmonary nodule ICD-10-CM: R91.1 ICD-9-CM: 793.11  12/26/2020 - Present Intractable back pain ICD-10-CM: M54.9 ICD-9-CM: 724.5  12/26/2020 - Present Closed compression fracture of L4 lumbar vertebra, initial encounter ([facility] [address] ICD-10-CM: U45.648F ICD-9-CM: 805.4  12/26/2020 - Present Hypokalemia ICD-10-CM: E87.6 ICD-9-CM: 276.8  12/26/2020 - Present Medications reviewed Current Facility-Administered Medications Medication Dose Route Frequency  potassium chloride (K-DUR, KLOR-CON) SR tablet 40 mEq  40 mEq Oral NOW  ampicillin-sulbactam (UNASYN) 3 g in 0.9% sodium chloride (MBP/ADV) 100 mL MBP  3 g IntraVENous Q6H  
 fluticasone propionate (FLONASE) 50 mcg/actuation nasal spray 2 Spray  2 Spray Both Nostrils DAILY  folic acid (FOLVITE) tablet 1 mg  1 mg Oral DAILY  furosemide (LASIX) tablet 40 mg  40 mg Oral DAILY  lactulose (CHRONULAC) 10 gram/15 mL solution 45 mL  30 g Oral TID  propranoloL (INDERAL) tablet 10 mg  10 mg Oral BID  albuterol-ipratropium (DUO-NEB) 2.5 MG-0.5 MG/3 ML  3 mL Nebulization Q6H RT  
 oxyCODONE IR (ROXICODONE) tablet 5 mg  5 mg Oral Q4H PRN  
 sodium chloride (NS) flush 5-40 mL  5-40 mL IntraVENous PRN  
 acetaminophen (TYLENOL) tablet 650 mg  650 mg Oral Q6H PRN Or  
 acetaminophen (TYLENOL) suppository 650 mg  650 mg Rectal Q6H PRN  polyethylene glycol (MIRALAX) packet 17 g  17 g Oral DAILY PRN  promethazine (PHENERGAN) tablet 12.5 mg  12.5 mg Oral Q6H PRN Or  
 ondansetron (ZOFRAN) injection 4 mg  4 mg IntraVENous Q6H PRN  
 isosorbide mononitrate ER (IMDUR) tablet 30 mg  30 mg Oral DAILY  pantoprazole (PROTONIX) tablet 40 mg  40 mg Oral ACB  morphine injection 2 mg  2 mg IntraVENous Q4H PRN  
 heparin (porcine) injection 5,000 Units  5,000 Units SubCUTAneous Q8H  
 rifAXIMin (XIFAXAN) tablet 550 mg  550 mg Oral TID Review of Systems:  
Review of Systems Constitutional: Positive for malaise/fatigue. Negative for fever. HENT: Negative. Eyes: Negative. Respiratory: Negative for shortness of breath. Cardiovascular: Positive for leg swelling. Gastrointestinal: Negative for blood in stool, melena, nausea and vomiting. Genitourinary: Negative. Musculoskeletal: Positive for back pain. Skin: Negative. Neurological: Positive for weakness. Endo/Heme/Allergies: Negative. Psychiatric/Behavioral: Negative. Objective:  
Physical Exam:  
 
Visit Vitals BP (!) 90/48 (BP 1 Location: Left arm, BP Patient Position: At rest) Pulse (!) 110 Temp 98.2 °F (36.8 °C) Resp 22 Ht 6' 0.84\" (1.85 m) Wt 97 kg (213 lb 13.5 oz) SpO2 96% BMI 28.34 kg/m² O2 Flow Rate (L/min): 5 l/min O2 Device: Nasal cannula Temp (24hrs), Av.5 °F (36.9 °C), Min:97.6 °F (36.4 °C), Max:99 °F (37.2 °C) No intake/output data recorded. 1901 -  0700 In: 720 [P.O.:720] Out: 2200 [Urine:2200] General:  Alert, cooperative, lucid, no withdrawal s/s Lungs:   Clear to auscultation bilaterally. Diminished Chest wall:  No tenderness or deformity. Left upper chest aicd/stri strips present, no erythema or purulence or tenderness. Heart:  Regular rate and rhythm, S1, S2 normal, no murmur, click, rub or gallop. Abdomen:   Soft, Distended nontender bowel sounds normal. No masses,  No organomegaly. Extremities:  Back pain limits mobility of lower extremities, Pain significant with minimal raise off bed of le's. Pulses: 2+ and symmetric all extremities. Skin:  Decreased turgor Neurologic: CNII-XII intact. No gross sensory or motor deficits Data Review:  
   
Recent Days: 
Recent Labs 20 
1330 20 
1804 WBC 10.6 6.7 HGB 9.2* 10.1* HCT 31.9* 32.9*  
 160 Recent Labs 21 
8682 20 
1330 20 
1804  142 140  
K 3.7 3.8 3.6 * 114* 109* CO2 25 23 25 GLU 90 109* 125* BUN 37* 36* 21* CREA 1.93* 1.91* 1.49* CA 8.4* 8.6 8.7 ALB 2.0* 2.0* 2.1* TBILI 0.8 1.0 1.0 ALT 18 17 18 Recent Labs 20 
2105 PH 7.23* PCO2 55* PO2 106* HCO3 20* 24 Hour Results: 
Recent Results (from the past 24 hour(s)) METABOLIC PANEL, COMPREHENSIVE Collection Time: 12/31/20  1:30 PM  
Result Value Ref Range Sodium 142 136 - 145 mmol/L Potassium 3.8 3.5 - 5.1 mmol/L Chloride 114 (H) 97 - 108 mmol/L  
 CO2 23 21 - 32 mmol/L Anion gap 5 5 - 15 mmol/L Glucose 109 (H) 65 - 100 mg/dL BUN 36 (H) 6 - 20 mg/dL Creatinine 1.91 (H) 0.70 - 1.30 mg/dL BUN/Creatinine ratio 19 12 - 20 GFR est AA 43 (L) >60 ml/min/1.73m2 GFR est non-AA 36 (L) >60 ml/min/1.73m2 Calcium 8.6 8.5 - 10.1 mg/dL Bilirubin, total 1.0 0.2 - 1.0 mg/dL AST (SGOT) 45 (H) 15 - 37 U/L  
 ALT (SGPT) 17 12 - 78 U/L Alk. phosphatase 111 45 - 117 U/L Protein, total 7.2 6.4 - 8.2 g/dL Albumin 2.0 (L) 3.5 - 5.0 g/dL Globulin 5.2 (H) 2.0 - 4.0 g/dL A-G Ratio 0.4 (L) 1.1 - 2.2 AMMONIA Collection Time: 12/31/20  1:30 PM  
Result Value Ref Range Ammonia 62 (H) <32 umol/L  
CBC WITH AUTOMATED DIFF Collection Time: 12/31/20  1:30 PM  
Result Value Ref Range WBC 10.6 4.1 - 11.1 K/uL  
 RBC 3.49 (L) 4.10 - 5.70 M/uL HGB 9.2 (L) 12.1 - 17.0 g/dL HCT 31.9 (L) 36.6 - 50.3 % MCV 91.4 80.0 - 99.0 FL  
 MCH 26.4 26.0 - 34.0 PG  
 MCHC 28.8 (L) 30.0 - 36.5 g/dL RDW 21.0 (H) 11.5 - 14.5 % PLATELET 932 992 - 053 K/uL MPV 10.3 8.9 - 12.9 FL  
 NEUTROPHILS 75 32 - 75 % LYMPHOCYTES 6 (L) 12 - 49 % MONOCYTES 17 (H) 5 - 13 % EOSINOPHILS 2 0 - 7 % BASOPHILS 0 0 - 1 % IMMATURE GRANULOCYTES 0 0.0 - 0.5 % ABS. NEUTROPHILS 7.8 1.8 - 8.0 K/UL  
 ABS. LYMPHOCYTES 0.7 (L) 0.8 - 3.5 K/UL  
 ABS. MONOCYTES 1.8 (H) 0.0 - 1.0 K/UL  
 ABS. EOSINOPHILS 0.2 0.0 - 0.4 K/UL  
 ABS. BASOPHILS 0.0 0.0 - 0.1 K/UL  
 ABS. IMM. GRANS. 0.0 0.00 - 0.04 K/UL  
 DF AUTOMATED    
C REACTIVE PROTEIN, QT Collection Time: 12/31/20  1:30 PM  
Result Value Ref Range C-Reactive protein 9.24 (H) 0.00 - 0.60 mg/dL PROCALCITONIN  
 Collection Time: 12/31/20  1:30 PM  
Result Value Ref Range Procalcitonin 0.38 (H) 0 ng/mL METABOLIC PANEL, COMPREHENSIVE Collection Time: 01/01/21  6:35 AM  
Result Value Ref Range Sodium 145 136 - 145 mmol/L Potassium 3.7 3.5 - 5.1 mmol/L Chloride 116 (H) 97 - 108 mmol/L  
 CO2 25 21 - 32 mmol/L Anion gap 4 (L) 5 - 15 mmol/L Glucose 90 65 - 100 mg/dL BUN 37 (H) 6 - 20 mg/dL Creatinine 1.93 (H) 0.70 - 1.30 mg/dL BUN/Creatinine ratio 19 12 - 20 GFR est AA 43 (L) >60 ml/min/1.73m2 GFR est non-AA 35 (L) >60 ml/min/1.73m2 Calcium 8.4 (L) 8.5 - 10.1 mg/dL Bilirubin, total 0.8 0.2 - 1.0 mg/dL AST (SGOT) 42 (H) 15 - 37 U/L  
 ALT (SGPT) 18 12 - 78 U/L Alk. phosphatase 112 45 - 117 U/L Protein, total 7.2 6.4 - 8.2 g/dL Albumin 2.0 (L) 3.5 - 5.0 g/dL Globulin 5.2 (H) 2.0 - 4.0 g/dL A-G Ratio 0.4 (L) 1.1 - 2.2 AMMONIA Collection Time: 01/01/21  7:45 AM  
Result Value Ref Range Ammonia 38 (H) <32 umol/L Assessment/  
 
Patient Active Problem List  
Diagnosis Code  Liver cirrhosis ([facility] K74.60  Pulmonary nodule R91.1  Intractable back pain M54.9  Closed compression fracture of L4 lumbar vertebra, initial encounter ([facility] S32.040A  Hypokalemia E87.6 Acute hepatic encephalopathypatient presented with significantly altered mental status consistent with acute hepatic encephalopathy, currently mental status is improved Continue to trend serum ammonia levels Continue to monitor mental status Continue lactulose, titrate to 2-3 bowel movements per day Gastroenterology consult Thoracic lumbar compression fracturesresulting in significant decrease in mobility, currently patient is not complaining of any pain at this time Orthopedic consult appreciated, patient more suited for conservative management at this time Follow physical therapy recommendations Chronic kidney disease stage IIIcurrently serum creatinine remained stable Continue to trend serum creatinine Gram-positive bacteremiafollow-up sensitivities Follow up Galium scan to rule out vertebral osteomyelitis Follow-up surveillance cultures Continue unasyn for Abx coverage for additional 4 days Thanks disease consult appreciated Abdominal ascitescurrently status post therapeutic paracentesis, tolerated procedure well Interventional radiology consult appreciated, will continue to follow Gastroenterology consult appreciated, will continue to follow ProphylaxisHeparin subcu FENcardiac diet, replete potassium and magnesium Full code, will clarify about surrogate decision-maker Dispositionpending clinical improvement/physical therapy/Occupational Therapy, patient will likely need placement. Total noncritical care time spent 35 minutes Care Plan discussed with: Patient/Family, Nurse and Consultant Ortho Panfilo Mart MD

## 2021-01-01 NOTE — PROGRESS NOTES
Progress Note Patient: Maik Schmid MRN: [id number]  SSN: [id number] YOB: 1955  Age: 60 y.o. Sex: male Admit Date: 12/26/2020 LOS: 6 days Subjective:  
Patient followed for bacteremia and suspected compression fracture lumbar spine. Ceretec scan felt to be negative for osteomyelitis. He is afebrile with normal WBC But elevated procal and CRP. He is currently on IV Unasyn. He is confused and disoriented at this time. Objective:  
 
Vitals:  
 12/31/20 1654 12/31/20 2153 01/01/21 0540 01/01/21 0801 BP:  (!) 112/58 (!) 99/55 (!) 83/48 Pulse:  (!) 104 (!) 106 (!) 108 Resp:  18 18 22 Temp:  99 °F (37.2 °C) 97.6 °F (36.4 °C) 98.6 °F (37 °C) SpO2: 96% 97% 94% 90% Weight:      
Height:      
  
 
Intake and Output: 
Current Shift: No intake/output data recorded. Last three shifts: 12/30 1901 - 01/01 0700 In: 720 [P.O.:720] Out: 2200 [Urine:2200] Physical Exam:  
 Vitals signs and nursing note reviewed. Constitutional:   
   General: He is not in acute distress. Appearance: He is ill-appearing. HENT:  
   Head: Normocephalic and atraumatic. Nose: Nose normal.  
   Mouth/Throat:  
   Pharynx: Oropharynx is clear. Eyes:  
   Pupils: Pupils are equal, round, and reactive to light. Neck: Musculoskeletal: Neck supple. Cardiovascular:  
   Rate and Rhythm: Normal rate and regular rhythm. Heart sounds: No murmur. Pulmonary:  
   Breath sounds: No wheezing, rhonchi or rales. Abdominal:  
   General: There is distension (with everted umbilicus). Palpations: Abdomen is soft. Tenderness: There is abdominal tenderness. Genitourinary: 
   Penis: Normal.   
   Comments: No Marquez Musculoskeletal:  
   Right lower leg: No edema. Left lower leg: No edema. Skin: 
   Findings: No rash. Neurological:  
   Mental Status: He is alert. He is disoriented.   
Psychiatric:     
   Mood and Affect: Mood normal.     
 Behavior: Behavior normal.  
  
 
Lab/Data Review: WBC 10,600 Procalcitonin 0.38 
CRP 9.24 Blood cultures (12/26) Streptococcus mitis/oralis and Streptococcus anginosus Blood cultures (12/27) No growth at 2 days Ceretec scan (12/31)  Specifically, there is no uptake in the L4 vertebral body, and therefore no evidence to suggest compression fractures due to infection Assessment:  
 
Active Problems: 
  Liver cirrhosis ([facility] [address] (12/26/2020) Pulmonary nodule (12/26/2020) Intractable back pain (12/26/2020) Closed compression fracture of L4 lumbar vertebra, initial encounter ([facility] [address] (12/26/2020) Hypokalemia (12/26/2020) 1. Bacteremia with Streptococcus mitis/oralis and Streptococcus anginosus, clinical significance unclear, TTE negative for vegetations, Day #3 IV Unasyn. 2. Fever, possibly secondary to #1. 3. Abnormal uptake L4 vertebra, consistent with compression fracture, negative for osteomyelitis per Ceretec scan. 4. Alcoholic cirrhosis 
 5. Hepatic encephalopathy with elevated ammonia Plan: 1. Continue IV Unasyn for 4 more days 3. Follow-up repeat blood cultures 3. In am, repeat ESR, CRP, procalcitonin  
  
 
Signed By: Nataliia Yip MD   
 January 1, 2021

## 2021-01-01 NOTE — PROGRESS NOTES
Comprehensive Nutrition Assessment Type and Reason for Visit: RD nutrition re-screen/LOS Nutrition Recommendations/Plan:  
Continue current Cardiac, soft solids diet Allow snacks as desired Please document % of all meal/snack consumed Nutrition Assessment:  Admitted for severe lower back pain, +hepatic encephalopathy (resolving), +Sepsis (on IV abx). Ortho eval'd +thoracolumbar compression fxs. Received paracentesis for chronic ascites. Noted fair appetite and no intakes documented in EMR. Unable to reach pt or RN x2 via phone at this time. Per EMR review, no indication for nutrition intervention at this time. Labs and meds reviewed, noted elevated ammonia levels improving with rifaximin and lactulose. Malnutrition Assessment: 
Malnutrition Status:  Insufficient data Context:  Chronic illness Findings of the 6 clinical characteristics of malnutrition:  
Energy Intake:  Mild decrease in energy intake (specify)(likely consider hx EtOH abuse) Weight Loss:  Unable to assess Body Fat Loss:  Unable to assess, Muscle Mass Loss:  Unable to assess, Fluid Accumulation:  Unable to assess, Nutrition Related Findings:  Unable to complete NFPE at this time, will f/u as appropriate. Noted chronic ascites causing abd distension, s/p paracentesis. Per nsg tolerating thin liquids, SLP following. BLLE edema. No N/V/D/C, last BM 1/1, watery 2/2 lactulose. Denies issues with c/s. Wounds:   
Moisture associate skin damage(R buttock, WCN following) Current Nutrition Therapies: DIET CARDIAC Soft Solids Anthropometric Measures: 
· Height:  6' 0.84\" (185 cm) · Current Body Wt:  97 kg (213 lb 13.5 oz)(12/27) · Admission Body Wt:  213 lb 13.5 oz(12/27) · Usual Body Wt:  99.8 kg (220 lb 0.3 oz)(12/26, stated on admit) · Ideal Body Wt:  183 lbs:  116.9 % · Adjusted Body Weight:   (EDW 95kg d/t ascites) · BMI Category: Overweight (BMI 25.0-29. 9) No wt hx to assess, pt appears fairly stable per UBW, noted wt changes likely realted to fluid shifts 2/2 ascites. Nutrition Diagnosis: No nutrition diagnosis at this time Nutrition Interventions:  
Food and/or Nutrient Delivery: Continue current diet Nutrition Education and Counseling: No recommendations at this time Coordination of Nutrition Care: Continue to monitor while inpatient Nutrition Monitoring and Evaluation:  
Behavioral-Environmental Outcomes: None identified Food/Nutrient Intake Outcomes: Food and nutrient intake Physical Signs/Symptoms Outcomes: Weight, Fluid status or edema, Diarrhea Discharge Planning:   
No discharge needs at this time Electronically signed by Lyle Payne RD on 1/1/2021 at 2:14 PM 
 
Contact: Ext [phone]

## 2021-01-02 NOTE — ROUTINE PROCESS
Bedside shift change report given to Dejuan Franklin RN (oncoming nurse) by Obed Boone RN (offgoing nurse). Report included the following information SBAR.

## 2021-01-02 NOTE — PROGRESS NOTES
Progress Note Patient: Ivet Cheney MRN: [id number]  SSN: [id number] YOB: 1955  Age: 60 y.o. Sex: male Admit Date: 12/26/2020 LOS: 7 days Subjective:  
Patient followed for bacteremia and suspected compression fracture lumbar spine. Ceretec scan felt to be negative for osteomyelitis. He is afebrile with normal WBC But elevated procal and CRP. He is currently on IV Unasyn. He is confused and disoriented at this time. Objective:  
 
Vitals:  
 01/02/21 2685 01/02/21 0455 01/02/21 0706 01/02/21 1534 BP:   119/73 (!) 81/36 Pulse:   66 (!) 103 Resp:   15 16 Temp:   99.7 °F (37.6 °C) 99 °F (37.2 °C) SpO2: 94% 94% 99% 92% Weight:      
Height:      
  
 
Intake and Output: 
Current Shift: No intake/output data recorded. Last three shifts: 12/31 1901 - 01/02 0700 In: -  
Out: 685 [ALYOU:691] Physical Exam:  
 Vitals signs and nursing note reviewed. Constitutional:   
   General: He is not in acute distress. Appearance: He is ill-appearing. HENT:  
   Head: Normocephalic and atraumatic. Nose: Nose normal.  
   Mouth/Throat:  
   Pharynx: Oropharynx is clear. Eyes:  
   Pupils: Pupils are equal, round, and reactive to light. Neck: Musculoskeletal: Neck supple. Cardiovascular:  
   Rate and Rhythm: Normal rate and regular rhythm. Heart sounds: No murmur. Pulmonary:  
   Breath sounds: No wheezing, rhonchi or rales. Abdominal:  
   General: There is distension (with everted umbilicus). Palpations: Abdomen is soft. Tenderness: There is abdominal tenderness. Genitourinary: 
   Penis: Normal.   
   Comments: No Marquez Musculoskeletal:  
   Right lower leg: No edema. Left lower leg: No edema. Skin: 
   Findings: No rash. Neurological:  
   Mental Status: He is alert. He is disoriented. Psychiatric:     
   Mood and Affect: Mood normal.     
   Behavior: Behavior normal.  
  
 
Lab/Data Review: WBC 10,600 Procalcitonin 0.38 
CRP 9.24 Blood cultures (12/26) Streptococcus mitis/oralis and Streptococcus anginosus Blood cultures (12/27) No growth at 2 days Ceretec scan (12/31)  Specifically, there is no uptake in the L4 vertebral body, and therefore no evidence to suggest compression fractures due to infection Assessment:  
 
Active Problems: 
  Liver cirrhosis ([facility] [address] (12/26/2020) Pulmonary nodule (12/26/2020) Intractable back pain (12/26/2020) Closed compression fracture of L4 lumbar vertebra, initial encounter ([facility] (12/26/2020) Hypokalemia (12/26/2020) 1. Bacteremia with Streptococcus mitis/oralis and Streptococcus anginosus, clinical significance unclear, TTE negative for vegetations, Day #4 IV Unasyn. 2. Fever, possibly secondary to #1. 3. Abnormal uptake L4 vertebra, consistent with compression fracture, negative for osteomyelitis per Ceretec scan. 4. Alcoholic cirrhosis 
 5. Hepatic encephalopathy with elevated ammonia Plan: 1. Continue IV Unasyn for 3 more days 3. Follow-up repeat blood cultures 3. In am, repeat ESR, CRP, procalcitonin  
  
 
Signed By: Isabella Edmond MD   
 January 2, 2021

## 2021-01-02 NOTE — PROGRESS NOTES
Progress Note Patient: Tanya Frances MRN: [id number]  SSN: [id number] YOB: 1955  Age: 60 y.o. Sex: male Admit Date: 12/26/2020 LOS: 7 days Subjective: More awake and alert. No pain or GI bleeding Want to eat more Past Medical History:  
Diagnosis Date  AICD (automatic cardioverter/defibrillator) present  CAD (coronary artery disease)  Cirrhosis of liver ([facility] [address]  Gallstones  Hypertension  Nodule of lower lobe of right lung  Renal stones Current Facility-Administered Medications:  
  ampicillin-sulbactam (UNASYN) 3 g in 0.9% sodium chloride (MBP/ADV) 100 mL MBP, 3 g, IntraVENous, Q6H, Risa Moise MD, Last Rate: 200 mL/hr at 01/02/21 0510, 3 g at 01/02/21 0510   fluticasone propionate (FLONASE) 50 mcg/actuation nasal spray 2 Spray, 2 Spray, Both Nostrils, DAILY, Jamie Nieves MD, 2 Spray at 01/02/21 1006   folic acid (FOLVITE) tablet 1 mg, 1 mg, Oral, DAILY, Jamie Nieves MD, 1 mg at 01/02/21 1004   furosemide (LASIX) tablet 40 mg, 40 mg, Oral, DAILY, Jamie Nieves MD, 40 mg at 01/02/21 1003   lactulose (CHRONULAC) 10 gram/15 mL solution 45 mL, 30 g, Oral, TID, Jamie Nieves MD, 45 mL at 01/02/21 1001   propranoloL (INDERAL) tablet 10 mg, 10 mg, Oral, BID, Jamie Nieves MD, 10 mg at 01/02/21 1004   albuterol-ipratropium (DUO-NEB) 2.5 MG-0.5 MG/3 ML, 3 mL, Nebulization, Q6H RT, Jamie Nieves MD, 3 mL at 01/02/21 7992   oxyCODONE IR (ROXICODONE) tablet 5 mg, 5 mg, Oral, Q4H PRN, David MAR MD, 5 mg at 01/01/21 1536   sodium chloride (NS) flush 5-40 mL, 5-40 mL, IntraVENous, PRN, Nathan Welch MD 
  acetaminophen (TYLENOL) tablet 650 mg, 650 mg, Oral, Q6H PRN, 650 mg at 12/26/20 2039 **OR** acetaminophen (TYLENOL) suppository 650 mg, 650 mg, Rectal, Q6H PRN, Wash Jamie Tuttle MD 
  polyethylene glycol (MIRALAX) packet 17 g, 17 g, Oral, DAILY PRN, Nathan Welch MD 
   promethazine (PHENERGAN) tablet 12.5 mg, 12.5 mg, Oral, Q6H PRN **OR** ondansetron (ZOFRAN) injection 4 mg, 4 mg, IntraVENous, Q6H PRN, Jamie Mena MD, 4 mg at 12/29/20 2250   isosorbide mononitrate ER (IMDUR) tablet 30 mg, 30 mg, Oral, DAILY, Jamie Mena MD, 30 mg at 01/02/21 1004   pantoprazole (PROTONIX) tablet 40 mg, 40 mg, Oral, ACB, Jamie Mena MD, 40 mg at 01/02/21 0011   morphine injection 2 mg, 2 mg, IntraVENous, Q4H PRN, Ambreen Puente MD, 2 mg at 12/29/20 2255   heparin (porcine) injection 5,000 Units, 5,000 Units, SubCUTAneous, Q8H, Vin Yo NP, 5,000 Units at 01/02/21 0510 
  rifAXIMin (XIFAXAN) tablet 550 mg, 550 mg, Oral, TID, Addison MAR MD, 550 mg at 01/02/21 1002 Objective:  
 
Vitals:  
 01/02/21 4513 01/02/21 5911 01/02/21 2268 01/02/21 8978 BP:    119/73 Pulse:    66 Resp:    15 Temp:    99.7 °F (37.6 °C) SpO2:  94% 94% 99% Weight: 105.2 kg (231 lb 14.8 oz) Height:      
  
 
Intake and Output: 
Current Shift: No intake/output data recorded. Last three shifts: 12/31 1901 - 01/02 0700 In: -  
Out: 005 [GTSUH:677] Physical Exam:  
Physical Exam  
Constitutional: He is cooperative. He has a sickly appearance. He appears ill. He appears distressed. HENT:  
Head: Normocephalic and atraumatic. Neck: Normal carotid pulses and no hepatojugular reflux present. Cardiovascular: Regular rhythm. Pulmonary/Chest: No apnea and no bradypnea. Abdominal: He exhibits distension, fluid wave and ascites. He exhibits no mass. Skin: Bruising noted. Lab/Data Review: No results found for this or any previous visit (from the past 24 hour(s)). NM INFLAM PROC LTD Final Result XR CHEST PORT Final Result Impression: No acute cardiopulmonary findings. IR PARACENTESIS ABD W IMAGE Final Result Impression:   
Successful paracentesis. NM BONE SCAN WH BODY Final Result US RETROPERITONEUM COMP Final Result Impression: No hydronephrosis. Ascites. Cirrhotic morphology. Splenomegaly. XR CHEST PORT Final Result Impression: No acute findings. CT ABD PELV WO CONT Final Result IMPRESSION:  
  
Liver cirrhosis. Mild splenomegaly. Upper abdominal and paraesophageal varices. Ascites. No bowel obstruction. Right lower lobe pulmonary nodule may represent tumor or other. Follow up is  
recommended to exclude malignancy. The results were called and verbally  
communicated to Dr. Jeremy Ferrell, on 12/26/2020 at 6:07 AM. Thoracolumbar compression fractures of indeterminate age, may be old. Small nonobstructive renal stones. Cholelithiasis. Small umbilical hernia. Assessment:  
 
Active Problems: 
  Liver cirrhosis ([facility] [address] (12/26/2020) Pulmonary nodule (12/26/2020) Intractable back pain (12/26/2020) Closed compression fracture of L4 lumbar vertebra, initial encounter ([facility] [address] (12/26/2020) Hypokalemia (12/26/2020) End-stage liver disease, likely alcohol abuse, Back pain, has been followed by the orthopedic surgeon for fracture of lumbar Mental status changes, confusion, hepatic encephalopathy, more altrer 
 decrease ammonia level Ascites, portal hypertension, esophageal varices, was followed by [facility] hepatology clinic 
anemia, no evidence of active GI bleed Plan:  
Continue on the lactulose rifaximin, for hepatic cephalopathy Follow the electrolytes, hepatic function, 
Continue vancomycin for possible infection, 
Continue on beta-blocker for hypertension long-acting nitroglycerin for portal hypertension Continue diuretic for ascites, increasing of girth of abdomien. ,We will follow to see  If repeat the paracentesis is  needed Signed By: Charlotte Rivera MD   
 January 2, 2021 Thank you for allowing me to participate in this patients care Cc Referring Physician Rosario Robles MD

## 2021-01-02 NOTE — PROGRESS NOTES
Progress Note Patient: Pattie Casetllanos MRN: [id number]  SSN: [id number] YOB: 1955  Age: 60 y.o. Sex: male Admit Date: 12/26/2020 LOS: 6 days Subjective: More awake and alert. No pain or GI bleeding Past Medical History:  
Diagnosis Date  AICD (automatic cardioverter/defibrillator) present  CAD (coronary artery disease)  Cirrhosis of liver ([facility] [address]  Gallstones  Hypertension  Nodule of lower lobe of right lung  Renal stones Current Facility-Administered Medications:  
  ampicillin-sulbactam (UNASYN) 3 g in 0.9% sodium chloride (MBP/ADV) 100 mL MBP, 3 g, IntraVENous, Q6H, Mu Palacios MD, Last Rate: 200 mL/hr at 01/01/21 1752, 3 g at 01/01/21 1752   fluticasone propionate (FLONASE) 50 mcg/actuation nasal spray 2 Spray, 2 Spray, Both Nostrils, DAILY, Jamie Brush MD, 2 [place] at 34/86/25 5425 
  folic acid (FOLVITE) tablet 1 mg, 1 mg, Oral, DAILY, Jamie Brush MD, 1 mg at 01/01/21 1030   furosemide (LASIX) tablet 40 mg, 40 mg, Oral, DAILY, Jamie Brush MD, Stopped at 01/01/21 0900 
  lactulose (CHRONULAC) 10 gram/15 mL solution 45 mL, 30 g, Oral, TID, Dayanna MAR MD, 45 mL at 01/01/21 2036   propranoloL (INDERAL) tablet 10 mg, 10 mg, Oral, BID, Jamie Brush MD, Stopped at 01/01/21 0900 
  albuterol-ipratropium (DUO-NEB) 2.5 MG-0.5 MG/3 ML, 3 mL, Nebulization, Q6H RT, Jamie Brush MD, 3 mL at 01/01/21 1929 
  oxyCODONE IR (ROXICODONE) tablet 5 mg, 5 mg, Oral, Q4H PRN, Dayanna MAR MD, 5 mg at 01/01/21 1536   sodium chloride (NS) flush 5-40 mL, 5-40 mL, IntraVENous, PRN, Kassi Carrasco MD 
  acetaminophen (TYLENOL) tablet 650 mg, 650 mg, Oral, Q6H PRN, 650 mg at 12/26/20 2039 **OR** acetaminophen (TYLENOL) suppository 650 mg, 650 mg, Rectal, Q6H PRN, Jamie Brush MD 
  polyethylene glycol (MIRALAX) packet 17 g, 17 g, Oral, DAILY PRN, Kassi Carrasco MD 
   promethazine (PHENERGAN) tablet 12.5 mg, 12.5 mg, Oral, Q6H PRN **OR** ondansetron (ZOFRAN) injection 4 mg, 4 mg, IntraVENous, Q6H PRN, Jamie Stone MD, 4 mg at 12/29/20 2250   isosorbide mononitrate ER (IMDUR) tablet 30 mg, 30 mg, Oral, DAILY, Gianna Hairston MD, Stopped at 01/01/21 0900   pantoprazole (PROTONIX) tablet 40 mg, 40 mg, Oral, ACB, Jamie Stone MD, 40 mg at 01/01/21 9367   morphine injection 2 mg, 2 mg, IntraVENous, Q4H PRN, Gianna Hairston MD, 2 mg at 12/29/20 2255   heparin (porcine) injection 5,000 Units, 5,000 Units, SubCUTAneous, Q8H, Vin Yo NP, 5,000 Units at 01/01/21 2036   rifAXIMin (XIFAXAN) tablet 550 mg, 550 mg, Oral, TID, Jen MAR MD, 550 mg at 01/01/21 2036 Objective:  
 
Vitals:  
 01/01/21 1427 01/01/21 1440 01/01/21 1441 01/01/21 1944 BP: (!) 101/54   (!) 99/49 Pulse: (!) 106   (!) 108 Resp: 20   20 Temp: 97.6 °F (36.4 °C)   97.8 °F (36.6 °C) SpO2: 95%  93% 95% Weight:      
Height:  6' 0.84\" (1.85 m) Intake and Output: 
Current Shift: No intake/output data recorded. Last three shifts: No intake/output data recorded. Physical Exam:  
Physical Exam  
Constitutional: He is cooperative. He has a sickly appearance. He appears ill. He appears distressed. HENT:  
Head: Normocephalic and atraumatic. Neck: Normal carotid pulses and no hepatojugular reflux present. Cardiovascular: Regular rhythm. Pulmonary/Chest: No apnea and no bradypnea. Abdominal: He exhibits distension, fluid wave, ascites and mass. Skin: Bruising noted. Lab/Data Review: 
Recent Results (from the past 24 hour(s)) METABOLIC PANEL, COMPREHENSIVE Collection Time: 01/01/21  6:35 AM  
Result Value Ref Range Sodium 145 136 - 145 mmol/L Potassium 3.7 3.5 - 5.1 mmol/L Chloride 116 (H) 97 - 108 mmol/L  
 CO2 25 21 - 32 mmol/L Anion gap 4 (L) 5 - 15 mmol/L Glucose 90 65 - 100 mg/dL BUN 37 (H) 6 - 20 mg/dL Creatinine 1.93 (H) 0.70 - 1.30 mg/dL BUN/Creatinine ratio 19 12 - 20 GFR est AA 43 (L) >60 ml/min/1.73m2 GFR est non-AA 35 (L) >60 ml/min/1.73m2 Calcium 8.4 (L) 8.5 - 10.1 mg/dL Bilirubin, total 0.8 0.2 - 1.0 mg/dL AST (SGOT) 42 (H) 15 - 37 U/L  
 ALT (SGPT) 18 12 - 78 U/L Alk. phosphatase 112 45 - 117 U/L Protein, total 7.2 6.4 - 8.2 g/dL Albumin 2.0 (L) 3.5 - 5.0 g/dL Globulin 5.2 (H) 2.0 - 4.0 g/dL A-G Ratio 0.4 (L) 1.1 - 2.2 AMMONIA Collection Time: 01/01/21  7:45 AM  
Result Value Ref Range Ammonia 38 (H) <32 umol/L  
  
 
NM INFLAM PROC LTD Final Result XR CHEST PORT Final Result Impression: No acute cardiopulmonary findings. IR PARACENTESIS ABD W IMAGE Final Result Impression:   
Successful paracentesis. NM BONE SCAN WH BODY Final Result US RETROPERITONEUM COMP Final Result Impression: No hydronephrosis. Ascites. Cirrhotic morphology. Splenomegaly. XR CHEST PORT Final Result Impression: No acute findings. CT ABD PELV WO CONT Final Result IMPRESSION:  
  
Liver cirrhosis. Mild splenomegaly. Upper abdominal and paraesophageal varices. Ascites. No bowel obstruction. Right lower lobe pulmonary nodule may represent tumor or other. Follow up is  
recommended to exclude malignancy. The results were called and verbally  
communicated to Dr. Joyce Deleon, on 12/26/2020 at 6:07 AM. Thoracolumbar compression fractures of indeterminate age, may be old. Small nonobstructive renal stones. Cholelithiasis. Small umbilical hernia. Assessment:  
 
Active Problems: 
  Liver cirrhosis ([facility] [address] (12/26/2020) Pulmonary nodule (12/26/2020) Intractable back pain (12/26/2020) Closed compression fracture of L4 lumbar vertebra, initial encounter ([facility] [address] (12/26/2020) Hypokalemia (12/26/2020) End-stage liver disease, likely alcohol abuse, 
 Back pain, has been followed by the orthopedic surgeon for fracture of lumbar Mental status changes, confusion, hepatic encephalopathy, more altrer 
 decrease ammonia level Ascites, portal hypertension, esophageal varices, was followed by [facility] hepatology clinic 
anemia, no evidence of active GI bleed Plan:  
Continue on the lactulose rifaximin, for hepatic cephalopathy Follow the electrolytes, hepatic function, 
Continue vancomycin for possible infection, 
Continue on beta-blocker for hypertension long-acting nitroglycerin for portal hypertension Continue diuretic for ascites We will follow to see  If repeat the paracentesis is  needed Signed By: Nicky Myers MD   
 January 1, 2021 Thank you for allowing me to participate in this patients care Cc Referring Physician Josh Jiménez MD

## 2021-01-02 NOTE — PROGRESS NOTES
Hospitalist Progress Note Daily Progress Note: 1/2/2021 This is a 27-year-old gentleman who with a history of cardiomyopathy, alcoholic cirrhosis, AICD in place, COPD and continued smoking, hypertension, diabetes mellitus type 2, chronic CHF. Presented to the emergency department complaining of severe low back pain. Little is known of his history initially as he was very confused was found to have an hepatic encephalopathy with an ammonia level of 108. Started on rifaximin and lactulose, mentation improved, ammonia only marginally. He had Steri-Strips in place and chest x-ray suggestive of AICD though not able to contact next of kin. Seen by orthopedic surgery secondary to thoracolumbar compression fractures under evaluation. Blood cultures drawn on presentation grew gram-positive cocci in all 4 bottles, started on vancomycin. Subsequent blood culture at 4 hours showing no growth. Finally was able to speak with significant other who describes AICD change, initially placed 10 years ago, at [facility] about a month ago. She was also able to report to me patient's history more clearly. Apparently he does have a history of compression fractures in the past and has a brace for this at home which she wears infrequently. Pt now recalls alcohol on xmas day and recalls a fall. Also reported that he weekly gets paracentesis at [facility]. ID + Ortho on consult. Subjective:  
1/2/2021 patient seen and examined at bedside, no acute events overnight, patient status post therapeutic paracentesis, tolerated procedure well, discussed with RN at bedside  
problem List: 
Problem List as of 1/2/2021 Date Reviewed: 12/27/2020 Codes Class Noted - Resolved Liver cirrhosis (HCC) ICD-10-CM: K74.60 ICD-9-CM: 571.5  12/26/2020 - Present Pulmonary nodule ICD-10-CM: R91.1 ICD-9-CM: 793.11  12/26/2020 - Present Intractable back pain ICD-10-CM: M54.9 ICD-9-CM: 724.5  12/26/2020 - Present Closed compression fracture of L4 lumbar vertebra, initial encounter ([facility] [address] ICD-10-CM: F46.920F ICD-9-CM: 805.4  12/26/2020 - Present Hypokalemia ICD-10-CM: E87.6 ICD-9-CM: 276.8  12/26/2020 - Present Medications reviewed Current Facility-Administered Medications Medication Dose Route Frequency  ampicillin-sulbactam (UNASYN) 3 g in 0.9% sodium chloride (MBP/ADV) 100 mL MBP  3 g IntraVENous Q6H  
 fluticasone propionate (FLONASE) 50 mcg/actuation nasal spray 2 Spray  2 Spray Both Nostrils DAILY  folic acid (FOLVITE) tablet 1 mg  1 mg Oral DAILY  furosemide (LASIX) tablet 40 mg  40 mg Oral DAILY  lactulose (CHRONULAC) 10 gram/15 mL solution 45 mL  30 g Oral TID  propranoloL (INDERAL) tablet 10 mg  10 mg Oral BID  albuterol-ipratropium (DUO-NEB) 2.5 MG-0.5 MG/3 ML  3 mL Nebulization Q6H RT  
 oxyCODONE IR (ROXICODONE) tablet 5 mg  5 mg Oral Q4H PRN  
 sodium chloride (NS) flush 5-40 mL  5-40 mL IntraVENous PRN  
 acetaminophen (TYLENOL) tablet 650 mg  650 mg Oral Q6H PRN Or  
 acetaminophen (TYLENOL) suppository 650 mg  650 mg Rectal Q6H PRN  polyethylene glycol (MIRALAX) packet 17 g  17 g Oral DAILY PRN  promethazine (PHENERGAN) tablet 12.5 mg  12.5 mg Oral Q6H PRN Or  
 ondansetron (ZOFRAN) injection 4 mg  4 mg IntraVENous Q6H PRN  
 isosorbide mononitrate ER (IMDUR) tablet 30 mg  30 mg Oral DAILY  pantoprazole (PROTONIX) tablet 40 mg  40 mg Oral ACB  morphine injection 2 mg  2 mg IntraVENous Q4H PRN  
 heparin (porcine) injection 5,000 Units  5,000 Units SubCUTAneous Q8H  
 rifAXIMin (XIFAXAN) tablet 550 mg  550 mg Oral TID Review of Systems:  
Review of Systems Constitutional: Positive for malaise/fatigue. Negative for fever. HENT: Negative. Eyes: Negative. Respiratory: Negative for shortness of breath. Cardiovascular: Positive for leg swelling. Gastrointestinal: Negative for blood in stool, melena, nausea and vomiting. Genitourinary: Negative. Musculoskeletal: Positive for back pain. Skin: Negative. Neurological: Positive for weakness. Endo/Heme/Allergies: Negative. Psychiatric/Behavioral: Negative. Objective:  
Physical Exam:  
 
Visit Vitals /73 (BP 1 Location: Left arm, BP Patient Position: At rest;Supine) Pulse 66 Temp 99.7 °F (37.6 °C) Resp 15 Ht 6' 0.84\" (1.85 m) Wt 105.2 kg (231 lb 14.8 oz) SpO2 99% BMI 30.74 kg/m² O2 Flow Rate (L/min): 4 l/min O2 Device: Nasal cannula Temp (24hrs), Av.4 °F (36.9 °C), Min:97.6 °F (36.4 °C), Max:99.7 °F (37.6 °C) No intake/output data recorded.  1901 -  0700 In: -  
Out: 477 [MDTPP:608] General:  Alert, cooperative, lucid, no withdrawal s/s Lungs:   Clear to auscultation bilaterally. Diminished Chest wall:  No tenderness or deformity. Left upper chest aicd/stri strips present, no erythema or purulence or tenderness. Heart:  Regular rate and rhythm, S1, S2 normal, no murmur, click, rub or gallop. Abdomen:   Soft, Distended nontender bowel sounds normal. No masses,  No organomegaly. Extremities:  Back pain limits mobility of lower extremities, Pain significant with minimal raise off bed of le's. Pulses: 2+ and symmetric all extremities. Skin:  Decreased turgor Neurologic: CNII-XII intact. No gross sensory or motor deficits Data Review:  
   
Recent Days: 
Recent Labs 20 
1330 WBC 10.6 HGB 9.2* HCT 31.9*  
 Recent Labs 21 
6770 20 
1330  142  
K 3.7 3.8 * 114* CO2 25 23 GLU 90 109* BUN 37* 36* CREA 1.93* 1.91* CA 8.4* 8.6 ALB 2.0* 2.0*  
TBILI 0.8 1.0 ALT 18 17 Recent Labs 20 
2105 PH 7.23* PCO2 55* PO2 106* HCO3 20*  
 
 
24 Hour Results: No results found for this or any previous visit (from the past 24 hour(s)). Assessment/  
 
Patient Active Problem List  
Diagnosis Code  Liver cirrhosis ([facility] [address] K74.60  Pulmonary nodule R91.1  Intractable back pain M54.9  Closed compression fracture of L4 lumbar vertebra, [facility] S32.040A  Hypokalemia E87.6 Acute hepatic encephalopathypatient presented with significantly altered mental status consistent with acute hepatic encephalopathy, currently mental status is improved Continue to trend serum ammonia levels Continue to monitor mental status Continue lactulose, titrate to 2-3 bowel movements per day Gastroenterology consult Thoracic lumbar compression fracturesresulting in significant decrease in mobility, currently patient is not complaining of any pain at this time Orthopedic consult appreciated, patient more suited for conservative management at this time Follow physical therapy recommendations Chronic kidney disease stage IIIcurrently serum creatinine remained stable Continue to trend serum creatinine Gram-positive bacteremiafollow-up sensitivities Follow up Galium scan to rule out vertebral osteomyelitis Follow-up surveillance cultures Continue unasyn for Abx coverage for additional 3 days Thanks disease consult appreciated Abdominal ascitescurrently status post therapeutic paracentesis, tolerated procedure well Interventional radiology consult appreciated, will continue to follow Gastroenterology consult appreciated, will continue to follow ProphylaxisHeparin subcu FENcardiac diet, replete potassium and magnesium Full code, will clarify about surrogate decision-maker Dispositionpending clinical improvement/physical therapy/Occupational Therapy, patient will likely need placement. Total noncritical care time spent 35 minutes Care Plan discussed with: Patient/Family, Nurse and Consultant Ortho Cody Phillips MD

## 2021-01-03 NOTE — PROGRESS NOTES
Hospitalist Progress Note Daily Progress Note: 1/3/2021 This is a 36-year-old gentleman who with a history of cardiomyopathy, alcoholic cirrhosis, AICD in place, COPD and continued smoking, hypertension, diabetes mellitus type 2, chronic CHF. Presented to the emergency department complaining of severe low back pain. Little is known of his history initially as he was very confused was found to have an hepatic encephalopathy with an ammonia level of 108. Started on rifaximin and lactulose, mentation improved, ammonia only marginally. He had Steri-Strips in place and chest x-ray suggestive of AICD though not able to contact next of kin. Seen by orthopedic surgery secondary to thoracolumbar compression fractures under evaluation. Blood cultures drawn on presentation grew gram-positive cocci in all 4 bottles, started on vancomycin. Subsequent blood culture at 4 hours showing no growth. Finally was able to speak with significant other who describes AICD change, initially placed 10 years ago, at [facility] about a month ago. She was also able to report to me patient's history more clearly. Apparently he does have a history of compression fractures in the past and has a brace for this at home which she wears infrequently. Pt now recalls alcohol on xmas day and recalls a fall. Also reported that he weekly gets paracentesis at [facility]. ID + Ortho on consult. Subjective:  
1/3/2021 patient seen and examined at bedside, no acute events overnight, patient status post therapeutic paracentesis, tolerated procedure well, discussed with RN at bedside  
problem List: 
Problem List as of 1/3/2021 Date Reviewed: 12/27/2020 Codes Class Noted - Resolved Liver cirrhosis (HCC) ICD-10-CM: K74.60 ICD-9-CM: 571.5  12/26/2020 - Present Pulmonary nodule ICD-10-CM: R91.1 ICD-9-CM: 793.11  12/26/2020 - Present Intractable back pain ICD-10-CM: M54.9 ICD-9-CM: 724.5  12/26/2020 - Present Closed compression fracture of L4 lumbar vertebra, initial encounter ([facility] [address] ICD-10-CM: X68.675M ICD-9-CM: 805.4  12/26/2020 - Present Hypokalemia ICD-10-CM: E87.6 ICD-9-CM: 276.8  12/26/2020 - Present Medications reviewed Current Facility-Administered Medications Medication Dose Route Frequency  ampicillin-sulbactam (UNASYN) 3 g in 0.9% sodium chloride (MBP/ADV) 100 mL MBP  3 g IntraVENous Q6H  
 fluticasone propionate (FLONASE) 50 mcg/actuation nasal spray 2 Spray  2 Spray Both Nostrils DAILY  folic acid (FOLVITE) tablet 1 mg  1 mg Oral DAILY  furosemide (LASIX) tablet 40 mg  40 mg Oral DAILY  lactulose (CHRONULAC) 10 gram/15 mL solution 45 mL  30 g Oral TID  propranoloL (INDERAL) tablet 10 mg  10 mg Oral BID  albuterol-ipratropium (DUO-NEB) 2.5 MG-0.5 MG/3 ML  3 mL Nebulization Q6H RT  
 oxyCODONE IR (ROXICODONE) tablet 5 mg  5 mg Oral Q4H PRN  
 sodium chloride (NS) flush 5-40 mL  5-40 mL IntraVENous PRN  
 acetaminophen (TYLENOL) tablet 650 mg  650 mg Oral Q6H PRN Or  
 acetaminophen (TYLENOL) suppository 650 mg  650 mg Rectal Q6H PRN  polyethylene glycol (MIRALAX) packet 17 g  17 g Oral DAILY PRN  promethazine (PHENERGAN) tablet 12.5 mg  12.5 mg Oral Q6H PRN Or  
 ondansetron (ZOFRAN) injection 4 mg  4 mg IntraVENous Q6H PRN  
 isosorbide mononitrate ER (IMDUR) tablet 30 mg  30 mg Oral DAILY  pantoprazole (PROTONIX) tablet 40 mg  40 mg Oral ACB  morphine injection 2 mg  2 mg IntraVENous Q4H PRN  
 heparin (porcine) injection 5,000 Units  5,000 Units SubCUTAneous Q8H  
 rifAXIMin (XIFAXAN) tablet 550 mg  550 mg Oral TID Review of Systems:  
Review of Systems Constitutional: Positive for malaise/fatigue. Negative for fever. HENT: Negative. Eyes: Negative. Respiratory: Negative for shortness of breath. Cardiovascular: Positive for leg swelling. Gastrointestinal: Negative for blood in stool, melena, nausea and vomiting. Genitourinary: Negative. Musculoskeletal: Positive for back pain. Skin: Negative. Neurological: Positive for weakness. Endo/Heme/Allergies: Negative. Psychiatric/Behavioral: Negative. Objective:  
Physical Exam:  
 
Visit Vitals BP (!) 102/53 (BP 1 Location: Left arm, BP Patient Position: At rest) Pulse (!) 110 Temp 99.1 °F (37.3 °C) Resp 18 Ht 6' 0.84\" (1.85 m) Wt 105.2 kg (231 lb 14.8 oz) SpO2 95% BMI 30.74 kg/m² O2 Flow Rate (L/min): 4 l/min O2 Device: Nasal cannula Temp (24hrs), Av.1 °F (37.3 °C), Min:98.5 °F (36.9 °C), Max:99.6 °F (37.6 °C) 
  701 - 1900 In: -  
Out: 600 [Urine:600]   1901 -  07 In: -  
Out: 1500 [Urine:1500] General:  Alert, cooperative, lucid, no withdrawal s/s Lungs:   Clear to auscultation bilaterally. Diminished Chest wall:  No tenderness or deformity. Left upper chest aicd/stri strips present, no erythema or purulence or tenderness. Heart:  Regular rate and rhythm, S1, S2 normal, no murmur, click, rub or gallop. Abdomen:   Soft, Distended nontender bowel sounds normal. No masses,  No organomegaly. Extremities:  Back pain limits mobility of lower extremities, Pain significant with minimal raise off bed of le's. Pulses: 2+ and symmetric all extremities. Skin:  Decreased turgor Neurologic: CNII-XII intact. No gross sensory or motor deficits Data Review:  
   
Recent Days: 
Recent Labs 21 
1107 20 
1330 WBC 7.4 10.6 HGB 7.7* 9.2* HCT 25.6* 31.9*  
* 167 Recent Labs 21 
4673 20 
1330  142  
K 3.7 3.8 * 114* CO2 25 23 GLU 90 109* BUN 37* 36* CREA 1.93* 1.91* CA 8.4* 8.6 ALB 2.0* 2.0*  
TBILI 0.8 1.0 ALT 18 17 No results for input(s): PH, PCO2, PO2, HCO3, FIO2 in the last 72 hours. 24 Hour Results: 
Recent Results (from the past 24 hour(s)) CBC W/O DIFF  Collection Time: 21 11:07 AM  
 Result Value Ref Range WBC 7.4 4.1 - 11.1 K/uL  
 RBC 2.88 (L) 4.10 - 5.70 M/uL HGB 7.7 (L) 12.1 - 17.0 g/dL HCT 25.6 (L) 36.6 - 50.3 % MCV 88.9 80.0 - 99.0 FL  
 MCH 26.7 26.0 - 34.0 PG  
 MCHC 30.1 30.0 - 36.5 g/dL RDW 20.9 (H) 11.5 - 14.5 % PLATELET 738 (L) 708 - 400 K/uL MPV 10.1 8.9 - 12.9 FL Assessment/  
 
Patient Active Problem List  
Diagnosis Code  Liver cirrhosis ([facility] [address] K74.60  Pulmonary nodule R91.1  Intractable back pain M54.9  Closed compression fracture of L4 lumbar vertebra, initial encounter ([facility] [address] S32.040A  Hypokalemia E87.6 Acute hepatic encephalopathypatient presented with significantly altered mental status consistent with acute hepatic encephalopathy, currently mental status is improved Continue to trend serum ammonia levels Continue to monitor mental status Continue lactulose, titrate to 2-3 bowel movements per day Gastroenterology consult Thoracic lumbar compression fracturesresulting in significant decrease in mobility, currently patient is not complaining of any pain at this time Orthopedic consult appreciated, patient more suited for conservative management at this time Follow physical therapy recommendations Chronic kidney disease stage IIIcurrently serum creatinine remained stable Continue to trend serum creatinine Gram-positive bacteremia Continue unasyn for Abx coverage for additional 2 days Infectious disease consult appreciated Abdominal ascitescurrently status post therapeutic paracentesis, tolerated procedure well Interventional radiology consult appreciated, will continue to follow Gastroenterology consult appreciated, will continue to follow ProphylaxisHeparin subcu FENcardiac diet, replete potassium and magnesium Full code, will clarify about surrogate decision-maker Dispositionpending clinical improvement/physical therapy/Occupational Therapy, patient will likely need placement. Total noncritical care time spent 35 minutes Care Plan discussed with: Patient/Family, Nurse and Consultant Bridgette Wiggins MD

## 2021-01-03 NOTE — PROGRESS NOTES
Problem: Mobility Impaired (Adult and Pediatric) Goal: *Acute Goals and Plan of Care (Insert Text) Description: Pt will be I with LE HEP in 7 days. Pt will perform bed mobility with SBA in 7 days. Pt will perform transfers with SUP in 7 days. Pt will amb >150 feet with LRAD safely with SUP in 7 days. Outcome: Progressing Towards Goal 
 PHYSICAL THERAPY TREATMENT Patient: Isela Matthew (45 y.o. male) Date: 1/3/2021 Diagnosis: Intractable back pain [M54.9] Open compression fracture of thoracolumbar vertebra ([facility] [address] [S22.080B, S32.010B] Liver cirrhosis ([facility] [address] [I40.45] Pulmonary nodule [R91.1] <principal problem not specified> Precautions:   
Chart, physical therapy assessment, plan of care and goals were reviewed. ASSESSMENT Patient continues with skilled PT services and is progressing towards goals. Upon entry into room, nurse administering meds and about to feed patient but OK'd patient to work with therapy. Patient agitated and  not wanting to work with therapy, \"just let me rest a little longer. \" With max encourgament I was able to get him to sit on the EOB for a little >1 min before he impulsively returned himself to supine. ModA for supine<>sit at LEs and trunk and patient in pain at LEs with touch; exclaiming that his knees hurt. Encouraged patient to try to eat while he was sitting up but he adamantly stated that he didn't want to eat. Once back in supine, maxA to scoot to HOB/comfort and then patient set up to eat in bed. He was able to perform a few reps of Ap, SLR, and knee flexion w/ pain and when asked to perform bicep curls with visual cueing patient flipped me the bird and laughed and then did a few reps. Assisted in prepping patient's food by cutting into little pieces and then patient able to feed himself in bed sitting up. He will benefit from continued skilled therapy to work on his strength for transfers and gait training to decrease assistance from caregivers and return to PLOF. Will likely need assist x 2 for OOB for patient/clinician safety and patient may benefit from LE stretches as he is tight from laying in bed for so long. He requires a lot of encouragement for participation. Current Level of Function Impacting Discharge (mobility/balance): decreased mobility and flexibility Other factors to consider for discharge: low BP (could not get a reading in sitting today but no dizziness or lightheadedness reporting in sitting), declining therapy services at times, unmotivated to get better PLAN : 
Patient continues to benefit from skilled intervention to address the above impairments. Continue treatment per established plan of care. to address goals. Recommendation for discharge: (in order for the patient to meet his/her long term goals) Therapy up to 5 days/week in SNF setting versus HHPT w/ fam care pending progress with therapy services here This discharge recommendation: 
Has been made in collaboration with the attending provider and/or case management IF patient discharges home will need the following DME: to be determined (TBD) SUBJECTIVE:  
Patient stated leave me alone. I can go home and take care of myself. I will just stay in bed.  OBJECTIVE DATA SUMMARY:  
Critical Behavior: 
Neurologic State: [place] Orientation Level: Oriented to person, Oriented to time, Disoriented to place, Disoriented to situation Cognition: Impulsive, Decreased attention/concentration, Decreased command following Functional Mobility Training: 
Bed Mobility: 
Rolling: Moderate assistance Supine to Sit: Moderate assistance Sit to Supine: Moderate assistance Scooting: Maximum assistance Increased time to perform bed mobility with max encouragement and pull to sit with BHHA + modA at trunk Balance: 
Sitting: Impaired; With support Sitting - Static: Fair (occasional) Sitting - Dynamic: Poor (constant support) Therapeutic Exercises:  
10x knee flexion, AP, SLR, bicep curls Pain Ratin/10 in back Activity Tolerance:  
Poor Please refer to the flowsheet for vital signs taken during this treatment. After treatment patient left in no apparent distress:  
Call bell within reach, Bed / chair alarm activated, and patient positioned in semi-supine while sitting up to eat breakfast  
 
COMMUNICATION/COLLABORATION:  
The patients plan of care was discussed with: Physical therapist and Registered nurse. Jarett [place] Time Calculation: 28 mins

## 2021-01-03 NOTE — PROGRESS NOTES
Progress Note Patient: Iqra Chirinos MRN: [id number]  SSN: [id number] YOB: 1955  Age: 60 y.o. Sex: male Admit Date: 12/26/2020 LOS: 8 days Subjective: More awake and alert. No pain or GI bleeding Want to eat more, PT or nose noted, difficult to participate in any meaningful PT/INR now Past Medical History:  
Diagnosis Date  AICD (automatic cardioverter/defibrillator) present  CAD (coronary artery disease)  Cirrhosis of liver ([facility] [address]  Gallstones  Hypertension  Nodule of lower lobe of right lung  Renal stones Current Facility-Administered Medications:  
  ampicillin-sulbactam (UNASYN) 3 g in 0.9% sodium chloride (MBP/ADV) 100 mL MBP, 3 g, IntraVENous, Q6H, David Bernabe MD, Last Rate: 200 mL/hr at 01/03/21 1201, 3 g at 01/03/21 1201   fluticasone propionate (FLONASE) 50 mcg/actuation nasal spray 2 Spray, 2 Spray, Both Nostrils, DAILY, Jamie Garsia MD, 2 [place] at 01/85/60 3499 
  folic acid (FOLVITE) tablet 1 mg, 1 mg, Oral, DAILY, Jamie Garsia MD, 1 mg at 01/03/21 5786   furosemide (LASIX) tablet 40 mg, 40 mg, Oral, DAILY, Janny Simmons MD, 40 mg at 01/03/21 0819 
  lactulose (CHRONULAC) 10 gram/15 mL solution 45 mL, 30 g, Oral, TID, Mitra Mcneil MD, 45 mL at 01/03/21 0996   propranoloL (INDERAL) tablet 10 mg, 10 mg, Oral, BID, Janny Simmons MD, 10 mg at 01/03/21 4174   albuterol-ipratropium (DUO-NEB) 2.5 MG-0.5 MG/3 ML, 3 mL, Nebulization, Q6H RT, Jamie Garsia MD, 3 mL at 01/03/21 1118 
  oxyCODONE IR (ROXICODONE) tablet 5 mg, 5 mg, Oral, Q4H PRN, Sigrid MAR MD, 5 mg at 01/01/21 1536   sodium chloride (NS) flush 5-40 mL, 5-40 mL, IntraVENous, PRN, Jamie Garsia MD 
  acetaminophen (TYLENOL) tablet 650 mg, 650 mg, Oral, Q6H PRN, 650 mg at 12/26/20 2039 **OR** acetaminophen (TYLENOL) suppository 650 mg, 650 mg, Rectal, Q6H PRN, Mitra Mcneil MD 
   polyethylene glycol (MIRALAX) packet 17 g, 17 g, Oral, DAILY PRN, Jamie Nieves MD 
  promethazine (PHENERGAN) tablet 12.5 mg, 12.5 mg, Oral, Q6H PRN **OR** ondansetron (ZOFRAN) injection 4 mg, 4 mg, IntraVENous, Q6H PRN, Jamie Nieves MD, 4 mg at 12/29/20 2250   isosorbide mononitrate ER (IMDUR) tablet 30 mg, 30 mg, Oral, DAILY, Nathan Welch MD, Stopped at 01/03/21 0900   pantoprazole (PROTONIX) tablet 40 mg, 40 mg, Oral, ACB, Jamie Nieves MD, 40 mg at 01/03/21 2091   morphine injection 2 mg, 2 mg, IntraVENous, Q4H PRN, Nathan Welch MD, 2 mg at 12/29/20 2255   heparin (porcine) injection 5,000 Units, 5,000 Units, SubCUTAneous, Q8H, Vin Yo NP, 5,000 Units at 01/03/21 1201 
  rifAXIMin (XIFAXAN) tablet 550 mg, 550 mg, Oral, TID, David MAR MD, 550 mg at 01/03/21 9972 Objective:  
 
Vitals:  
 01/03/21 3031 01/03/21 8791 01/03/21 0715 01/03/21 1509 BP:  (!) 102/53  (!) 111/54 Pulse:  (!) 110  (!) 101 Resp:  18 18 17 Temp:  99.1 °F (37.3 °C)  99.2 °F (37.3 °C) SpO2: 93% 96% 95% 95% Weight:      
Height:      
  
 
Intake and Output: 
Current Shift: 01/03 0701 - 01/03 1900 In: -  
Out: 045 [UOQWQ:882] Last three shifts: 01/01 1901 - 01/03 0700 In: -  
Out: 1500 [Urine:1500] Physical Exam:  
Physical Exam  
Constitutional: He is cooperative. He has a sickly appearance. He appears ill. He appears distressed. HENT:  
Head: Normocephalic and atraumatic. Neck: Normal carotid pulses and no hepatojugular reflux present. Cardiovascular: Regular rhythm. Pulmonary/Chest: No apnea and no bradypnea. Abdominal: He exhibits distension, fluid wave and ascites. He exhibits no mass. Skin: Bruising noted. Lab/Data Review: 
Recent Results (from the past 24 hour(s)) CBC W/O DIFF Collection Time: 01/03/21 11:07 AM  
Result Value Ref Range WBC 7.4 4.1 - 11.1 K/uL  
 RBC 2.88 (L) 4.10 - 5.70 M/uL HGB 7.7 (L) 12.1 - 17.0 g/dL HCT 25.6 (L) 36.6 - 50.3 % MCV 88.9 80.0 - 99.0 FL  
 MCH 26.7 26.0 - 34.0 PG  
 MCHC 30.1 30.0 - 36.5 g/dL RDW 20.9 (H) 11.5 - 14.5 % PLATELET 701 (L) 347 - 400 K/uL MPV 10.1 8.9 - 21.8 FL  
METABOLIC PANEL, COMPREHENSIVE Collection Time: 01/03/21 11:07 AM  
Result Value Ref Range Sodium 141 136 - 145 mmol/L Potassium 3.3 (L) 3.5 - 5.1 mmol/L Chloride 110 (H) 97 - 108 mmol/L  
 CO2 24 21 - 32 mmol/L Anion gap 7 5 - 15 mmol/L Glucose 136 (H) 65 - 100 mg/dL BUN 27 (H) 6 - 20 mg/dL Creatinine 1.88 (H) 0.70 - 1.30 mg/dL BUN/Creatinine ratio 14 12 - 20 GFR est AA 44 (L) >60 ml/min/1.73m2 GFR est non-AA 36 (L) >60 ml/min/1.73m2 Calcium 8.4 (L) 8.5 - 10.1 mg/dL Bilirubin, total 0.9 0.2 - 1.0 mg/dL AST (SGOT) 29 15 - 37 U/L  
 ALT (SGPT) 17 12 - 78 U/L Alk. phosphatase 98 45 - 117 U/L Protein, total 7.0 6.4 - 8.2 g/dL Albumin 1.8 (L) 3.5 - 5.0 g/dL Globulin 5.2 (H) 2.0 - 4.0 g/dL A-G Ratio 0.3 (L) 1.1 - 2.2 AMMONIA Collection Time: 01/03/21 11:07 AM  
Result Value Ref Range Ammonia 53 (H) <32 umol/L  
C REACTIVE PROTEIN, QT Collection Time: 01/03/21 11:07 AM  
Result Value Ref Range C-Reactive protein 9.75 (H) 0.00 - 0.60 mg/dL PROCALCITONIN Collection Time: 01/03/21 11:07 AM  
Result Value Ref Range Procalcitonin 0.30 (H) 0 ng/mL SED RATE, AUTOMATED Collection Time: 01/03/21 11:07 AM  
Result Value Ref Range Sed rate, automated 77 mm/hr XR CHEST PORT Final Result IMPRESSION:  
  
Single portable AP view compared to 12/30/2020. Left cardiac pacemaker/AICD  
intact and unchanged with one electrode in the region of the right ventricle. Mild cardiomegaly. No mediastinal widening or shift. There is new patchy consolidation in the right lower lobe and mild streaky  
parenchymal change in the right mid zone and left lower zone new from the prior  
study. No radiopaque foreign bodies. No edema or effusion or pneumothorax. NM INFLAM PROC LTD Final Result XR CHEST PORT Final Result Impression: No acute cardiopulmonary findings. IR PARACENTESIS ABD W IMAGE Final Result Impression:   
Successful paracentesis. NM BONE SCAN WH BODY Final Result US RETROPERITONEUM COMP Final Result Impression: No hydronephrosis. Ascites. Cirrhotic morphology. Splenomegaly. XR CHEST PORT Final Result Impression: No acute findings. CT ABD PELV WO CONT Final Result IMPRESSION:  
  
Liver cirrhosis. Mild splenomegaly. Upper abdominal and paraesophageal varices. Ascites. No bowel obstruction. Right lower lobe pulmonary nodule may represent tumor or other. Follow up is  
recommended to exclude malignancy. The results were called and verbally  
communicated to Dr. Kristen Stoll, on 12/26/2020 at 6:07 AM. Thoracolumbar compression fractures of indeterminate age, may be old. Small nonobstructive renal stones. Cholelithiasis. Small umbilical hernia. Assessment:  
 
Active Problems: 
  Liver cirrhosis ([facility] [address] (12/26/2020) Pulmonary nodule (12/26/2020) Intractable back pain (12/26/2020) Closed compression fracture of L4 lumbar vertebra, initial encounter ([address] (12/26/2020) Hypokalemia (12/26/2020) End-stage liver disease, likely alcohol abuse, Back pain, has been followed by the orthopedic surgeon for fracture of lumbar Mental status changes, confusion, hepatic encephalopathy, more altrer 
 decrease ammonia level Ascites, portal hypertension, esophageal varices, was followed by [facility] hepatology clinic 
anemia, no evidence of active GI bleed Plan:  
Continue on the lactulose rifaximin, for hepatic cephalopathy Follow the electrolytes, hepatic function, 
Continue vancomycin for possible infection, 
 Continue on beta-blocker for hypertension long-acting nitroglycerin for portal hypertension Continue diuretic for ascites, increasing of girth of abdomien. , 
 
  
 
 
Signed By: Charlotte Rivera MD   
 January 3, 2021 Thank you for allowing me to participate in this patients care Cc Referring Physician Rosario Robles MD

## 2021-01-03 NOTE — PROGRESS NOTES
Progress Note Patient: Sky Montoya MRN: [id number]  SSN: [id number] YOB: 1955  Age: 60 y.o. Sex: male Admit Date: 12/26/2020 LOS: 8 days Subjective:  
Patient followed for bacteremia and suspected compression fracture lumbar spine. Ceretec scan felt to be negative for osteomyelitis. He is afebrile with normal WBC. Procal decreased slightly but CRP unchange. He is currently on IV Unasyn. He is asleep at this time. Objective:  
 
Vitals:  
 01/03/21 6893 01/03/21 1155 01/03/21 0715 01/03/21 1509 BP:  (!) 102/53  (!) 111/54 Pulse:  (!) 110  (!) 101 Resp:  18 18 17 Temp:  99.1 °F (37.3 °C)  99.2 °F (37.3 °C) SpO2: 93% 96% 95% 95% Weight:      
Height:      
  
 
Intake and Output: 
Current Shift: 01/03 0701 - 01/03 1900 In: -  
Out: 642 [XAKLR:175] Last three shifts: 01/01 1901 - 01/03 0700 In: -  
Out: 1500 [Urine:1500] Physical Exam:  
 Vitals signs and nursing note reviewed. Constitutional:   
   General: He is not in acute distress. Appearance: He is ill-appearing. Cardiovascular:  
   Rate and Rhythm: Normal rate and regular rhythm. Heart sounds: No murmur. Pulmonary:  
   Breath sounds: No wheezing, rhonchi or rales. Abdominal:  
   General: There is distension (with everted umbilicus). Palpations: Abdomen is soft. Tenderness: There is no abdominal tenderness Genitourinary: 
   Penis: Normal.   
   Comments: No Marquez Musculoskeletal:  
   Right lower leg: No edema. Left lower leg: No edema. Skin: 
   Findings: No rash. Neurological:  
   Mental Status: He is alert. He is disoriented. Psychiatric:     
   Mood and Affect: Mood normal.     
   Behavior: Behavior normal.  
  
 
Lab/Data Review: WBC 7,400 Procalcitonin 0.30 < 0.38 
CRP 9.75 <9.24 ESR 77 Blood cultures (12/26) Streptococcus mitis/oralis and Streptococcus anginosus Blood cultures (12/27) No growth FINAL Assessment: Active Problems: 
  Liver cirrhosis ([facility] [address] (12/26/2020) Pulmonary nodule (12/26/2020) Intractable back pain (12/26/2020) Closed compression fracture of L4 lumbar vertebra, initial encounter ([facility] [address] (12/26/2020) Hypokalemia (12/26/2020) 1. Bacteremia with Streptococcus mitis/oralis and Streptococcus anginosus, clinical significance unclear, TTE negative for vegetations, Day #5 IV Unasyn. 2. Fever, possibly secondary to #1. 3. Abnormal uptake L4 vertebra, consistent with compression fracture, negative for osteomyelitis per Ceretec scan. 4. Alcoholic cirrhosis 
 5. Hepatic encephalopathy with elevated ammonia Comment:  Unclear why CRP is not decreasing. Plan: 1. Continue IV Unasyn for 2 more days 2. On Tuesday, repeat ESR, CRP, procalcitonin  
  
 
Signed By: Grazyna Sesay MD   
 January 3, 2021

## 2021-01-04 NOTE — PROGRESS NOTES
PHYSICAL THERAPY TREATMENT Patient: Lesly Houston (89 y.o. male) Date: 1/4/2021 Diagnosis: Intractable back pain [M54.9] Open compression fracture of thoracolumbar vertebra ([facility] [address] [S22.080B, S32.010B] Liver cirrhosis ([facility] [B13.08] Pulmonary nodule [R91.1] <principal problem not specified> Precautions:   
Chart, physical therapy assessment, plan of care and goals were reviewed. ASSESSMENT Patient continues with skilled PT services and is progressing towards goals. Pt. Semi supine in bed upon arrival and agreeable to therapy  Session. Noticed patient had loose stool around flexitube. Notified SN to get patient cleaned up. Required Max a for rolling, TTP to left foot. Attempted sup to sit transfer after cleaning and patient had BM again. Recommend DC to HHPT W/ FAMILY CARE VS. SNF Current Level of Function Impacting Discharge (mobility/balance): strength, bed mobility, pain, endurance Other factors to consider for discharge: tbd PLAN : 
Patient continues to benefit from skilled intervention to address the above impairments. Continue treatment per established plan of care. to address goals. Recommendation for discharge: (in order for the patient to meet his/her long term goals) To be determined: HHPT W FAMILY CARE VS. SNF PENDING PROGRESS This discharge recommendation: 
Has been made in collaboration with the attending provider and/or case management IF patient discharges home will need the following DME: to be determined (TBD) SUBJECTIVE:  
Patient stated IM GOOD.  OBJECTIVE DATA SUMMARY:  
Critical Behavior: 
Neurologic State: Alert Orientation Level: Oriented X4 Cognition: Follows commands Functional Mobility Training: 
Bed Mobility: 
Rolling: Maximum assistance Supine to Sit: Maximum assistance Sit to Supine: Maximum assistance Scooting: Maximum assistance;Assist x2 Transfers: 
  
  
     
  
     
  
  
  
  
Balance: 
  
Ambulation/Gait Training: Stairs: Therapeutic Exercises: NONE Pain Rating: 
TTP LEFT FOOT Activity Tolerance:  
Fair Please refer to the flowsheet for vital signs taken during this treatment. After treatment patient left in no apparent distress:  
Supine in bed COMMUNICATION/COLLABORATION:  
The patients plan of care was discussed with: Physical therapy assistant. Nikita Montesinos Time Calculation: 25 mins

## 2021-01-04 NOTE — PROGRESS NOTES
CM met with patient at bedside. Currently patient agrees to have referral sent to SNFs in the [address] [place] [address] area. CM sent referrals, we are pending acceptance at this time. CM will continue to follow closely. Currently Pending Placement.

## 2021-01-04 NOTE — PROGRESS NOTES
Problem: Dysphagia (Adult) Goal: *Acute Goals and Plan of Care (Insert Text) Description: Speech Therapy Swallow Goals Initiated 12/27/2020 
-Patient will tolerate Thin/Soft-regular without clinical indicators of aspiration given moderate cues within 2-3 day(s). [ ] Not met  [ x]  MET   [ ] Progressing  [ ] Tran Power 
-Patient will tolerate PO trials without clinical indicators of aspiration given moderate cues within 2-3 day(s). [ ] Not met  [ x]  MET   [ ] Progressing  [ ] Tran Power 
-Patient will participate in modified barium swallow study within 7 day(s) as/if indicated. [ ] Not met  [ ]  MET   [ ] Progressing  [ x] Discontinue 
-Patient will demonstrate understanding of swallow safety precautions and aspiration precautions, diet recs with minimal cues within 2-3 day(s). [ ] Not met  [x ]  MET   [ ] Progressing  [ ] Tran Power Outcome: Resolved/Met SPEECH LANGUAGE PATHOLOGY DYSPHAGIA TREATMENT Patient: Alba Mabry (14 y.o. male) Date: 1/4/2021 Diagnosis: Intractable back pain [M54.9] Open compression fracture of thoracolumbar vertebra ([facility] [address] [S22.080B, S32.010B] Liver cirrhosis ([facility] [address] [A58.94] Pulmonary nodule [R91.1] <principal problem not specified> Precautions: aspiration ASSESSMENT: 
Pt seen for follow up, alert, agreeable, positioned as upright as tolerated in bed. Pt with distended abdomen noted and reports discomfort with upright positioning. Pt given thin via cup/straw, puree and hard solids. Pt's oropharyngeal sw function appears largely [facility] with prolonged mastication noted, which appears due to missing teeth. Delayed min cough x 1 after completion of trials that appears incidental.  Recent CXR results noted, discussed with Dr. Adeline Conner. No overt concerns for recurrent aspiration at this time. Pt's WBC WFL and no significant fevers noted (though recent temp elevation noted). PLAN: 
Recommendations and Planned Interventions: At this time, pt's oropharyngeal swallow function appears WFL to cont current soft/thin diet with 1:1 assistance with ALL PO intake, STRICT aspiration and GERD precautions, monitor pt closely for s/s aspiration, meds as tolerated, FEED ONLY IF AWAKE AND ALERT. Concerns for reflux are present given increased abdominal pressure. No further SLP intervention is indicated at this time. D/C SLP and please re-consult if/as indicated or order MBS if aspiration concerns are present. Discharge Recommendations:  Vipul Sánchez SUBJECTIVE:  
Patient alert, agreeable, pleasant, appropriate. OBJECTIVE:  
 
CXR Results  (Last 48 hours) 01/03/21 1308  XR CHEST PORT Final result Impression:  IMPRESSION:  
   
Single portable AP view compared to 12/30/2020. Left cardiac pacemaker/AICD  
intact and unchanged with one electrode in the region of the right ventricle. Mild cardiomegaly. No mediastinal widening or shift. There is new patchy consolidation in the right lower lobe and mild streaky  
parenchymal change in the right mid zone and left lower zone new from the prior  
study. No radiopaque foreign bodies. No edema or effusion or pneumothorax. Narrative:  Chest  
   
  
 
  
 
Cognitive and Communication Status: 
Neurologic State: Alert Orientation Level: Oriented X4 Cognition: Follows commands Pain: 
Pain Scale 1: Numeric (0 - 10) Pain Intensity 1: 0 After treatment:  
Patient left in no apparent distress in bed, Call bell within reach and Nursing notified COMMUNICATION/EDUCATION:  
Patient was educated regarding  dysphagia, swallow safety precautions, aspiration risks, diet recs and no further SLP intervention indicated at this time. He demonstrated Good understanding as evidenced by verbal resposiveness. The patient's plan of care including recommendations, planned interventions, and recommended diet were discussed with: Registered nurse and Physician. Shahnaz Romo M.S. CCC-SLP Time Calculation: 9 mins

## 2021-01-04 NOTE — PROGRESS NOTES
Progress Note Patient: Coco Chiu MRN: [id number]  SSN: [id number] YOB: 1955  Age: 60 y.o. Sex: male Admit Date: 12/26/2020 LOS: 9 days Subjective:  
Patient followed for bacteremia and suspected compression fracture lumbar spine. Ceretec scan felt to be negative for osteomyelitis. He is afebrile with normal WBC. Procal decreased slightly but CRP unchanged. He is currently on IV Unasyn. Objective:  
 
Vitals:  
 01/04/21 1046 01/04/21 1227 01/04/21 1539 01/04/21 1542 BP: (!) 106/54 (!) 120/57 (!) 104/51 Pulse:  (!) 101 (!) 104 Resp:  20 20 Temp:   98.1 °F (36.7 °C) SpO2:  94% 92% 96% Weight:      
Height:      
  
 
Intake and Output: 
Current Shift: No intake/output data recorded. Last three shifts: 01/02 1901 - 01/04 0700 In: -  
Out: [address] [facility] [[id number]] Physical Exam:  
 Vitals signs and nursing note reviewed. Constitutional:   
   General: He is not in acute distress. Appearance: He is ill-appearing. Cardiovascular:  
   Rate and Rhythm: Normal rate and regular rhythm. Heart sounds: No murmur. Pulmonary:  
   Breath sounds: No wheezing, rhonchi or rales. Abdominal:  
   General: There is distension (with everted umbilicus). Palpations: Abdomen is soft. Tenderness: There is no abdominal tenderness Genitourinary: 
   Penis: Normal.   
   Comments: No Marquez Musculoskeletal:  
   Right lower leg: No edema. Left lower leg: No edema. Skin: 
   Findings: No rash. Neurological:  
   Mental Status: He is alert. He is disoriented. Psychiatric:     
   Mood and Affect: Mood normal.     
   Behavior: Behavior normal.  
  
 
Lab/Data Review: WBC 6,300 Procalcitonin 0.30 < 0.38 
CRP 9.75 <9.24 ESR 77 Blood cultures (12/26) Streptococcus mitis/oralis and Streptococcus anginosus Blood cultures (12/27) No growth FINAL Assessment:  
 
Active Problems: 
  Liver cirrhosis ([address] (12/26/2020) Pulmonary nodule (12/26/2020) Intractable back pain (12/26/2020) Closed compression fracture of L4 lumbar vertebra, initial encounter ([facility] [facility] (12/26/2020) Hypokalemia (12/26/2020) 1. Bacteremia with Streptococcus mitis/oralis and Streptococcus anginosus, clinical significance unclear, TTE negative for vegetations, Day #6 IV Unasyn. 2. Fever, possibly secondary to #1. 3. Abnormal uptake L4 vertebra, consistent with compression fracture, negative for osteomyelitis per Ceretec scan. 4. Alcoholic cirrhosis 
 5. Hepatic encephalopathy with elevated ammonia Comment:  Unclear why CRP is not decreasing. Plan: 1. Continue IV Unasyn for 1 more day 2. On Tuesday, repeat ESR, CRP, procalcitonin  
  
 
Signed By: Donell Hightower MD   
 January 4, 2021

## 2021-01-04 NOTE — PROGRESS NOTES
Hospitalist Progress Note Daily Progress Note: 1/4/2021 This is a 73-year-old gentleman who with a history of cardiomyopathy, alcoholic cirrhosis, AICD in place, COPD and continued smoking, hypertension, diabetes mellitus type 2, chronic CHF. Presented to the emergency department complaining of severe low back pain. Little is known of his history initially as he was very confused was found to have an hepatic encephalopathy with an ammonia level of 108. Started on rifaximin and lactulose, mentation improved, ammonia only marginally. He had Steri-Strips in place and chest x-ray suggestive of AICD though not able to contact next of kin. Seen by orthopedic surgery secondary to thoracolumbar compression fractures under evaluation. Blood cultures drawn on presentation grew gram-positive cocci in all 4 bottles, started on vancomycin. Subsequent blood culture at 4 hours showing no growth. Finally was able to speak with significant other who describes AICD change, initially placed 10 years ago, at [facility] about a month ago. She was also able to report to me patient's history more clearly. Apparently he does have a history of compression fractures in the past and has a brace for this at home which she wears infrequently. Pt now recalls alcohol on xmas day and recalls a fall. Also reported that he weekly gets paracentesis at [facility]. ID + Ortho on consult. Subjective:  
1/4/2021 patient seen and examined at bedside, no acute events overnight, patient status post therapeutic paracentesis, tolerated procedure well, discussed with RN at bedside  
problem List: 
Problem List as of 1/4/2021 Date Reviewed: 12/27/2020 Codes Class Noted - Resolved Liver cirrhosis (HCC) ICD-10-CM: K74.60 ICD-9-CM: 571.5  12/26/2020 - Present Pulmonary nodule ICD-10-CM: R91.1 ICD-9-CM: 793.11  12/26/2020 - Present Intractable back pain ICD-10-CM: M54.9 ICD-9-CM: 724.5  12/26/2020 - Present Closed compression fracture of L4 lumbar vertebra, initial encounter ([facility] [address] ICD-10-CM: U92.296R ICD-9-CM: 805.4  12/26/2020 - Present Hypokalemia ICD-10-CM: E87.6 ICD-9-CM: 276.8  12/26/2020 - Present Medications reviewed Current Facility-Administered Medications Medication Dose Route Frequency  oxyCODONE IR (ROXICODONE) tablet 5 mg  5 mg Oral Q4H PRN  
 oxyCODONE IR (ROXICODONE) tablet 10 mg  10 mg Oral Q4H PRN  potassium chloride (K-DUR, KLOR-CON) SR tablet 80 mEq  80 mEq Oral NOW  ampicillin-sulbactam (UNASYN) 3 g in 0.9% sodium chloride (MBP/ADV) 100 mL MBP  3 g IntraVENous Q6H  
 fluticasone propionate (FLONASE) 50 mcg/actuation nasal spray 2 Spray  2 Spray Both Nostrils DAILY  folic acid (FOLVITE) tablet 1 mg  1 mg Oral DAILY  furosemide (LASIX) tablet 40 mg  40 mg Oral DAILY  lactulose (CHRONULAC) 10 gram/15 mL solution 45 mL  30 g Oral TID  propranoloL (INDERAL) tablet 10 mg  10 mg Oral BID  albuterol-ipratropium (DUO-NEB) 2.5 MG-0.5 MG/3 ML  3 mL Nebulization Q6H RT  
 sodium chloride (NS) flush 5-40 mL  5-40 mL IntraVENous PRN  
 acetaminophen (TYLENOL) tablet 650 mg  650 mg Oral Q6H PRN Or  
 acetaminophen (TYLENOL) suppository 650 mg  650 mg Rectal Q6H PRN  polyethylene glycol (MIRALAX) packet 17 g  17 g Oral DAILY PRN  promethazine (PHENERGAN) tablet 12.5 mg  12.5 mg Oral Q6H PRN Or  
 ondansetron (ZOFRAN) injection 4 mg  4 mg IntraVENous Q6H PRN  
 isosorbide mononitrate ER (IMDUR) tablet 30 mg  30 mg Oral DAILY  pantoprazole (PROTONIX) tablet 40 mg  40 mg Oral ACB  morphine injection 2 mg  2 mg IntraVENous Q4H PRN  
 heparin (porcine) injection 5,000 Units  5,000 Units SubCUTAneous Q8H  
 rifAXIMin (XIFAXAN) tablet 550 mg  550 mg Oral TID Review of Systems:  
Review of Systems Constitutional: Positive for malaise/fatigue. Negative for fever. HENT: Negative. Eyes: Negative. Respiratory: Negative for shortness of breath. Cardiovascular: Positive for leg swelling. Gastrointestinal: Negative for blood in stool, melena, nausea and vomiting. Genitourinary: Negative. Musculoskeletal: Positive for back pain. Skin: Negative. Neurological: Positive for weakness. Endo/Heme/Allergies: Negative. Psychiatric/Behavioral: Negative. Objective:  
Physical Exam:  
 
Visit Vitals BP (!) 120/57 (BP 1 Location: Right arm, BP Patient Position: At rest) Pulse (!) 101 Temp 97.8 °F (36.6 °C) Resp 20 Ht 6' 0.84\" (1.85 m) Wt 105.2 kg (231 lb 14.8 oz) SpO2 94% BMI 30.74 kg/m² O2 Flow Rate (L/min): 2 l/min O2 Device: Nasal cannula Temp (24hrs), Av.6 °F (37 °C), Min:97.6 °F (36.4 °C), Max:100.1 °F (37.8 °C) No intake/output data recorded.  1901 -  0700 In: -  
Out: [address] [facility] [[id number]] General:  Alert, cooperative, lucid, no withdrawal s/s Lungs:   Clear to auscultation bilaterally. Diminished Chest wall:  No tenderness or deformity. Left upper chest aicd/stri strips present, no erythema or purulence or tenderness. Heart:  Regular rate and rhythm, S1, S2 normal, no murmur, click, rub or gallop. Abdomen:   Soft, Distended nontender bowel sounds normal. No masses,  No organomegaly. Extremities:  Back pain limits mobility of lower extremities, Pain significant with minimal raise off bed of le's. Pulses: 2+ and symmetric all extremities. Skin:  Decreased turgor Neurologic: CNII-XII intact. No gross sensory or motor deficits Data Review:  
   
Recent Days: 
Recent Labs 21 
1035 21 
1107 WBC 6.3 7.4 HGB 8.3* 7.7* HCT 28.0* 25.6*  
* 148* Recent Labs 21 
1035 21 
1107  141  
K 3.1* 3.3*  
* 110* CO2 23 24 * 136* BUN 27* 27* CREA 1.80* 1.88* CA 8.1* 8.4* ALB  --  1.8* TBILI  --  0.9 ALT  --  17  
 
 No results for input(s): PH, PCO2, PO2, HCO3, FIO2 in the last 72 hours. 24 Hour Results: 
Recent Results (from the past 24 hour(s)) CBC W/O DIFF Collection Time: 01/04/21 10:35 AM  
Result Value Ref Range WBC 6.3 4.1 - 11.1 K/uL  
 RBC 3.11 (L) 4.10 - 5.70 M/uL HGB 8.3 (L) 12.1 - 17.0 g/dL HCT 28.0 (L) 36.6 - 50.3 % MCV 90.0 80.0 - 99.0 FL  
 MCH 26.7 26.0 - 34.0 PG  
 MCHC 29.6 (L) 30.0 - 36.5 g/dL RDW 21.3 (H) 11.5 - 14.5 % PLATELET 924 (L) 298 - 400 K/uL MPV 9.8 8.9 - 34.6 FL  
METABOLIC PANEL, BASIC Collection Time: 01/04/21 10:35 AM  
Result Value Ref Range Sodium 138 136 - 145 mmol/L Potassium 3.1 (L) 3.5 - 5.1 mmol/L Chloride 109 (H) 97 - 108 mmol/L  
 CO2 23 21 - 32 mmol/L Anion gap 6 5 - 15 mmol/L Glucose 201 (H) 65 - 100 mg/dL BUN 27 (H) 6 - 20 mg/dL Creatinine 1.80 (H) 0.70 - 1.30 mg/dL BUN/Creatinine ratio 15 12 - 20 GFR est AA 46 (L) >60 ml/min/1.73m2 GFR est non-AA 38 (L) >60 ml/min/1.73m2 Calcium 8.1 (L) 8.5 - 10.1 mg/dL Assessment/  
 
Patient Active Problem List  
Diagnosis Code  Liver cirrhosis ([facility] [address] K74.60  Pulmonary nodule R91.1  Intractable back pain M54.9  Closed compression [facility] of L4 lumbar vertebra, [facility] S32.040A  Hypokalemia E87.6 Acute hepatic encephalopathypatient presented with significantly altered mental status consistent with acute hepatic encephalopathy, currently mental status is improved Continue to trend serum ammonia levels Continue to monitor mental status Continue lactulose, titrate to 2-3 bowel movements per day Gastroenterology consult Thoracic lumbar compression fracturesresulting in significant decrease in mobility, currently patient is not complaining of any pain at this time Orthopedic consult appreciated, patient more suited for conservative management at this time Follow physical therapy recommendations Chronic kidney disease stage IIIcurrently serum creatinine remained stable Continue to trend serum creatinine Gram-positive bacteremia Continue unasyn for Abx coverage for additional 1 days Infectious disease consult appreciated Abdominal ascitescurrently status post therapeutic paracentesis, tolerated procedure well Interventional radiology consult appreciated, will continue to follow Gastroenterology consult appreciated, will continue to follow ProphylaxisHeparin subcu FENcardiac diet, replete potassium and magnesium Full code, will clarify about surrogate decision-maker DispositionSNF/Rehab placement, discussed with CM Total noncritical care time spent 35 minutes Care Plan discussed with: Patient/Family, Nurse and Consultant Ortho Estefania Milner MD

## 2021-01-04 NOTE — PROGRESS NOTES
Orthopedic progress note Date:2021       Room:[facility] Patient Jan Hobson     YOB: 1955     Age:65 y.o. Subjective Follow-up for back pain and L4 compression fracture. Patient still complaining of moderate low back pain. The patient had a negative Ceretec scan shows no signs of compression fracture being caused by infection. He states he is still unable to walk without discomfort. Objective Vitals Last 24 Hours: TEMPERATURE:  Temp  Av.6 °F (37 °C)  Min: 97.6 °F (36.4 °C)  Max: 100.1 °F (37.8 °C) RESPIRATIONS RANGE: Resp  Av.2  Min: 17  Max: 20 
PULSE OXIMETRY RANGE: SpO2  Av.7 %  Min: 94 %  Max: 97 % PULSE RANGE: Pulse  Av.6  Min: 92  Max: 109 BLOOD PRESSURE RANGE: Systolic (93VUR), VDH:788 , Min:90 , ADO:887  
; Diastolic (97WEM), BOA:97, Min:49, Max:57 Current Facility-Administered Medications Medication Dose Route Frequency  ampicillin-sulbactam (UNASYN) 3 g in 0.9% sodium chloride (MBP/ADV) 100 mL MBP  3 g IntraVENous Q6H  
 fluticasone propionate (FLONASE) 50 mcg/actuation nasal spray 2 Spray  2 Spray Both Nostrils DAILY  folic acid (FOLVITE) tablet 1 mg  1 mg Oral DAILY  furosemide (LASIX) tablet 40 mg  40 mg Oral DAILY  lactulose (CHRONULAC) 10 gram/15 mL solution 45 mL  30 g Oral TID  propranoloL (INDERAL) tablet 10 mg  10 mg Oral BID  albuterol-ipratropium (DUO-NEB) 2.5 MG-0.5 MG/3 ML  3 mL Nebulization Q6H RT  
 oxyCODONE IR (ROXICODONE) tablet 5 mg  5 mg Oral Q4H PRN  
 sodium chloride (NS) flush 5-40 mL  5-40 mL IntraVENous PRN  
 acetaminophen (TYLENOL) tablet 650 mg  650 mg Oral Q6H PRN Or  
 acetaminophen (TYLENOL) suppository 650 mg  650 mg Rectal Q6H PRN  polyethylene glycol (MIRALAX) packet 17 g  17 g Oral DAILY PRN  promethazine (PHENERGAN) tablet 12.5 mg  12.5 mg Oral Q6H PRN  Or  
 ondansetron (ZOFRAN) injection 4 mg  4 mg IntraVENous Q6H PRN  
  isosorbide mononitrate ER (IMDUR) tablet 30 mg  30 mg Oral DAILY  pantoprazole (PROTONIX) tablet 40 mg  40 mg Oral ACB  morphine injection 2 mg  2 mg IntraVENous Q4H PRN  
 heparin (porcine) injection 5,000 Units  5,000 Units SubCUTAneous Q8H  
 rifAXIMin (XIFAXAN) tablet 550 mg  550 mg Oral TID  
  
 
 
I/O (24Hr): Intake/Output Summary (Last 24 hours) at 1/4/2021 1154 Last data filed at 1/4/2021 1736 Gross per 24 hour Intake  Output 925 ml Net -925 ml Objective Labs/Imaging/Diagnostics Labs: CBC: 
Recent Labs 01/03/21 
1107 WBC 7.4  
RBC 2.88* HGB 7.7* HCT 25.6* MCV 88.9 RDW 20.9*  
* CHEMISTRIES: 
Recent Labs 01/03/21 
1107   
K 3.3*  
* CO2 24 BUN 27* CREA 1.88* CA 8.4* PT/INR:No results for input(s): INR, INREXT in the last 72 hours. No lab exists for component: PROTIME APTT:No results for input(s): APTT in the last 72 hours. LIVER PROFILE: 
Recent Labs 01/03/21 
1107 AST 29 ALT 17 Lab Results Component Value Date/Time ALT (SGPT) 17 01/03/2021 11:07 AM  
 AST (SGOT) 29 01/03/2021 11:07 AM  
 Alk. phosphatase 98 01/03/2021 11:07 AM  
 Bilirubin, direct 0.4 (H) 12/26/2020 12:00 PM  
 Bilirubin, total 0.9 01/03/2021 11:07 AM  
 
 
Physical Exam: 
Limited exam of his back show there was still tenderness to palpation of the vertebral region between L3 and L4. Lower extremities: Skin warm dry and intact throughout. Leg lengths are equal.  He is unable to straight leg raise bilaterally secondary to pain. No calf pain to palpation. EHL/DF/PF is 4 out of 5. DTRs are delayed throughout his lower extremities. Assessment//Plan Patient Active Problem List  
 Diagnosis Date Noted  Liver cirrhosis ([facility] [address] 12/26/2020  Pulmonary nodule 12/26/2020  Intractable back pain 12/26/2020  Closed compression fracture of L4 lumbar vertebra, initial encounter ([facility] [address] 12/26/2020  Hypokalemia 12/26/2020 L4 compression fracture Continue with conservative management. We did rediscussed the option of kyphoplasty but I told the patient with his history of bacteremia we will hold off on that at this time. I will adjust his pain medication accordingly.  
 
 
Electronically signed by Magnus Burns PA-C on 1/4/2021 at 11:54 AM

## 2021-01-05 NOTE — PROGRESS NOTES
Progress Note Patient: Sky Montoya MRN: [id number]  SSN: [id number] YOB: 1955  Age: 60 y.o. Sex: male Admit Date: 12/26/2020 LOS: 10 days Subjective:  
Patient followed for bacteremia and suspected compression fracture lumbar spine. Ceretec scan felt to be negative for osteomyelitis. He is afebrile with normal WBC. He is currently on IV Unasyn. Patient resting comfortably with no complaints. Objective:  
 
Vitals:  
 01/04/21 2054 01/05/21 0007 01/05/21 1059 01/05/21 9092 BP: (!) 98/55 113/68 (!) 100/52 Pulse: 99 71 (!) 107 Resp: 20 20 20 Temp: 99 °F (37.2 °C) 98.9 °F (37.2 °C) 98.1 °F (36.7 °C) SpO2: 92% 99% 95% 94% Weight:      
Height:      
  
 
Intake and Output: 
Current Shift: No intake/output data recorded. Last three shifts: 01/03 1901 - 01/05 0700 In: -  
Out: Allika 46 [[id number]:[id number]] Physical Exam:  
 Vitals signs and nursing note reviewed. Constitutional:   
   General: He is not in acute distress. Appearance: He is ill-appearing. Cardiovascular:  
   Rate and Rhythm: Normal rate and regular rhythm. Heart sounds: No murmur. Pulmonary:  
   Breath sounds: No wheezing, rhonchi or rales. Abdominal:  
   General: There is distension (with everted umbilicus). Palpations: Abdomen is soft. Tenderness: There is no abdominal tenderness Genitourinary: 
   Penis: Normal.   
   Comments: No Marquez Musculoskeletal:  
   Right lower leg: No edema. Left lower leg: No edema. Skin: 
   Findings: No rash. Neurological:  
   Mental Status: He is alert. He is disoriented. Psychiatric:     
   Mood and Affect: Mood normal.     
   Behavior: Behavior normal.  
  
 
Lab/Data Review: WBC 5,800 Procalcitonin 0.17 <0.30 < 0.38 
CRP 8.26 <9.75 <9.24 ESR 77 Blood cultures (12/26) Streptococcus mitis/oralis and Streptococcus anginosus Blood cultures (12/27) No growth FINAL Assessment:  
 
Active Problems: Liver cirrhosis ([facility] (12/26/2020) Pulmonary nodule (12/26/2020) Intractable back pain (12/26/2020) Closed compression fracture of L4 lumbar vertebra, initial encounter ([facility] (12/26/2020) Hypokalemia (12/26/2020) 1. Bacteremia with Streptococcus mitis/oralis and Streptococcus anginosus, clinical significance unclear, TTE negative for vegetations, Day #7 IV Unasyn. 2. Fever, possibly secondary to #1, resolved. 3. Abnormal uptake L4 vertebra, consistent with compression fracture, negative for osteomyelitis per Ceretec scan. 4. Alcoholic cirrhosis 
 5. Hepatic encephalopathy with elevated ammonia Comment:  Unclear why CRP is not decreasing. Plan: 1. Discontinue IV Unasyn after today 2. Will continue to follow  
  
 
Signed By: Jen Mederos MD   
 January 5, 2021

## 2021-01-05 NOTE — PROGRESS NOTES
PHYSICAL THERAPY TREATMENT Patient: Alba Mabry (2 y.o. male) Date: 1/5/2021 Diagnosis: Intractable back pain [M54.9] [facility] [S22.080B, S32.010B] Liver cirrhosis ([facility] [J86.24] Pulmonary nodule [R91.1] <principal problem not specified> Precautions:   
Chart, physical therapy assessment, plan of care and goals were reviewed. ASSESSMENT Patient continues with skilled PT services and is progressing towards goals. Pt. Semi supine in bed upon arrival and agreeable to therapy session. A&Ox4. TTP to LLE. P! With Rolling R/L in low back. Practiced Rolling L/R 10X. Attempted sup to sit with Mod a and patient resisted due to pain. Recommend DC to [employer]. SNF Pending progress. Haley Bailey Current Level of Function Impacting Discharge (mobility/balance): Pain  STRENGTH Other factors to consider for discharge: TBD PLAN : 
Patient continues to benefit from skilled intervention to address the above impairments. Continue treatment per established plan of care. to address goals. Recommendation for discharge: (in order for the patient to meet his/her long term goals) To be determined: HH VS. SNF This discharge recommendation: 
Has been made in collaboration with the attending provider and/or case management IF patient discharges home will need the following DME: to be determined (TBD) SUBJECTIVE:  
Patient stated IM OKAY.  
 
OBJECTIVE DATA SUMMARY:  
Critical Behavior: 
Neurologic State: Alert Orientation Level: Oriented X4 Cognition: Appropriate decision making Functional Mobility Training: 
Bed Mobility: 
Rolling: Moderate assistance Supine to Sit: Moderate assistance Sit to Supine: Moderate assistance Scooting: Maximum assistance Transfers: 
  
  
     
  
     
  
  
  
  
Balance: 
  
Ambulation/Gait Training: 
  
  
  
  
  
  
  
  
  
  
  
  
  
  
  
  
  
  
Stairs: Therapeutic Exercises:  
10XHeel slide, hip abd/add, AP, Quad set Pain Ratin Activity Tolerance:  
Poor Please refer to the flowsheet for vital signs taken during this treatment. After treatment patient left in no apparent distress:  
Supine in bed COMMUNICATION/COLLABORATION:  
The patients plan of care was discussed with: Physical therapy assistantJeanna Chan Time Calculation: 18 mins

## 2021-01-05 NOTE — PROGRESS NOTES
Problem: Self Care Deficits Care Plan (Adult) Goal: *Acute Goals and Plan of Care (Insert Text) Description: GOAL 1:  Pt will tolerate sitting EOB X 15 MINUTES in prep for ADL TASKS. GOAL 2:  Pt will perform daily BUE HEP GIVEN MIN ASSIST BY CG TO STRENGTHEN BUE NEEDED FOR ADL TASKS. GOAL 3:  Pt will perform 5 sit to stand EOB transfers daily in one minute at [facility] [place] for functional transfer training. Outcome: Progressing Towards Goal 
 OCCUPATIONAL THERAPY TREATMENT Patient: Alba Mabry (39 y.o. male) Date: 1/5/2021 Diagnosis: Intractable back pain [M54.9] Open compression fracture of thoracolumbar vertebra ([facility] [S22.080B, S32.010B] Liver cirrhosis ([facility] [J11.14] Pulmonary nodule [R91.1] <principal problem not specified> Precautions:   
Chart, occupational therapy assessment, plan of care, and goals were reviewed. ASSESSMENT Patient continues with skilled OT services and is progressing slowly towards goals. Pt. Received semi-supine in bed and agreeable to therapy session. Pt. Performed Rolling with mod A and tactile cues for hand placement. Pt. Performed sup->sit with Max A, Pt unable to perform full sitting and up-right posterior d/t increased pain in lower back demonstrating heavy Left side lean for few seconds before impulsively laying back into bed. Pt. Required max A for scooting towards HOB. Pt educated on UE therex to perform throughout the day to increase overall strength and endurance. Pt. Will require Assist of 2 for future therapy sessions due to pts decrease in activity tolerance and to increased pt's sitting balance at EOB, increase up-right posture, increased performance with seated ADL and OOB ADL, and safety for pt and clinician. PLAN : 
Patient continues to benefit from skilled intervention to address the above impairments. Continue treatment per established plan of care. to address goals. Recommendation for discharge: (in order for the patient to meet his/her long term goals) [address] with family care and 24/7 care pending progress with therapy This discharge recommendation: 
Has been made in collaboration with the attending provider and/or case management IF patient discharges home will need the following DME: TBD SUBJECTIVE:  
Patient stated my back is hurting really bad.  OBJECTIVE DATA SUMMARY:  
Cognitive/Behavioral Status: 
Neurologic State: Alert;Drowsy Orientation Level: Oriented X4 Cognition: Follows commands Functional Mobility and Transfers for ADLs: 
Bed Mobility: 
Rolling: Moderate assistance Supine to Sit: Maximum assistance Sit to Supine: Moderate assistance Scooting: Moderate assistance Balance: 
Sitting: Impaired; With support ADL Intervention: 
Grooming Grooming Assistance: Set-up Position Performed: Other (comment)(semi-supine in bed. ) Washing Face: Independent; Set-up Lower Body Dressing Assistance Socks: Total assistance (dependent) Position Performed: Supine Therapeutic Exercises:  
Exercise Sets Reps AROM AAROM PROM Self PROM Comments Shoulder flex/ext 1 10 [x] [] [] [] Elbow flex/ext 1 10 [x] [] [] []   
Wrist flex/ext 1 10 [x] [] [] [] Functional reaching 1 7 [x] [] [] []   
 
 
 
Pain: 
8/10 pain in lower back with attempting to sit at EOB Activity Tolerance:  
Poor Please refer to the flowsheet for vital signs taken during this treatment. After treatment patient left in no apparent distress:  
Supine in bed, Call bell within reach, and Bed / chair alarm activated COMMUNICATION/COLLABORATION:  
The patients plan of care was discussed with: Registered nurse. Sharon Al Time Calculation: 25 mins

## 2021-01-05 NOTE — PROGRESS NOTES
Hospitalist Progress Note Daily Progress Note: 1/5/2021 Subjective: The patient is seen for follow up. 61-year-old male who was initially admitted on 12/26 with low back pain. CT scan showed thoracolumbar compression fractures of indeterminate age. On admission he had a temperature of 101.8. Blood cultures grew Streptococcus anginosus/oralis. He was initially started on vancomycin. He was seen by ID. Transthoracic echocardiogram showed no vegetations. Bone scan showed  bandlike increased uptake in L4, felt indicate that the compression fracture was less than 3years old. This was not felt to be consistent with osteomyelitis Antibiotics were switched to Unasyn Patient reportedly had an AICD replaced about 1 month prior to admission and is known to have a history of compression fractures. He has a history of alcohol abuse with alcoholic liver disease and ascites requiring frequent paracentesis at [facility] At this time, SNF placement is pending for rehab Problem List: 
Problem List as of 1/5/2021 Date Reviewed: 12/27/2020 Codes Class Noted - Resolved Liver cirrhosis (HCC) ICD-10-CM: K74.60 ICD-9-CM: 571.5  12/26/2020 - Present Pulmonary nodule ICD-10-CM: R91.1 ICD-9-CM: 793.11  12/26/2020 - Present Intractable back pain ICD-10-CM: M54.9 ICD-9-CM: 724.5  12/26/2020 - Present Closed compression fracture of L4 lumbar vertebra[facility] ICD-10-CM: U12.325X ICD-9-CM: 805.4  12/26/2020 - Present Hypokalemia ICD-10-CM: E87.6 ICD-9-CM: 276.8  12/26/2020 - Present Medications reviewed Current Facility-Administered Medications Medication Dose Route Frequency  oxyCODONE IR (ROXICODONE) tablet 5 mg  5 mg Oral Q4H PRN  
 oxyCODONE IR (ROXICODONE) tablet 10 mg  10 mg Oral Q4H PRN  
 ampicillin-sulbactam (UNASYN) 3 g in 0.9% sodium chloride (MBP/ADV) 100 mL MBP  3 g IntraVENous Q6H  
  fluticasone propionate (FLONASE) 50 mcg/actuation nasal spray 2 Spray  2 Spray Both Nostrils DAILY  folic acid (FOLVITE) tablet 1 mg  1 mg Oral DAILY  furosemide (LASIX) tablet 40 mg  40 mg Oral DAILY  lactulose (CHRONULAC) 10 gram/15 mL solution 45 mL  30 g Oral TID  propranoloL (INDERAL) tablet 10 mg  10 mg Oral BID  albuterol-ipratropium (DUO-NEB) 2.5 MG-0.5 MG/3 ML  3 mL Nebulization Q6H RT  
 sodium chloride (NS) flush 5-40 mL  5-40 mL IntraVENous PRN  
 acetaminophen (TYLENOL) tablet 650 mg  650 mg Oral Q6H PRN Or  
 acetaminophen (TYLENOL) suppository 650 mg  650 mg Rectal Q6H PRN  polyethylene glycol (MIRALAX) packet 17 g  17 g Oral DAILY PRN  promethazine (PHENERGAN) tablet 12.5 mg  12.5 mg Oral Q6H PRN Or  
 ondansetron (ZOFRAN) injection 4 mg  4 mg IntraVENous Q6H PRN  
 isosorbide mononitrate ER (IMDUR) tablet 30 mg  30 mg Oral DAILY  pantoprazole (PROTONIX) tablet 40 mg  40 mg Oral ACB  morphine injection 2 mg  2 mg IntraVENous Q4H PRN  
 heparin (porcine) injection 5,000 Units  5,000 Units SubCUTAneous Q8H  
 rifAXIMin (XIFAXAN) tablet 550 mg  550 mg Oral TID Review of Systems: A comprehensive review of systems was negative except for that written in the HPI. Objective:  
Physical Exam:  
 
Visit Vitals BP (!) 100/52 (BP 1 Location: Right arm, BP Patient Position: At rest;Supine) Pulse (!) 107 Temp 98.1 °F (36.7 °C) Resp 20 Ht 6' 0.84\" (1.85 m) Wt 105.2 kg (231 lb 14.8 oz) SpO2 94% BMI 30.74 kg/m² O2 Flow Rate (L/min): 2 l/min O2 Device: Nasal cannula Temp (24hrs), Av.5 °F (36.9 °C), Min:98.1 °F (36.7 °C), Max:99 °F (37.2 °C) No intake/output data recorded.  1901 -  0700 In: -  
Out: Db 46 [[id number]:[id number]] General:   Awake and alert Lungs:   Clear to auscultation bilaterally. Chest wall:  No tenderness or deformity. Heart:  Regular rate and rhythm, S1, S2 normal, no murmur, click, rub or gallop. Abdomen:    Protuberant with obvious ascites Extremities: Extremities normal, atraumatic, no cyanosis or edema. Pulses: 2+ and symmetric all extremities. Skin: Skin color, texture, turgor normal. No rashes or lesions Neurologic: CNII-XII intact. No gross focal deficits Data Review:  
   
Recent Days: 
Recent Labs 01/04/21 
1035 01/03/21 
1107 WBC 6.3 7.4 HGB 8.3* 7.7* HCT 28.0* 25.6*  
* 148* Recent Labs 01/04/21 
1035 01/03/21 
1107  141  
K 3.1* 3.3*  
* 110* CO2 23 24 * 136* BUN 27* 27* CREA 1.80* 1.88* CA 8.1* 8.4* ALB  --  1.8* TBILI  --  0.9 ALT  --  17 No results for input(s): PH, PCO2, PO2, HCO3, FIO2 in the last 72 hours. 24 Hour Results: No results found for this or any previous visit (from the past 24 hour(s)). XR CHEST PORT Final Result IMPRESSION:  
  
Single portable AP view compared to 12/30/2020. Left cardiac pacemaker/AICD  
intact and unchanged with one electrode in the region of the right ventricle. Mild cardiomegaly. No mediastinal widening or shift. There is new patchy consolidation in the right lower lobe and mild streaky  
parenchymal change in the right mid zone and left lower zone new from the prior  
study. No radiopaque foreign bodies. No edema or effusion or pneumothorax. NM INFLAM PROC LTD Final Result XR CHEST PORT Final Result Impression: No acute cardiopulmonary findings. IR PARACENTESIS ABD W IMAGE Final Result Impression:   
Successful paracentesis. NM BONE SCAN WH BODY Final Result US RETROPERITONEUM COMP Final Result Impression: No hydronephrosis. Ascites. Cirrhotic morphology. Splenomegaly. XR CHEST PORT Final Result Impression: No acute findings. CT ABD PELV WO CONT Final Result IMPRESSION:  
  
Liver cirrhosis. Mild splenomegaly. Upper abdominal and paraesophageal varices. Ascites. No bowel obstruction. Right lower lobe pulmonary nodule may represent tumor or other. Follow up is  
recommended to exclude malignancy. The results were called and verbally  
communicated to Dr. Daxa Arias, on 12/26/2020 at 6:07 AM. Thoracolumbar compression fractures of indeterminate age, may be old. Small nonobstructive renal stones. Cholelithiasis. Small umbilical hernia. Assessment: 
Bacteremia with Streptococcus mitis/oralis and Streptococcus anginosus, clinical significance unclear. Day #7 IV Unasyn Thoracolumbar compression fractures Alcoholic cirrhosis with portal hypertension and recurrent ascites Hepatic encephalopathy Acute kidney disease stage III Hypokalemia Anemia of chronic disease Plan: 
Continue IV Unasyn Continue lactulose SNF placement pending We will speak with ID regarding duration of IV antibiotics Care Plan discussed with:  Total time spent with patient: 30 minutes.  
 
Sindi Ervin MD

## 2021-01-06 NOTE — PROGRESS NOTES
LOS assessment performed by myself and oJ-Ann Rivera LPN. Pt has a skin tear on left buttock and excoriation on sacrum. Otherwise, skin clean, dry, and intact.

## 2021-01-06 NOTE — PROGRESS NOTES
Problem: Mobility Impaired (Adult and Pediatric) Goal: *Acute Goals and Plan of Care (Insert Text) Description: Pt will be I with LE HEP in 7 days. Pt will perform bed mobility with SBA in 7 days. Pt will perform transfers with SUP in 7 days. Pt will amb >150 feet with LRAD safely with SUP in 7 days. Outcome: Progressing Towards Goal 
  
Problem: Patient Education: Go to Patient Education Activity Goal: Patient/Family Education Outcome: Progressing Towards Goal 
  
PHYSICAL THERAPY REEVALUATION Patient: Maik Schmid (5 y.o. male) Date: 1/6/2021 Primary Diagnosis: Intractable back pain [M54.9] Open compression fracture of thoracolumbar vertebra ([facility] [S22.080B, S32.010B] Liver cirrhosis ([facility] [W36.24] Pulmonary nodule [R91.1] Precautions: AMS, catheter, flexitube, Supp O2,  Bed Alarm ASSESSMENT Based on the objective data described below, the patient presents with Pt presents with decr ROM, strength, bed mobility, transfers, gait, balance, activity tolerance, safety awareness, and functional mobility. Pt agreeable to PT re-assessment, Pt was evaluated by PT on 12/30 and has participated in 3 treatment sessions since then. Pt is slowly progressing well towards all goals, progress is limited due to back pain. Pt is on 2L supp O2. A&O x 4 with incr time and cues, presents confused throughout session with poor attention to task. Pt received semi-supine lying crooked in bed with wife at side. Pt completed bed mobility with Mod/Max A, tolerated sitting at EOB for approx 1 minute with max encouragement to tolerate. Pt completed bed level LE therex for ROM, strengthening, and functional mobility. Pt has poor activity tolerance, completing bed mobility only and limited due to back pain. Pt would benefit from continued skilled PT services to address above deficits and progress towards goals. PT d/c recc SNF when medically appropriate. Jody Mendoza Other factors to consider for discharge: activity tolerance, safety with OOB mobility, pain Patient will benefit from skilled therapy intervention to address the above noted impairments. PLAN : 
Recommendations and Planned Interventions: bed mobility training, transfer training, gait training, therapeutic exercises, neuromuscular re-education, patient and family training/education, and therapeutic activities Frequency/Duration: Patient will be followed by physical therapy:  5 times a week to address goals. Recommendation for discharge: (in order for the patient to meet his/her long term goals) PT d/c recc SNF when medically appropriate. This discharge recommendation: 
Has been made in collaboration with the attending provider and/or case management Equipment recommendations for successful discharge (if) home: bedside commode, shower chair, walker: rolling, and wheelchair SUBJECTIVE:  
Patient stated it's 1921, no its 2021. I can't sit up, I'm eating icecream. OBJECTIVE DATA SUMMARY:  
HISTORY:   
Past Medical History:  
Diagnosis Date AICD (automatic cardioverter/defibrillator) present CAD (coronary artery disease) Cirrhosis of liver ([address] Gallstones Hypertension Nodule of lower lobe of right lung Renal stones Past Surgical History:  
Procedure Laterality Date HX PACEMAKER PLACEMENT    
 IR PARACENTESIS ABD W IMAGE  12/30/2020 Hospital course since last seen and reason for reevaluation: Pt was evaluated by PT on 12/30 and has participated in 3 treatment sessions since then. Pt is slowly progressing well towards all goals, progress is limited due to back pain. A&O x 4 with incr time and cues, presents confused throughout session with poor attention to task. Personal factors and/or comorbidities impacting plan of care: complicated medical history, multiple co-morbidities. Home Situation Home Environment: Private residence # Steps to Enter: 1 Rails to Enter: No 
One/Two Story Residence: One story Living Alone: No 
Support Systems: Spouse/Significant Other/Partner, Child(jen) Patient Expects to be Discharged to[de-identified] Private residence Current DME Used/Available at Home: Cane, straight EXAMINATION/PRESENTATION/DECISION MAKING:  
Critical Behavior: 
Neurologic State: Alert, Appropriate for age Orientation Level: Oriented X4(with cues and incr time) Cognition: Decreased command following, Decreased attention/concentration, Impaired decision making, Poor safety awareness Hearing: Auditory Auditory Impairment: None Range Of Motion: 
AROM: Grossly decreased, non-functional 
 PROM: Generally decreased, functional 
 Strength:   
Strength: Grossly decreased, non-functional(Gross RLE 2/5, LLE 2/5) Tone & Sensation:  
 Sensation: Intact Functional Mobility: 
Bed Mobility: 
Rolling: Moderate assistance Supine to Sit: Moderate assistance Sit to Supine: Maximum assistance Scooting: Moderate assistance Transfers: 
Does not occur Balance:  
Sitting: Impaired;High guard; With support Sitting - Static: Poor (constant support) Sitting - Dynamic: Poor (constant support) Ambulation/Gait Training: 
Does not occur Pt received semi-supine lying crooked in bed with wife at side. Pt completed bed mobility with Mod/Max A, tolerated sitting at EOB for approx 1 minute with max encouragement to tolerate. Pt req max cues for hand placement, sequencing, line management, and safety during dynamic activities to prevent LOB. Pt has poor activity tolerance, completing bed mobility only and limited due to back pain. Pt recovered in supine, on 2 L supp O2. Pt reports moderate fatigue and significant pain in back during mobility. Therapeutic Exercises:  
Pt completed bed level LE therex for ROM, strengthening, and functional mobility. Pt req max cues for proper form and attention to task. EXERCISE Sets Reps Active Active Assist  
 Passive Self ROM Comments Ankle Pumps  20 [x] [] [] [] Quad Sets/Glut Sets   [] [] [] [] Hamstring Sets   [] [] [] [] Short Arc Quads   [] [] [] [] Heel Slides  10 [x] [] [] [] Straight Leg Raises   [] [] [] [] Hip abd/add  10 [x] [] [] [] Long Arc Quads   [] [] [] [] Marching   [] [] [] []   
   [] [] [] []   
Pain Rating: 
10/10 low back pain in sitting EOB Activity Tolerance:  
Poor, desaturates with exertion and requires oxygen, requires rest breaks, and observed SOB with activity Please refer to the flowsheet for vital signs taken during this treatment. After treatment patient left in no apparent distress:  
Supine in bed, Call bell within reach, Bed / chair alarm activated, Caregiver / family present, and Side rails x 3 
 
COMMUNICATION/EDUCATION:  
The patients plan of care was discussed with: Registered nurse. Fall prevention education was provided and the patient/caregiver indicated understanding. and Patient/family have participated as able in goal setting and plan of care. Thank you for this referral. 
Gianna Holland, PT Time Calculation: 23 mins

## 2021-01-06 NOTE — PROGRESS NOTES
Hospitalist Progress Note Daily Progress Note: 1/6/2021 Subjective: The patient is seen for follow up. IV Unasyn was discontinued as of this morning by ID. Case management continues to work on placement Problem List: 
Problem List as of 1/6/2021 Date Reviewed: 12/27/2020 Codes Class Noted - Resolved Liver cirrhosis (HCC) ICD-10-CM: K74.60 ICD-9-CM: 571.5  12/26/2020 - Present Pulmonary nodule ICD-10-CM: R91.1 ICD-9-CM: 793.11  12/26/2020 - Present Intractable back pain ICD-10-CM: M54.9 ICD-9-CM: 724.5  12/26/2020 - Present Closed compression fracture of L4 lumbar vertebra, initial encounter ([facility] ICD-10-CM: R94.572M ICD-9-CM: 805.4  12/26/2020 - Present Hypokalemia ICD-10-CM: E87.6 ICD-9-CM: 276.8  12/26/2020 - Present Medications reviewed Current Facility-Administered Medications Medication Dose Route Frequency  oxyCODONE IR (ROXICODONE) tablet 5 mg  5 mg Oral Q4H PRN  
 oxyCODONE IR (ROXICODONE) tablet 10 mg  10 mg Oral Q4H PRN  
 ampicillin-sulbactam (UNASYN) 3 g in 0.9% sodium chloride (MBP/ADV) 100 mL MBP  3 g IntraVENous Q6H  
 fluticasone propionate (FLONASE) 50 mcg/actuation nasal spray 2 Spray  2 Spray Both Nostrils DAILY  folic acid (FOLVITE) tablet 1 mg  1 mg Oral DAILY  furosemide (LASIX) tablet 40 mg  40 mg Oral DAILY  lactulose (CHRONULAC) 10 gram/15 mL solution 45 mL  30 g Oral TID  propranoloL (INDERAL) tablet 10 mg  10 mg Oral BID  albuterol-ipratropium (DUO-NEB) 2.5 MG-0.5 MG/3 ML  3 mL Nebulization Q6H RT  
 sodium chloride (NS) flush 5-40 mL  5-40 mL IntraVENous PRN  
 acetaminophen (TYLENOL) tablet 650 mg  650 mg Oral Q6H PRN Or  
 acetaminophen (TYLENOL) suppository 650 mg  650 mg Rectal Q6H PRN  polyethylene glycol (MIRALAX) packet 17 g  17 g Oral DAILY PRN  promethazine (PHENERGAN) tablet 12.5 mg  12.5 mg Oral Q6H PRN  Or  
  ondansetron (ZOFRAN) injection 4 mg  4 mg IntraVENous Q6H PRN  
 isosorbide mononitrate ER (IMDUR) tablet 30 mg  30 mg Oral DAILY  pantoprazole (PROTONIX) tablet 40 mg  40 mg Oral ACB  morphine injection 2 mg  2 mg IntraVENous Q4H PRN  
 heparin (porcine) injection 5,000 Units  5,000 Units SubCUTAneous Q8H  
 rifAXIMin (XIFAXAN) tablet 550 mg  550 mg Oral TID Review of Systems: A comprehensive review of systems was negative except for that written in the HPI. Objective:  
Physical Exam:  
 
Visit Vitals BP 95/71 (BP 1 Location: Right arm, BP Patient Position: At rest) Pulse (!) 103 Temp 99.3 °F (37.4 °C) Resp 18 Ht 6' 0.84\" (1.85 m) Wt 105.2 kg (231 lb 14.8 oz) SpO2 92% BMI 30.74 kg/m² O2 Flow Rate (L/min): 2 l/min O2 Device: Room air Temp (24hrs), Av.9 °F (37.2 °C), Min:98.3 °F (36.8 °C), Max:99.3 °F (37.4 °C) No intake/output data recorded.  1901 -  0700 In: -  
Out: 2250 [[id number]] General:   Awake and alert Lungs:   Clear to auscultation bilaterally. Chest wall:  No tenderness or deformity. Heart:  Regular rate and rhythm, S1, S2 normal, no murmur, click, rub or gallop. Abdomen:    Protuberant with obvious ascites Extremities: Extremities normal, atraumatic, no cyanosis or edema. Pulses: 2+ and symmetric all extremities. Skin: Skin color, texture, turgor normal. No rashes or lesions Neurologic: CNII-XII intact. No gross focal deficits Data Review:  
   
Recent Days: 
Recent Labs 21 
1026 21 
1035 21 
1107 WBC 5.8 6.3 7.4 HGB 8.1* 8.3* 7.7* HCT 26.8* 28.0* 25.6*  
* 149* 148* Recent Labs 21 
1026 21 
1035 21 
1107  138 141  
K 3.3* 3.1* 3.3*  
* 109* 110* CO2 25 23 24 * 201* 136* BUN 21* 27* 27* CREA 1.67* 1.80* 1.88* CA 8.2* 8.1* 8.4* MG 2.2  --   --   
ALB  --   --  1.8* TBILI  --   --  0.9 ALT  --   --  17  
 
 No results for input(s): PH, PCO2, PO2, HCO3, FIO2 in the last 72 hours. 24 Hour Results: 
Recent Results (from the past 24 hour(s)) CBC W/O DIFF Collection Time: 01/05/21 10:26 AM  
Result Value Ref Range WBC 5.8 4.1 - 11.1 K/uL  
 RBC 3.00 (L) 4.10 - 5.70 M/uL HGB 8.1 (L) 12.1 - 17.0 g/dL HCT 26.8 (L) 36.6 - 50.3 % MCV 89.3 80.0 - 99.0 FL  
 MCH 27.0 26.0 - 34.0 PG  
 MCHC 30.2 30.0 - 36.5 g/dL RDW 21.2 (H) 11.5 - 14.5 % PLATELET 341 (L) 512 - 400 K/uL MPV 9.7 8.9 - 89.8 FL  
METABOLIC PANEL, BASIC Collection Time: 01/05/21 10:26 AM  
Result Value Ref Range Sodium 141 136 - 145 mmol/L Potassium 3.3 (L) 3.5 - 5.1 mmol/L Chloride 110 (H) 97 - 108 mmol/L  
 CO2 25 21 - 32 mmol/L Anion gap 6 5 - 15 mmol/L Glucose 144 (H) 65 - 100 mg/dL BUN 21 (H) 6 - 20 mg/dL Creatinine 1.67 (H) 0.70 - 1.30 mg/dL BUN/Creatinine ratio 13 12 - 20 GFR est AA 50 (L) >60 ml/min/1.73m2 GFR est non-AA 42 (L) >60 ml/min/1.73m2 Calcium 8.2 (L) 8.5 - 10.1 mg/dL MAGNESIUM Collection Time: 01/05/21 10:26 AM  
Result Value Ref Range Magnesium 2.2 1.6 - 2.4 mg/dL C REACTIVE PROTEIN, QT Collection Time: 01/05/21 10:26 AM  
Result Value Ref Range C-Reactive protein 8.26 (H) 0.00 - 0.60 mg/dL SED RATE, AUTOMATED Collection Time: 01/05/21 10:26 AM  
Result Value Ref Range Sed rate, automated 93 mm/hr PROCALCITONIN Collection Time: 01/05/21 10:26 AM  
Result Value Ref Range Procalcitonin 0.17 (H) 0 ng/mL XR CHEST PORT Final Result IMPRESSION:  
  
Single portable AP view compared to 12/30/2020. Left cardiac pacemaker/AICD  
intact and unchanged with one electrode in the region of the right ventricle. Mild cardiomegaly. No mediastinal widening or shift. There is new patchy consolidation in the right lower lobe and mild streaky  
parenchymal change in the right mid zone and left lower zone new from the prior  
study. No radiopaque foreign bodies. No edema or effusion or pneumothorax. NM INFLAM PROC LTD Final Result XR CHEST PORT Final Result Impression: No acute cardiopulmonary findings. IR PARACENTESIS ABD W IMAGE Final Result Impression:   
Successful paracentesis. NM BONE SCAN WH BODY Final Result US RETROPERITONEUM COMP Final Result Impression: No hydronephrosis. Ascites. Cirrhotic morphology. Splenomegaly. XR CHEST PORT Final Result Impression: No acute findings. CT ABD PELV WO CONT Final Result IMPRESSION:  
  
Liver cirrhosis. Mild splenomegaly. Upper abdominal and paraesophageal varices. Ascites. No bowel obstruction. Right lower lobe pulmonary nodule may represent tumor or other. Follow up is  
recommended to exclude malignancy. The results were called and verbally  
communicated to Dr. Kristen Stoll, on 12/26/2020 at 6:07 AM. Thoracolumbar compression fractures of indeterminate age, may be old. Small nonobstructive renal stones. Cholelithiasis. Small umbilical hernia. Assessment: 
Bacteremia with Streptococcus mitis/oralis and Streptococcus anginosus, clinical significance unclear. 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 
Continue lactulose Await SNF placement Care Plan discussed with:  Total time spent with patient: 30 minutes.  
 
Tona Kussmaul, MD 
 Patient/Caregiver provided printed discharge information.

## 2021-01-07 NOTE — PROGRESS NOTES
Physician Progress Note Geraldo Reynaga 
CSN #:                  [id number] :                       1955 ADMIT DATE:       2020 2:48 AM 
DISCH DATE: 
RESPONDING 
PROVIDER #:        Marquita Cabrera MD 
 
 
 
 
QUERY TEXT: 
 
Dr. Julieta Connelly, 
Pt admitted with bacteremia. Pt noted to have  elevated HR in the 130s, temp of 103 and  requiring 4L via NC . If possible, please document in the progress notes and discharge summary if you are evaluating and /or treating any of the following: The medical record reflects the following: 
Risk Factors: recent AICD placement Clinical Indicators: positive blood cultures x4 with HR in the 130s, temp of 103 and hypoxia Treatment: IV Vancomycin Thank you, Tatiana Lange RN Options provided: 
-- Streptococcal Sepsis, present on admission -- Streptococcal Sepsis, present on admission now resolved -- Sepsis was ruled out 
-- Other - I will add my own diagnosis -- Disagree - Not applicable / Not valid -- Disagree - Clinically unable to determine / Unknown 
-- Refer to Clinical Documentation Reviewer PROVIDER RESPONSE TEXT: 
 
This patient has Streptococcal sepsis which was present on admission. Query created by:  Tita Vilchis on 2021 2:51 PM 
 
 
Electronically signed by:  Marquita Cabrera MD 2021 2:37 PM

## 2021-01-07 NOTE — PROGRESS NOTES
Hospitalist Progress Note Daily Progress Note: 1/7/2021 Subjective: The patient is seen for follow up. Patient states that he is feeling well this morning. No new complaints. Case management continues to work on placement Problem List: 
Problem List as of 1/7/2021 Date Reviewed: 12/27/2020 Codes Class Noted - Resolved Liver cirrhosis (HCC) ICD-10-CM: K74.60 ICD-9-CM: 571.5  12/26/2020 - Present Pulmonary nodule ICD-10-CM: R91.1 ICD-9-CM: 793.11  12/26/2020 - Present Intractable back pain ICD-10-CM: M54.9 ICD-9-CM: 724.5  12/26/2020 - Present Closed compression fracture of L4 lumbar vertebra, [facility] encounter ([facility] ICD-10-CM: D26.819T ICD-9-CM: 805.4  12/26/2020 - Present Hypokalemia ICD-10-CM: E87.6 ICD-9-CM: 276.8  12/26/2020 - Present Medications reviewed Current Facility-Administered Medications Medication Dose Route Frequency  oxyCODONE IR (ROXICODONE) tablet 5 mg  5 mg Oral Q4H PRN  
 oxyCODONE IR (ROXICODONE) tablet 10 mg  10 mg Oral Q4H PRN  
 fluticasone propionate (FLONASE) 50 mcg/actuation nasal spray 2 Spray  2 Spray Both Nostrils DAILY  folic acid (FOLVITE) tablet 1 mg  1 mg Oral DAILY  furosemide (LASIX) tablet 40 mg  40 mg Oral DAILY  lactulose (CHRONULAC) 10 gram/15 mL solution 45 mL  30 g Oral TID  propranoloL (INDERAL) tablet 10 mg  10 mg Oral BID  albuterol-ipratropium (DUO-NEB) 2.5 MG-0.5 MG/3 ML  3 mL Nebulization Q6H RT  
 sodium chloride (NS) flush 5-40 mL  5-40 mL IntraVENous PRN  
 acetaminophen (TYLENOL) tablet 650 mg  650 mg Oral Q6H PRN Or  
 acetaminophen (TYLENOL) suppository 650 mg  650 mg Rectal Q6H PRN  polyethylene glycol (MIRALAX) packet 17 g  17 g Oral DAILY PRN  promethazine (PHENERGAN) tablet 12.5 mg  12.5 mg Oral Q6H PRN  Or  
 ondansetron (ZOFRAN) injection 4 mg  4 mg IntraVENous Q6H PRN  
  isosorbide mononitrate ER (IMDUR) tablet 30 mg  30 mg Oral DAILY  pantoprazole (PROTONIX) tablet 40 mg  40 mg Oral ACB  morphine injection 2 mg  2 mg IntraVENous Q4H PRN  
 heparin (porcine) injection 5,000 Units  5,000 Units SubCUTAneous Q8H  
 rifAXIMin (XIFAXAN) tablet 550 mg  550 mg Oral TID Review of Systems: A comprehensive review of systems was negative except for that written in the HPI. Objective:  
Physical Exam:  
 
Visit Vitals BP (!) 96/57 Pulse 98 Temp 98.1 °F (36.7 °C) Resp 20 Ht 6' 0.84\" (1.85 m) Wt 105.2 kg (231 lb 14.8 oz) SpO2 91% BMI 30.74 kg/m² O2 Flow Rate (L/min): 2 l/min O2 Device: Room air Temp (24hrs), Av.8 °F (37.1 °C), Min:98.1 °F (36.7 °C), Max:99.3 °F (37.4 °C) No intake/output data recorded.  1901 -  0700 In: 300 [P.O.:300] Out: 2000 [Urine:1300] General:   Awake and alert Lungs:   Clear to auscultation bilaterally. Chest wall:  No tenderness or deformity. Heart:  Regular rate and rhythm, S1, S2 normal, no murmur, click, rub or gallop. Abdomen:    Protuberant with obvious ascites Extremities: Extremities normal, atraumatic, no cyanosis or edema. Pulses: 2+ and symmetric all extremities. Skin: Skin color, texture, turgor normal. No rashes or lesions Neurologic: CNII-XII intact. No gross focal deficits Data Review:  
   
Recent Days: 
Recent Labs 21 
1026 21 
1035 WBC 5.8 6.3 HGB 8.1* 8.3* HCT 26.8* 28.0*  
* 149* Recent Labs 21 
1026 21 
1035  138  
K 3.3* 3.1*  
* 109* CO2 25 23 * 201* BUN 21* 27* CREA 1.67* 1.80* CA 8.2* 8.1*  
MG 2.2  -- No results for input(s): PH, PCO2, PO2, HCO3, FIO2 in the last 72 hours. 24 Hour Results: 
Recent Results (from the past 24 hour(s)) C REACTIVE PROTEIN, QT Collection Time: 21  2:32 AM  
Result Value Ref Range C-Reactive protein 5.86 (H) 0.00 - 0.60 mg/dL PROCALCITONIN  
 Collection Time: 01/07/21  2:32 AM  
Result Value Ref Range  
 Procalcitonin 0.12 (H) 0 ng/mL  
 
 
XR CHEST PORT  
Final Result  
IMPRESSION:  
  
Single portable AP view compared to 12/30/2020. Left cardiac pacemaker/AICD  
intact and unchanged with one electrode in the region of the right ventricle.  
Mild cardiomegaly. No mediastinal widening or shift.  
There is new patchy consolidation in the right lower lobe and mild streaky  
parenchymal change in the right mid zone and left lower zone new from the prior  
study.  
No radiopaque foreign bodies. No edema or effusion or pneumothorax.  
  
NM INFLAM PROC LTD  
Final Result  
  
XR CHEST PORT  
Final Result  
Impression:  
No acute cardiopulmonary findings.  
  
IR PARACENTESIS ABD W IMAGE  
Final Result  
Impression:   
Successful paracentesis.  
  
NM BONE SCAN WH BODY  
Final Result  
  
US RETROPERITONEUM COMP  
Final Result  
Impression: No hydronephrosis.  Ascites.  Cirrhotic morphology.  Splenomegaly.  
  
  
  
XR CHEST PORT  
Final Result  
Impression:  
No acute findings.  
  
CT ABD PELV WO CONT  
Final Result  
IMPRESSION:  
  
Liver cirrhosis. Mild splenomegaly. Upper abdominal and paraesophageal varices.  
Ascites.   
  
No bowel obstruction.  
  
Right lower lobe pulmonary nodule may represent tumor or other. Follow up is  
recommended to exclude malignancy. The results were called and verbally  
communicated to Dr. Pena, on 12/26/2020 at 6:07 AM.  
  
Thoracolumbar compression fractures of indeterminate age, may be old.  
  
Small nonobstructive renal stones.  
  
Cholelithiasis.  
  
Small umbilical hernia.  
  
  
 
Assessment: 
Bacteremia with Streptococcus mitis/oralis and Streptococcus anginosus, clinical significance unclear.   Completed course of Unasyn on 1/6. 
 
Thoracolumbar compression fractures 
 
Alcoholic cirrhosis with portal hypertension and recurrent ascites 
 
Hepatic encephalopathy 
 
 Acute kidney disease stage III Hypokalemia Anemia of chronic disease Plan: 
Continue lactulose Await SNF placement Care Plan discussed with:  Total time spent with patient: 30 minutes.  
 
Ignacio Tan MD

## 2021-01-07 NOTE — DISCHARGE SUMMARY
Physician Discharge Summary Patient ID:   
Alba Mabry [id number] 
66 y.o. 
1955 Admit date: 12/26/2020 Discharge date : 1/7/2021 Chronic Diagnoses:   
Problem List as of 1/7/2021 Date Reviewed: 12/27/2020 Codes Class Noted - Resolved Liver cirrhosis (HCC) ICD-10-CM: K74.60 ICD-9-CM: 571.5  12/26/2020 - Present Pulmonary nodule ICD-10-CM: R91.1 ICD-9-CM: 793.11  12/26/2020 - Present Intractable back pain ICD-10-CM: M54.9 ICD-9-CM: 724.5  12/26/2020 - Present Closed compression fracture of L4 lumbar vertebra, initial encounter ([facility] ICD-10-CM: Y47.908D ICD-9-CM: 805.4  12/26/2020 - Present Hypokalemia ICD-10-CM: E87.6 ICD-9-CM: 276.8  12/26/2020 - Present 25 Final Diagnoses:  
Intractable back pain [M54.9] Open compression fracture of thoracolumbar vertebra ([facility] [S22.080B, S32.010B] Liver cirrhosis ([facility] [D91.70] Pulmonary nodule [R91.1] Reason for Hospitalization: 
 72-year-old male who was initially admitted on 12/26 with low back pain. CT scan showed thoracolumbar compression fractures of indeterminate age. On admission he had a temperature of 101.8 Hospital Course:  
Patient was admitted to the medical floor. He was started on broad-spectrum antibiotics Blood cultures grew Streptococcus anginosus and Streptococcus mitis/oralis 
  
He was initially started on vancomycin. He was seen by ID. Transthoracic echocardiogram showed no vegetations. Bone scan showed  bandlike increased uptake in L4, felt indicate that the compression fracture was less than 3years old.   This was not felt to be consistent with osteomyelitis 
  
Antibiotics were switched to Unasyn 
  
Patient reportedly had an AICD replaced about 1 month prior to admission and is known to have a history of compression fractures. 
  
 He has a history of alcohol abuse with alcoholic liver disease and ascites requiring frequent paracentesis at [facility] Antibiotics were discontinued as of 1/6 Patient was felt appropriate for a skilled nursing facility for rehab and was finally accepted on 1/7 Discharge Medications:  
Current Discharge Medication List  
  
START taking these medications Details  
lactulose (CHRONULAC) 10 gram/15 mL solution Take 45 mL by mouth three (3) times daily. Qty: 500 mL, Refills: 0  
  
albuterol-ipratropium (DUO-NEB) 2.5 mg-0.5 mg/3 ml nebu 3 mL by Nebulization route every six (6) hours as needed for Wheezing. Qty: 30 Nebule, Refills: 0  
  
polyethylene glycol (MIRALAX) 17 gram packet Take 1 Packet by mouth daily. Qty: 1 Packet, Refills: 0  
  
rifAXIMin (XIFAXAN) 550 mg tablet Take 1 Tab by mouth three (3) times daily. Qty: 60 Tab, Refills: 0  
  
pantoprazole (PROTONIX) 40 mg tablet Take 1 Tab by mouth Daily (before breakfast). Qty: 30 Tab, Refills: 0  
  
acetaminophen (TYLENOL) 325 mg tablet Take 2 Tabs by mouth every six (6) hours as needed for Pain. Qty: 30 Tab, Refills: 0 CONTINUE these medications which have NOT CHANGED Details  
propranoloL (INDERAL) 20 mg tablet Take 20 mg by mouth two (2) times a day. folic acid (FOLVITE) 1 mg tablet Take 1 mg by mouth daily. isosorbide mononitrate ER (IMDUR) 30 mg tablet Take 30 mg by mouth daily. furosemide (LASIX) 40 mg tablet Take 40 mg by mouth daily. tiotropium (Spiriva with HandiHaler) 18 mcg inhalation capsule Take 1 Cap by inhalation daily. guaiFENesin (ORGANIDIN) 400 mg tablet Take 400 mg by mouth every four (4) hours as needed for Congestion. FLUTICASONE PROPION-SALMETEROL IN Take  by inhalation. Follow up Care: 1. Thanh Mcgarry MD in 1-2 weeks. Please call to set up an appointment shortly after discharge. Diet:  Cardiac Diet Disposition: 
SNF.  
 
Advanced Directive:  
FULL   
DNR   
 Discharge Exam: 
General:  Alert, cooperative, no distress, appears stated age. Lungs:   Clear to auscultation bilaterally. Chest wall:  No tenderness or deformity. Heart:  Regular rate and rhythm, S1, S2 normal, no murmur, click, rub or gallop. Abdomen:   Soft, non-tender. Bowel sounds normal. No masses,  No organomegaly. Extremities: Extremities normal, atraumatic, no cyanosis or edema. Pulses: 2+ and symmetric all extremities. Skin: Skin color, texture, turgor normal. No rashes or lesions Neurologic: CNII-XII intact. No gross sensory or motor deficits CONSULTATIONS: ID Significant Diagnostic Studies:  
12/26/2020: BUN 19 mg/dL (Ref range: 6 - 20 mg/dL); Calcium 8.7 mg/dL (Ref range: 8.5 - 10.1 mg/dL); CO2 25 mmol/L (Ref range: 21 - 32 mmol/L); Creatinine 1.56 mg/dL* (Ref range: 0.70 - 1.30 mg/dL); Glucose 120 mg/dL* (Ref range: 65 - 100 mg/dL); HCT 31.7 %* (Ref range: 36.6 - 50.3 %); HGB 9.6 g/dL* (Ref range: 12.1 - 17.0 g/dL); Potassium 3.2 mmol/L* (Ref range: 3.5 - 5.1 mmol/L); Sodium 140 mmol/L (Ref range: 136 - 145 mmol/L) 12/27/2020: BUN 20 mg/dL (Ref range: 6 - 20 mg/dL); BUN 21 mg/dL* (Ref range: 6 - 20 mg/dL); Calcium 7.8 mg/dL* (Ref range: 8.5 - 10.1 mg/dL); Calcium 8.1 mg/dL* (Ref range: 8.5 - 10.1 mg/dL); CO2 24 mmol/L (Ref range: 21 - 32 mmol/L); CO2 24 mmol/L (Ref range: 21 - 32 mmol/L); Creatinine 1.55 mg/dL* (Ref range: 0.70 - 1.30 mg/dL); Creatinine 1.50 mg/dL* (Ref range: 0.70 - 1.30 mg/dL); Glucose 128 mg/dL* (Ref range: 65 - 100 mg/dL); Glucose 101 mg/dL* (Ref range: 65 - 100 mg/dL); HCT 28.3 %* (Ref range: 36.6 - 50.3 %); HCT 29.9 %* (Ref range: 36.6 - 50.3 %); HGB 8.5 g/dL* (Ref range: 12.1 - 17.0 g/dL); HGB 9.0 g/dL* (Ref range: 12.1 - 17.0 g/dL); Potassium 3.4 mmol/L* (Ref range: 3.5 - 5.1 mmol/L); Potassium 3.5 mmol/L (Ref range: 3.5 - 5.1 mmol/L); Sodium 145 mmol/L (Ref range: 136 - 145 mmol/L); Sodium 145 mmol/L (Ref range: 136 - 145 mmol/L) Recent Labs 01/05/21 
1026 WBC 5.8 HGB 8.1* HCT 26.8*  
* Recent Labs 01/05/21 
1026   
K 3.3*  
* CO2 25 BUN 21* CREA 1.67* * CA 8.2* MG 2.2 No results for input(s): ALT, AP, TBIL, TBILI, TP, ALB, GLOB, GGT, AML, LPSE in the last 72 hours. No lab exists for component: SGOT, GPT, AMYP, HLPSE No results for input(s): INR, PTP, APTT, INREXT in the last 72 hours. No results for input(s): FE, TIBC, PSAT, FERR in the last 72 hours. No results for input(s): PH, PCO2, PO2 in the last 72 hours. No results for input(s): CPK, CKMB in the last 72 hours. No lab exists for component: TROPONINI Lab Results Component Value Date/Time Glucose (POC) 126 (H) 12/29/2020 07:36 PM  
 
 
Discharge time spent 35 minutes Signed: 
Ignacio Tan MD 
1/7/2021 
12:18 PM

## 2021-01-07 NOTE — PROGRESS NOTES
Patient has been accepted to [facility] and has a bed available today. He can go to Room 131B and nursing report can be called to [phone]. Notified patient at the bedside along with his significant other, Sveta Martin. They were provided all of the above information with no concern. All discharge information was faxed via [employer] to [facility].

## 2021-01-07 NOTE — PROGRESS NOTES
Progress Note Patient: Maik Schmid MRN: [id number]  SSN: [id number] YOB: 1955  Age: 60 y.o. Sex: male Admit Date: 2020 LOS: 11 days Subjective:  
Patient followed for bacteremia and suspected compression fracture lumbar spine. Ceretec scan felt to be negative for osteomyelitis. He is afebrile with normal WBC. He is currently on IV Unasyn. Patient resting comfortably with no complaints. Objective:  
 
Vitals:  
 21 1347 21 1647 21 1901 21 2129 BP:  (!) 98/55  (!) 101/47 Pulse:  (!) 112  (!) 106 Resp:  18  20 Temp:  99 °F (37.2 °C)  99.3 °F (37.4 °C) SpO2: 92% 94% 94% 94% Weight:      
Height:      
  
 
Intake and Output: 
Current Shift: No intake/output data recorded. Last three shifts: 701 - 1900 In: -  
Out: 8886 [DRRICARDO] Physical Exam:  
 Vitals signs and nursing note reviewed. Constitutional:   
   General: He is not in acute distress. Appearance: He is ill-appearing. Cardiovascular:  
   Rate and Rhythm: Normal rate and regular rhythm. Heart sounds: No murmur. Pulmonary:  
   Breath sounds: No wheezing, rhonchi or rales. Abdominal:  
   General: There is distension (with everted umbilicus). Palpations: Abdomen is soft. Tenderness: There is no abdominal tenderness Genitourinary: 
   Penis: Normal.   
   Comments: No Marquez Musculoskeletal:  
   Right lower leg: No edema. Left lower leg: No edema. Skin: 
   Findings: No rash. Neurological:  
   Mental Status: He is alert. He is disoriented. Psychiatric:     
   Mood and Affect: Mood normal.     
   Behavior: Behavior normal.  
  
 
Lab/Data Review: WBC 5,800 Procalcitonin 0.17 <0.30 < 0.38 
CRP 8.26 <9.75 <9.24 ESR 77 Blood cultures () Streptococcus mitis/oralis and Streptococcus anginosus Blood cultures () No growth FINAL Assessment:  
 
Active Problems: Liver cirrhosis ([facility] (12/26/2020) Pulmonary nodule (12/26/2020) Intractable back pain (12/26/2020) Closed compression fracture of L4 lumbar vertebra, initial encounter ([facility] (12/26/2020) Hypokalemia (12/26/2020) 1. Bacteremia with Streptococcus mitis/oralis and Streptococcus anginosus, clinical significance unclear, TTE negative for vegetations, Day #7 IV Unasyn. 2. Fever, possibly secondary to #1, resolved. 3. Abnormal uptake L4 vertebra, consistent with compression fracture, negative for osteomyelitis per Ceretec scan. 4. Alcoholic cirrhosis 
 5. Hepatic encephalopathy with elevated ammonia Comment:  Unclear why CRP is not decreasing. Plan: 1. Discontinue IV Unasyn after today 2. Will continue to follow  
  
 
Signed By: Alin Cruz MD   
 January 6, 2021

## 2021-01-07 NOTE — PROGRESS NOTES
I attempted to visit patient in [place] [address] med/surg unit during rounds. Only the patient was in the room during attempted visit. Mr. Julieta Saxena seemed to be sleeping and he did not respond to my knocking on door. I provided silent support and prayer. Chaplains will follow up if further referrals are requested. Chaplain Sudhir Roberts M.Div.  can be reached by calling the  at [facility] 
[phone]

## 2021-01-07 NOTE — PROGRESS NOTES
Discharge note:  
 
Patient being discharged to [facility]. [facility] transportation set up for 1700. Vitals are stable and no acute distress noted. Marquez and flexi removed. IV removed with catheter tip intact. Discharge paperwork and education provided. Report given to receiving nurse at [facility]. Discharge plan of care/case management plan validated with provider discharge order.

## 2021-01-07 NOTE — PROGRESS NOTES
PHYSICAL THERAPY TREATMENT Patient: William Vasquez (84 y.o. male) Date: 1/7/2021 Diagnosis: Intractable back pain [M54.9] Open compression fracture of thoracolumbar vertebra ([facility] [address] [S22.080B, S32.010B] Liver cirrhosis ([facility] [W70.35] Pulmonary nodule [R91.1] <principal problem not specified> Precautions: Bed Alarm Chart, physical therapy assessment, plan of care and goals were reviewed. ASSESSMENT Patient continues with skilled PT services and is progressing towards goals. Pt. Supine in bed upon arrival and agreeable to therapy session. Able to perform supine to sit transfers and maintain sitting balance for approximately 3 minutes before patient laid down from back pain. Able to do supine LE TE, and seated LE TE. Recommend DC to SNF. Current Level of Function Impacting Discharge (mobility/balance): PAIN Other factors to consider for discharge: TBD PLAN : 
Patient continues to benefit from skilled intervention to address the above impairments. Continue treatment per established plan of care. to address goals. Recommendation for discharge: (in order for the patient to meet his/her long term goals) Therapy up to 5 days/week in SNF setting This discharge recommendation: 
Has been made in collaboration with the attending provider and/or case management IF patient discharges home will need the following DME: to be determined (TBD) SUBJECTIVE:  
Patient stated IM TIRED BUD. OBJECTIVE DATA SUMMARY:  
Critical Behavior: 
Neurologic State: Confused Orientation Level: Oriented to person Cognition: Impaired decision making Functional Mobility Training: 
Bed Mobility: 
Rolling: Moderate assistance Supine to Sit: Moderate assistance Sit to Supine: Moderate assistance Scooting: Moderate assistance Transfers: 
  
  
     
  
     
  
  
  
  
Balance: 
Sitting: Impaired; With support Sitting - Static: Poor (constant support) Sitting - Dynamic: Poor (constant support) Ambulation/Gait Training: 
  
  
  
  
  
  
  
  
  
  
  
  
  
  
  
  
  
  
Stairs: Therapeutic Exercises:  
Therapeutic Exercises:  
 
 
EXERCISE Sets Reps Active Active Assist  
Passive Self ROM Comments Ankle Pumps  10 [x] [] [] [] Quad Sets/Glut Sets   [] [] [] [] Hamstring Sets   [] [] [] [] Short Arc Quads   [] [] [] [] Heel Slides  10 [x] [] [] [] Straight Leg Raises   [] [] [] [] Hip abd/add  10 [x] [] [] [] Long Arc Quads  10 [x] [] [] [] Marching  10 [x] [] [] []   
   [] [] [] []   
 
 
Pain Rating: 
CANT DESCRIBE Activity Tolerance:  
Fair Please refer to the flowsheet for vital signs taken during this treatment. After treatment patient left in no apparent distress:  
Supine in bed COMMUNICATION/COLLABORATION:  
The patients plan of care was discussed with: Physical therapy assistant. Cong Hernández Time Calculation: 9 mins

## 2021-01-07 NOTE — PROGRESS NOTES
Chart reviewed. Currently no SN[facility] Additional referrals have been sent and CM has called and left voice messages requesting returned call regarding bed availability.

## 2021-10-19 PROBLEM — K76.82 HEPATIC ENCEPHALOPATHY: Status: ACTIVE | Noted: 2021-01-01

## 2021-10-19 NOTE — ED PROVIDER NOTES
EMERGENCY DEPARTMENT HISTORY AND PHYSICAL EXAM      Date: 10/19/2021  Patient Name: Brant Marcano      History of Presenting Illness     Chief Complaint   Patient presents with    Altered mental status        History Provided By: Caregiver    HPI: Brant Marcano, 73 y.o. male with a past medical history significant Cirrhosis presents to the ED with cc of altered sensorium. Patient's not able to provide any history. He also appears not to understand the questions being asked. There are no other complaints, changes, or physical findings at this time. PCP: Venessa Melendez MD    Current Facility-Administered Medications   Medication Dose Route Frequency Provider Last Rate Last Admin    sodium chloride (NS) flush 5-40 mL  5-40 mL IntraVENous Q8H Kortney Segundo MD        sodium chloride (NS) flush 5-40 mL  5-40 mL IntraVENous PRN Kortney Segundo MD        lactulose (CHRONULAC) 10 gram/15 mL solution 45 mL  45 mL Oral BID Kortney Segundo MD         Current Outpatient Medications   Medication Sig Dispense Refill    lactulose (CHRONULAC) 10 gram/15 mL solution Take 45 mL by mouth three (3) times daily. 500 mL 0    albuterol-ipratropium (DUO-NEB) 2.5 mg-0.5 mg/3 ml nebu 3 mL by Nebulization route every six (6) hours as needed for Wheezing. 30 Nebule 0    polyethylene glycol (MIRALAX) 17 gram packet Take 1 Packet by mouth daily. 1 Packet 0    rifAXIMin (XIFAXAN) 550 mg tablet Take 1 Tab by mouth three (3) times daily. 60 Tab 0    pantoprazole (PROTONIX) 40 mg tablet Take 1 Tab by mouth Daily (before breakfast). 30 Tab 0    acetaminophen (TYLENOL) 325 mg tablet Take 2 Tabs by mouth every six (6) hours as needed for Pain. 30 Tab 0    propranoloL (INDERAL) 20 mg tablet Take 20 mg by mouth two (2) times a day.  folic acid (FOLVITE) 1 mg tablet Take 1 mg by mouth daily.  isosorbide mononitrate ER (IMDUR) 30 mg tablet Take 30 mg by mouth daily.       furosemide (LASIX) 40 mg tablet Take 40 mg by mouth daily.  tiotropium (Spiriva with HandiHaler) 18 mcg inhalation capsule Take 1 Cap by inhalation daily.  guaiFENesin (ORGANIDIN) 400 mg tablet Take 400 mg by mouth every four (4) hours as needed for Congestion.  FLUTICASONE PROPION-SALMETEROL IN Take  by inhalation. Past History     Past Medical History:  Past Medical History:   Diagnosis Date    AICD (automatic cardioverter/defibrillator) present     CAD (coronary artery disease)     Cirrhosis of liver ([address]     Gallstones     Hypertension     Nodule of lower lobe of right lung     Renal stones        Past Surgical History:  Past Surgical History:   Procedure Laterality Date    HX PACEMAKER PLACEMENT      IR PARACENTESIS ABD W IMAGE  12/30/2020       Family History:  No family history on file. Social History:  Social History     Tobacco Use    Smoking status: Current Every Day Smoker     Packs/day: 1.00   Substance Use Topics    Alcohol use: Yes    Drug use: Not Currently       Allergies:  No Known Allergies      Review of Systems     Review of Systems   Unable to perform ROS: Mental status change       Physical Exam     Physical Exam  Vitals and nursing note reviewed. Constitutional:       General: He is not in acute distress. Appearance: He is well-developed. HENT:      Head: Normocephalic and atraumatic. Nose: Nose normal.      Mouth/Throat:      Mouth: Mucous membranes are moist.      Pharynx: Oropharynx is clear. No oropharyngeal exudate. Eyes:      General:         Right eye: No discharge. Left eye: No discharge. Conjunctiva/sclera: Conjunctivae normal.      Pupils: Pupils are equal, round, and reactive to light. Cardiovascular:      Rate and Rhythm: Normal rate and regular rhythm. Chest Wall: PMI is not displaced. No thrill. Heart sounds: Normal heart sounds. No murmur heard. No friction rub. No gallop.     Pulmonary:      Effort: Pulmonary effort is normal. No respiratory distress. Breath sounds: Normal breath sounds. No wheezing or rales. Chest:      Chest wall: No tenderness. Abdominal:      General: Bowel sounds are normal. There is no distension. Palpations: Abdomen is soft. There is no mass. Tenderness: There is no abdominal tenderness. There is no guarding or rebound. Comments: Mild abdominal distention with caput medusa a, stitch site at right lower quadrant   Musculoskeletal:         General: Normal range of motion. Cervical back: Normal range of motion and neck supple. Lymphadenopathy:      Cervical: No cervical adenopathy. Skin:     General: Skin is warm and dry. Capillary Refill: Capillary refill takes less than 2 seconds. Findings: No erythema or rash. Neurological:      Mental Status: He is alert and oriented to person, place, and time. Cranial Nerves: No cranial nerve deficit. Coordination: Coordination normal.   Psychiatric:      Comments: Altered sensorium, difficult to assess         Lab and Diagnostic Study Results     Labs -     Recent Results (from the past 12 hour(s))   CBC WITH AUTOMATED DIFF    Collection Time: 10/19/21  3:20 PM   Result Value Ref Range    WBC 4.1 4.1 - 11.1 K/uL    RBC 3.82 (L) 4.10 - 5.70 M/uL    HGB 12.5 12.1 - 17.0 g/dL    HCT 38.4 36.6 - 50.3 %    .5 (H) 80.0 - 99.0 FL    MCH 32.7 26.0 - 34.0 PG    MCHC 32.6 30.0 - 36.5 g/dL    RDW 18.9 (H) 11.5 - 14.5 %    PLATELET 286 (L) 424 - 400 K/uL    MPV 10.0 8.9 - 12.9 FL    NRBC 0.0 0.0  WBC    ABSOLUTE NRBC 0.00 0.00 - 0.01 K/uL    NEUTROPHILS 55 32 - 75 %    LYMPHOCYTES 17 12 - 49 %    MONOCYTES 24 (H) 5 - 13 %    EOSINOPHILS 3 0 - 7 %    BASOPHILS 1 0 - 1 %    IMMATURE GRANULOCYTES 0 0 - 0.5 %    ABS. NEUTROPHILS 2.2 1.8 - 8.0 K/UL    ABS. LYMPHOCYTES 0.7 (L) 0.8 - 3.5 K/UL    ABS. MONOCYTES 1.0 0.0 - 1.0 K/UL    ABS. EOSINOPHILS 0.1 0.0 - 0.4 K/UL    ABS. BASOPHILS 0.0 0.0 - 0.1 K/UL    ABS. IMM.  GRANS. 0.0 0.00 - 0.04 K/UL DF AUTOMATED     METABOLIC PANEL, COMPREHENSIVE    Collection Time: 10/19/21  3:20 PM   Result Value Ref Range    Sodium 141 136 - 145 mmol/L    Potassium 3.1 (L) 3.5 - 5.1 mmol/L    Chloride 105 97 - 108 mmol/L    CO2 29 21 - 32 mmol/L    Anion gap 7 5 - 15 mmol/L    Glucose 110 (H) 65 - 100 mg/dL    BUN 16 6 - 20 mg/dL    Creatinine 1.98 (H) 0.70 - 1.30 mg/dL    BUN/Creatinine ratio 8 (L) 12 - 20      GFR est AA 41 (L) >60 ml/min/1.73m2    GFR est non-AA 34 (L) >60 ml/min/1.73m2    Calcium 8.6 8.5 - 10.1 mg/dL    Bilirubin, total 1.1 (H) 0.2 - 1.0 mg/dL    AST (SGOT) 31 15 - 37 U/L    ALT (SGPT) 15 12 - 78 U/L    Alk. phosphatase 129 (H) 45 - 117 U/L    Protein, total 7.5 6.4 - 8.2 g/dL    Albumin 2.3 (L) 3.5 - 5.0 g/dL    Globulin 5.2 (H) 2.0 - 4.0 g/dL    A-G Ratio 0.4 (L) 1.1 - 2.2     AMMONIA    Collection Time: 10/19/21  3:20 PM   Result Value Ref Range    Ammonia 148 (H) <32 umol/L   ETHYL ALCOHOL    Collection Time: 10/19/21  3:20 PM   Result Value Ref Range    ALCOHOL(ETHYL),SERUM <4 <10 mg/dL   MAGNESIUM    Collection Time: 10/19/21  3:20 PM   Result Value Ref Range    Magnesium 2.0 1.6 - 2.4 mg/dL       Radiologic Studies -   [unfilled]  CT Results  (Last 48 hours)               10/19/21 1536  CT HEAD WO CONT Final result    Impression:  No evidence of acute intracranial process. Narrative:  CT HEAD WITHOUT IV CONTRAST       CLINICAL INDICATION: Altered mental status       TECHNIQUE: Routine axial images were obtained through the brain without the use   of IV contrast. Sagittal and coronal reformatted images were performed at the CT   console. Dose reduction: Per department policy, all CT scans at this facility   are performed using dose reduction optimization techniques as appropriate to a   performed examination including the following: Automated exposure control,   adjustments of the mA and/or KV according to patient size, or use of iterative   reconstruction technique. COMPARISON: None. FINDINGS:    Motion degraded, limited exam.   No evidence of acute intracranial hemorrhage or mass effect. Mild prominence of the extra-axial spaces, with commensurate ventricular   enlargement. No hydrocephalus. Portions of the posterior fossa not obscured by streak artifact are   unremarkable. Absent bilateral native lenses. Paranasal sinuses are predominantly clear. Tympanomastoid cavities are clear. No acute calvarial abnormality. Atherosclerotic calcification of the internal carotid arteries. CXR Results  (Last 48 hours)               10/19/21 1603  XR CHEST PORT Final result    Narrative:  1 view comparison January 3       New/enlarging masses in the right lower lung. There may be another nodule in the   left base. Upper lungs are clear. No effusion. Normal heart and mediastinum             Medical Decision Making and ED Course   - I am the first and primary provider for this patient AND AM THE PRIMARY PROVIDER OF RECORD. - I reviewed the vital signs, available nursing notes, past medical history, past surgical history, family history and social history. - Initial assessment performed. The patients presenting problems have been discussed, and the staff are in agreement with the care plan formulated and outlined with them. I have encouraged them to ask questions as they arise throughout their visit. Vital Signs-Reviewed the patient's vital signs. Patient Vitals for the past 12 hrs:   Pulse Resp BP SpO2   10/19/21 1510 84 24 (!) 129/90 96 %       EKG interpretation: (Preliminary): Performed at 1526x, and read at 1527Rhythm: Ventricular rate 83 bpm,  ms, QRS duration 106 ms,  ms. Interpretation: Normal sinus rhythm with right superior axis deviation. Low voltage QRS. Abnormal EKG.     Records Reviewed: Nursing Notes    The patient presents with altered sensorium with a differential diagnosis of hepatic encephalopathy, electrolyte abnormality, acute renal failure, CVA, TIA, UTI. Will assess with stroke work-up and assess other metabolites. ED Course:   Dialysis been updated on the patient's condition and agrees to have him admitted here           Provider Notes (Medical Decision Making):   81-year-old male with known history of hepatic CA presents with hyperammonemia and hepatic encephalopathy. No other appreciable complications at this point time. We discussed with the family they are in agreement with care plan as outlined to have the patient admitted. Lactulose has been ordered. MDM           Consultations:       Consultations: -  Hospitalist Consultant: Dr. Kiesha Mustafa: We have asked for emergent assistance with regard to this patient. We have discussed the patients HPI, ROS, PE and results this far. They will come and evaluate the patient for admission. Procedures and Critical Care       Performed by: Jm Zuñiga MD  PROCEDURES:  Procedures               Disposition     Disposition: Condition stable    Discharged  Diagnosis     Clinical Impression:   1. Hepatic encephalopathy ([address]        Attestations:    Jm Zuñiga MD    Please note that this dictation was completed with [employer], the computer voice recognition software. Quite often unanticipated grammatical, syntax, homophones, and other interpretive errors are inadvertently transcribed by the computer software. Please disregard these errors. Please excuse any errors that have escaped final proofreading. Thank you.

## 2021-10-19 NOTE — MED STUDENT NOTES
History and Physical    Subjective:     Mireille Cowan is a 74 yo male with a history of chronic alcohol use, cirrhosis, and CAD s/p AICD placement who presented to the ED with AMS, reportedly forgetting his name. He is being seen every 6 weeks at [facility] for management as well as recurrent paracenteses. Today he is oriented only to name. He reports running out of his Chronulac 6-7 days ago. Upon arrival to the ED, platelet and albumin levels were low and potassium levels were 3.1. Patient admits to confusion and offers no other complaints; he denies abdominal pain, dizziness, chest pain, shortness of breath, and headache. Past Medical History:   Diagnosis Date    AICD (automatic cardioverter/defibrillator) present     CAD (coronary artery disease)     Cirrhosis of liver ([address]     Gallstones     Hypertension     Nodule of lower lobe of right lung     Renal stones       Past Surgical History:   Procedure Laterality Date    HX PACEMAKER PLACEMENT      IR PARACENTESIS ABD W IMAGE  12/30/2020     No family history on file. Social History     Tobacco Use    Smoking status: Current Every Day Smoker     Packs/day: 1.00   Substance Use Topics    Alcohol use: Yes       Prior to Admission medications    Medication Sig Start Date End Date Taking? Authorizing Provider   lactulose (CHRONULAC) 10 gram/15 mL solution Take 45 mL by mouth three (3) times daily. 1/7/21   Oriana Unger MD   albuterol-ipratropium (DUO-NEB) 2.5 mg-0.5 mg/3 ml nebu 3 mL by Nebulization route every six (6) hours as needed for Wheezing. 1/7/21   Venus Jackson MD   polyethylene glycol (MIRALAX) 17 gram packet Take 1 Packet by mouth daily. 1/7/21   Oriana Unger MD   rifAXIMin Colletta Payton) 550 mg tablet Take 1 Tab by mouth three (3) times daily. 1/7/21   Oriana Unger MD   pantoprazole (PROTONIX) 40 mg tablet Take 1 Tab by mouth Daily (before breakfast).  1/8/21   Oriana Unger MD   acetaminophen (TYLENOL) 325 mg tablet Take 2 Tabs by mouth every six (6) hours as needed for Pain. 1/7/21   Sol Unger MD   propranoloL (INDERAL) 20 mg tablet Take 20 mg by mouth two (2) times a day. Leslie Graves MD   folic acid (FOLVITE) 1 mg tablet Take 1 mg by mouth daily. Leslie Graves MD   isosorbide mononitrate ER (IMDUR) 30 mg tablet Take 30 mg by mouth daily. Leslie Graves MD   furosemide (LASIX) 40 mg tablet Take 40 mg by mouth daily. Leslie Graves MD   tiotropium (Spiriva with HandiHaler) 18 mcg inhalation capsule Take 1 Cap by inhalation daily. Leslie Graves MD   guaiFENesin (ORGANIDIN) 400 mg tablet Take 400 mg by mouth every four (4) hours as needed for Congestion. Leslie Graves MD   FLUTICASONE PROPION-SALMETEROL IN Take  by inhalation. Provider, Historical     No Known Allergies     Review of Systems:  A comprehensive review of systems was negative except for that written in the History of Present Illness. Objective: Intake and Output:    No intake/output data recorded. No intake/output data recorded. Physical Exam:   Visit Vitals  BP (!) 129/90   Pulse 84   Resp 24   Ht 6' 1\" (1.854 m)   Wt 230 lb (104.3 kg)   SpO2 96%   BMI 30.34 kg/m²     General:  Alert, cooperative, no distress, appears stated age. Head:  Normocephalic, without obvious abnormality, atraumatic. Eyes:  Conjunctivae/corneas clear. PERRL, EOMs intact. Ears:  Normal TMs and external ear canals both ears. Nose: Nares normal. Septum midline. Mucosa normal. No drainage or sinus tenderness. Throat: Lips, mucosa, and tongue normal. Teeth and gums normal.   Neck: Supple, symmetrical, trachea midline, no adenopathy, thyroid: no enlargement/tenderness/nodules, no carotid bruit and no JVD. Back:   Symmetric, no curvature. ROM normal. No CVA tenderness. Lungs:   Clear to auscultation bilaterally. Chest wall:  No tenderness or deformity. Heart:  Regular rate and rhythm, S1, S2 normal, no murmur, click, rub or gallop. Abdomen:   Fluid wave present; no distension; hepatomegaly   Extremities: Extremities normal, atraumatic, no cyanosis or edema. Skin: Spider angiomas, large veins on abdomen; multiple ecchymoses   Neurologic: Confusion; oriented x1       Data Review:   Recent Results (from the past 24 hour(s))   CBC WITH AUTOMATED DIFF    Collection Time: 10/19/21  3:20 PM   Result Value Ref Range    WBC 4.1 4.1 - 11.1 K/uL    RBC 3.82 (L) 4.10 - 5.70 M/uL    HGB 12.5 12.1 - 17.0 g/dL    HCT 38.4 36.6 - 50.3 %    .5 (H) 80.0 - 99.0 FL    MCH 32.7 26.0 - 34.0 PG    MCHC 32.6 30.0 - 36.5 g/dL    RDW 18.9 (H) 11.5 - 14.5 %    PLATELET 308 (L) 518 - 400 K/uL    MPV 10.0 8.9 - 12.9 FL    NRBC 0.0 0.0  WBC    ABSOLUTE NRBC 0.00 0.00 - 0.01 K/uL    NEUTROPHILS 55 32 - 75 %    LYMPHOCYTES 17 12 - 49 %    MONOCYTES 24 (H) 5 - 13 %    EOSINOPHILS 3 0 - 7 %    BASOPHILS 1 0 - 1 %    IMMATURE GRANULOCYTES 0 0 - 0.5 %    ABS. NEUTROPHILS 2.2 1.8 - 8.0 K/UL    ABS. LYMPHOCYTES 0.7 (L) 0.8 - 3.5 K/UL    ABS. MONOCYTES 1.0 0.0 - 1.0 K/UL    ABS. EOSINOPHILS 0.1 0.0 - 0.4 K/UL    ABS. BASOPHILS 0.0 0.0 - 0.1 K/UL    ABS. IMM. GRANS. 0.0 0.00 - 0.04 K/UL    DF AUTOMATED     METABOLIC PANEL, COMPREHENSIVE    Collection Time: 10/19/21  3:20 PM   Result Value Ref Range    Sodium 141 136 - 145 mmol/L    Potassium 3.1 (L) 3.5 - 5.1 mmol/L    Chloride 105 97 - 108 mmol/L    CO2 29 21 - 32 mmol/L    Anion gap 7 5 - 15 mmol/L    Glucose 110 (H) 65 - 100 mg/dL    BUN 16 6 - 20 mg/dL    Creatinine 1.98 (H) 0.70 - 1.30 mg/dL    BUN/Creatinine ratio 8 (L) 12 - 20      GFR est AA 41 (L) >60 ml/min/1.73m2    GFR est non-AA 34 (L) >60 ml/min/1.73m2    Calcium 8.6 8.5 - 10.1 mg/dL    Bilirubin, total 1.1 (H) 0.2 - 1.0 mg/dL    AST (SGOT) 31 15 - 37 U/L    ALT (SGPT) 15 12 - 78 U/L    Alk.  phosphatase 129 (H) 45 - 117 U/L    Protein, total 7.5 6.4 - 8.2 g/dL    Albumin 2.3 (L) 3.5 - 5.0 g/dL    Globulin 5.2 (H) 2.0 - 4.0 g/dL    A-G Ratio 0.4 (L) 1.1 - 2.2     AMMONIA    Collection Time: 10/19/21  3:20 PM   Result Value Ref Range    Ammonia 148 (H) <32 umol/L   ETHYL ALCOHOL    Collection Time: 10/19/21  3:20 PM   Result Value Ref Range    ALCOHOL(ETHYL),SERUM <4 <10 mg/dL   MAGNESIUM    Collection Time: 10/19/21  3:20 PM   Result Value Ref Range    Magnesium 2.0 1.6 - 2.4 mg/dL   EKG, 12 LEAD, INITIAL    Collection Time: 10/19/21  3:26 PM   Result Value Ref Range    Ventricular Rate 83 BPM    Atrial Rate 83 BPM    P-R Interval 168 ms    QRS Duration 106 ms    Q-T Interval 440 ms    QTC Calculation (Bezet) 517 ms    Calculated P Axis 76 degrees    Calculated R Axis -99 degrees    Calculated T Axis 25 degrees    Diagnosis       Normal sinus rhythm  Right superior axis deviation  Low voltage QRS  Abnormal ECG  When compared with ECG of 26-DEC-2020 21:04,  No significant change was found  Confirmed by Gil Cespedes ([id number]) on 10/19/2021 5:17:00 PM         XR CHEST PORT   Final Result      CT HEAD WO CONT   Final Result   No evidence of acute intracranial process. Assessment:     1. Cirrhosis    2. Altered mental status    3. Hepatic encephalopathy    4. Chronic alcohol use    5. Medication nonadherence    *ATTENTION:  This note has been created by a medical student for educational purposes only. Please do not refer to the content of this note for clinical decision-making, billing, or other purposes. Please see attending physicians note to obtain clinical information on this patient. *

## 2021-10-19 NOTE — H&P
History & Physical    Primary Care Provider: Venessa Melendez MD  Source of Information: Patient     History of Presenting Illness:   Brant Marcano is a 72 yo male with a history of chronic alcohol use, cirrhosis, and CAD s/p AICD placement who presented to the ED with AMS, reportedly forgetting his name. He is being seen every 6 weeks at [facility] for management as well as recurrent paracenteses. Today he is oriented only to name. He reports running out of his Chronulac(lactulose) 6-7 days ago. Upon arrival to the ED, platelet and albumin levels were low and potassium levels were 3.1. Ammonia level at 148. Patient admits to confusion and offers no other complaints; he denies abdominal pain, dizziness, chest pain, shortness of breath, and headache. Review of Systems:  A comprehensive review of systems was negative except for that written in the History of Present Illness. Past Medical History:   Diagnosis Date    AICD (automatic cardioverter/defibrillator) present     CAD (coronary artery disease)     Cirrhosis of liver ([address]     Gallstones     Hypertension     Nodule of lower lobe of right lung     Renal stones       Past Surgical History:   Procedure Laterality Date    HX PACEMAKER PLACEMENT      IR PARACENTESIS ABD W IMAGE  12/30/2020     Prior to Admission medications    Medication Sig Start Date End Date Taking? Authorizing Provider   lactulose (CHRONULAC) 10 gram/15 mL solution Take 45 mL by mouth three (3) times daily. 1/7/21   Sol Unger MD   albuterol-ipratropium (DUO-NEB) 2.5 mg-0.5 mg/3 ml nebu 3 mL by Nebulization route every six (6) hours as needed for Wheezing. 1/7/21   Sonali Langford MD   polyethylene glycol (MIRALAX) 17 gram packet Take 1 Packet by mouth daily. 1/7/21   Sol Unger MD   rifAXIMin Genevia Harden) 550 mg tablet Take 1 Tab by mouth three (3) times daily.  1/7/21   Sol Unger MD   pantoprazole (PROTONIX) 40 mg tablet Take 1 Tab by mouth Daily (before breakfast). 1/8/21   Rangel Unger MD   acetaminophen (TYLENOL) 325 mg tablet Take 2 Tabs by mouth every six (6) hours as needed for Pain. 1/7/21   Rangel Unger MD   propranoloL (INDERAL) 20 mg tablet Take 20 mg by mouth two (2) times a day. Leslie Graves MD   folic acid (FOLVITE) 1 mg tablet Take 1 mg by mouth daily. Leslie Graves MD   isosorbide mononitrate ER (IMDUR) 30 mg tablet Take 30 mg by mouth daily. Leslie Graves MD   furosemide (LASIX) 40 mg tablet Take 40 mg by mouth daily. Leslie Graves MD   tiotropium (Spiriva with HandiHaler) 18 mcg inhalation capsule Take 1 Cap by inhalation daily. Leslie Graves MD   guaiFENesin (ORGANIDIN) 400 mg tablet Take 400 mg by mouth every four (4) hours as needed for Congestion. Leslie Graves MD   FLUTICASONE PROPION-SALMETEROL IN Take  by inhalation. Provider, Historical     No Known Allergies     FH: reviewed and non contributory    SOCIAL HISTORY:  Patient resides:  Independently    Assisted Living    SNF    With family care x      Smoking history:   None x   Former    Chronic      Alcohol history:   None x   Social    Chronic      Ambulates:   Independently x   w/cane    w/walker    w/wc    CODE STATUS:  DNR    Full x   Other      Objective:     Physical Exam:     Visit Vitals  BP (!) 129/90   Pulse 84   Resp 24   Ht 6' 1\" (1.854 m)   Wt 104.3 kg (230 lb)   SpO2 96%   BMI 30.34 kg/m²      O2 Device: None (Room air)    General:  Alert, intermittently confused   Head:  Normocephalic, without obvious abnormality, atraumatic. Eyes:  Conjunctivae/corneas clear. PERRL, EOMs intact. Nose: Nares normal. Septum midline. Mucosa normal. No drainage or sinus tenderness. Throat: Lips, mucosa, and tongue normal. Teeth and gums normal.   Neck: Supple, symmetrical, trachea midline, no adenopathy, thyroid: no enlargement/tenderness/nodules, no carotid bruit and no JVD. Back:   Symmetric, no curvature.  ROM normal. No CVA tenderness. Lungs:   Clear to auscultation bilaterally. Chest wall:  No tenderness or deformity. Heart:  Regular rate and rhythm, S1, S2 normal, no murmur, click, rub or gallop. Abdomen:   Soft, non-tender. Bowel sounds normal. No masses,  No organomegaly. Extremities: Extremities normal, atraumatic, no cyanosis or edema. Pulses: 2+ and symmetric all extremities. Skin: Skin color, texture, turgor normal. No rashes or lesions   Neurologic: CNII-XII intact. No motor or sensory deficits. EKG:  nonspecific ST and T waves changes. Data Review:     Recent Days:  Recent Labs     10/19/21  1520   WBC 4.1   HGB 12.5   HCT 38.4   *     Recent Labs     10/19/21  1520      K 3.1*      CO2 29   *   BUN 16   CREA 1.98*   CA 8.6   MG 2.0   ALB 2.3*   TBILI 1.1*   ALT 15     No results for input(s): PH, PCO2, PO2, HCO3, FIO2 in the last 72 hours. 24 Hour Results:  Recent Results (from the past 24 hour(s))   CBC WITH AUTOMATED DIFF    Collection Time: 10/19/21  3:20 PM   Result Value Ref Range    WBC 4.1 4.1 - 11.1 K/uL    RBC 3.82 (L) 4.10 - 5.70 M/uL    HGB 12.5 12.1 - 17.0 g/dL    HCT 38.4 36.6 - 50.3 %    .5 (H) 80.0 - 99.0 FL    MCH 32.7 26.0 - 34.0 PG    MCHC 32.6 30.0 - 36.5 g/dL    RDW 18.9 (H) 11.5 - 14.5 %    PLATELET 494 (L) 019 - 400 K/uL    MPV 10.0 8.9 - 12.9 FL    NRBC 0.0 0.0  WBC    ABSOLUTE NRBC 0.00 0.00 - 0.01 K/uL    NEUTROPHILS 55 32 - 75 %    LYMPHOCYTES 17 12 - 49 %    MONOCYTES 24 (H) 5 - 13 %    EOSINOPHILS 3 0 - 7 %    BASOPHILS 1 0 - 1 %    IMMATURE GRANULOCYTES 0 0 - 0.5 %    ABS. NEUTROPHILS 2.2 1.8 - 8.0 K/UL    ABS. LYMPHOCYTES 0.7 (L) 0.8 - 3.5 K/UL    ABS. MONOCYTES 1.0 0.0 - 1.0 K/UL    ABS. EOSINOPHILS 0.1 0.0 - 0.4 K/UL    ABS. BASOPHILS 0.0 0.0 - 0.1 K/UL    ABS. IMM.  GRANS. 0.0 0.00 - 0.04 K/UL    DF AUTOMATED     METABOLIC PANEL, COMPREHENSIVE    Collection Time: 10/19/21  3:20 PM   Result Value Ref Range    Sodium 141 136 - 145 mmol/L    Potassium 3.1 (L) 3.5 - 5.1 mmol/L    Chloride 105 97 - 108 mmol/L    CO2 29 21 - 32 mmol/L    Anion gap 7 5 - 15 mmol/L    Glucose 110 (H) 65 - 100 mg/dL    BUN 16 6 - 20 mg/dL    Creatinine 1.98 (H) 0.70 - 1.30 mg/dL    BUN/Creatinine ratio 8 (L) 12 - 20      GFR est AA 41 (L) >60 ml/min/1.73m2    GFR est non-AA 34 (L) >60 ml/min/1.73m2    Calcium 8.6 8.5 - 10.1 mg/dL    Bilirubin, total 1.1 (H) 0.2 - 1.0 mg/dL    AST (SGOT) 31 15 - 37 U/L    ALT (SGPT) 15 12 - 78 U/L    Alk. phosphatase 129 (H) 45 - 117 U/L    Protein, total 7.5 6.4 - 8.2 g/dL    Albumin 2.3 (L) 3.5 - 5.0 g/dL    Globulin 5.2 (H) 2.0 - 4.0 g/dL    A-G Ratio 0.4 (L) 1.1 - 2.2     AMMONIA    Collection Time: 10/19/21  3:20 PM   Result Value Ref Range    Ammonia 148 (H) <32 umol/L   ETHYL ALCOHOL    Collection Time: 10/19/21  3:20 PM   Result Value Ref Range    ALCOHOL(ETHYL),SERUM <4 <10 mg/dL   MAGNESIUM    Collection Time: 10/19/21  3:20 PM   Result Value Ref Range    Magnesium 2.0 1.6 - 2.4 mg/dL   EKG, 12 LEAD, INITIAL    Collection Time: 10/19/21  3:26 PM   Result Value Ref Range    Ventricular Rate 83 BPM    Atrial Rate 83 BPM    P-R Interval 168 ms    QRS Duration 106 ms    Q-T Interval 440 ms    QTC Calculation (Bezet) 517 ms    Calculated P Axis 76 degrees    Calculated R Axis -99 degrees    Calculated T Axis 25 degrees    Diagnosis       Normal sinus rhythm  Right superior axis deviation  Low voltage QRS  Abnormal ECG  When compared with ECG of 26-DEC-2020 21:04,  No significant change was found  Confirmed by Taco Howard ([id number]) on 10/19/2021 5:17:00 PM           Imaging:   XR CHEST PORT   Final Result      CT HEAD WO CONT   Final Result   No evidence of acute intracranial process.                Assessment:     Acute hepatic encephalopathy  Chronic liver cirrhosis secondary to hepatitis C  Chronic alcohol abuse  Medication noncompliance  Generalized debilitation from medical illness        Plan:     Admit to medical telemetry floor inpatient  Restart home dose lactulose 45 g 3 times daily  Recheck ammonia level in a.m.   Avoid hepatotoxic medications  We will obtain PT OT eval once mentation is fully improve  Anticipate home with home health in 24 to 48 hours pending clinical improvement    Signed By: Kleber Cook MD     October 19, 2021

## 2021-10-20 NOTE — PROGRESS NOTES
Problem: Falls - Risk of  Goal: *Absence of Falls  Description: Document Jon Toro Fall Risk and appropriate interventions in the flowsheet.   Outcome: Progressing Towards Goal  Note: Fall Risk Interventions:  Mobility Interventions: Bed/chair exit alarm    Mentation Interventions: Bed/chair exit alarm    Medication Interventions: Bed/chair exit alarm    Elimination Interventions: Bed/chair exit alarm, Call light in reach

## 2021-10-20 NOTE — ROUTINE PROCESS
Two person new admission skin assessment performed by myself and Kait Mederos RN. Patient has scattered bruising and skin tears on bilateral upper and lower extremities. Patient noted to have a stitch on his right lower abdomen, states it is from previous paracentesis.
pregnancy problem

## 2021-10-20 NOTE — CONSULTS
Hematology Oncology Consultation    Reason for consult: Lung mass    Admitting Diagnosis: Hepatic encephalopathy ([facility] [address] [K72.90]    Impression:   1. Lung cancer  -untreated  -[facility] [place] [facility]  -CT brain for confusion from alcohol withdraw without obvious CNS metastatic disease  -will try and obtain records. He thinks he just saw his oncologist there, but cannot recall getting any treatment, name of his oncologist or other details about his disease  -outpatient restaging imaging  -discussed with lung navagator to help with coordinattion of his care  -concerned that based on overall health and complaince he will not be a treatment candidate    2. Alcohol abuse/withdraw  3. Chronic pain  4. Cirrhosis  5. CAD  6. AICD placement      Feel free to call with questions    Discussion: Sushila Garcia is a  73y.o. year old seen in consultation at the request of Dr. Julio Persaud for lung mass. He presents for progressive hepatic encephalopathy as a result of medical noncompliance. He has cirrhosis from alcohol abuse and is currently pleasantly confused and not reliable in his history. Imaging revealed a lung mass, which his family reports was first found in Jan.  [facility] followed with [facility] [place] [facility]. Family is not sure of treatment details, nor even name of his cancer provider. He has no notes in the [facility] system.   He currently is improving with respect to his hepatic encephalopathy  Imaging:    Imaging reviewed    Labs:    Recent Results (from the past 24 hour(s))   PROTHROMBIN TIME + INR    Collection Time: 10/19/21  9:25 PM   Result Value Ref Range    Prothrombin time 17.4 (H) 11.9 - 14.7 sec    INR 1.4 (H) 0.9 - 1.1     METABOLIC PANEL, BASIC    Collection Time: 10/20/21  2:00 AM   Result Value Ref Range    Sodium 142 136 - 145 mmol/L    Potassium 2.9 (L) 3.5 - 5.1 mmol/L    Chloride 107 97 - 108 mmol/L    CO2 28 21 - 32 mmol/L    Anion gap 7 5 - 15 mmol/L    Glucose 114 (H) 65 - 100 mg/dL    BUN 16 6 - 20 mg/dL    Creatinine 1.79 (H) 0.70 - 1.30 mg/dL    BUN/Creatinine ratio 9 (L) 12 - 20      GFR est AA 46 (L) >60 ml/min/1.73m2    GFR est non-AA 38 (L) >60 ml/min/1.73m2    Calcium 8.5 8.5 - 10.1 mg/dL   LACTIC ACID    Collection Time: 10/20/21  2:00 AM   Result Value Ref Range    Lactic acid 2.0 0.4 - 2.0 mmol/L   CBC W/O DIFF    Collection Time: 10/20/21  2:00 AM   Result Value Ref Range    WBC 3.4 (L) 4.1 - 11.1 K/uL    RBC 3.76 (L) 4.10 - 5.70 M/uL    HGB 12.1 12.1 - 17.0 g/dL    HCT 37.6 36.6 - 50.3 %    .0 (H) 80.0 - 99.0 FL    MCH 32.2 26.0 - 34.0 PG    MCHC 32.2 30.0 - 36.5 g/dL    RDW 18.9 (H) 11.5 - 14.5 %    PLATELET 94 (L) 779 - 400 K/uL    MPV 10.4 8.9 - 12.9 FL    NRBC 0.0 0.0  WBC    ABSOLUTE NRBC 0.00 0.00 - 0.01 K/uL       History:  Past Medical History:   Diagnosis Date    AICD (automatic cardioverter/defibrillator) present     CAD (coronary artery disease)     Cirrhosis of liver ([facility])     Gallstones     Hypertension     Nodule of lower lobe of right lung     Renal stones       Past Surgical History:   Procedure Laterality Date    HX PACEMAKER PLACEMENT      IR PARACENTESIS ABD W IMAGE  12/30/2020      Prior to Admission medications    Medication Sig Start Date End Date Taking? Authorizing Provider   albuterol (PROVENTIL HFA, VENTOLIN HFA, PROAIR HFA) 90 mcg/actuation inhaler Take 2 Puffs by inhalation every four to six (4-6) hours as needed. 10/2/21  Yes Provider, Historical   diclofenac (VOLTAREN) 1 % gel Apply  to affected area daily. 9/17/21  Yes Provider, Historical   ondansetron hcl (ZOFRAN) 4 mg tablet Take 1 Tablet by mouth three (3) times daily as needed. 10/15/21  Yes Provider, Historical   pantoprazole (PROTONIX) 20 mg tablet Take 1 Tablet by mouth daily. 10/15/21  Yes Provider, Historical   ibuprofen (MOTRIN) 600 mg tablet Take 1 Tablet by mouth three (3) times daily as needed. 10/15/21  Yes Provider, Historical   propranoloL (INDERAL) 20 mg tablet Take 20 mg by mouth two (2) times a day.    Yes Other, MD Leslie   folic acid (FOLVITE) 1 mg tablet Take 1 mg by mouth daily. Yes Leslie Graves MD   isosorbide mononitrate ER (IMDUR) 30 mg tablet Take 30 mg by mouth daily. Yes Leslie Graves MD   furosemide (LASIX) 40 mg tablet Take 40 mg by mouth daily. Yes Leslie Graves MD     No Known Allergies   Social History     Tobacco Use    Smoking status: Current Every Day Smoker     Packs/day: 1.00   Substance Use Topics    Alcohol use: Yes      No family history on file.      Current Medications:  Current Facility-Administered Medications   Medication Dose Route Frequency    potassium chloride (K-DUR, KLOR-CON) SR tablet 40 mEq  40 mEq Oral Q2H    sodium chloride (NS) flush 5-40 mL  5-40 mL IntraVENous Q8H    sodium chloride (NS) flush 5-40 mL  5-40 mL IntraVENous PRN    sodium chloride (NS) flush 5-40 mL  5-40 mL IntraVENous Q8H    sodium chloride (NS) flush 5-40 mL  5-40 mL IntraVENous PRN    polyethylene glycol (MIRALAX) packet 17 g  17 g Oral DAILY PRN    ondansetron (ZOFRAN ODT) tablet 4 mg  4 mg Oral Q8H PRN    Or    ondansetron (ZOFRAN) injection 4 mg  4 mg IntraVENous Q6H PRN    enoxaparin (LOVENOX) injection 40 mg  40 mg SubCUTAneous DAILY    albuterol-ipratropium (DUO-NEB) 2.5 MG-0.5 MG/3 ML  3 mL Nebulization Q6H PRN    budesonide-formoteroL (SYMBICORT) 160-4.5 mcg/actuation HFA inhaler 1 Puff  1 Puff Inhalation BID RT    folic acid (FOLVITE) tablet 1 mg  1 mg Oral DAILY    isosorbide mononitrate ER (IMDUR) tablet 30 mg  30 mg Oral DAILY    pantoprazole (PROTONIX) tablet 40 mg  40 mg Oral ACB    propranoloL (INDERAL) tablet 20 mg  20 mg Oral BID    rifAXIMin (XIFAXAN) tablet 550 mg  550 mg Oral TID    0.9% sodium chloride infusion  75 mL/hr IntraVENous CONTINUOUS    lactulose (CHRONULAC) 10 gram/15 mL solution 45 mL  30 g Oral TID         Review of Systems:  Pleasantly confused currently                                                                Physical Exam:  Constitutional Chronically ill appearing   Head Normocephalic; no scars   Eyes Conjunctivae and sclerae are clear and without icterus. Pupils are reactive and equal.   ENMT Sinuses are nontender. No oral exudates, ulcers, masses, thrush or mucositis. Oropharynx clear. Tongue normal.   Neck Supple without masses or thyromegaly. No jugular venous distension. Hematologic/Lymphatic No petechiae or purpura. No tender or palpable lymph nodes in the cervical, supraclavicular, axillary or inguinal area. Respiratory Lungs are clear to auscultation without rhonchi or wheezing. Cardiovascular Regular rate and rhythm of heart without murmurs, gallops or rubs. Chest / Line Site Chest is symmetric with no chest wall deformities. Abdomen Non-tender, non-distended, no masses, ascites or hepatosplenomegaly. Good bowel sounds. No guarding or rebound tenderness. No pulsatile masses. Musculoskeletal No tenderness or swelling, normal range of motion without obvious weakness. Extremities No visible deformities, no cyanosis, clubbing or edema. Skin No rashes, scars, or lesions suggestive of malignancy. No petechiae, purpura, or ecchymoses. No excoriations. Neurologic No sensory or motor deficits, normal cerebellar function, normal gait, cranial nerves intact. Psychiatric Alert and oriented times three. Coherent speech. Verbalizes understanding of our discussions today.

## 2021-10-20 NOTE — PROGRESS NOTES
Progress Note    Patient: Vero Koroma MRN: [id number]  SSN: [id number]    YOB: 1955  Age: 73 y.o. Sex: male      Admit Date: 10/19/2021    LOS: 1 day     Subjective:     66M, H/o alcohol induced liver cirrhosis, alcohol abuse, CAD s/p AICD placement  With acute encephalopathy s/t running out of lactulose      OT recommends SNF, PT recommendation pending  Will continue lactulose and replace electrolytes    His Xray shows new enlarging mass compared to XR in January  Patient is not oriented, and no contact,. willl get CT chest to assess the mass. Talking to msignificant other, he was diagnosed with lung cancer in januray 2021, was supposed to get IV therapy . XR today shows worsening mass and nodule        Review of Systems:  Unable to perform due to mental status      Objective:     Vitals:    10/19/21 2031 10/20/21 0000 10/20/21 0735 10/20/21 0829   BP: (!) 108/59  103/60 (!) 146/73   Pulse: 83 79 84 (!) 102   Resp: 20  16 16   Temp: 98.7 °F (37.1 °C)  98.2 °F (36.8 °C) 98.5 °F (36.9 °C)   SpO2: 98%  96% 95%   Weight:       Height:            Intake and Output:  Current Shift: No intake/output data recorded. Last three shifts: No intake/output data recorded. Physical Exam:   General:  Alert, intermittently confused   Head:  Normocephalic, without obvious abnormality, atraumatic. Eyes:  Conjunctivae/corneas clear. PERRL, EOMs intact. Nose: Nares normal. Septum midline. Mucosa normal. No drainage or sinus tenderness. Throat: Lips, mucosa, and tongue normal. Teeth and gums normal.   Neck: Supple, symmetrical, trachea midline, no adenopathy, thyroid: no enlargement/tenderness/nodules, no carotid bruit and no JVD. Back:   Symmetric, no curvature. ROM normal. No CVA tenderness. Lungs:   Clear to auscultation bilaterally. Chest wall:  No tenderness or deformity. Heart:  Regular rate and rhythm, S1, S2 normal, no murmur, click, rub or gallop. Abdomen:   Soft, non-tender. Bowel sounds normal. No masses,  No organomegaly. Extremities: Extremities normal, atraumatic, no cyanosis or edema. Pulses: 2+ and symmetric all extremities. Skin: Skin color, texture, turgor normal. No rashes or lesions   Neurologic: CNII-XII intact. No motor or sensory deficits. Lab/Data Review:  Recent Results (from the past 24 hour(s))   CBC WITH AUTOMATED DIFF    Collection Time: 10/19/21  3:20 PM   Result Value Ref Range    WBC 4.1 4.1 - 11.1 K/uL    RBC 3.82 (L) 4.10 - 5.70 M/uL    HGB 12.5 12.1 - 17.0 g/dL    HCT 38.4 36.6 - 50.3 %    .5 (H) 80.0 - 99.0 FL    MCH 32.7 26.0 - 34.0 PG    MCHC 32.6 30.0 - 36.5 g/dL    RDW 18.9 (H) 11.5 - 14.5 %    PLATELET 321 (L) 574 - 400 K/uL    MPV 10.0 8.9 - 12.9 FL    NRBC 0.0 0.0  WBC    ABSOLUTE NRBC 0.00 0.00 - 0.01 K/uL    NEUTROPHILS 55 32 - 75 %    LYMPHOCYTES 17 12 - 49 %    MONOCYTES 24 (H) 5 - 13 %    EOSINOPHILS 3 0 - 7 %    BASOPHILS 1 0 - 1 %    IMMATURE GRANULOCYTES 0 0 - 0.5 %    ABS. NEUTROPHILS 2.2 1.8 - 8.0 K/UL    ABS. LYMPHOCYTES 0.7 (L) 0.8 - 3.5 K/UL    ABS. MONOCYTES 1.0 0.0 - 1.0 K/UL    ABS. EOSINOPHILS 0.1 0.0 - 0.4 K/UL    ABS. BASOPHILS 0.0 0.0 - 0.1 K/UL    ABS. IMM. GRANS. 0.0 0.00 - 0.04 K/UL    DF AUTOMATED     METABOLIC PANEL, COMPREHENSIVE    Collection Time: 10/19/21  3:20 PM   Result Value Ref Range    Sodium 141 136 - 145 mmol/L    Potassium 3.1 (L) 3.5 - 5.1 mmol/L    Chloride 105 97 - 108 mmol/L    CO2 29 21 - 32 mmol/L    Anion gap 7 5 - 15 mmol/L    Glucose 110 (H) 65 - 100 mg/dL    BUN 16 6 - 20 mg/dL    Creatinine 1.98 (H) 0.70 - 1.30 mg/dL    BUN/Creatinine ratio 8 (L) 12 - 20      GFR est AA 41 (L) >60 ml/min/1.73m2    GFR est non-AA 34 (L) >60 ml/min/1.73m2    Calcium 8.6 8.5 - 10.1 mg/dL    Bilirubin, total 1.1 (H) 0.2 - 1.0 mg/dL    AST (SGOT) 31 15 - 37 U/L    ALT (SGPT) 15 12 - 78 U/L    Alk.  phosphatase 129 (H) 45 - 117 U/L    Protein, total 7.5 6.4 - 8.2 g/dL    Albumin 2.3 (L) 3.5 - 5.0 g/dL    Globulin 5.2 (H) 2.0 - 4.0 g/dL    A-G Ratio 0.4 (L) 1.1 - 2.2     AMMONIA    Collection Time: 10/19/21  3:20 PM   Result Value Ref Range    Ammonia 148 (H) <32 umol/L   ETHYL ALCOHOL    Collection Time: 10/19/21  3:20 PM   Result Value Ref Range    ALCOHOL(ETHYL),SERUM <4 <10 mg/dL   MAGNESIUM    Collection Time: 10/19/21  3:20 PM   Result Value Ref Range    Magnesium 2.0 1.6 - 2.4 mg/dL   EKG, 12 LEAD, INITIAL    Collection Time: 10/19/21  3:26 PM   Result Value Ref Range    Ventricular Rate 83 BPM    Atrial Rate 83 BPM    P-R Interval 168 ms    QRS Duration 106 ms    Q-T Interval 440 ms    QTC Calculation (Bezet) 517 ms    Calculated P Axis 76 degrees    Calculated R Axis -99 degrees    Calculated T Axis 25 degrees    Diagnosis       Normal sinus rhythm  Right superior axis deviation  Low voltage QRS  Abnormal ECG  When compared with ECG of 26-DEC-2020 21:04,  No significant change was found  Confirmed by Isiah Jones ([id number]) on 10/19/2021 5:17:00 PM     PROTHROMBIN TIME + INR    Collection Time: 10/19/21  9:25 PM   Result Value Ref Range    Prothrombin time 17.4 (H) 11.9 - 14.7 sec    INR 1.4 (H) 0.9 - 1.1     METABOLIC PANEL, BASIC    Collection Time: 10/20/21  2:00 AM   Result Value Ref Range    Sodium 142 136 - 145 mmol/L    Potassium 2.9 (L) 3.5 - 5.1 mmol/L    Chloride 107 97 - 108 mmol/L    CO2 28 21 - 32 mmol/L    Anion gap 7 5 - 15 mmol/L    Glucose 114 (H) 65 - 100 mg/dL    BUN 16 6 - 20 mg/dL    Creatinine 1.79 (H) 0.70 - 1.30 mg/dL    BUN/Creatinine ratio 9 (L) 12 - 20      GFR est AA 46 (L) >60 ml/min/1.73m2    GFR est non-AA 38 (L) >60 ml/min/1.73m2    Calcium 8.5 8.5 - 10.1 mg/dL   LACTIC ACID    Collection Time: 10/20/21  2:00 AM   Result Value Ref Range    Lactic acid 2.0 0.4 - 2.0 mmol/L   CBC W/O DIFF    Collection Time: 10/20/21  2:00 AM   Result Value Ref Range    WBC 3.4 (L) 4.1 - 11.1 K/uL    RBC 3.76 (L) 4.10 - 5.70 M/uL    HGB 12.1 12.1 - 17.0 g/dL    HCT 37.6 36.6 - 50.3 % .0 (H) 80.0 - 99.0 FL    MCH 32.2 26.0 - 34.0 PG    MCHC 32.2 30.0 - 36.5 g/dL    RDW 18.9 (H) 11.5 - 14.5 %    PLATELET 94 (L) 028 - 400 K/uL    MPV 10.4 8.9 - 12.9 FL    NRBC 0.0 0.0  WBC    ABSOLUTE NRBC 0.00 0.00 - 0.01 K/uL         Assessment and plan:      (1) Acute hepatic encephalopathy : GCS 12, lactulose, and rifaximin, propanolol repeat ammonia    (2) severe hypokalemia: replace electrolytes. (2) complicates liver cirrhosis s/t alcohol use: complicates all aspects of care    (3) Alcohol abuse : CIWA, lorazepam PRN for CIWA> 10    (4) CAD s/p AICD placement : continue isosorbide,     (5) GERD: protonix    (6) lung cancer: Talking to significant optherr, he was diagnosed with lung cancer in januray 2021, was supposed to get IV therapy .  XR today shows worsening mass and nodule      DVT ppx: SCD due to low platel    DIPSO: likely SNF, workup for pulmonary mass      Signed By: Sourav Cao MD     October 20, 2021

## 2021-10-20 NOTE — PROGRESS NOTES
Reason for Admission:  Hepatic Encephalopathy                     RUR Score:    14%                 Plan for utilizing home health:     Requesting [facility]     PCP: First and Last name:  Stanley Calixto MD     Name of Practice:    Are you a current patient: Yes/No:    Approximate date of last visit: Unsure   Can you participate in a virtual visit with your PCP:                     Current Advanced Directive/Advance Care Plan: Full Code      Healthcare Decision Maker:   Click here to complete [address] including selection of the [address] Relationship (ie \"Primary\")           Significant Other, Anshul Hernadez, [phone]                  Transition of Care Plan:                    Patient currently lives at home with his significant other and her son. Patient's significant other will be his ride at discharge and to follow-up appointments. Patient currently has a walker, cane and wheelchair at home. Patient has had [facility] services in the past and would like to have these services again. Patient is refusing SNF or IRF. Choice letter signed and placed on chart. Referral sent. CM will continue to follow. Current Dispo: Home with Home Health.

## 2021-10-20 NOTE — PROGRESS NOTES
OCCUPATIONAL THERAPY EVALUATION  Patient: Iqra Chirinos (13 y.o. male)  Date: 10/20/2021  Primary Diagnosis: Hepatic encephalopathy ([facility] [address] [K72.90]        Precautions: fall risk       ASSESSMENT  Pt is a 74 y/o M with PMH of chronic alcohol use, cirrhosis, and CAD s/p AICD placement who presented to [facility] ED with AMS, reportedly forgetting his name. He [place] 6 [place] [facility] [place] as well as recurrent paracenteses. Upon admission, he is oriented only to name. He reports running out of his Chronulac (lactulose) 6-7 days ago. Upon arrival to the ED, platelet and albumin levels were low and potassium levels were 3.1. Ammonia level at 148. Patient admits to confusion and offers no other complaints; he denies abdominal pain, dizziness, chest pain, shortness of breath, and headache, admitted 10/19/21 and being treated for hepatic encephalopathy. Pt received sleeping semi-supine in bed upon arrival, able to arouse with verbal/tactile cueing however remained drowsy. Pt AXO to self and place, confused yet agreeable to OT evaluation at this time. Per pt report, pt lives with mother in a one-story home with 2 MC, was IND with ADLs and ambulatory without AD at [facility], no DME owned. Pt states he has had 4-5 recent falls with noted scattered bruising and skin tears on bilateral upper and lower extremities. Will need to confirm PLOF 2' to current cognitive state. Based on current observations, pt presents with deficits in generalized strength/AROM, dynamic sitting balance, static/dynamic standing balance, functional activity tolerance, coordination, cognition/confusion and poor safety awareness impacting overall performance of ADLs and functional transfers/mobility. Pt currently requires CGA rolling, min A supine>sit with occasional CGA for sitting balance. Socks donned total A at this time 2' to poor command following and impaired dynamic sitting.  STS completed to/from EOB and toilet with RW and increased cuing for hand placement/safety, CGA for STS from toilet with continued cues for safety prior to returning back to bedside, SBA sit>supine and IND with breakfast/self feeding at end of evaluation. Overall, pt tolerates session fair today, limited mostly by impaired standing balance, weakness and poor safety awareness with functional activity. Pt would benefit from continued skilled OT services to address current impairments and improve overall IND and safety with self cares and functional transfers/mobility. At this time, OT recommending d/c to SNF once medically appropriate. Other factors to consider for discharge: family support, DME, time since onset, severity of deficits     Patient will benefit from skilled therapy intervention to address the above noted impairments. PLAN :  Recommendations and Planned Interventions: self care training, functional mobility training, therapeutic exercise, balance training, therapeutic activities, cognitive retraining, endurance activities, patient education, home safety training, and family training/education    Frequency/Duration: Patient will be followed by physical therapy:  2-3x/week to address goals.     Recommendation for discharge: (in order for the patient to meet his/her long term goals)  Vipul Sánchez    This discharge recommendation:  Has been made in collaboration with the attending provider and/or case management       SUBJECTIVE:   Patient stated no when asked if he was in any pain today    OBJECTIVE DATA SUMMARY:   HISTORY:   Past Medical History:   Diagnosis Date    AICD (automatic cardioverter/defibrillator) present     CAD (coronary artery disease)     Cirrhosis of liver ([facility] [address]     Gallstones     Hypertension     Nodule of lower lobe of right lung     Renal stones      Past Surgical History:   Procedure Laterality Date    HX PACEMAKER PLACEMENT      IR PARACENTESIS ABD W IMAGE  12/30/2020       Expanded or extensive additional review of patient history:     Home Situation  Home Environment: Private residence  # Steps to Enter: 2  One/Two Story Residence: One story  Living Alone: No  Support Systems: Other Family Member(s) (Mother)  Patient Expects to be Discharged to[de-identified] House  Current DME Used/Available at Home: None    EXAMINATION OF PERFORMANCE DEFICITS:  Cognitive/Behavioral Status:  Neurologic State: Alert;Confused  Orientation Level: Disoriented to situation  Cognition: Decreased attention/concentration;Decreased command following    Skin: scattered bruising and skin tears on bilateral upper and lower extremities (RN aware)    Hearing: Auditory  Auditory Impairment: None    Range of Motion:  AROM: Generally decreased, functional    Strength:  Strength: Generally decreased, functional    Coordination:  Coordination: Generally decreased, functional  Fine Motor Skills-Upper: Left Intact; Right Intact    Gross Motor Skills-Upper: Left Intact; Right Intact    Balance:  Sitting: Impaired; Without support  Sitting - Static: Fair (occasional)  Sitting - Dynamic: Fair (occasional)  Standing: Impaired; Without support  Standing - Static: Fair  Standing - Dynamic : Fair;Constant support    Functional Mobility and Transfers for ADLs:  Bed Mobility:  Rolling: Contact guard assistance  Supine to Sit: Minimum assistance  Sit to Supine: Stand-by assistance  Scooting: Contact guard assistance    Transfers:  Sit to Stand: Minimum assistance  Stand to Sit: Contact guard assistance  Bathroom Mobility: Minimum assistance  Toilet Transfer : Contact guard assistance    ADL Intervention and task modifications:  Feeding  Feeding Assistance: Independent  Container Management: Independent  Food to Mouth: Independent    Lower Body Dressing Assistance  Socks: Total assistance (dependent)  Position Performed: Seated edge of bed    Therapeutic Exercise:  Pt would benefit from UE HEP to improve overall UE AROM/strength and can be further educated in next treatment session. Functional Measure:    [address] [place] [address] AM-PACTM \"6 Clicks\"                                                       Daily Activity Inpatient Short Form  How much help from another person does the patient currently need. .. Total; A Lot A Little None   1. Putting on and taking off regular lower body clothing? []  1 [x]  2 []  3 []  4   2. Bathing (including washing, rinsing, drying)? []  1 [x]  2 []  3 []  4   3. Toileting, which includes using toilet, bedpan or urinal? [] 1 [x]  2 []  3 []  4   4. Putting on and taking off regular upper body clothing? []  1 []  2 [x]  3 []  4   5. Taking care of personal grooming such as brushing teeth? []  1 []  2 [x]  3 []  4   6. Eating meals? []  1 []  2 []  3 [x]  4   © , Trustees of [address] [place] [address], under license to [employer] All rights reserved     Score:      Interpretation of Tool:  Represents clinically-significant functional categories (i.e. Activities of daily living). Percentage of Impairment CH    0%   CI    1-19% CJ    20-39% CK    40-59% CL    60-79% CM    80-99% CN     100%   [facility]  Score 6-24 24 23 20-22 15-19 10-14 7-9 6        Occupational Therapy Evaluation Charge Determination   History Examination Decision-Making   LOW Complexity : Brief history review  MEDIUM Complexity : 3-5 performance deficits relating to physical, cognitive , or psychosocial skils that result in activity limitations and / or participation restrictions MEDIUM Complexity : Patient may present with comorbidities that affect occupational performnce.  Miniml to moderate modification of tasks or assistance (eg, physical or verbal ) with assesment(s) is necessary to enable patient to complete evaluation       Based on the above components, the patient evaluation is determined to be of the following complexity level: LOW   Pain Ratin/10    Activity Tolerance:   Fair    After treatment patient left in no apparent distress:    Supine in bed, Call bell within reach, Bed / chair alarm activated, and Side rails x 3    COMMUNICATION/EDUCATION:   The patients plan of care was discussed with: Physical therapist and Registered nurse. Patient/family have participated as able in goal setting and plan of care. and Patient/family agree to work toward stated goals and plan of care. This patients plan of care is appropriate for delegation to [facility].     Thank you for this referral.  Destiny Sinha  Time Calculation: 30 mins   Problem: Self Care Deficits Care Plan (Adult)  Goal: *Acute Goals and Plan of Care (Insert Text)  Description: Pt will be IND sup <> sit in prep for EOB ADLs  Pt will be IND grooming sitting EOB  Pt will be IND LE dressing sitting EOB/long sit  Pt will be IND sit <>  prep for toileting LRAD  Pt will be IND toileting/toilet transfer/cloth mgmt LRAD  Pt will be IND following UE HEP in prep for self care tasks   Outcome: Not Met

## 2021-10-20 NOTE — PROGRESS NOTES
Problem: Falls - Risk of  Goal: *Absence of Falls  Description: Document [facility] Fall Risk and appropriate interventions in the flowsheet.   Outcome: Progressing Towards Goal  Note: Fall Risk Interventions:  Mobility Interventions: Bed/chair exit alarm, Patient to call before getting OOB    Mentation Interventions: Bed/chair exit alarm, Door open when patient unattended, Increase mobility, More frequent rounding, Reorient patient, Room close to nurse's station    Medication Interventions: Bed/chair exit alarm, Teach patient to arise slowly, Patient to call before getting OOB    Elimination Interventions: Bed/chair exit alarm, Call light in reach

## 2021-10-20 NOTE — PROGRESS NOTES
Problem: Mobility Impaired (Adult and Pediatric)  Goal: *Acute Goals and Plan of Care (Insert Text)  Description: Patient will move from supine to sit and sit to supine , scoot up and down, and roll side to side in bed with modified independence within 7 day(s). Patient will transfer from bed to chair and chair to bed with modified independence using the least restrictive device within 7 day(s). Patient will improve static sitting balance to supervision within 1 week(s). Patient will ambulate 25 feet with modified independence with least restrictive device within 1 weeks. Outcome: Progressing Towards Goal   PHYSICAL THERAPY EVALUATION  Patient: Vero Koroma (16 y.o. male)  Date: 10/20/2021  Primary Diagnosis: Hepatic encephalopathy ([facility] [address] [K72.90]        Precautions: fall    ASSESSMENT  Pt is a 74 y/o M with PMH of chronic alcohol use, cirrhosis, and CAD s/p AICD placement who presented to [facility] ED with AMS, reportedly forgetting his name. He is being seen every 6 weeks at [facility] for management as well as recurrent paracenteses. Upon admission, he is oriented only to name. He reports running out of his Chronulac (lactulose) 6-7 days ago. Upon arrival to the ED, platelet and albumin levels were low and potassium levels were 3.1. Ammonia level at 148. Patient admits to confusion and offers no other complaints; he denies abdominal pain, dizziness, chest pain, shortness of breath, and headache, admitted 10/19/21 and being treated for hepatic encephalopathy. Pt received sleeping semi-supine in bed upon arrival, able to arouse with verbal/tactile cueing however remained drowsy. Pt AXO to self and place, confused yet agreeable to PT evaluation at this time. SO present in room at this time. Per pt report, pt lives with SO in a one-story home with 2 MC, was IND with ADLs and ambulatory without AD at [facility], no DME owned.  Pt states he has had 4-5 recent falls with noted scattered bruising and skin tears on bilateral upper and lower extremities. Will need to confirm PLOF 2' to current cognitive state. Based on current observations, pt presents with deficits in generalized strength/AROM, dynamic sitting balance, static/dynamic standing balance, functional activity tolerance, coordination, cognition/confusion and poor safety awareness impacting overall performance of ADLs and functional transfers/mobility. Pt currently requires CGA rolling, min A supine>sit with occasional CGA for sitting balance. Socks donned total A at this time 2' to poor command following and impaired dynamic sitting. STS completed to/from EOB  with IV pole and increased cuing for hand placement/safety,took few steps to HOB,Declined going to bathroom. SBA sit>supine and IND with breakfast/self feeding at end of evaluation. Overall, pt tolerates session fair today, limited mostly by impaired standing balance, weakness and poor safety awareness with functional activity. Pt would benefit from continued skilled PT services to address current impairments and improve overall IND and safety with self cares and functional transfers/mobility. At this time, PT recommending d/c to SNF once medically appropriate. Other factors to consider for discharge: family support, DME, time since onset, severity of deficits     Patient will benefit from skilled therapy intervention to address the above noted impairments     PLAN :  Recommendations and Planned Interventions: gait training, therapeutic exercises, patient and family training/education, and therapeutic activities      Frequency/Duration: Patient will be followed by physical therapy:  2-3x/week to address goals.     Recommendation for discharge: (in order for the patient to meet his/her long term goals)  To Be Determined  SNF vs HHPT  This discharge recommendation:  Has been made in collaboration with the attending provider and/or case management    IF patient discharges home will need the following DME: rollator and wheelchair         SUBJECTIVE:   Patient stated i am ok.     OBJECTIVE DATA SUMMARY:   HISTORY:    Past Medical History:   Diagnosis Date    AICD (automatic cardioverter/defibrillator) present     CAD (coronary artery disease)     Cirrhosis of liver (H[facility])     Gallstones     Hypertension     Nodule of lower lobe of right lung     Renal stones      Past Surgical History:   Procedure Laterality Date    HX PACEMAKER PLACEMENT      IR PARACENTESIS ABD W IMAGE  12/30/2020       Personal factors and/or comorbidities impacting plan of care: Home Situation  Home Environment: Private residence  # Steps to Enter: 2  One/Two Story Residence: One story  Living Alone: No  Support Systems: Spouse/Significant Other  Patient Expects to be Discharged to[de-identified] Unknown  Current DME Used/Available at Home: Walker, rolling, Wheelchair    EXAMINATION/PRESENTATION/DECISION MAKING:   Critical Behavior:  Neurologic State: Alert, Confused  Orientation Level: Disoriented to situation  Cognition: Decreased attention/concentration     Hearing: Auditory  Auditory Impairment: None  Range Of Motion:  AROM: Generally decreased, functional                       Strength:    Strength: Generally decreased, functional                    Tone & Sensation:                                  Coordination:  Coordination: Generally decreased, functional  Functional Mobility:  Bed Mobility:  Rolling: Contact guard assistance  Supine to Sit: Minimum assistance  Sit to Supine: Stand-by assistance  Scooting: Contact guard assistance  Transfers:  Sit to Stand: Minimum assistance  Stand to Sit: Contact guard assistance                       Balance:   Sitting: Impaired; With support  Sitting - Static: Fair (occasional)  Sitting - Dynamic: Fair (occasional)  Standing: Impaired;Pull to stand; With support  Standing - Static: Fair  Standing - Dynamic : Constant support; Fair  Ambulation/Gait Training:  Distance (ft): 5 Feet (ft)  Assistive Device: Gait belt;Other (comment) (IV pole)  Ambulation - Level of Assistance: Contact guard assistance     Gait Description (WDL): Exceptions to WDL           Base of Support: Widened     Speed/Shawna: Slow;Shuffled  Step Length: Right shortened;Left shortened                Functional Measure:  [facility] [place] [facility] AM-PAC 6 Clicks         Basic Mobility Inpatient Short Form  How much difficulty does the patient currently have. .. Unable A Lot A Little None   1. Turning over in bed (including adjusting bedclothes, sheets and blankets)? [] 1   [] 2   [x] 3   [] 4   2. Sitting down on and standing up from a chair with arms ( e.g., wheelchair, bedside commode, etc.)   [] 1   [] 2   [x] 3   [] 4   3. Moving from lying on back to sitting on the side of the bed? [] 1   [] 2   [x] 3   [] 4          How much help from another person does the patient currently need. .. Total A Lot A Little None   4. Moving to and from a bed to a chair (including a wheelchair)? [] 1   [] 2   [x] 3   [] 4   5. Need to walk in hospital room? [] 1   [] 2   [x] 3   [] 4   6. Climbing 3-5 steps with a railing? [] 1   [] 2   [x] 3   [] 4   © 2007, Trustees of [facility] [place] [facility], under license to [employer]. All rights reserved     Score:  Initial: 18/24 Most Recent: X (Date: 10/20/2021 )   Interpretation of Tool:  Represents activities that are increasingly more difficult (i.e. Bed mobility, Transfers, Gait).   Score 24 23 22-20 19-15 14-10 9-7 6   Modifier CH CI CJ CK CL CM CN           Physical Therapy Evaluation Charge Determination   History Examination Presentation Decision-Making   LOW Complexity : Zero comorbidities / personal factors that will impact the outcome / POC LOW Complexity : 1-2 Standardized tests and measures addressing body structure, function, activity limitation and / or participation in recreation  LOW Complexity : Stable, uncomplicated  LOW Complexity : FOTO score of       Based on the above components, the patient evaluation is determined to be of the following complexity level: LOW     Pain Ratin/10    Activity Tolerance:   Good  Please refer to the flowsheet for vital signs taken during this treatment. After treatment patient left in no apparent distress:   Supine in bed, Call bell within reach, and Bed / chair alarm activated    COMMUNICATION/EDUCATION:   The patients plan of care was discussed with: Occupational therapist and Registered nurse. Fall prevention education was provided and the patient/caregiver indicated understanding., Patient/family have participated as able in goal setting and plan of care. , and Patient/family agree to work toward stated goals and plan of care.     Thank you for this referral.  Norman Sofia PT   Time Calculation: 20 mins

## 2021-10-20 NOTE — PROGRESS NOTES
Patient remains very confused, continues to try to get out of the bed and is pulling at IV lines and telemetry leads. Will not keep telemetry leads on. Nurse redirected patient and educated on the importance to stay in the bed due to being unsteady on his feet. Bed alarm on, in lowest position and locked.

## 2021-10-21 NOTE — NURSE NAVIGATOR
Per Oncology's request, this navigator was able to speak with [facility] Thoracic Navigator Angelica Jimenez and obtain note from patient's most recent visit with them on 10/4/21. COPY OF THAT NOTE PLUS PET SCAN SCANNED INTO Epic . Sent Dr. Henrry Goss copy of note as well. Per Angelica Jimenez, patient's treatment plan is still being discussed. Patient is Stage MAG. Will follow as needed.

## 2021-10-21 NOTE — DISCHARGE SUMMARY
Physician Discharge Summary     Patient ID:    Maryjo Bhakta  [id number]  73 y.o.  1955    Admit date: 10/19/2021    Discharge date : 10/21/2021    Chronic Diagnoses:    Problem List as of 10/21/2021 Date Reviewed: 12/27/2020        Codes Class Noted - Resolved    Hepatic encephalopathy ([facility] ICD-10-CM: K72.90  ICD-9-CM: 572.2  10/19/2021 - Present        Liver cirrhosis ([facility] ICD-10-CM: K74.60  ICD-9-CM: 571.5  12/26/2020 - Present        Pulmonary nodule ICD-10-CM: R91.1  ICD-9-CM: 793.11  12/26/2020 - Present        Intractable back pain ICD-10-CM: M54.9  ICD-9-CM: 724.5  12/26/2020 - Present        Closed compression fracture of L4 lumbar vertebra, initial encounter [facility]) ICD-10-CM: S32.040A  ICD-9-CM: 805.4  12/26/2020 - Present        Hypokalemia ICD-10-CM: E87.6  ICD-9-CM: 276.8  12/26/2020 - Present          22    Final Diagnoses:   Hepatic encephalopathy ([facility] [K72.90]    Reason for Hospitalization:    (1) Acute hepatic encephalopathy :      (2) severe hypokalemia:      (2) complicates liver cirrhosis s/t alcohol use: complicates all aspects of care     (3) Alcohol abuse : CIWA, lorazepam PRN for CIWA> 10     (4) CAD s/p AICD placement : continue isosorbide,      (5) GERD: protonix     (6) lung cancer:     Hospital Course:   66M, H/o alcohol induced liver cirrhosis, alcohol abuse, CAD s/p AICD placement  With acute encephalopathy s/t running out of lactulose    His ammonia was high, was started on lactulose, he improved clinically    He has a h/o lung cancer with mets, worsening. She was advised to f/u with [facility] oncology           INSTRUCTIONS ON DISCHARGE     (1) please note that the most important medication is             LACTULOSE 20g three times a day    (2) note that we have decreased your LASIX to 20 mg. Take this daily, and IF you have any dizziness or light headed ness. Stop this medication     (3) Please follow-up with GASTROENTEROLOGY in 1 week.            Discharge Medications:   Current Discharge Medication List      CONTINUE these medications which have CHANGED    Details   lactulose (CHRONULAC) 10 gram/15 mL solution Take 30 mL by mouth three (3) times daily for 30 days. Qty: 2700 mL, Refills: 2  Start date: 10/21/2021, End date: 11/20/2021      furosemide (LASIX) 40 mg tablet Take 0.5 Tablets by mouth daily. Qty: 30 Tablet, Refills: 2  Start date: 10/21/2021      pantoprazole (PROTONIX) 20 mg tablet Take 1 Tablet by mouth daily. Qty: 30 Tablet, Refills: 2  Start date: 10/21/2021      propranoloL (INDERAL) 20 mg tablet Take 1 Tablet by mouth two (2) times a day for 30 days. Qty: 60 Tablet, Refills: 2  Start date: 10/21/2021, End date: 11/20/2021         CONTINUE these medications which have NOT CHANGED    Details   albuterol (PROVENTIL HFA, VENTOLIN HFA, PROAIR HFA) 90 mcg/actuation inhaler Take 2 Puffs by inhalation every four to six (4-6) hours as needed. diclofenac (VOLTAREN) 1 % gel Apply  to affected area daily. ondansetron hcl (ZOFRAN) 4 mg tablet Take 1 Tablet by mouth three (3) times daily as needed. ibuprofen (MOTRIN) 600 mg tablet Take 1 Tablet by mouth three (3) times daily as needed. folic acid (FOLVITE) 1 mg tablet Take 1 mg by mouth daily. isosorbide mononitrate ER (IMDUR) 30 mg tablet Take 30 mg by mouth daily.          STOP taking these medications       albuterol-ipratropium (DUO-NEB) 2.5 mg-0.5 mg/3 ml nebu Comments:   Reason for Stopping:         polyethylene glycol (MIRALAX) 17 gram packet Comments:   Reason for Stopping:         rifAXIMin (XIFAXAN) 550 mg tablet Comments:   Reason for Stopping:         acetaminophen (TYLENOL) 325 mg tablet Comments:   Reason for Stopping:         tiotropium (Spiriva with HandiHaler) 18 mcg inhalation capsule Comments:   Reason for Stopping:         guaiFENesin (ORGANIDIN) 400 mg tablet Comments:   Reason for Stopping:         FLUTICASONE PROPION-SALMETEROL IN Comments:   Reason for Stopping: Follow up Care:    1. Honey Gomez MD in 1-2 weeks. Please call to set up an appointment shortly after discharge. Diet:  Hepatic diet    Disposition:  {home    Advanced Directive:   FULL x   DNR      Discharge Exam:  .  General:  Alert,    Head:  Normocephalic, without obvious abnormality, atraumatic. Eyes:  Conjunctivae/corneas clear. PERRL, EOMs intact. Nose: Nares normal. Septum midline. Mucosa normal. No drainage or sinus tenderness. Throat: Lips, mucosa, and tongue normal. Teeth and gums normal.   Neck: Supple, symmetrical, trachea midline, no adenopathy, thyroid: no enlargement/tenderness/nodules, no carotid bruit and no JVD. Back:   Symmetric, no curvature. ROM normal. No CVA tenderness. Lungs:   Clear to auscultation bilaterally. Chest wall:  No tenderness or deformity. Heart:  Regular rate and rhythm, S1, S2 normal, no murmur, click, rub or gallop. Abdomen:   Soft, non-tender. Bowel sounds normal. No masses,  No organomegaly. Extremities: Extremities normal, atraumatic, no cyanosis or edema. Pulses: 2+ and symmetric all extremities. Skin: Skin color, texture, turgor normal. No rashes or lesions   Neurologic: CNII-XII intact. No motor or sensory deficits.         CONSULTATIONS: oncology    Significant Diagnostic Studies:     Radiologic Studies -   Results from Hospital Encounter encounter on 10/19/21    XR CHEST PORT    Narrative  1 view comparison January 3    New/enlarging masses in the right lower lung. There may be another nodule in the  left base. Upper lungs are clear. No effusion. Normal heart and mediastinum     CT Results  (Last 48 hours)               10/20/21 4631  CT CHEST WO CONT Final result    Impression:  Multiple lung masses, likely metastases, right greater than left   lower lobe. Severe ascites       Narrative:  Noncontrast study shows multiple masses in the right lower lobe.        CT dose reduction was achieved through use of a standardized protocol tailored   for this examination and automatic exposure control for dose modulation. There are round or macrolobulated. Largest is 4 cm. There are at least 8,   somewhat coalescent. 2 cm nodule present in the posterior left lower lobe. Mild dependent edema in   the right upper lobe. Central airways are open       Tiny middle mediastinal lymph nodes. Normal-caliber aorta. Advanced coronary   calcification. No pleural pericardial effusion. Gross ascites. No destructive   bone lesion           10/19/21 1536  CT HEAD WO CONT Final result    Impression:  No evidence of acute intracranial process. Narrative:  CT HEAD WITHOUT IV CONTRAST       CLINICAL INDICATION: Altered mental status       TECHNIQUE: Routine axial images were obtained through the brain without the use   of IV contrast. Sagittal and coronal reformatted images were performed at the CT   console. Dose reduction: Per department policy, all CT scans at this facility   are performed using dose reduction optimization techniques as appropriate to a   performed examination including the following: Automated exposure control,   adjustments of the mA and/or KV according to patient size, or use of iterative   reconstruction technique. COMPARISON: None. FINDINGS:    Motion degraded, limited exam.   No evidence of acute intracranial hemorrhage or mass effect. Mild prominence of the extra-axial spaces, with commensurate ventricular   enlargement. No hydrocephalus. Portions of the posterior fossa not obscured by streak artifact are   unremarkable. Absent bilateral native lenses. Paranasal sinuses are predominantly clear. Tympanomastoid cavities are clear. No acute calvarial abnormality. Atherosclerotic calcification of the internal carotid arteries.                      Discharge time spent 35 minutes    Signed:  Jori Stover MD  10/21/2021  1:37 PM

## 2021-10-21 NOTE — PROGRESS NOTES
CM spoke with [facility]. They are unable to accept the patient because they do not service his area. CM spoke with patient and has sent referrals to other companies to see if they can accept the patient.

## 2021-10-21 NOTE — PROGRESS NOTES
Hematology/Oncology   Progress Note    Patient: Mayela Mckeon MRN: [id number]     YOB: 1955  Age: 73 y.o. Sex: male      Admit Date: 10/19/2021    LOS: 2 days     Chief Complaint: Admitted with altered mental status    Subjective:     Feels better. Treated with lactulose for hepatic encephalopathy. Not a good historian. Constitutional No fevers, chills, night sweats, excessive fatigue or weight loss. Allergic/Immunologic No recent allergic reactions   Eyes No significant visual difficulties. No diplopia. ENMT No problems with hearing, no sore throat, no sinus drainage. Endocrine No hot flashes or night sweats. No cold intolerance, polyuria, or polydipsia   Hematologic/Lymphatic No easy bruising or bleeding. The patient denies any tender or palpable lymph nodes   Breasts No abnormal masses of breast, nipple discharge or pain. Respiratory No dyspnea on exertion, orthopnea, chest pain, cough or hemoptysis. Cardiovascular No anginal chest pain, irregular heart beat, tachycardia, palpitations or orthopnea. Gastrointestinal No nausea, vomiting, diarrhea, constipation, cramping, dysphagia, reflux, heartburn, GI bleeding, or early satiety. No change in bowel habits. Genitourinary (M) No hematuria, dysuria, increased frequency, urgency, hesitancy or incontinence. Musculoskeletal No joint pain, swelling or redness. No decreased range of motion. Integumentary No chronic rashes, inflammation, ulcerations, pruritus, petechiae, purpura, ecchymoses, or skin changes. Neurologic No headache, blurred vision, and no areas of focal weakness or numbness. Normal gait. No sensory problems. Psychiatric No insomnia, depression, travis or mood swings. No psychotropic drugs.         Current Facility-Administered Medications:     sodium chloride (NS) flush 5-40 mL, 5-40 mL, IntraVENous, Q8H, Arron Lucas MD, 10 mL at 10/21/21 0518    sodium chloride (NS) flush 5-40 mL, 5-40 mL, IntraVENous, PRN, Mary Cox MD    sodium chloride (NS) flush 5-40 mL, 5-40 mL, IntraVENous, Q8H, Yani Maynard MD, 10 mL at 10/21/21 0519    sodium chloride (NS) flush 5-40 mL, 5-40 mL, IntraVENous, PRN, Yani Maynard MD    polyethylene glycol (MIRALAX) packet 17 g, 17 g, Oral, DAILY PRN, Yani Maynard MD    ondansetron (ZOFRAN ODT) tablet 4 mg, 4 mg, Oral, Q8H PRN **OR** ondansetron (ZOFRAN) injection 4 mg, 4 mg, IntraVENous, Q6H PRN, Yani Maynard MD    enoxaparin (LOVENOX) injection 40 mg, 40 mg, SubCUTAneous, DAILY, Yani Maynard MD, 40 mg at 10/21/21 0945    albuterol-ipratropium (DUO-NEB) 2.5 MG-0.5 MG/3 ML, 3 mL, Nebulization, Q6H PRN, Yani Maynard MD    budesonide-formoteroL (SYMBICORT) 160-4.5 mcg/actuation HFA inhaler 1 Puff, 1 Puff, Inhalation, BID RT, Yani Maynard MD, 2 Puff at 91/02/50 0920    folic acid (FOLVITE) tablet 1 mg, 1 mg, Oral, DAILY, Yani Maynard MD, 1 mg at 10/21/21 0945    isosorbide mononitrate ER (IMDUR) tablet 30 mg, 30 mg, Oral, DAILY, Yani Maynard MD, 30 mg at 10/21/21 0945    pantoprazole (PROTONIX) tablet 40 mg, 40 mg, Oral, ACB, Yani Maynard MD, 40 mg at 10/21/21 0945    propranoloL (INDERAL) tablet 20 mg, 20 mg, Oral, BID, Yani Maynard MD, 20 mg at 10/21/21 0945    rifAXIMin (XIFAXAN) tablet 550 mg, 550 mg, Oral, TID, Yani Maynard MD, 550 mg at 10/21/21 0945    lactulose (CHRONULAC) 10 gram/15 mL solution 45 mL, 30 g, Oral, TID, Yani Maynard MD, 45 mL at 10/21/21 0945     Objective:     Vitals:    10/20/21 2330 10/21/21 0738 10/21/21 0749 10/21/21 1501   BP: (!) 115/57  104/63 107/67   Pulse: 89  86 89   Resp: 20  18 18   Temp: 98.2 °F (36.8 °C)  98.5 °F (36.9 °C) 98.3 °F (36.8 °C)   SpO2: 100% 94% 96% 95%   Weight:       Height:              Physical Exam:   Constitutional Alert, cooperative, oriented. Mood and affect appropriate. Appears close to chronological age. Well nourished. Well developed. Head Normocephalic; no scars   Eyes Conjunctivae and sclerae are clear and without icterus. Pupils are reactive and equal.   ENMT Sinuses are nontender. No oral exudates, ulcers, masses, thrush or mucositis. Oropharynx clear. Tongue normal.   Neck Supple without masses or thyromegaly. No jugular venous distension. Hematologic/Lymphatic No petechiae or purpura. No tender or palpable lymph nodes in the cervical, supraclavicular, axillary or inguinal area. Respiratory Lungs are clear to auscultation without rhonchi or wheezing. Cardiovascular Regular rate and rhythm of heart without murmurs, gallops or rubs. Chest / Line Site Chest is symmetric with no chest wall deformities. Abdomen Non-tender, non-distended, no masses, ascites or hepatosplenomegaly. Good bowel sounds. No guarding or rebound tenderness. No pulsatile masses. Musculoskeletal No tenderness or swelling, normal range of motion without obvious weakness. Extremities No visible deformities, no cyanosis, clubbing or edema. Skin No rashes, scars, or lesions suggestive of malignancy. No petechiae, purpura, or ecchymoses. No excoriations. Neurologic No sensory or motor deficits, normal cerebellar function, normal gait, cranial nerves intact. Psychiatric Alert and oriented times three. Coherent speech. Verbalizes understanding of our discussions today.        Lab/Data Review:  Recent Labs     10/20/21  0200 10/19/21  1520   WBC 3.4* 4.1   HGB 12.1 12.5   HCT 37.6 38.4   PLT 94* 102*     Recent Labs     10/21/21  0710 10/20/21  0200 10/19/21  2125 10/19/21  1520    142  --  141   K 4.1 2.9*  --  3.1*   * 107  --  105   CO2 26 28  --  29   * 114*  --  110*   BUN 13 16  --  16   CREA 1.64* 1.79*  --  1.98*   CA 9.0 8.5  --  8.6   MG  --   --   --  2.0   ALB  --   --   --  2.3*   TBILI  --   --   --  1.1*   ALT  --   --   --  15   INR  --   --  1.4*  --      No results for input(s): PH, PCO2, PO2, HCO3, FIO2 in the last 72 hours. Recent Results (from the past 24 hour(s))   AMMONIA    Collection Time: 10/21/21  7:10 AM   Result Value Ref Range    Ammonia 56 (H) <86 umol/L   METABOLIC PANEL, BASIC    Collection Time: 10/21/21  7:10 AM   Result Value Ref Range    Sodium 142 136 - 145 mmol/L    Potassium 4.1 3.5 - 5.1 mmol/L    Chloride 111 (H) 97 - 108 mmol/L    CO2 26 21 - 32 mmol/L    Anion gap 5 5 - 15 mmol/L    Glucose 123 (H) 65 - 100 mg/dL    BUN 13 6 - 20 mg/dL    Creatinine 1.64 (H) 0.70 - 1.30 mg/dL    BUN/Creatinine ratio 8 (L) 12 - 20      GFR est AA 51 (L) >60 ml/min/1.73m2    GFR est non-AA 42 (L) >60 ml/min/1.73m2    Calcium 9.0 8.5 - 10.1 mg/dL        Radiology:   CT Results  (Last 48 hours)               10/20/21 1747  CT CHEST WO CONT Final result    Impression:  Multiple lung masses, likely metastases, right greater than left   lower lobe. Severe ascites       Narrative:  Noncontrast study shows multiple masses in the right lower lobe. CT dose reduction was achieved through use of a standardized protocol tailored   for this examination and automatic exposure control for dose modulation. There are round or macrolobulated. Largest is 4 cm. There are at least 8,   somewhat coalescent. 2 cm nodule present in the posterior left lower lobe. Mild dependent edema in   the right upper lobe. Central airways are open       Tiny middle mediastinal lymph nodes. Normal-caliber aorta. Advanced coronary   calcification. No pleural pericardial effusion. Gross ascites. No destructive   bone lesion                  Assessment and Plan: Active Problems:    Hepatic encephalopathy ([address] (10/19/2021)    Stage IV Squamous Cell Carcinoma:  - Diagnosed in 7/2021 and following with Dr. Kylah Weiss. - Reviewed [facility] from [facility]. - Treatment options reportedly discussed with patient but yet to start treatment. - F/u with [facility] as scheduled.     Altered mental Status:  - Secondary to hepatic encephalopathy from known cirrhosis.       Signed By: Chelo Edward MD     October 21, 2021

## 2021-10-21 NOTE — PROGRESS NOTES
CM spoke with patient's wife. He has been accepted with [facility] with an anticipated start of care date of 10/22/21. CM updated primary physician and RN. Discharge plan of care/case management plan validated with provider discharge order.

## 2021-10-21 NOTE — PROGRESS NOTES
Pt pulled tele leads of multiple times and unassembled tele box. Tele discontinued per nursing protocol. Tele box 55 returned to tele.

## 2021-10-21 NOTE — DISCHARGE INSTRUCTIONS
INSTRUCTIONS ON DISCHARGE     (1) please note that the most important medication is             LACTULOSE 20g three times a day    (2) note that we have decreased your LASIX to 20 mg. Take this daily, and IF you have any dizziness or light headed ness. Stop this medication     (3) Please follow-up with GASTROENTEROLOGY in 1 week.

## 2021-10-21 NOTE — PROGRESS NOTES
Patient discharged home with home health. Education on medications and follow up care given to patient and girlfriend Hallie Guzman) both verbalized understanding.

## 2021-10-23 PROBLEM — G93.40 ACUTE ENCEPHALOPATHY: Status: ACTIVE | Noted: 2021-01-01

## 2021-10-23 NOTE — ED PROVIDER NOTES
EMERGENCY DEPARTMENT HISTORY AND PHYSICAL EXAM      Date: 10/23/2021  Patient Name: Leo Lepe    History of Presenting Illness     Chief Complaint   Patient presents with    Shortness of Breath       HPI: Leo Lepe, 73 y.o. male with history of cirrhosis, hypertension, AICD, hyperammonemia, on Protonix, lactulose, Lasix, propranolol presenting with weakness and altered mental status. He was noted to be wheezing by EMS. The patient does not provide additional history. Unknown medication compliance. He was hypoxic on presentation and placed on 6 L of oxygen.       PCP: Keon Myers MD    Current Facility-Administered Medications   Medication Dose Route Frequency Provider Last Rate Last Admin    piperacillin-tazobactam (ZOSYN) 3.375 g in 0.9% sodium chloride (MBP/ADV) 100 mL MBP  3.375 g IntraVENous Stan Healy MD 25 mL/hr at 10/23/21 1339 3.375 g at 10/23/21 1339    lactulose (CHRONULAC) 10 gram/15 mL solution 30 mL  30 mL Oral Stan Healy MD        lactulose (CHRONULAC) 10 gram/15 mL solution 45 mL  45 mL Oral TID Charo Bolivar MD        sodium chloride (NS) flush 5-40 mL  5-40 mL IntraVENous Q8H Charo Bolivar MD   10 mL at 10/23/21 1557    sodium chloride (NS) flush 5-40 mL  5-40 mL IntraVENous PRN Charo Bolivar MD        acetaminophen (TYLENOL) tablet 650 mg  650 mg Oral Q6H PRN Charo Bolivar MD        Or    acetaminophen (TYLENOL) suppository 650 mg  650 mg Rectal Q6H PRN Charo Bolivar MD        polyethylene glycol (MIRALAX) packet 17 g  17 g Oral DAILY PRN Charo Bolivar MD        ondansetron (ZOFRAN ODT) tablet 4 mg  4 mg Oral Q8H PRN Charo Bolivar MD        Or    [facility]) injection 4 mg  4 mg IntraVENous Q6H PRN Charo Bolivar MD        [START ON 10/24/2021] enoxaparin (LOVENOX) injection 40 mg  40 mg SubCUTAneous DAILY Chaor Bolivar MD        methylPREDNISolone (PF) (SOLU-MEDROL) injection 40 mg  40 mg IntraVENous Q8H Brandyn Cohen MD   40 mg at 10/23/21 1559    albuterol (PROVENTIL HFA, VENTOLIN HFA, PROAIR HFA) inhaler 2 Puff  2 Puff Inhalation Q4H PRN Brandyn Cohen MD        [START ON 81/39/4449] folic acid (FOLVITE) tablet 1 mg  1 mg Oral DAILY Brandyn Cohen MD        Gama Lukaszmaxime ON 10/24/2021] pantoprazole (PROTONIX) tablet 20 mg  20 mg Oral DAILY Brandyn Cohen MD        propranoloL (INDERAL) tablet 20 mg  20 mg Oral BID Brandyn Cohen MD        rifAXIMin hSantel Toscano) tablet 550 mg  550 mg Oral TID Brandyn Cohen MD         Current Outpatient Medications   Medication Sig Dispense Refill    lactulose (CHRONULAC) 10 gram/15 mL solution Take 30 mL by mouth three (3) times daily for 30 days. 2700 mL 2    furosemide (LASIX) 40 mg tablet Take 0.5 Tablets by mouth daily. 30 Tablet 2    pantoprazole (PROTONIX) 20 mg tablet Take 1 Tablet by mouth daily. 30 Tablet 2    propranoloL (INDERAL) 20 mg tablet Take 1 Tablet by mouth two (2) times a day for 30 days. 60 Tablet 2    albuterol (PROVENTIL HFA, VENTOLIN HFA, PROAIR HFA) 90 mcg/actuation inhaler Take 2 Puffs by inhalation every four to six (4-6) hours as needed.  diclofenac (VOLTAREN) 1 % gel Apply  to affected area daily.  ondansetron hcl (ZOFRAN) 4 mg tablet Take 1 Tablet by mouth three (3) times daily as needed.  ibuprofen (MOTRIN) 600 mg tablet Take 1 Tablet by mouth three (3) times daily as needed.  folic acid (FOLVITE) 1 mg tablet Take 1 mg by mouth daily.  isosorbide mononitrate ER (IMDUR) 30 mg tablet Take 30 mg by mouth daily.          Medical History   I reviewed the medical, surgical, family, and social history, as well as allergies:    Past Medical History:  Past Medical History:   Diagnosis Date    AICD (automatic cardioverter/defibrillator) present     CAD (coronary artery disease)     Cirrhosis of liver ([facility] [address]     Gallstones     Hypertension     Nodule of lower lobe of right lung     Renal stones        Past Surgical History:  Past Surgical History:   Procedure Laterality Date    HX PACEMAKER PLACEMENT      IR PARACENTESIS ABD W IMAGE  12/30/2020       Family History:  No family history on file. Social History:  Social History     Tobacco Use    Smoking status: Current Every Day Smoker     Packs/day: 1.00   Substance Use Topics    Alcohol use: Yes    Drug use: Not Currently       Allergies:  No Known Allergies    Review of Systems     Review of Systems   Unable to perform ROS: Mental status change         Physical Exam and Vital Signs   Vital Signs - Reviewed the patient's vital signs. Patient Vitals for the past 12 hrs:   Temp Pulse Resp BP SpO2   10/23/21 1554 98.8 °F (37.1 °C) (!) 102 15 116/68 96 %   10/23/21 1444  (!) 103 18 (!) 126/93 100 %   10/23/21 1354     100 %   10/23/21 1350     100 %   10/23/21 1341  (!) 108 26 (!) 114/56 91 %   10/23/21 1257     (!) 89 %   10/23/21 1229 99.5 °F (37.5 °C) (!) 105 25 122/64 98 %       Physical Exam:    GENERAL: Lethargic, altered, tachypneic  HEENT:  * Pupils equal, EOMI, no scleral icterus  * Head atraumatic  CV:  * regular rhythm  * warm and perfused extremities bilaterally  PULMONARY  bilateral decreased air entry and wheezing noted  ABDOMEN: Distended, not tense, no tenderness  : No suprapubic tenderness  EXTREMITIES/BACK: warm and perfused, no tenderness, no edema  SKIN: no rashes or signs of trauma  NEURO:  *GCS of 9  * Moves U&LE bilaterally to pain      Medical Decision Making and ED Course   - I am the first and primary provider for this patient and am the primary provider of record. - I reviewed the vital signs, available nursing notes, past medical history, past surgical history, family history and social history. - Initial assessment performed. The patients presenting problems have been discussed, and the staff are in agreement with the care plan formulated and outlined with them.   I have encouraged them to ask questions as they arise throughout their visit. - Available medical records, nursing notes, old EKGs, and EMS run sheets (if patient was EMS transported) were reviewed    MDM:   Patient is a 73 y.o. male presenting for AMS. Vitals reveal tachycardia and physical exam reveals wheezing. EKG showed sinus tachycardia with low voltage QRS. Based on the history, physical exam, risk factors, and vitals signs, I favor the following differential diagnoses: metabolic causes (i.e., hyper/hyponatremia, hyper/hypoglycemia, hypercalcemia, hyper/hypothyroidism, hypoxia/hypercapnea, hepatic encephalopathy, uremic encephalopathy, drug intoxication/withdrawal, alcohol intoxication/withdrawal, Wernicke encephalopathy), structural lesions (malignancy, intracranial hemorrhage, cerebral edema), B12/Folate deficiency, anemia, cerebrovascular accident, transient ischemic attack, meningitis, encephalitis, seizures, postictal state, hypertensive encephalopathy, vasculitis, arrhythmias, heart failure, endocarditis, head injury, UTI, meningitis. Results     Labs:  Recent Results (from the past 12 hour(s))   CBC WITH AUTOMATED DIFF    Collection Time: 10/23/21 12:39 PM   Result Value Ref Range    WBC 6.0 4.1 - 11.1 K/uL    RBC 4.06 (L) 4.10 - 5.70 M/uL    HGB 13.0 12.1 - 17.0 g/dL    HCT 42.4 36.6 - 50.3 %    .4 (H) 80.0 - 99.0 FL    MCH 32.0 26.0 - 34.0 PG    MCHC 30.7 30.0 - 36.5 g/dL    RDW 18.7 (H) 11.5 - 14.5 %    PLATELET 91 (L) 955 - 400 K/uL    MPV 11.1 8.9 - 12.9 FL    NRBC 0.0 0.0  WBC    ABSOLUTE NRBC 0.00 0.00 - 0.01 K/uL    NEUTROPHILS 59 32 - 75 %    LYMPHOCYTES 13 12 - 49 %    MONOCYTES 23 (H) 5 - 13 %    EOSINOPHILS 3 0 - 7 %    BASOPHILS 1 0 - 1 %    IMMATURE GRANULOCYTES 1 (H) 0 - 0.5 %    ABS. NEUTROPHILS 3.6 1.8 - 8.0 K/UL    ABS. LYMPHOCYTES 0.8 0.8 - 3.5 K/UL    ABS. MONOCYTES 1.4 (H) 0.0 - 1.0 K/UL    ABS. EOSINOPHILS 0.2 0.0 - 0.4 K/UL    ABS. BASOPHILS 0.0 0.0 - 0.1 K/UL    ABS. IMM.  GRANS. 0.0 0.00 - 0.04 K/UL    DF AUTOMATED     METABOLIC PANEL, COMPREHENSIVE    Collection Time: 10/23/21 12:39 PM   Result Value Ref Range    Sodium 136 136 - 145 mmol/L    Potassium 5.3 (H) 3.5 - 5.1 mmol/L    Chloride 104 97 - 108 mmol/L    CO2 25 21 - 32 mmol/L    Anion gap 7 5 - 15 mmol/L    Glucose 109 (H) 65 - 100 mg/dL    BUN 16 6 - 20 mg/dL    Creatinine 1.47 (H) 0.70 - 1.30 mg/dL    BUN/Creatinine ratio 11 (L) 12 - 20      GFR est AA 58 (L) >60 ml/min/1.73m2    GFR est non-AA 48 (L) >60 ml/min/1.73m2    Calcium 9.0 8.5 - 10.1 mg/dL    Bilirubin, total 1.3 (H) 0.2 - 1.0 mg/dL    AST (SGOT) 66 (H) 15 - 37 U/L    ALT (SGPT) 27 12 - 78 U/L    Alk.  phosphatase 115 45 - 117 U/L    Protein, total 8.0 6.4 - 8.2 g/dL    Albumin 2.0 (L) 3.5 - 5.0 g/dL    Globulin 6.0 (H) 2.0 - 4.0 g/dL    A-G Ratio 0.3 (L) 1.1 - 2.2     NT-PRO BNP    Collection Time: 10/23/21 12:39 PM   Result Value Ref Range    NT pro- (H) <125 pg/mL   LACTIC ACID    Collection Time: 10/23/21 12:39 PM   Result Value Ref Range    Lactic acid 1.0 0.4 - 2.0 mmol/L   AMMONIA    Collection Time: 10/23/21 12:39 PM   Result Value Ref Range    Ammonia 143 (H) <32 umol/L   MAGNESIUM    Collection Time: 10/23/21 12:39 PM   Result Value Ref Range    Magnesium 2.1 1.6 - 2.4 mg/dL   TROPONIN-HIGH SENSITIVITY    Collection Time: 10/23/21 12:45 PM   Result Value Ref Range    Troponin-High Sensitivity 9 0 - 76 ng/L   SARS-COV-2    Collection Time: 10/23/21 12:45 PM   Result Value Ref Range    SARS-CoV-2 Nasopharyngeal     BLOOD GAS, ARTERIAL    Collection Time: 10/23/21 12:45 PM   Result Value Ref Range    pH 7.24 (LL) 7.35 - 7.45      PCO2 61 (H) 35 - 45 mmHg    PO2 57 (L) 75 - 100 mmHg    O2 SAT 88 (L) >95 %    BICARBONATE 22 22 - 26 mmol/L    BASE DEFICIT 2.6 (H) 0 - 2 mmol/L    O2 METHOD Nasal Cannula      O2 FLOW RATE 4.0 L/min    SITE Right Brachial      ALVAERZ'S TEST PASS      Critical value read back SAMARA STEVENS RN    COVID-19 RAPID TEST    Collection Time: 10/23/21 12:45 PM Result Value Ref Range    Specimen source Nasopharyngeal      COVID-19 rapid test Not Detected Not Detected         Radiologic Studies:  CT Results  (Last 48 hours)    None        CXR Results  (Last 48 hours)               10/23/21 1324  XR CHEST SNGL V Final result    Impression:  Shallow depth of inspiration otherwise fairly stable appearance of   the chest.       Narrative:  AP supine portable radiograph of the chest at 1:17 p.m. compared with October 19, 2021. INDICATION: Shortness of breath. There has taken a shallow depth of inspiration and is rotated to left. Heart   size appears unchanged. Bilateral pulmonary nodules persist but are not as   well-visualized due to the shallow depth of inspiration. No definite new   infiltrate or effusion. Left chest wall pacer is noted. No pneumothorax. No   acute bony findings.                  Medications ordered:  Medications   piperacillin-tazobactam (ZOSYN) 3.375 g in 0.9% sodium chloride (MBP/ADV) 100 mL MBP (3.375 g IntraVENous New Bag 10/23/21 1339)   lactulose (CHRONULAC) 10 gram/15 mL solution 30 mL (has no administration in time range)   lactulose (CHRONULAC) 10 gram/15 mL solution 45 mL (has no administration in time range)   sodium chloride (NS) flush 5-40 mL (10 mL IntraVENous Given 10/23/21 1557)   sodium chloride (NS) flush 5-40 mL (has no administration in time range)   acetaminophen (TYLENOL) tablet 650 mg (has no administration in time range)     Or   acetaminophen (TYLENOL) suppository 650 mg (has no administration in time range)   polyethylene glycol (MIRALAX) packet 17 g (has no administration in time range)   ondansetron (ZOFRAN ODT) tablet 4 mg (has no administration in time range)     Or   ondansetron (ZOFRAN) injection 4 mg (has no administration in time range)   enoxaparin (LOVENOX) injection 40 mg (has no administration in time range)   methylPREDNISolone (PF) (SOLU-MEDROL) injection 40 mg (40 mg IntraVENous Given 10/23/21 2790) albuterol (PROVENTIL HFA, VENTOLIN HFA, PROAIR HFA) inhaler 2 Puff (has no administration in time range)   folic acid (FOLVITE) tablet 1 mg (has no administration in time range)   pantoprazole (PROTONIX) tablet 20 mg (has no administration in time range)   propranoloL (INDERAL) tablet 20 mg (has no administration in time range)   rifAXIMin (XIFAXAN) tablet 550 mg (has no administration in time range)   methylPREDNISolone (PF) (Solu-MEDROL) injection 125 mg (125 mg IntraVENous Given 10/23/21 1301)   albuterol-ipratropium (DUO-NEB) 2.5 MG-0.5 MG/3 ML (3 mL Nebulization Given 10/23/21 1259)   vancomycin (VANCOCIN) 1,000 mg in 0.9% sodium chloride 250 mL (VIAL-MATE) (0 mg IntraVENous IV Completed 10/23/21 1555)   albuterol-ipratropium (DUO-NEB) 2.5 MG-0.5 MG/3 ML (3 mL Nebulization Given 10/23/21 1356)        ED Course     ED Course:     ED Course as of Oct 23 1611   Sat Oct 23, 2021   1321 CBC does not show any evidence of acute process. Leukocytosis not present to suggest infection. Hemoglobin at baseline without evidence of acute anemia. Platelet count is normal.    [SS]   1322 COVID-19 rapid test is negative.      [SS]   1327 ABG shows a pH of 7.24 with a PCO2 of 61, picture of respiratory acidosis. The patient will be placed on BiPAP with DuoNeb's given severe COPD exacerbation. Currently he is on 6 L of nasal cannula with oxygen is in the low 92 however there is concern for hypercarbia thus BiPAP is required. [SS]   3414 Chest x-ray shows a right lower lobe pneumonia, antibiotics initiated. [SS]   1353 Electrolytes are within range. Creatinine is not elevated more than baseline range making CHOCO unlikely. No significant transaminitis noted. Normal bilirubin. Potassium is 5.3 however is hemolyzed. We will repeat.    [SS]   1353 Lactate is within normal limits. No concern for severe sepsis, septic shock, or ischemia. BNP is not significantly elevated thus heart failure is less likely.     Magnesium within normal limits. [SS]   6616 After BiPAP irrigation, the patient remains altered. Will do CT head. [SS]   1539 Ammonia is 143. Lactulose ordered    [SS]      ED Course User Index  [SS] Kim Knight MD       Reassessment / Disposition / Discussion: We will admit for altered mental status from respiratory acidosis and hypercapnia as well as hyperammonemia. CT head is pending. Final Disposition     Disposition: Condition stable  Admitted to ICU Medical ICU the case was discussed with the admitting physician     ADMISSION: After completion of ED workup and discussion of results and diagnoses with the patient, patient was admitted to the hospital. All the patient's questions were answered. Case was discussed with the receiving team.    ED Procedures & Consultations   Performed by: Jhonny Arizmendi MD  Procedures     EKG interpretation (Preliminary):  Rhythm: normal sinus rhythm; and tachycardic . Rate (approx.): 106. Axis: R axis;  SC interval: normal;  QRS interval: normal - low voltage;  ST/T wave: normal;  Other findings: abnormal EKG: Sinus tachycardia. Diagnosis     Clinical Impression:   1. Hypercapnia    2. Respiratory acidosis    3. Altered mental status, unspecified altered mental status type    4. Hyperammonemia [facility])        Attestations:    Jhonny Arizmendi MD    Please note that this dictation was completed with [employer], the computer voice recognition software. Quite often unanticipated grammatical, syntax, homophones, and other interpretive errors are inadvertently transcribed by the computer software. Please disregard these errors. Please excuse any errors that have escaped final proofreading. Thank you.

## 2021-10-23 NOTE — PROGRESS NOTES
Patient came to room 526 from ED  [place] via stretcher on HF NC lethargic,unable to answer admission questions, skin assessment completed, scatters scars, 0 breakdown. External catheter placed and suction applied. IV assessed and patent in LFA 20. Bed in lowest position, wheels locked.

## 2021-10-23 NOTE — ED NOTES
TRANSFER - OUT REPORT:    Verbal report given to accepting admit nurse, Nicky Putnam (name) on Isela Matthew  being transferred to [place] [id number] (unit) for routine progression of care       Report consisted of patients Situation, Background, Assessment and   Recommendations(SBAR). Information from the following report(s) SBAR was reviewed with the receiving nurse & care transferred. Lines:   Peripheral IV 10/23/21 Left;Posterior Hand (Active)        Opportunity for questions and clarification was provided. Patient transported to floor on stretcher by ED Tech & RT with:   Monitor  Bipap    All belongings accompanied patient to floor in labeled belongings bag.

## 2021-10-23 NOTE — H&P
History and Physical    Subjective:   Chief Complaint : generalized weakness and confusion since 1 day  Source of information : charts    Next of kin is son who is an adult, he also has a significant other who takes care of him  History of present illness:     89E, H/o complicated alcoholic liver cirrhosis, alcohol and tobacco dependence, lung cancer with mets (not on treatment ), CAD also has an AICD with generalized weakness and worsening confusion     He was discharged by me on 10/21, SNF was recommended but family politely declined. He went home and was doing ok when became unresponsive this AM and presented here. He came in through EMS, and he was found unresponsive and hasn't received his lactulose since last discharge     [facility]/o COPD with lung cancer and mets , given the liver cirrhosis he is not a good candidate for chemo, they stilll follow [facility] oncology and has f/ups. ER: Bipap, abx and solumedrol. Past Medical History:   Diagnosis Date    AICD (automatic cardioverter/defibrillator) present     CAD (coronary artery disease)     Cirrhosis of liver ([address]     Gallstones     Hypertension     Nodule of lower lobe of right lung     Renal stones      Past Surgical History:   Procedure Laterality Date    HX PACEMAKER PLACEMENT      IR PARACENTESIS ABD W IMAGE  12/30/2020     No family history on file. Social History     Tobacco Use    Smoking status: Current Every Day Smoker     Packs/day: 1.00   Substance Use Topics    Alcohol use: Yes       Prior to Admission medications    Medication Sig Start Date End Date Taking? Authorizing Provider   lactulose (CHRONULAC) 10 gram/15 mL solution Take 30 mL by mouth three (3) times daily for 30 days. 10/21/21 11/20/21  Kenny Barriga MD   furosemide (LASIX) 40 mg tablet Take 0.5 Tablets by mouth daily. 10/21/21   Kenny Barriga MD   pantoprazole (PROTONIX) 20 mg tablet Take 1 Tablet by mouth daily.  10/21/21   Jaja Alvarez Jaimes MD   propranoloL (INDERAL) 20 mg tablet Take 1 Tablet by mouth two (2) times a day for 30 days. 10/21/21 11/20/21  Kenny Barriga MD   albuterol (PROVENTIL HFA, VENTOLIN HFA, PROAIR HFA) 90 mcg/actuation inhaler Take 2 Puffs by inhalation every four to six (4-6) hours as needed. 10/2/21   Provider, Historical   diclofenac (VOLTAREN) 1 % gel Apply  to affected area daily. 9/17/21   Provider, Historical   ondansetron hcl (ZOFRAN) 4 mg tablet Take 1 Tablet by mouth three (3) times daily as needed. 10/15/21   Provider, Historical   ibuprofen (MOTRIN) 600 mg tablet Take 1 Tablet by mouth three (3) times daily as needed. 10/15/21   Provider, Historical   folic acid (FOLVITE) 1 mg tablet Take 1 mg by mouth daily. Star, MD Leslie   isosorbide mononitrate ER (IMDUR) 30 mg tablet Take 30 mg by mouth daily. Other, MD Leslie     No Known Allergies          Review of Systems:  Cannot be determined s.t mental status    Vitals:     Visit Vitals  BP (!) 126/93   Pulse (!) 103   Temp 99.5 °F (37.5 °C)   Resp 18   Ht 5' 8\" (1.727 m)   Wt 104.3 kg (230 lb)   SpO2 100%   BMI 34.97 kg/m²       Physical Exam:   General : bipap, opending eyes to commands and name, and sternal rubs, but falling back to sleep  HEENT : PERRLA, normal oral mucosa, atraumatic normocephalic, Normal ear and nose. oropharynx  Neck : Supple, no JVD, no masses noted, no carotid bruit  Lungs : +++ wheezing  CVS : Rhythm rate regular, S1+, S2+, no murmur or gallop  Abdomen : soft, spider nevi  Extremities : No edema noted,  pedal pulses palpable  Musculoskeletal : Fair range of motion, no joint swelling or effusion, muscle tone and power appears normal,   Skin : Moist, warm, skin turgor, no pathological rash  Lymphatic : No cervical lymphadenopathy.   Neurological : cannot be determined  Psychiatric : cannot be determined        Data Review:   Recent Results (from the past 24 hour(s))   CBC WITH AUTOMATED DIFF    Collection Time: 10/23/21 12:39 PM   Result Value Ref Range    WBC 6.0 4.1 - 11.1 K/uL    RBC 4.06 (L) 4.10 - 5.70 M/uL    HGB 13.0 12.1 - 17.0 g/dL    HCT 42.4 36.6 - 50.3 %    .4 (H) 80.0 - 99.0 FL    MCH 32.0 26.0 - 34.0 PG    MCHC 30.7 30.0 - 36.5 g/dL    RDW 18.7 (H) 11.5 - 14.5 %    PLATELET 91 (L) 939 - 400 K/uL    MPV 11.1 8.9 - 12.9 FL    NRBC 0.0 0.0  WBC    ABSOLUTE NRBC 0.00 0.00 - 0.01 K/uL    NEUTROPHILS 59 32 - 75 %    LYMPHOCYTES 13 12 - 49 %    MONOCYTES 23 (H) 5 - 13 %    EOSINOPHILS 3 0 - 7 %    BASOPHILS 1 0 - 1 %    IMMATURE GRANULOCYTES 1 (H) 0 - 0.5 %    ABS. NEUTROPHILS 3.6 1.8 - 8.0 K/UL    ABS. LYMPHOCYTES 0.8 0.8 - 3.5 K/UL    ABS. MONOCYTES 1.4 (H) 0.0 - 1.0 K/UL    ABS. EOSINOPHILS 0.2 0.0 - 0.4 K/UL    ABS. BASOPHILS 0.0 0.0 - 0.1 K/UL    ABS. IMM. GRANS. 0.0 0.00 - 0.04 K/UL    DF AUTOMATED     METABOLIC PANEL, COMPREHENSIVE    Collection Time: 10/23/21 12:39 PM   Result Value Ref Range    Sodium 136 136 - 145 mmol/L    Potassium 5.3 (H) 3.5 - 5.1 mmol/L    Chloride 104 97 - 108 mmol/L    CO2 25 21 - 32 mmol/L    Anion gap 7 5 - 15 mmol/L    Glucose 109 (H) 65 - 100 mg/dL    BUN 16 6 - 20 mg/dL    Creatinine 1.47 (H) 0.70 - 1.30 mg/dL    BUN/Creatinine ratio 11 (L) 12 - 20      GFR est AA 58 (L) >60 ml/min/1.73m2    GFR est non-AA 48 (L) >60 ml/min/1.73m2    Calcium 9.0 8.5 - 10.1 mg/dL    Bilirubin, total 1.3 (H) 0.2 - 1.0 mg/dL    AST (SGOT) 66 (H) 15 - 37 U/L    ALT (SGPT) 27 12 - 78 U/L    Alk.  phosphatase 115 45 - 117 U/L    Protein, total 8.0 6.4 - 8.2 g/dL    Albumin 2.0 (L) 3.5 - 5.0 g/dL    Globulin 6.0 (H) 2.0 - 4.0 g/dL    A-G Ratio 0.3 (L) 1.1 - 2.2     NT-PRO BNP    Collection Time: 10/23/21 12:39 PM   Result Value Ref Range    NT pro- (H) <125 pg/mL   LACTIC ACID    Collection Time: 10/23/21 12:39 PM   Result Value Ref Range    Lactic acid 1.0 0.4 - 2.0 mmol/L   AMMONIA    Collection Time: 10/23/21 12:39 PM   Result Value Ref Range    Ammonia 143 (H) <32 umol/L   MAGNESIUM Collection Time: 10/23/21 12:39 PM   Result Value Ref Range    Magnesium 2.1 1.6 - 2.4 mg/dL   TROPONIN-HIGH SENSITIVITY    Collection Time: 10/23/21 12:45 PM   Result Value Ref Range    Troponin-High Sensitivity 9 0 - 76 ng/L   SARS-COV-2    Collection Time: 10/23/21 12:45 PM   Result Value Ref Range    SARS-CoV-2 Nasopharyngeal     BLOOD GAS, ARTERIAL    Collection Time: 10/23/21 12:45 PM   Result Value Ref Range    pH 7.24 (LL) 7.35 - 7.45      PCO2 61 (H) 35 - 45 mmHg    PO2 57 (L) 75 - 100 mmHg    O2 SAT 88 (L) >95 %    BICARBONATE 22 22 - 26 mmol/L    BASE DEFICIT 2.6 (H) 0 - 2 mmol/L    O2 METHOD Nasal Cannula      O2 FLOW RATE 4.0 L/min    SITE Right Brachial      ALVAREZ'S TEST PASS      Critical value read back SAMARA STEVENS,RN    COVID-19 RAPID TEST    Collection Time: 10/23/21 12:45 PM   Result Value Ref Range    Specimen source Nasopharyngeal      COVID-19 rapid test Not Detected Not Detected               Assessment and Plan :     (1) Acute encephalopathy : metabolic with increased ammonia and co2 narcosis. GCS 11. Continue Bipap and lactolose with rifaximin. (2) chronic liver cirrhosis with increased ammonia: complicats above. Resume lactulose rectal and rifaximin. NPO until alert. (3) AECOPD: solumedrol, duo nebs, does not meet sepsis. (4) alcohol and tobacco dependence: complicates all aspects of care.  Nicotine patch    (5) hyperkalemia : continue above treatment,.     (6) respiratory acidosis : bipap, repeat ABG in 2 hours and change to NC    (7) CKDIII: improved than last time    (7) CAD: complicates all aspects of care    DVT ppx: heparin subQ    DISPO: pt, likely SNF    Signed By: Mu Shane MD     October 23, 2021

## 2021-10-23 NOTE — ED NOTES
Called 5E to attempt to give report to accepting admit nurse for pt to go to assigned admit Rm [id number]. Nurse unable to take report at this time. Awaiting call back.

## 2021-10-23 NOTE — Clinical Note
Status[de-identified] INPATIENT [101]   Type of Bed: Remote Telemetry [29]   Cardiac Monitoring Required?: No   Inpatient Hospitalization Certified Necessary for the Following Reasons: 9.  Other (further clarification in H&P documentation)   Admitting Diagnosis: Acute encephalopathy [[id number]]   Admitting Physician: Glendy Mathur [[id number]]   Attending Physician: Glendy Mathur [[id number]]   Estimated Length of Stay: 3-4 Midnights   Discharge Plan[de-identified] Extended Care Facility (e.g. Adult Home, Nursing Home, etc.)

## 2021-10-24 NOTE — PROGRESS NOTES
Hospitalist Progress Note    Subjective:   Daily Progress Note: 10/24/2021 3:15 PM    Alert and oriented    Current Facility-Administered Medications   Medication Dose Route Frequency    lactulose (CHRONULAC) 10 gram/15 mL solution 45 mL  30 g Oral TID    sodium chloride (NS) flush 5-40 mL  5-40 mL IntraVENous Q8H    sodium chloride (NS) flush 5-40 mL  5-40 mL IntraVENous PRN    acetaminophen (TYLENOL) tablet 650 mg  650 mg Oral Q6H PRN    Or    acetaminophen (TYLENOL) suppository 650 mg  650 mg Rectal Q6H PRN    polyethylene glycol (MIRALAX) packet 17 g  17 g Oral DAILY PRN    ondansetron (ZOFRAN ODT) tablet 4 mg  4 mg Oral Q8H PRN    Or    ondansetron (ZOFRAN) injection 4 mg  4 mg IntraVENous Q6H PRN    enoxaparin (LOVENOX) injection 40 mg  40 mg SubCUTAneous DAILY    methylPREDNISolone (PF) (SOLU-MEDROL) injection 40 mg  40 mg IntraVENous Q8H    albuterol (PROVENTIL HFA, VENTOLIN HFA, PROAIR HFA) inhaler 2 Puff  2 Puff Inhalation R4P PRN    folic acid (FOLVITE) tablet 1 mg  1 mg Oral DAILY    pantoprazole (PROTONIX) tablet 20 mg  20 mg Oral DAILY    propranoloL (INDERAL) tablet 20 mg  20 mg Oral BID    rifAXIMin (XIFAXAN) tablet 550 mg  550 mg Oral TID    lactulose (CHRONULAC) 10 gram/15 mL solution 45 mL  45 mL Rectal BID        Review of Systems  Review of Systems   Constitutional: Negative for chills, fever and malaise/fatigue. HENT: Negative. Eyes: Negative. Respiratory: Negative. Negative for cough and shortness of breath. Cardiovascular: Negative for chest pain and leg swelling. Gastrointestinal: Negative for abdominal pain, nausea and vomiting. Genitourinary: Negative. Musculoskeletal: Negative. Skin: Negative. Neurological: Negative. Psychiatric/Behavioral: Negative.              Objective:     Visit Vitals  /64 (BP 1 Location: Left upper arm, BP Patient Position: At rest;Supine)   Pulse 96   Temp 97.6 °F (36.4 °C)   Resp 20   Ht 5' 8\" (1.727 m)   Wt 104.3 kg (230 lb)   SpO2 96%   BMI 34.97 kg/m²    O2 Flow Rate (L/min):  (4 l/m) O2 Device: Nasal cannula    Temp (24hrs), Av.8 °F (36.6 °C), Min:97.4 °F (36.3 °C), Max:98.8 °F (37.1 °C)      10/24 0701 - 10/24 1900  In: 360 [P.O.:360]  Out: -   10/22 1901 - 10/24 0700  In: 350 [I.V.:350]  Out: 0     Recent Results (from the past 24 hour(s))   BLOOD GAS, ARTERIAL    Collection Time: 10/23/21  8:55 PM   Result Value Ref Range    pH 7.26 (L) 7.35 - 7.45      PCO2 56 (H) 35 - 45 mmHg    PO2 80 75 - 100 mmHg    O2 SAT 96 >95 %    BICARBONATE 22 22 - 26 mmol/L    BASE DEFICIT 2.8 (H) 0 - 2 mmol/L    O2 METHOD Non-invasive ventilation      FIO2 40.0 %    MODE BiPAP      SET RATE 18      EPAP/CPAP/PEEP 5      SITE Arterial Line      ALVAREZ'S TEST Positive     GLUCOSE, POC    Collection Time: 10/24/21 12:09 AM   Result Value Ref Range    Glucose (POC) 165 (H) 65 - 117 mg/dL    Performed by Addison Joe    BLOOD GAS, ARTERIAL    Collection Time: 10/24/21  3:30 AM   Result Value Ref Range    pH 7.30 (L) 7.35 - 7.45      PCO2 49 (H) 35 - 45 mmHg    PO2 72 (L) 75 - 100 mmHg    O2 SAT 95 (L) >95 %    BICARBONATE 22 22 - 26 mmol/L    BASE DEFICIT 3.2 (H) 0 - 2 mmol/L    O2 METHOD BiPAP      FIO2 35.0 %    SET RATE 16      EPAP/CPAP/PEEP 8      High PEEP 16      SITE Left Radial      ALVAREZ'S TEST PASS     CBC WITH AUTOMATED DIFF    Collection Time: 10/24/21  7:50 AM   Result Value Ref Range    WBC 4.3 4.1 - 11.1 K/uL    RBC 3.88 (L) 4.10 - 5.70 M/uL    HGB 12.5 12.1 - 17.0 g/dL    HCT 39.5 36.6 - 50.3 %    .8 (H) 80.0 - 99.0 FL    MCH 32.2 26.0 - 34.0 PG    MCHC 31.6 30.0 - 36.5 g/dL    RDW 18.4 (H) 11.5 - 14.5 %    PLATELET 81 (L) 989 - 400 K/uL    MPV 10.3 8.9 - 12.9 FL    NRBC 0.0 0.0  WBC    ABSOLUTE NRBC 0.00 0.00 - 0.01 K/uL    NEUTROPHILS 85 (H) 32 - 75 %    LYMPHOCYTES 7 (L) 12 - 49 %    MONOCYTES 7 5 - 13 %    EOSINOPHILS 0 0 - 7 %    BASOPHILS 0 0 - 1 %    IMMATURE GRANULOCYTES 1 (H) 0 - 0.5 %    ABS. NEUTROPHILS 3.6 1.8 - 8.0 K/UL    ABS. LYMPHOCYTES 0.3 (L) 0.8 - 3.5 K/UL    ABS. MONOCYTES 0.3 0.0 - 1.0 K/UL    ABS. EOSINOPHILS 0.0 0.0 - 0.4 K/UL    ABS. BASOPHILS 0.0 0.0 - 0.1 K/UL    ABS. IMM. GRANS. 0.0 0.00 - 0.04 K/UL    DF AUTOMATED     METABOLIC PANEL, COMPREHENSIVE    Collection Time: 10/24/21  7:50 AM   Result Value Ref Range    Sodium 139 136 - 145 mmol/L    Potassium 5.0 3.5 - 5.1 mmol/L    Chloride 110 (H) 97 - 108 mmol/L    CO2 25 21 - 32 mmol/L    Anion gap 4 (L) 5 - 15 mmol/L    Glucose 145 (H) 65 - 100 mg/dL    BUN 23 (H) 6 - 20 mg/dL    Creatinine 1.61 (H) 0.70 - 1.30 mg/dL    BUN/Creatinine ratio 14 12 - 20      GFR est AA 52 (L) >60 ml/min/1.73m2    GFR est non-AA 43 (L) >60 ml/min/1.73m2    Calcium 9.3 8.5 - 10.1 mg/dL    Bilirubin, total 1.2 (H) 0.2 - 1.0 mg/dL    AST (SGOT) 36 15 - 37 U/L    ALT (SGPT) 21 12 - 78 U/L    Alk. phosphatase 104 45 - 117 U/L    Protein, total 7.0 6.4 - 8.2 g/dL    Albumin 1.9 (L) 3.5 - 5.0 g/dL    Globulin 5.1 (H) 2.0 - 4.0 g/dL    A-G Ratio 0.4 (L) 1.1 - 2.2     AMMONIA    Collection Time: 10/24/21  7:50 AM   Result Value Ref Range    Ammonia 42 (H) <32 umol/L   GLUCOSE, POC    Collection Time: 10/24/21  8:16 AM   Result Value Ref Range    Glucose (POC) 125 (H) 65 - 117 mg/dL    Performed by [address] [place] [address], POC    Collection Time: 10/24/21 10:48 AM   Result Value Ref Range    Glucose (POC) 162 (H) 65 - 117 mg/dL    Performed by MCKIVER NANCY         CT HEAD WO CONT   Final Result   No evidence of acute intracranial process. XR CHEST SNGL V   Final Result   Shallow depth of inspiration otherwise fairly stable appearance of   the chest.           PHYSICAL EXAM:    Physical Exam  Vitals reviewed. Constitutional:       Appearance: He is ill-appearing. HENT:      Head: Normocephalic and atraumatic. Mouth/Throat:      Mouth: Mucous membranes are moist.      Pharynx: Oropharynx is clear.    Eyes:      Conjunctiva/sclera: Conjunctivae normal. Cardiovascular:      Rate and Rhythm: Normal rate and regular rhythm. Heart sounds: Normal heart sounds. Pulmonary:      Effort: Pulmonary effort is normal.      Breath sounds: Normal breath sounds. Abdominal:      General: Abdomen is flat. Bowel sounds are normal.      Palpations: Abdomen is soft. Musculoskeletal:         General: Normal range of motion. Cervical back: Normal range of motion and neck supple. Skin:     General: Skin is warm and dry. Neurological:      General: No focal deficit present. Mental Status: He is alert and oriented to person, place, and time. Mental status is at baseline.    Psychiatric:         Mood and Affect: Mood normal.          Data Review    Recent Results (from the past 24 hour(s))   BLOOD GAS, ARTERIAL    Collection Time: 10/23/21  8:55 PM   Result Value Ref Range    pH 7.26 (L) 7.35 - 7.45      PCO2 56 (H) 35 - 45 mmHg    PO2 80 75 - 100 mmHg    O2 SAT 96 >95 %    BICARBONATE 22 22 - 26 mmol/L    BASE DEFICIT 2.8 (H) 0 - 2 mmol/L    O2 METHOD Non-invasive ventilation      FIO2 40.0 %    MODE BiPAP      SET RATE 18      EPAP/CPAP/PEEP 5      SITE Arterial Line      ALVAREZ'S TEST Positive     GLUCOSE, POC    Collection Time: 10/24/21 12:09 AM   Result Value Ref Range    Glucose (POC) 165 (H) 65 - 117 mg/dL    Performed by [employer], ARTERIAL    Collection Time: 10/24/21  3:30 AM   Result Value Ref Range    pH 7.30 (L) 7.35 - 7.45      PCO2 49 (H) 35 - 45 mmHg    PO2 72 (L) 75 - 100 mmHg    O2 SAT 95 (L) >95 %    BICARBONATE 22 22 - 26 mmol/L    BASE DEFICIT 3.2 (H) 0 - 2 mmol/L    O2 METHOD BiPAP      FIO2 35.0 %    SET RATE 16      EPAP/CPAP/PEEP 8      High PEEP 16      SITE Left Radial      ALVAREZ'S TEST PASS     CBC WITH AUTOMATED DIFF    Collection Time: 10/24/21  7:50 AM   Result Value Ref Range    WBC 4.3 4.1 - 11.1 K/uL    RBC 3.88 (L) 4.10 - 5.70 M/uL    HGB 12.5 12.1 - 17.0 g/dL    HCT 39.5 36.6 - 50.3 %    .8 (H) 80.0 - 99.0 FL    MCH 32.2 26.0 - 34.0 PG    MCHC 31.6 30.0 - 36.5 g/dL    RDW 18.4 (H) 11.5 - 14.5 %    PLATELET 81 (L) 153 - 400 K/uL    MPV 10.3 8.9 - 12.9 FL    NRBC 0.0 0.0  WBC    ABSOLUTE NRBC 0.00 0.00 - 0.01 K/uL    NEUTROPHILS 85 (H) 32 - 75 %    LYMPHOCYTES 7 (L) 12 - 49 %    MONOCYTES 7 5 - 13 %    EOSINOPHILS 0 0 - 7 %    BASOPHILS 0 0 - 1 %    IMMATURE GRANULOCYTES 1 (H) 0 - 0.5 %    ABS. NEUTROPHILS 3.6 1.8 - 8.0 K/UL    ABS. LYMPHOCYTES 0.3 (L) 0.8 - 3.5 K/UL    ABS. MONOCYTES 0.3 0.0 - 1.0 K/UL    ABS. EOSINOPHILS 0.0 0.0 - 0.4 K/UL    ABS. BASOPHILS 0.0 0.0 - 0.1 K/UL    ABS. IMM. GRANS. 0.0 0.00 - 0.04 K/UL    DF AUTOMATED     METABOLIC PANEL, COMPREHENSIVE    Collection Time: 10/24/21  7:50 AM   Result Value Ref Range    Sodium 139 136 - 145 mmol/L    Potassium 5.0 3.5 - 5.1 mmol/L    Chloride 110 (H) 97 - 108 mmol/L    CO2 25 21 - 32 mmol/L    Anion gap 4 (L) 5 - 15 mmol/L    Glucose 145 (H) 65 - 100 mg/dL    BUN 23 (H) 6 - 20 mg/dL    Creatinine 1.61 (H) 0.70 - 1.30 mg/dL    BUN/Creatinine ratio 14 12 - 20      GFR est AA 52 (L) >60 ml/min/1.73m2    GFR est non-AA 43 (L) >60 ml/min/1.73m2    Calcium 9.3 8.5 - 10.1 mg/dL    Bilirubin, total 1.2 (H) 0.2 - 1.0 mg/dL    AST (SGOT) 36 15 - 37 U/L    ALT (SGPT) 21 12 - 78 U/L    Alk. phosphatase 104 45 - 117 U/L    Protein, total 7.0 6.4 - 8.2 g/dL    Albumin 1.9 (L) 3.5 - 5.0 g/dL    Globulin 5.1 (H) 2.0 - 4.0 g/dL    A-G Ratio 0.4 (L) 1.1 - 2.2     AMMONIA    Collection Time: 10/24/21  7:50 AM   Result Value Ref Range    Ammonia 42 (H) <32 umol/L   GLUCOSE, POC    Collection Time: 10/24/21  8:16 AM   Result Value Ref Range    Glucose (POC) 125 (H) 65 - 117 mg/dL    Performed by [address] [place] [address], POC    Collection Time: 10/24/21 10:48 AM   Result Value Ref Range    Glucose (POC) 162 (H) 65 - 117 mg/dL    Performed by Hima Mai         Assessment/Plan:     Active Problems:    Acute encephalopathy (10/23/2021)      Hospital course:     There is a 52-year-old male with a history of COPD, lung cancer with metastatic disease, cirrhosis who was felt to not be a good candidate for chemotherapy. He is a [facility] patient. He receives paracentesis at that facility. He was discharged home although the recommendation was for skilled nursing facility although the patient declined. Upon admission he was found to have an ammonia of 143 as he is noncompliant with his lactulose. Patient is requesting a transfer to [facility] for potential salvage chemotherapy. Impression:    1. Acute hepatic encephalopathy  2. Chronic liver cirrhosis  3. COPD with acute exacerbation  4. Alcohol and tobacco abuse  5. Hypokalemia  6. Acute respiratory failure with hypercapnia  7. Chronic kidney disease, stage III  8. Coronary artery disease    Plan:    1. Acute hepatic encephalopathy  Patient given lactulose with improvement of ammonia from  143-->42  Resolved  Continue lactulose    2. Chronic liver cirrhosis  Continue to monitor  Paracentesis as needed    3. COPD with acute exacerbation  Continue steroids  Continue duo nebs    4. Alcohol and tobacco dependency  Monitor for withdrawal    5. Hyperkalemia  Resolved    6. Acute respiratory failure with hypercapnia  Resolved with BiPAP    7. Chronic kidney disease, stage III  Stable    8. Coronary artery disease  Stable    DVT prophylaxis: Heparin  Ulcer prophylaxis: Protonix    Discharge disposition: Compliance with medications, possible transfer to [facility] discussed with: Patient/Family    Total time spent with patient: >35 minutes.

## 2021-10-24 NOTE — PROGRESS NOTES
Patient on BIPAP upon assessment, lethargic, responds to voice, reviewed ABG's, CO2 increased. MD paged for Reassessment of ABG now and tomorrow AM.  Lactulose enemas being completed, new blood word ordered for AM.  Respiratory consulted and settings changed. Patient remains NPO. POC glucose checks ordered Q 6 to prevent hypoglycemia. Confused. External cath placed and on suction, so far no urine output. Tele box reading 's. Skin is WNL minus scattered scars. PIV in place and flushed. MD made aware of same. Text paged new ABG results. Repeat Lactulose ordered r/t dose missed in ED. Will reexamine labs in the AM.  BP was a little soft upon initial assessment, but after repositioning still soft, but with a MAP over 70.

## 2021-10-24 NOTE — PROGRESS NOTES
Reason for Readmission:     Acute Encephalopathy         RUR Score/Risk Level:   18%      PCP: First and Last name:  Valerie Hurt Rachel   Name of Practice:    Are you a current patient: Yes/No:    Approximate date of last visit: Unsure   Can you participate in a virtual visit with your PCP:     Is a Care Conference indicated:  NO      Did you attend your follow up appointment (s): If not, why not:  Patient returned to the hospital by EMS before he had his appointment       Resources/supports as identified by patient/family:   Patient's significant other is his primary care giver       Top Challenges facing patient (as identified by patient/family and CM): Finances/Medication cost?   No issue    Transportation      No issue  Support system or lack thereof? No Issue  Living arrangements? No issue       Self-care/ADLs/Cognition? No issue       Current Advanced Directive/Advance Care Plan:  No advanced care directive or plan and not interested in information at this time           Plan for utilizing home health:  Patient current with home health before admission              Transition of Care Plan:    Based on readmission, the patient's previous Plan of Care   has been evaluated and/or modified. The current Transition of Care Plan is:   CM spoke with patient's significant other. Patient lives at home with her. She is his transport at discharge and to follow-up appointments. Patient was current with [facility] before admission. Significant other is stating that she wants to speak with doctor about some things before CM sends referral back to home health company. She states that if patient returns home she will want the home health. Current Dispo: Home with Home Health.

## 2021-10-24 NOTE — PROGRESS NOTES
Pt doing much better today, alert and oriented x4, can be confused at times. Compliant with all medications. Pt continues scheduled lactulose, bowel movements recorded. Stomach very distended, pt states he was supposed to have a paracentesis friday but did not go.
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
## 2021-10-24 NOTE — PROGRESS NOTES
SPEECH LANGUAGE PATHOLOGY BEDSIDE SWALLOW EVALUATIONS  Patient: Kiet Villalba  (23 y.o. )  Date: 10/24/2021  Primary Diagnosis: Acute Encephalopathy  Precaution: Aspiration    ASSESSMENT :  Patient seen upright at bedside, alert and loquacious. RN present. Patient presents with abdominal distention and RN is aware. Recommendation: GI consult. Patient is agreeable to po trials of thin and mildly thick liquids, puree, and hard solids. Patient self administering liquids and gulps with consecutive sips. Patient exhibits wet vocal quality and throat clearing with thin liquids with single and consecutive sips. No overt s/sx of aspiration with mildly thick liquids. Patient with prolonged mastication with hard solids. Upon digital palpation: patient's swallow initiation appears delayed and HLE appears weak/reduced. Patient's impulsivity negatively impacts swallow function. Education provided concerning small single sips and bites, upright positioning, diet consistencies and safe swallow strategies. Patient will benefit from skilled intervention to address the above impairments. Patients rehabilitation potential is considered to be Good     PLAN :  Recommendations and Planned Interventions:  Initiate diet of SB6/Mildly thick liquids, with STRICT aspiration and GERD precautions advised. Monitor for overt s/sx of aspiration. ST to follow. MBS as/if indicated. Frequency/Duration: Patient will be followed by speech-language pathology daily to address goals. Discharge Recommendations: To Be Determined     SUBJECTIVE:   Patient reports \"I was out of it yesterday. \"    OBJECTIVE:     Past Medical History:   Diagnosis Date    AICD (automatic cardioverter/defibrillator) present     CAD (coronary artery disease)     Cirrhosis of liver (HCC)     Gallstones     Hypertension     Nodule of lower lobe of right lung     Renal stones        CXR Results  (Last 48 hours)                 10/23/21 1324  XR CHEST SNGL V Final result    Impression:  Shallow depth of inspiration otherwise fairly stable appearance of   the chest.       Narrative:  AP supine portable radiograph of the chest at 1:17 p.m. compared with October 19, 2021. INDICATION: Shortness of breath. There has taken a shallow depth of inspiration and is rotated to left. Heart   size appears unchanged. Bilateral pulmonary nodules persist but are not as   well-visualized due to the shallow depth of inspiration. No definite new   infiltrate or effusion. Left chest wall pacer is noted. No pneumothorax. No   acute bony findings. Current Diet:  DIET NPO   Cognitive and Communication Status:  Neurologic State: Alert  Orientation Level: Disoriented to time, Disoriented to situation  Cognition: Poor safety awareness, Decreased command following  Swallowing Evaluation:   Oral Assessment:  Oral Assessment  Labial: No impairment  Dentition:  (Upper endentulous, lower missing many teeth)  Oral Hygiene: Poor   Lingual: No impairment  P.O. Trials:  Patient Position: Upright at bedside  Vocal quality prior to P.O.: Breathy  Consistency Presented: Thin liquid; Solid;Puree;Nectar thick liquid  How Presented: Cup/sip;Cup/gulp; Self-fed/presented;SLP-fed/presented  Bolus Acceptance: No impairment  Bolus Formation/Control: Impaired  Type of Impairment: Incomplete;Mastication  Propulsion: Discoordination;Delayed (# of seconds); Tongue pumping  Oral Residue: 10-50% of bolus  Initiation of Swallow: Delayed (# of seconds)  Laryngeal Elevation: Decreased;Weak  Aspiration Signs/Symptoms: Change vocal quality; Increase in RR  Pharyngeal Phase Characteristics: Easily fatigued ; Poor endurance  Oral Phase Severity: Mild-moderate  Pharyngeal Phase Severity : Mild-moderate  Voice:  Vocal Quality: Breathy    Pain:  0/10  After treatment:   Patient left in no apparent distress in bed    COMMUNICATION/EDUCATION:   Patient was educated regarding his deficit(s) of dysphagia, though requires additional training and education. The patient's plan of care including recommendations, planned interventions, and recommended diet changes were discussed with: Registered nurse. Patient/family have participated as able in goal setting and plan of care. Thank you for this referral.  Prudence Rodgers M.S., CCC-SLP  Time Calculation: 10 mins   Problem: Dysphagia (Adult)  Goal: *Acute Goals and Plan of Care (Insert Text)  Description: Speech Therapy Swallow Goals  Initiated 10/24/2021  -Patient will tolerate SB6/Mildly thick liquids without clinical indicators of aspiration given minimal cues within 1-3 day(s). -Patient will tolerate PO trials without clinical indicators of aspiration given minimal cues within 1-3 day(s). -Patient will participate in modified barium swallow study within 7 day(s). -Patient will demonstrate understanding of swallow safety precautions and aspiration precautions, diet recs with minimal cues within 1-3 day(s).           Outcome: Not Met     Problem: Patient Education: Go to Patient Education Activity  Goal: Patient/Family Education  Outcome: Not Met

## 2021-10-25 NOTE — PROGRESS NOTES
Problem: Pressure Injury - Risk of  Goal: *Prevention of pressure injury  Description: Document Misha Scale and appropriate interventions in the flowsheet.   Note: Pressure Injury Interventions:  Sensory Interventions: Float heels, Keep linens dry and wrinkle-free    Moisture Interventions: Assess need for specialty bed    Activity Interventions: Assess need for specialty bed, Increase time out of bed    Mobility Interventions: Float heels, Assess need for specialty bed    Nutrition Interventions: Document food/fluid/supplement intake, Offer support with meals,snacks and hydration    Friction and Shear Interventions: Apply protective barrier, creams and emollients                Problem: Patient Education: Go to Patient Education Activity  Goal: Patient/Family Education  Outcome: Progressing Towards Goal

## 2021-10-25 NOTE — PROGRESS NOTES
Hospitalist Progress Note    Subjective:   Daily Progress Note: 10/25/2021 3:15 PM    Alert and oriented    Current Facility-Administered Medications   Medication Dose Route Frequency    lactulose (CHRONULAC) 10 gram/15 mL solution 45 mL  30 g Oral TID    sodium chloride (NS) flush 5-40 mL  5-40 mL IntraVENous Q8H    sodium chloride (NS) flush 5-40 mL  5-40 mL IntraVENous PRN    acetaminophen (TYLENOL) tablet 650 mg  650 mg Oral Q6H PRN    Or    acetaminophen (TYLENOL) suppository 650 mg  650 mg Rectal Q6H PRN    polyethylene glycol (MIRALAX) packet 17 g  17 g Oral DAILY PRN    ondansetron (ZOFRAN ODT) tablet 4 mg  4 mg Oral Q8H PRN    Or    ondansetron (ZOFRAN) injection 4 mg  4 mg IntraVENous Q6H PRN    enoxaparin (LOVENOX) injection 40 mg  40 mg SubCUTAneous DAILY    methylPREDNISolone (PF) (SOLU-MEDROL) injection 40 mg  40 mg IntraVENous Q8H    albuterol (PROVENTIL HFA, VENTOLIN HFA, PROAIR HFA) inhaler 2 Puff  2 Puff Inhalation L7Z PRN    folic acid (FOLVITE) tablet 1 mg  1 mg Oral DAILY    pantoprazole (PROTONIX) tablet 20 mg  20 mg Oral DAILY    propranoloL (INDERAL) tablet 20 mg  20 mg Oral BID    rifAXIMin (XIFAXAN) tablet 550 mg  550 mg Oral TID        Review of Systems  Review of Systems   Constitutional: Negative for chills, fever and malaise/fatigue. HENT: Negative. Eyes: Negative. Respiratory: Negative. Negative for cough and shortness of breath. Cardiovascular: Negative for chest pain and leg swelling. Gastrointestinal: Negative for abdominal pain, nausea and vomiting. Genitourinary: Negative. Musculoskeletal: Negative. Skin: Negative. Neurological: Negative. Psychiatric/Behavioral: Negative.              Objective:     Visit Vitals  /64 (BP 1 Location: Left upper arm)   Pulse 94   Temp 97.9 °F (36.6 °C)   Resp 20   Ht 5' 8\" (1.727 m)   Wt 104.3 kg (230 lb)   SpO2 98%   BMI 34.97 kg/m²    O2 Flow Rate (L/min): 0 l/min O2 Device: None (Room air)    Temp (24hrs), Av.8 °F (36.6 °C), Min:97.6 °F (36.4 °C), Max:97.9 °F (36.6 °C)      No intake/output data recorded. 10/23 1901 - 10/25 0700  In: 460 [P.O.:360; I.V.:100]  Out: 0     Recent Results (from the past 24 hour(s))   GLUCOSE, POC    Collection Time: 10/24/21  8:36 PM   Result Value Ref Range    Glucose (POC) 360 (H) 65 - 117 mg/dL    Performed by Manuel Oates, POC    Collection Time: 10/25/21  7:29 AM   Result Value Ref Range    Glucose (POC) 187 (H) 65 - 117 mg/dL    Performed by Christine Paul, POC    Collection Time: 10/25/21 10:49 AM   Result Value Ref Range    Glucose (POC) 232 (H) 65 - 117 mg/dL    Performed by MELY CRUZ         CT HEAD WO CONT   Final Result   No evidence of acute intracranial process. XR CHEST SNGL V   Final Result   Shallow depth of inspiration otherwise fairly stable appearance of   the chest.           PHYSICAL EXAM:    Physical Exam  Vitals reviewed. Constitutional:       Appearance: He is ill-appearing. HENT:      Head: Normocephalic and atraumatic. Mouth/Throat:      Mouth: Mucous membranes are moist.      Pharynx: Oropharynx is clear. Eyes:      Conjunctiva/sclera: Conjunctivae normal.   Cardiovascular:      Rate and Rhythm: Normal rate and regular rhythm. Heart sounds: Normal heart sounds. Pulmonary:      Effort: Pulmonary effort is normal.      Breath sounds: Normal breath sounds. Abdominal:      General: Abdomen is flat. Bowel sounds are normal.      Palpations: Abdomen is soft. Musculoskeletal:         General: Normal range of motion. Cervical back: Normal range of motion and neck supple. Skin:     General: Skin is warm and dry. Neurological:      General: No focal deficit present. Mental Status: He is alert and oriented to person, place, and time. Mental status is at baseline.    Psychiatric:         Mood and Affect: Mood normal.          Data Review    Recent Results (from the past 24 hour(s))   GLUCOSE, POC    Collection Time: 10/24/21  8:36 PM   Result Value Ref Range    Glucose (POC) 360 (H) 65 - 117 mg/dL    Performed by [address] Oates, POC    Collection Time: 10/25/21  7:29 AM   Result Value Ref Range    Glucose (POC) 187 (H) 65 - 117 mg/dL    Performed by Judie [address], POC    Collection Time: 10/25/21 10:49 AM   Result Value Ref Range    Glucose (POC) 232 (H) 65 - 117 mg/dL    Performed by Iqra Saldivar         Assessment/Plan:     Active Problems:    Acute encephalopathy (10/23/2021)      Hospital course: There is a 55-year-old male with a history of COPD, lung cancer with metastatic disease, cirrhosis who was felt to not be a good candidate for chemotherapy. He is a [facility] patient. He receives paracentesis at that facility. He was discharged home although the recommendation was for skilled nursing facility although the patient declined. Upon admission he was found to have an ammonia of 143 as he is noncompliant with his lactulose. Patient is requesting a transfer to [facility] for potential salvage chemotherapy. Impression:    1. Acute hepatic encephalopathy  2. Chronic liver cirrhosis  3. COPD with acute exacerbation  4. Alcohol and tobacco abuse  5. Hypokalemia  6. Acute respiratory failure with hypercapnia  7. Chronic kidney disease, stage III  8. Coronary artery disease    Plan:    1. Acute hepatic encephalopathy  Patient given lactulose with improvement of ammonia from  143-->42  Resolved  Continue lactulose    2. Chronic liver cirrhosis  Continue to monitor  Paracentesis as needed    3. COPD with acute exacerbation  Continue steroids  Continue duo nebs    4. Alcohol and tobacco dependency  Monitor for withdrawal    5. Hyperkalemia  Resolved    6. Acute respiratory failure with hypercapnia  Resolved with BiPAP    7. Chronic kidney disease, stage III  Stable    8.   Coronary artery disease  Stable    DVT prophylaxis: Heparin  Ulcer prophylaxis: Protonix    Discharge disposition: Compliance with medications, possible transfer to [facility], discharged home in a.m. Care Plan discussed with: Patient/Family    Total time spent with patient: >35 minutes.

## 2021-10-26 PROBLEM — Z91.199 H/O NONCOMPLIANCE WITH MEDICAL TREATMENT, PRESENTING HAZARDS TO HEALTH: Status: ACTIVE | Noted: 2021-01-01

## 2021-10-26 PROBLEM — J44.9 COPD (CHRONIC OBSTRUCTIVE PULMONARY DISEASE) (HCC): Status: ACTIVE | Noted: 2021-01-01

## 2021-10-26 PROBLEM — K76.82 ACUTE HEPATIC ENCEPHALOPATHY: Status: ACTIVE | Noted: 2021-01-01

## 2021-10-26 NOTE — DISCHARGE SUMMARY
Admit date: 10/23/2021   Admitting Provider: Mickey Yoon MD    Discharge date: 10/26/2021  Discharging Provider: Sai Nguyen PA-C      * Admission Diagnoses: Acute encephalopathy [G93.40]    * Discharge Diagnoses:    Hospital Problems as of 10/26/2021 Date Reviewed: 12/27/2020        Codes Class Noted - Resolved POA    Acute hepatic encephalopathy ICD-10-CM: K72.00  ICD-9-CM: 572.2  10/26/2021 - Present Unknown        COPD (chronic obstructive pulmonary disease) ([facility] [address] ICD-10-CM: J44.9  ICD-9-CM: 846  10/26/2021 - Present Unknown        H/O noncompliance with medical treatment, presenting hazards to health ICD-10-CM: Z91.19  ICD-9-CM: V15.81  10/26/2021 - Present Unknown        Acute encephalopathy ICD-10-CM: G93.40  ICD-9-CM: 348.30  10/23/2021 - Present Unknown              * Hospital Course:   Hospital course:     There is a 70-year-old male with a history of COPD, lung cancer with metastatic disease, cirrhosis who was felt to not be a good candidate for chemotherapy. He is a [facility] patient. He receives paracentesis at that facility. He was discharged home although the recommendation was for skilled nursing facility although the patient declined. Upon admission he was found to have an ammonia of 143 as he is noncompliant with his lactulose. Discussed the reasoning why patient requires lactulose. He seems understand. He has ample lactulose at home per his girlfriend. He states he will be compliant with this medication moving forward. Again have addressed the patient's home situation and advised a nursing facility placement. He has again declined. He will be discharged home with his girlfriend. He will follow-up with [facility] for additional paracentesis, follow-up on the lung cancer as well as the treatment options. Patient is a high risk for readmission. Patient will continue his CPAP machine at home as previously prescribed.   Patient return to the emergency department for any further issues in regards to his respiratory status or mental status changes. These directions were also discussed with the patient's girlfriend at the bedside.     Impression:     1. Acute hepatic encephalopathy  2. Chronic liver cirrhosis  3. COPD with acute exacerbation  4. Alcohol and tobacco abuse  5. Hypokalemia  6. Acute respiratory failure with hypercapnia  7. Chronic kidney disease, stage III  8. Coronary artery disease     Plan:     1. Acute hepatic encephalopathy  Patient given lactulose with improvement of ammonia from  143-->42  Resolved  Continue lactulose  Rifaximin     2. Chronic liver cirrhosis  Continue to monitor  Paracentesis as needed     3. COPD with acute exacerbation  Resolved     4. Alcohol and tobacco dependency  Monitor for withdrawal     5. Hyperkalemia  Resolved     6. Acute respiratory failure with hypercapnia  Resolved with BiPAP     7. Chronic kidney disease, stage III  Stable     8. Coronary artery disease  Stable    * Procedures:   * No surgery found *      Consults:   None    Significant Diagnostic Studies: As discussed in hospital course    Discharge Exam:  Visit Vitals  /69 (BP Patient Position: At rest)   Pulse 85   Temp 97.6 °F (36.4 °C)   Resp 22   Ht 5' 8\" (1.727 m)   Wt 104.3 kg (230 lb)   SpO2 92%   BMI 34.97 kg/m²     PHYSICAL EXAM:  Constitutional: Alert in no acute distress   HEENT: Sclerae anicteric, The neck is supple. Cardiovascular: Regular rate and rhythm. No murmurs, gallops, or rubs. Boris Rathke Respiratory: Clear breath sounds with no wheezes, rales, or rhonchi. GI: Abdomen nondistended, soft, and nontender. Normal active bowel sounds. Rectal: Deferred   Musculoskeletal: No pitting edema of the lower legs. Extremities have good range of motion. Neurological:  Patient is alert and oriented. Cranial nerves II-XII intact  Psychiatric: Mood appears appropriate with judgement intact. Lymphatic: No cervical or supraclavicular adenopathy.    Skin: No rashes or breakdown of the skin      * Discharge Condition: stable  * Disposition: Home    Discharge Medications:  Current Discharge Medication List      START taking these medications    Details   rifAXIMin (XIFAXAN) 550 mg tablet Take 1 Tablet by mouth three (3) times daily. Qty: 30 Tablet, Refills: 0  Start date: 10/26/2021         CONTINUE these medications which have NOT CHANGED    Details   lactulose (CHRONULAC) 10 gram/15 mL solution Take 30 mL by mouth three (3) times daily for 30 days. Qty: 2700 mL, Refills: 2      furosemide (LASIX) 40 mg tablet Take 0.5 Tablets by mouth daily. Qty: 30 Tablet, Refills: 2      pantoprazole (PROTONIX) 20 mg tablet Take 1 Tablet by mouth daily. Qty: 30 Tablet, Refills: 2      propranoloL (INDERAL) 20 mg tablet Take 1 Tablet by mouth two (2) times a day for 30 days. Qty: 60 Tablet, Refills: 2      albuterol (PROVENTIL HFA, VENTOLIN HFA, PROAIR HFA) 90 mcg/actuation inhaler Take 2 Puffs by inhalation every four to six (4-6) hours as needed. diclofenac (VOLTAREN) 1 % gel Apply  to affected area daily. ondansetron hcl (ZOFRAN) 4 mg tablet Take 1 Tablet by mouth three (3) times daily as needed. ibuprofen (MOTRIN) 600 mg tablet Take 1 Tablet by mouth three (3) times daily as needed. folic acid (FOLVITE) 1 mg tablet Take 1 mg by mouth daily. isosorbide mononitrate ER (IMDUR) 30 mg tablet Take 30 mg by mouth daily. * Follow-up Care/Patient Instructions:   Activity: Activity as tolerated  Diet: Cardiac Diet  Wound Care: None needed    Follow-up Information     Follow up With Specialties Details Why Contact Info    Rachel Nava MD Family Medicine In 1 week  [phone]      [facility]  In 1 week            Discharge summary greater than 35 minutes spent with the patient performing discharge instructions, medication review and physical exam    Signed:  Mrat Fermin PA-C  10/26/2021  10:15 AM

## 2021-10-26 NOTE — PROGRESS NOTES
Physician Progress Note      PATIENT:               Valencia Castañeda  CSN #:                  [id number]  :                       1955  ADMIT DATE:       10/23/2021 12:28 PM  DISCH DATE:  RESPONDING  PROVIDER #:        JACKIE DAVIS PA-C          QUERY TEXT:    Patient admitted with shortness of breath. Documentation reflects pneumonia in 10/23 ED provider note. If possible, please document in the progress notes and discharge summary if pneumonia was: The medical record reflects the following:  Risk Factors: 73year old male, shortness of breath, acute exacerbation COPD  Clinical Indicators: 10/23 ED provider note -  Chest x-ray shows a right lower lobe pneumonia, antibiotics initiated. 10/19 CXR - New/enlarging masses in the right lower lung. There may be another nodule in the  left base. Upper lungs are clear. No effusion. Normal heart and mediastinum  10/23 CXR - Shallow depth of inspiration otherwise fairly stable appearance of  the chest.  Treatment: IV Zosyn, IV Vancomycin    Please call [phone] with any questions  Options provided:  -- pneumonia confirmed after study  -- pneumonia treated and resolved  -- pneumonia ruled out after study  -- Other - I will add my own diagnosis  -- Disagree - Not applicable / Not valid  -- Disagree - Clinically unable to determine / Unknown  -- Refer to Clinical Documentation Reviewer    PROVIDER RESPONSE TEXT:    pneumonia ruled out after study. Query created by:  Bernice Gentile on 10/26/2021 11:16 AM      Electronically signed by:  Jennifer Newell PA-C 10/26/2021 11:39 AM

## 2021-10-26 NOTE — PROGRESS NOTES
Problem: Mobility Impaired (Adult and Pediatric)  Goal: *Acute Goals and Plan of Care (Insert Text)  Description: Patient will move from supine to sit and sit to supine , scoot up and down, and roll side to side in bed with modified independence within 7 day(s). Patient will transfer from bed to chair and chair to bed with modified independence using the least restrictive device within 7 day(s). Patient will improve static standing balance to supervision within 1 week(s). Patient will ambulate 15 feet with modified independence with least restrictive device within 1 weeks. Outcome: Progressing Towards Goal   PHYSICAL THERAPY EVALUATION  Patient: Coco Chiu (40 y.o. male)  Date: 10/26/2021  Primary Diagnosis: Acute encephalopathy [G93.40]        Precautions: fall  ASSESSMENT  As per MD [facility] summary notes(There is a 61-year-old male with a history of COPD, lung cancer with metastatic disease, cirrhosis who was felt to not be a good candidate for chemotherapy. He is a [facility] patient. He receives paracentesis at that facility. He was discharged home although the recommendation was for skilled nursing facility although the patient declined. Upon admission he was found to have an ammonia of 143 as he is noncompliant with his lactulose. Discussed the reasoning why patient requires lactulose. He seems understand. He has ample lactulose at home per his girlfriend. He states he will be compliant with this medication moving forward. Again have addressed the patient's home situation and advised a nursing facility placement. He has again declined. He will be discharged home with his girlfriend. He will follow-up with [facility] for additional paracentesis, follow-up on the lung cancer as well as the treatment options. Patient is a high risk for readmission. Patient will continue his CPAP machine at home as previously prescribed.   Patient return to the emergency department for any further issues in regards to his respiratory status or mental status changes. These directions were also discussed with the patient's girlfriend at the bedside.)  Patient found inbed at eval time, agreeable to therapy eval. Patient required min assist for bed mob and transfers supine to sit. STS with min to mod assist and RW. Able to march 10 steps at bedside. Based on the objective data described below, the patient presents with decreased gen strength, endurance to activities and ambulation abilities. Other factors to consider for discharge: patient reported\" they are going to send me to  [facility]\"  Patient will benefit from skilled therapy intervention to address the above noted impairments. PLAN :  Recommendations and Planned Interventions: bed mobility training, transfer training, gait training, therapeutic exercises, patient and family training/education and therapeutic activities      Frequency/Duration: Patient will be followed by physical therapy:  2-3x/week to address goals. Recommendation for discharge: (in order for the patient to meet his/her long term goals)  Vipul Sánchez    This discharge recommendation:  Has been made in collaboration with the attending provider and/or case management    IF patient discharges home will need the following DME: hospital bed, rolling walker and wheelchair         SUBJECTIVE:   Patient stated i am ok.     OBJECTIVE DATA SUMMARY:   HISTORY:    Past Medical History:   Diagnosis Date    AICD (automatic cardioverter/defibrillator) present     CAD (coronary artery disease)     Cirrhosis of liver ([address]     Gallstones     Hypertension     Nodule of lower lobe of right lung     Renal stones      Past Surgical History:   Procedure Laterality Date    HX PACEMAKER PLACEMENT      IR PARACENTESIS ABD W IMAGE  12/30/2020       Personal factors and/or comorbidities impacting plan of care:Home Situation  Home Environment: Private residence  # Steps to Enter: 0  One/Two Story Residence: One story  Living Alone: No  Support Systems: Spouse/Significant Other  Patient Expects to be Discharged to[de-identified] Unknown  Current DME Used/Available at Home: Walker, rolling    EXAMINATION/PRESENTATION/DECISION MAKING:   Critical Behavior:  Neurologic State: Alert  Orientation Level: Oriented X4  Cognition: Appropriate decision making     Hearing: Auditory  Auditory Impairment: None  Range Of Motion:                          Strength: Tone & Sensation:                                  Coordination:     Vision:      Functional Mobility:  Bed Mobility:              Transfers:                             Balance:      Ambulation/Gait Training:                                                      Functional Measure:  [address] [place] [address] AM-PAC 6 Clicks         Basic Mobility Inpatient Short Form  How much difficulty does the patient currently have. .. Unable A Lot A Little None   1. Turning over in bed (including adjusting bedclothes, sheets and blankets)? [] 1   [] 2   [x] 3   [] 4   2. Sitting down on and standing up from a chair with arms ( e.g., wheelchair, bedside commode, etc.)   [] 1   [x] 2   [] 3   [] 4   3. Moving from lying on back to sitting on the side of the bed? [] 1   [] 2   [x] 3   [] 4          How much help from another person does the patient currently need. .. Total A Lot A Little None   4. Moving to and from a bed to a chair (including a wheelchair)? [] 1   [x] 2   [] 3   [] 4   5. Need to walk in hospital room? [] 1   [x] 2   [] 3   [] 4   6. Climbing 3-5 steps with a railing? [] 1   [x] 2   [] 3   [] 4   © 2007, Trustees of [address] [place] [address], under license to [employer]. All rights reserved     Score:  Initial:14/24 Most Recent: X (Date: 10/26/2021 )   Interpretation of Tool:  Represents activities that are increasingly more difficult (i.e. Bed mobility, Transfers, Gait).   Score 24 23 22-20 19-15 14-10 9-7 6   Modifier CH CI CJ CK CL CM CN Physical Therapy Evaluation Charge Determination   History Examination Presentation Decision-Making   LOW Complexity : Zero comorbidities / personal factors that will impact the outcome / POC LOW Complexity : 1-2 Standardized tests and measures addressing body structure, function, activity limitation and / or participation in recreation  LOW Complexity : Stable, uncomplicated  LOW Complexity : FOTO score of       Based on the above components, the patient evaluation is determined to be of the following complexity level: LOW     Pain Ratin/10    Activity Tolerance:   Good  Please refer to the flowsheet for vital signs taken during this treatment. After treatment patient left in no apparent distress:   Supine in bed and Call bell within reach    COMMUNICATION/EDUCATION:   The patients plan of care was discussed with: Registered nurse and Case management. Fall prevention education was provided and the patient/caregiver indicated understanding., Patient/family have participated as able in goal setting and plan of care. and Patient/family agree to work toward stated goals and plan of care.     Thank you for this referral.  Barbra Lombard  PT   Time Calculation: 20 mins

## 2021-10-26 NOTE — PROGRESS NOTES
Patient requested for me to call his girlfriend, Michael for pickup. No answer at this time and voicemail is full.

## 2021-10-26 NOTE — PROGRESS NOTES
VSS. Patient given discharge instructions. IV removed. Patient pickup will be around 1400. Patient, provider, family, case management, and primary RN aware of discharge. Patient awaiting pickup at this time.

## 2021-11-05 PROBLEM — J96.01 ACUTE HYPOXEMIC RESPIRATORY FAILURE (HCC): Status: ACTIVE | Noted: 2021-01-01

## 2021-11-05 NOTE — PROGRESS NOTES
Patient received from ED intubated and sedated on precedex. Levo @ 5mcg. Patient awake, reaching for ETT, order obtained for Versed gtt. Patient noted to have multiple skin tears to bilateral upper extremities, scattered scabs to hands and legs, bruising on both knees and upper extremities, and a skin tear on chest. Sacrum pink but blanchable. Wounds on UE dressed with vaseline gauze and kerlix. Skin assessment completed with PRATIBHA Laughlin RN. Patient given CHG bath, tellez care and placed on bedside monitor.

## 2021-11-05 NOTE — ED NOTES
TRANSFER - OUT REPORT:    Verbal report given to [facility] RN(name) on Claudene Shores  being transferred to ICU(unit) for routine progression of care       Report consisted of patients Situation, Background, Assessment and   Recommendations(SBAR). Information from the following report(s) SBAR, MAR, Recent Results and Cardiac Rhythm NSR was reviewed with the receiving nurse. Lines:   Triple Lumen 11/05/21 Proximal; Right Femoral (Active)       Peripheral IV 11/05/21 Left; Posterior Hand (Active)       Peripheral IV 11/05/21 Posterior; Right Hand (Active)        Opportunity for questions and clarification was provided.       Patient transported with:   Monitor  O2 @ VENTILATOR liters  Registered Nurse

## 2021-11-05 NOTE — PROGRESS NOTES
Attempt to reach girlfriend , listed in demographic as primary decision maker, to consent for paracentesis. No answer and unable to leave message due to no voicemail set up.  Attempt was then made to reach listed Son with no answer an  left requesting a call back

## 2021-11-05 NOTE — PROGRESS NOTES
CM obtained additional contacts for patient via his PCP's office:    Allison Rodriguez (sister): [phone]  Ganga Ruiz (son): [phone]: [phone]

## 2021-11-05 NOTE — PROGRESS NOTES
Comprehensive Nutrition Assessment    Type and Reason for Visit: Initial (intubation)    Nutrition Recommendations/Plan:   As medically feasible, TF via OG of Promote at 20mL/hr     Increase by 20mL q4hr to goal rate 50mL/hr, continuous  Add 2 pkt Prosource TID (6 pkt total; 360kcal, 90g pro)  Flush with 125mL free water q4hr  Goal feeds provide 1560kcal (87%), 165g protein (100%), 1757mL fluid (95%)    Document TF rate, protein modular provision, water flushes, and GRVs in EMR    Nutrition Assessment:  Pt unresponsive pta, agonal breathing so intubated. Currently intubated, sedated, on 1x pressor. GI consulted for liver failure, ascites- assessment pending. RD to order TF to initiate as appropriate. Labs: BUN 21, Cr 1.51, , Ca 7.4. Meds: norepi, precedex, miralax prn. Malnutrition Assessment:  Malnutrition Status:  Mild malnutrition    Context:  Chronic illness     Findings of the 6 clinical characteristics of malnutrition:   Energy Intake:  Mild decrease in energy intake (specify) (NPO x1)  Weight Loss:  No significant weight loss (Per RD assessment 10x months ago, wt 97kg)     Body Fat Loss:  No significant body fat loss,     Muscle Mass Loss:  1 - Mild muscle mass loss, Temples (temporalis)  Fluid Accumulation:  No significant fluid accumulation,      Estimated Daily Nutrient Needs:  Energy (kcal): 1800kcal (PSU 2010); Weight Used for Energy Requirements: Current  Protein (g): 162g (2g/kg); Weight Used for Protein Requirements: Ideal  Fluid (ml/day): 1800mL; Method Used for Fluid Requirements: 1 ml/kcal    Nutrition Related Findings:  NFPE finding mild muscle wasting. Significant ascites noted. No documented hx dysphagia, n/v, or c/d- however limited documentation available. No edema documented.       Wounds:    Skin tears (multiple skin tears, areas of excoriation)       Current Nutrition Therapies:  DIET NPO    Anthropometric Measures:  · Height:  6' (182.9 cm)  · Current Body Wt:  103 kg (227 lb 1.2 oz) (EDW; measured BW est)   · Ideal Body Wt:  178 lbs:  127.6 %   · Adjusted Body Weight:   (103kg)   · BMI Category: Overweight (BMI 25.0-29.9) (utilizing EDW)     Wt Readings from Last 4 Encounters:   11/05/21 113.4 kg (250 lb)   10/23/21 104.3 kg (230 lb)   10/19/21 104.3 kg (230 lb)   01/02/21 105.2 kg (231 lb 14.8 oz)     Nutrition Diagnosis:   · Inadequate protein-energy intake related to increased demand for energy/nutrients as evidenced by mild muscle loss, wounds, GI abnormality    Nutrition Interventions:   Food and/or Nutrient Delivery: Continue NPO, Start tube feeding  Nutrition Education and Counseling: No recommendations at this time  Coordination of Nutrition Care: Continue to monitor while inpatient    Goals:  Meet >75% EENs in 5-7 days, Lytes wnl, Maintain skin integrity       Nutrition Monitoring and Evaluation:   Behavioral-Environmental Outcomes: None identified  Food/Nutrient Intake Outcomes: Enteral nutrition intake/tolerance  Physical Signs/Symptoms Outcomes: Fluid status or edema, Weight, Diarrhea    Discharge Planning:     Too soon to determine     Electronically signed by Daniel Cardozo on 11/5/2021 at 1:51 PM    Contact:

## 2021-11-05 NOTE — H&P
Hospitalist Admission Note    NAME: Fortino Roberts   :  1955   MRN:  [id number]     Date/Time:  2021 12:58 PM    Patient PCP: Truman Gibbs MD  ______________________________________________________________________  Given the patient's current clinical presentation, I have a high level of concern for decompensation if discharged from the emergency department. Complex decision making was performed, which includes reviewing the patient's available past medical records, laboratory results, and x-ray films. Subjective:   CHIEF COMPLAINT: altered mental status and hypoxia    HISTORY OF PRESENT ILLNESS:     Fortino Roberts is a 73 y.o. With hx of metastatic lung cancer, COPD, liver cirrhosis was brought in by EMS for altered mental status and acute hypoxic respiratory failure. Unable to obtained further hx as patient was intubated in ED for acute hypoxemic respiratory failure. We were asked to admit for work up and evaluation of the above problems. Past Medical History:   Diagnosis Date    AICD (automatic cardioverter/defibrillator) present     CAD (coronary artery disease)     Cirrhosis of liver ([address]     Gallstones     Hypertension     Nodule of lower lobe of right lung     Renal stones         Past Surgical History:   Procedure Laterality Date    HX PACEMAKER PLACEMENT      IR PARACENTESIS ABD W IMAGE  2020       Social History     Tobacco Use    Smoking status: Current Every Day Smoker     Packs/day: 1.00    Smokeless tobacco: Not on file   Substance Use Topics    Alcohol use: Yes        No family history on file. reviewed and not pertinent to patient's presentation. No Known Allergies     Prior to Admission medications    Medication Sig Start Date End Date Taking? Authorizing Provider   rifAXIMin (XIFAXAN) 550 mg tablet Take 1 Tablet by mouth three (3) times daily.  10/26/21   Hernandez Ge PA-C   lactulose (CHRONULAC) 10 gram/15 mL solution Take 30 mL by mouth three (3) times daily for 30 days. 10/21/21 11/20/21  Tanisha Morales MD   furosemide (LASIX) 40 mg tablet Take 0.5 Tablets by mouth daily. 10/21/21   Tanisha Morales MD   pantoprazole (PROTONIX) 20 mg tablet Take 1 Tablet by mouth daily. 10/21/21   Tanisha Morales MD   propranoloL (INDERAL) 20 mg tablet Take 1 Tablet by mouth two (2) times a day for 30 days. 10/21/21 11/20/21  Tanisha Morales MD   albuterol (PROVENTIL HFA, VENTOLIN HFA, PROAIR HFA) 90 mcg/actuation inhaler Take 2 Puffs by inhalation every four to six (4-6) hours as needed. 10/2/21   Provider, Historical   diclofenac (VOLTAREN) 1 % gel Apply  to affected area daily. 9/17/21   Provider, Historical   ondansetron hcl (ZOFRAN) 4 mg tablet Take 1 Tablet by mouth three (3) times daily as needed. 10/15/21   Provider, Historical   ibuprofen (MOTRIN) 600 mg tablet Take 1 Tablet by mouth three (3) times daily as needed. 10/15/21   Provider, Historical   folic acid (FOLVITE) 1 mg tablet Take 1 mg by mouth daily. Leslie Graves MD   isosorbide mononitrate ER (IMDUR) 30 mg tablet Take 30 mg by mouth daily.     Leslie Graves MD       REVIEW OF SYSTEMS:     I am not able to complete the review of systems because:  x The patient is intubated and sedated   x The patient has altered mental status due to his acute medical problems    The patient has baseline aphasia from prior stroke(s)    The patient has baseline dementia and is not reliable historian    The patient is in acute medical distress and unable to provide information                    Objective:   VITALS:    Visit Vitals  /79   Pulse 92   Temp 97.9 °F (36.6 °C)   Resp 14   Ht 6' (1.829 m)   Wt 113.4 kg (250 lb)   SpO2 100%   BMI 33.91 kg/m²       PHYSICAL EXAM:    General:    Intubated and sedated  HEENT: Atraumatic, anicteric sclerae, pink conjunctivae     No oral ulcers, mucosa moist, throat clear, dentition fair  Neck:  Supple, symmetrical,  thyroid: non tender  Lungs:   Clear to auscultation bilaterally. No Wheezing or Rhonchi. No rales. Chest wall:  No tenderness  No Accessory muscle use. Heart:   Regular  rhythm,  No  murmur   No edema  Abdomen:   Soft, non-tender. Not distended. Bowel sounds normal  Extremities: Skin turgor normal, Capillary refill normal, Radial dial pulse 2+  Skin:     Not pale. Not Jaundiced  No rashes   Psych: Intubated and sedated  Neurologic:intubated and sedated _______________________________________________________________________      My assessment of this patient's clinical condition and my plan of care is as follows. Assessment / Plan:    73 y.o. With hx of metastatic lung cancer, COPD, liver cirrhosis was brought in by EMS for altered mental status and acute hypoxic respiratory failure. Acute hypoxemic respiratory failure:  Likely s/s Acute exacerbation of COPD and metastatic lung cancer  Appreciate pulmonary consult  Continue mechanical ventilation   Continue solumedrol    Septic shock:  Unclear source of infection. Continue antibiotics  Continue levophed  Paracentesis    Acute hepatic encephalopathy  Continue lactulose    CKD: avoid nephrotoxic medications        Code Status: full  Surrogate Decision Maker:unclear, I called patient's girlfriend, unable to reach her    DVT Prophylaxis: heparin sc  GI Prophylaxis: pantoprazole        _____________________________________________    I personally spent   35  minutes in patient's care. This is time spent at  patient's bedside actively involved in patient care as well as the coordination of care and discussions with the patient's family. This does not include any procedural time which has been billed separately. ________________________________________________________________________  Signed: Julito Miles MD    Procedures: see electronic medical records for all procedures/Xrays and details which were not copied into this note but were reviewed prior to creation of Plan.     LAB DATA REVIEWED:    Recent Results (from the past 24 hour(s))   URINALYSIS W/ REFLEX CULTURE    Collection Time: 11/05/21  9:00 AM    Specimen: Urine   Result Value Ref Range    Color Yellow/Straw      Appearance Turbid (A) Clear      Specific gravity 1.014 1.003 - 1.030      pH (UA) 5.0 5.0 - 8.0      Protein 30 (A) Negative mg/dL    Glucose Negative Negative mg/dL    Ketone Negative Negative mg/dL    Bilirubin Negative Negative      Blood Small (A) Negative      Urobilinogen 0.1 0.1 - 1.0 EU/dL    Nitrites Negative Negative      Leukocyte Esterase Negative Negative      WBC 0-4 0 - 4 /hpf    RBC 10-20 0 - 5 /hpf    Bacteria Negative Negative /hpf    UA:UC IF INDICATED Culture not indicated by UA result Culture not indicated by UA result      Hyaline cast 5-10 0 - 5 /lpf   DRUG SCREEN, URINE    Collection Time: 11/05/21  9:00 AM   Result Value Ref Range    AMPHETAMINES Negative Negative      BARBITURATES Negative Negative      BENZODIAZEPINES Negative Negative      COCAINE Negative Negative      METHADONE Negative Negative      OPIATES Negative Negative      PCP(PHENCYCLIDINE) Negative Negative      THC (TH-CANNABINOL) Negative Negative      Drug screen comment        This test is a screen for drugs of abuse in a medical setting only (i.e., they are unconfirmed results and as such must not be used for non-medical purposes, e.g.,employment testing, legal testing). Due to its inherent nature, false positive (FP) and false negative (FN) results may be obtained. Therefore, if necessary for medical care, recommend confirmation of positive findings by GC/MS.    CBC WITH AUTOMATED DIFF    Collection Time: 11/05/21  9:03 AM   Result Value Ref Range    WBC 11.0 4.1 - 11.1 K/uL    RBC 3.88 (L) 4.10 - 5.70 M/uL    HGB 12.4 12.1 - 17.0 g/dL    HCT 41.1 36.6 - 50.3 %    .9 (H) 80.0 - 99.0 FL    MCH 32.0 26.0 - 34.0 PG    MCHC 30.2 30.0 - 36.5 g/dL    RDW 17.6 (H) 11.5 - 14.5 %    PLATELET 836 (L) 505 - 400 K/uL    MPV 10.8 8.9 - 12.9 FL    NRBC 0.0 0.0  WBC    ABSOLUTE NRBC 0.00 0.00 - 0.01 K/uL    NEUTROPHILS 71 32 - 75 %    LYMPHOCYTES 9 (L) 12 - 49 %    MONOCYTES 16 (H) 5 - 13 %    EOSINOPHILS 1 0 - 7 %    BASOPHILS 1 0 - 1 %    IMMATURE GRANULOCYTES 2 (H) 0 - 0.5 %    ABS. NEUTROPHILS 7.9 1.8 - 8.0 K/UL    ABS. LYMPHOCYTES 1.0 0.8 - 3.5 K/UL    ABS. MONOCYTES 1.7 (H) 0.0 - 1.0 K/UL    ABS. EOSINOPHILS 0.2 0.0 - 0.4 K/UL    ABS. BASOPHILS 0.1 0.0 - 0.1 K/UL    ABS. IMM. GRANS. 0.2 (H) 0.00 - 0.04 K/UL    DF AUTOMATED     METABOLIC PANEL, COMPREHENSIVE    Collection Time: 11/05/21  9:03 AM   Result Value Ref Range    Sodium 138 136 - 145 mmol/L    Potassium 4.6 3.5 - 5.1 mmol/L    Chloride 110 (H) 97 - 108 mmol/L    CO2 22 21 - 32 mmol/L    Anion gap 6 5 - 15 mmol/L    Glucose 129 (H) 65 - 100 mg/dL    BUN 21 (H) 6 - 20 mg/dL    Creatinine 1.51 (H) 0.70 - 1.30 mg/dL    BUN/Creatinine ratio 14 12 - 20      GFR est AA 56 (L) >60 ml/min/1.73m2    GFR est non-AA 47 (L) >60 ml/min/1.73m2    Calcium 7.4 (L) 8.5 - 10.1 mg/dL    Bilirubin, total 0.8 0.2 - 1.0 mg/dL    AST (SGOT) 37 15 - 37 U/L    ALT (SGPT) 25 12 - 78 U/L    Alk.  phosphatase 116 45 - 117 U/L    Protein, total 6.6 6.4 - 8.2 g/dL    Albumin 2.0 (L) 3.5 - 5.0 g/dL    Globulin 4.6 (H) 2.0 - 4.0 g/dL    A-G Ratio 0.4 (L) 1.1 - 2.2     TROPONIN-HIGH SENSITIVITY    Collection Time: 11/05/21  9:03 AM   Result Value Ref Range    Troponin-High Sensitivity 10 0 - 76 ng/L   NT-PRO BNP    Collection Time: 11/05/21  9:03 AM   Result Value Ref Range    NT pro-BNP 1,245 (H) <125 pg/mL   AMMONIA    Collection Time: 11/05/21  9:03 AM   Result Value Ref Range    Ammonia 104 (H) <32 umol/L   LACTIC ACID    Collection Time: 11/05/21  9:03 AM   Result Value Ref Range    Lactic acid 0.8 0.4 - 2.0 mmol/L   PTT    Collection Time: 11/05/21  9:03 AM   Result Value Ref Range    aPTT 34.8 (H) 21.2 - 34.1 sec    aPTT, therapeutic range   82 - 109 sec   PROTHROMBIN TIME + INR    Collection Time: 11/05/21  9:03 AM   Result Value Ref Range    Prothrombin time 15.5 (H) 11.9 - 14.7 sec    INR 1.3 (H) 0.9 - 1.1     ETHYL ALCOHOL    Collection Time: 11/05/21  9:03 AM   Result Value Ref Range    ALCOHOL(ETHYL),SERUM <4 <10 mg/dL   MAGNESIUM    Collection Time: 11/05/21  9:03 AM   Result Value Ref Range    Magnesium 2.0 1.6 - 2.4 mg/dL   TYPE & SCREEN    Collection Time: 11/05/21  9:03 AM   Result Value Ref Range    Crossmatch Expiration 11/08/2021,2359     ABO/Rh(D) Rudolfo Porch Positive     Antibody screen Negative    COVID-19 RAPID TEST    Collection Time: 11/05/21  9:15 AM   Result Value Ref Range    Specimen source Please find results under separate order      COVID-19 rapid test Not Detected Not Detected     BLOOD GAS, ARTERIAL    Collection Time: 11/05/21 11:50 AM   Result Value Ref Range    pH 7.33 (L) 7.35 - 7.45      PCO2 42 35 - 45 mmHg    PO2 286 (H) 75 - 100 mmHg    O2 SAT >100 >95 %    BICARBONATE 22 22 - 26 mmol/L    BASE DEFICIT 3.8 (H) 0 - 2 mmol/L    O2 METHOD VENT      FIO2 100.0 %    MODE Assist Control/Volume Control      Tidal volume 500      SET RATE 16      EPAP/CPAP/PEEP 6.0      SITE Right Radial      ALVAREZ'S TEST PASS

## 2021-11-05 NOTE — CONSULTS
IMPRESSION:   1. Acute hypoxic respiratory failure  2. 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 COPD  4. Hypovolemic intravascular depletion with shock  5. History of EtOH and alcohol abuse  6. Hepatic encephalopathy  7. Cirrhosis of liver  8. Ascites  9. Chronic kidney disease stage III  10. History of AICD placement  11. Additional workup outlined below  12. Pt is at high risk of sudden decline and decompensation with life threatening consequenses and continued end organ dysfunction and failure  13. Pt is critically ill. Time spent with pt and staff actively rendering care, managing pt and coordinating care as stated below;  55 minutes, exclusive of any procedures      RECOMMENDATIONS/PLAN:   1. ICU monitoring  1. Ventilator for mechanical life support and prevent respiratory arrest with protective lung strategies  2. Patient on assist control mode 35% FiO2  3. His lung cancer was managed at [facility] with stage IV squamous cell lung cancer metastatic disease to both lung  4. IV Solu-Medrol nebulizer treatment  5. Lactulose  6. CVP monitoring chest x-ray shows right lower lobe infiltrate atelectasis chronic scarring  7. Patient on Levophed  8. IV vasopressors for circulatory shock refractory to fluids to maintain SBP> 90  9. Ascites possible need paracentesis GI evaluation  10. Patient requiring restraints due to threat of injury to self, interference with medical devices  11. Transfuse prn to maintain Hgb > 7  12. Labs to follow electrolytes, renal function and and blood counts  13. Bronchial hygiene with respiratory therapy techniques, bronchodilators  14. Pt needs IV fluids with additives and Drug therapy requiring intensive monitoring for toxicity  15. Prescription drug management with home med reconciliation reviewed  16. DVT, SUP prophylaxis  17.  Will be available to assist in medical management while in the CCU pending disposition     [x] High complexity decision making was performed  [x] See my orders for details    PMH:  has a past medical history of AICD (automatic cardioverter/defibrillator) present, CAD (coronary artery disease), Cirrhosis of liver ([address] Gallstones, Hypertension, Nodule of lower lobe of right lung, and Renal stones. PSH:   has a past surgical history that includes hx pacemaker placement and ir paracentesis abd w image (12/30/2020). FHX: family history is not on file. SHX:  reports that he has been smoking. He has been smoking about 1.00 pack per day. He does not have any smokeless tobacco history on file. He reports current alcohol use. He reports previous drug use. ALL: No Known Allergies     MEDS:   [x] Reviewed - As Below   [] Not reviewed    Current Facility-Administered Medications   Medication    dexmedeTOMidine (PRECEDEX) 400 mcg in 0.9% sodium chloride (MBP/ADV) 100 mL MBP    sodium chloride (NS) flush 5-40 mL    sodium chloride (NS) flush 5-40 mL    acetaminophen (TYLENOL) tablet 650 mg    Or    acetaminophen (TYLENOL) suppository 650 mg    polyethylene glycol (MIRALAX) packet 17 g    ondansetron (ZOFRAN ODT) tablet 4 mg    Or    ondansetron (ZOFRAN) injection 4 mg    enoxaparin (LOVENOX) injection 40 mg    cefTRIAXone (ROCEPHIN) 1 g in sterile water (preservative free) 10 mL IV syringe    lactulose (CHRONULAC) 10 gram/15 mL solution 45 mL    [START ON 11/6/2021] pantoprazole (PROTONIX) tablet 40 mg    NOREPINephrine (LEVOPHED) 8 mg in 0.9% NS 250ml infusion     Current Outpatient Medications   Medication Sig    rifAXIMin (XIFAXAN) 550 mg tablet Take 1 Tablet by mouth three (3) times daily.  lactulose (CHRONULAC) 10 gram/15 mL solution Take 30 mL by mouth three (3) times daily for 30 days.  furosemide (LASIX) 40 mg tablet Take 0.5 Tablets by mouth daily.  pantoprazole (PROTONIX) 20 mg tablet Take 1 Tablet by mouth daily.     propranoloL (INDERAL) 20 mg tablet Take 1 Tablet by mouth two (2) times a day for 30 days.  albuterol (PROVENTIL HFA, VENTOLIN HFA, PROAIR HFA) 90 mcg/actuation inhaler Take 2 Puffs by inhalation every four to six (4-6) hours as needed.  diclofenac (VOLTAREN) 1 % gel Apply  to affected area daily.  ondansetron hcl (ZOFRAN) 4 mg tablet Take 1 Tablet by mouth three (3) times daily as needed.  ibuprofen (MOTRIN) 600 mg tablet Take 1 Tablet by mouth three (3) times daily as needed.  folic acid (FOLVITE) 1 mg tablet Take 1 mg by mouth daily.  isosorbide mononitrate ER (IMDUR) 30 mg tablet Take 30 mg by mouth daily. MAR reviewed and pertinent medications noted or modified as needed   Current Facility-Administered Medications   Medication    dexmedeTOMidine (PRECEDEX) 400 mcg in 0.9% sodium chloride (MBP/ADV) 100 mL MBP    sodium chloride (NS) flush 5-40 mL    sodium chloride (NS) flush 5-40 mL    acetaminophen (TYLENOL) tablet 650 mg    Or    acetaminophen (TYLENOL) suppository 650 mg    polyethylene glycol (MIRALAX) packet 17 g    ondansetron (ZOFRAN ODT) tablet 4 mg    Or    ondansetron (ZOFRAN) injection 4 mg    enoxaparin (LOVENOX) injection 40 mg    cefTRIAXone (ROCEPHIN) 1 g in sterile water (preservative free) 10 mL IV syringe    lactulose (CHRONULAC) 10 gram/15 mL solution 45 mL    [START ON 11/6/2021] pantoprazole (PROTONIX) tablet 40 mg    NOREPINephrine (LEVOPHED) 8 mg in 0.9% NS 250ml infusion     Current Outpatient Medications   Medication Sig    rifAXIMin (XIFAXAN) 550 mg tablet Take 1 Tablet by mouth three (3) times daily.  lactulose (CHRONULAC) 10 gram/15 mL solution Take 30 mL by mouth three (3) times daily for 30 days.  furosemide (LASIX) 40 mg tablet Take 0.5 Tablets by mouth daily.  pantoprazole (PROTONIX) 20 mg tablet Take 1 Tablet by mouth daily.  propranoloL (INDERAL) 20 mg tablet Take 1 Tablet by mouth two (2) times a day for 30 days.     albuterol (PROVENTIL HFA, VENTOLIN HFA, PROAIR HFA) 90 mcg/actuation inhaler Take 2 Puffs by inhalation every four to six (4-6) hours as needed.  diclofenac (VOLTAREN) 1 % gel Apply  to affected area daily.  ondansetron hcl (ZOFRAN) 4 mg tablet Take 1 Tablet by mouth three (3) times daily as needed.  ibuprofen (MOTRIN) 600 mg tablet Take 1 Tablet by mouth three (3) times daily as needed.  folic acid (FOLVITE) 1 mg tablet Take 1 mg by mouth daily.  isosorbide mononitrate ER (IMDUR) 30 mg tablet Take 30 mg by mouth daily. PMH:  has a past medical history of AICD (automatic cardioverter/defibrillator) present, CAD (coronary artery disease), Cirrhosis of liver ([address] Gallstones, Hypertension, Nodule of lower lobe of right lung, and Renal stones. PSH:   has a past surgical history that includes hx pacemaker placement and ir paracentesis abd w image (2020). FHX: family history is not on file. SHX:  reports that he has been smoking. He has been smoking about 1.00 pack per day. He does not have any smokeless tobacco history on file. He reports current alcohol use. He reports previous drug use. ROS:  Unable to obtain sedated on ventilator    Hemodynamics:    CO:    CI:    CVP:    SVR:   PAP Systolic:    PAP Diastolic:    PVR:    LM85:        Ventilator Settings:      Mode Rate TV Press PEEP FiO2 PIP Min.  Vent   Assist control, Volume control    500 ml    6 cm H20 100 %  32 cm H2O  6.3 l/min        Vital Signs: Telemetry:    normal sinus rhythm Intake/Output:   Visit Vitals  /76   Pulse 92   Temp 97.9 °F (36.6 °C)   Resp 16   Ht 6' (1.829 m)   Wt 113.4 kg (250 lb)   SpO2 100%   BMI 33.91 kg/m²       Temp (24hrs), Av.6 °F (37 °C), Min:97.9 °F (36.6 °C), Max:99.3 °F (37.4 °C)        O2 Device: Ventilator O2 Flow Rate (L/min): 15 l/min       Wt Readings from Last 4 Encounters:   21 113.4 kg (250 lb)   10/23/21 104.3 kg (230 lb)   10/19/21 104.3 kg (230 lb)   21 105.2 kg (231 lb 14.8 oz)          Intake/Output Summary (Last 24 hours) at 2021 1137  Last data filed at 11/5/2021 1115  Gross per 24 hour   Intake 500 ml   Output    Net 500 ml       Last shift:      11/05 0701 - 11/05 1900  In: 500 [I.V.:500]  Out: -   Last 3 shifts: No intake/output data recorded. Physical Exam:     General:  intubated on vent  HEENT: NCAT, poor dentition, lips and mucosa dry  Eyes: anicteric; conjunctiva clear  Neck: no nodes, no cuff leak, trach midline; no accessory MM use. Chest: no deformity,   Cardiac: R regular; no murmur;   Lungs: distant breath sounds; no wheezes  Abd: Distended positive for ascites from  Ext: no edema; no joint swelling; No clubbing  : NO tellez, clear urine  Neuro: sedated;  Psych-  unable to assess  Skin: warm, dry, no cyanosis;   Pulses: 1-2+ Bilateral pedal, radial  Capillary: brisk; pale      DATA:    MAR reviewed and pertinent medications noted or modified as needed  MEDS:   Current Facility-Administered Medications   Medication    dexmedeTOMidine (PRECEDEX) 400 mcg in 0.9% sodium chloride (MBP/ADV) 100 mL MBP    sodium chloride (NS) flush 5-40 mL    sodium chloride (NS) flush 5-40 mL    acetaminophen (TYLENOL) tablet 650 mg    Or    acetaminophen (TYLENOL) suppository 650 mg    polyethylene glycol (MIRALAX) packet 17 g    ondansetron (ZOFRAN ODT) tablet 4 mg    Or    ondansetron (ZOFRAN) injection 4 mg    enoxaparin (LOVENOX) injection 40 mg    cefTRIAXone (ROCEPHIN) 1 g in sterile water (preservative free) 10 mL IV syringe    lactulose (CHRONULAC) 10 gram/15 mL solution 45 mL    [START ON 11/6/2021] pantoprazole (PROTONIX) tablet 40 mg    NOREPINephrine (LEVOPHED) 8 mg in 0.9% NS 250ml infusion     Current Outpatient Medications   Medication Sig    rifAXIMin (XIFAXAN) 550 mg tablet Take 1 Tablet by mouth three (3) times daily.  lactulose (CHRONULAC) 10 gram/15 mL solution Take 30 mL by mouth three (3) times daily for 30 days.  furosemide (LASIX) 40 mg tablet Take 0.5 Tablets by mouth daily.     pantoprazole (PROTONIX) 20 mg tablet Take 1 Tablet by mouth daily.  propranoloL (INDERAL) 20 mg tablet Take 1 Tablet by mouth two (2) times a day for 30 days.  albuterol (PROVENTIL HFA, VENTOLIN HFA, PROAIR HFA) 90 mcg/actuation inhaler Take 2 Puffs by inhalation every four to six (4-6) hours as needed.  diclofenac (VOLTAREN) 1 % gel Apply  to affected area daily.  ondansetron hcl (ZOFRAN) 4 mg tablet Take 1 Tablet by mouth three (3) times daily as needed.  ibuprofen (MOTRIN) 600 mg tablet Take 1 Tablet by mouth three (3) times daily as needed.  folic acid (FOLVITE) 1 mg tablet Take 1 mg by mouth daily.  isosorbide mononitrate ER (IMDUR) 30 mg tablet Take 30 mg by mouth daily. Labs:    Recent Labs     11/05/21  0903   WBC 11.0   HGB 12.4   *   INR 1.3*   APTT 34.8*     Recent Labs     11/05/21  0903      K 4.6   *   CO2 22   *   BUN 21*   CREA 1.51*   CA 7.4*   MG 2.0   LAC 0.8   ALB 2.0*   ALT 25     No results for input(s): PH, PCO2, PO2, HCO3, FIO2 in the last 72 hours. No results for input(s): CPK, CKNDX, TROIQ in the last 72 hours. No lab exists for component: CPKMB  No results found for: BNPP, BNP   Lab Results   Component Value Date/Time    Culture result: No growth 6 days 10/23/2021 01:30 PM    Culture result: No growth 6 days 12/27/2020 08:15 PM    Culture result: (A) 12/26/2020 03:00 PM     Streptococcus mitis/oralis growing in 2 of 3 bottles drawn    Culture result:  12/26/2020 03:00 PM     Streptococcus anginosus growing in 1 of 3 bottles drawn    Culture result:  12/26/2020 03:00 PM     One of four bottles has been flagged positive by instrument. Bottle has been sent to [facility] laboratory to assess for possible growth. Gram Positive Cocci in pairs and chains CALLED TO AND READ BACK BY Hussein Navas RN AT 1450 12/27/20.  TLW     No results found for: TSH, TSHEXT     Imaging:    Results from Hospital Encounter encounter on 11/05/21    XR CHEST SNGL V    Addendum 11/5/2021 10:24 AM  Addendum: Addendum 10:21 AM 11/5/2021. Additional chest imaging in disc folder at 9:26 AM is after ET tube advancement,  tip now 5.9 cm above osiris. Called report to Will at 1021am 11/5/2021. ET tube  has not been advanced since the prior phone call. Narrative  Intubation. Comparison chest x-ray 10/23/2021. Impression  FINDINGS: IMPRESSION: Single frontal view chest. ET tube above thoracic inlet,  distal tip approximately 11 cm above the osiris. Called report to emergency room  Will at 10:09 AM 11/5/2021. Enteric tube side-port passes below the left  hemidiaphragm. Left chest wall cardiac defibrillator. Unchanged cardiomediastinal silhouette. Similar hypoinflation. Improved airspace disease in the right lower lobe. Platelike atelectasis or scarring in the left lower lobe. No pleural effusion or  pneumothorax. No free air under the diaphragm. Right sixth rib deformity, chronic. Results from [place] encounter on 11/05/21    CT SPINE CERV WO CONT    Narrative  Fall. No comparison. Technique: Axial imaging cervical spine with sagittal and coronal reformatting  and 3-D volume rendering performed by the technologist at the console. Dose reduction: All CT scans at this facility are performed using dose reduction  optimization techniques as appropriate to a performed exam including the  following: Automated exposure control, adjustments of the mA and/or kV according  to patient's size, or use of iterative reconstruction technique    Findings: No listhesis. T1 vertebral body mild superior endplate compression  unchanged compared to chest CT 10/20/2021. No acute fracture, or jumped or  perched facet. Multilevel disc narrowing and small osteophytosis from  degenerative disease. Lateral masses symmetric bilaterally. Odontoid intact. No  prevertebral soft tissue swelling. Lung apices clear. Support tubing present. Impression  1. No acute bony findings.       This care involved high complexity decision making which includes independently reviewing the patient's past medical records, current laboratory results, medication profiles that were immediately available to me and actual Xray images at the bedside in order to assess, support vital system function, and to treat this degree of vital organ system failure, and to prevent further life threatening deterioration of the patients condition. I was in direct communication with the nursing staff throughout this time. Medical Decision Making Today  · Reviewed the flowsheet and previous days notes  · Reviewed and summarized records or history from previous days note or discussions with staff, family  · Parenteral controlled substances - Reviewed/ Adjusted / [place] / Started  · High Risk Drug therapy requiring intensive monitoring for toxicity: eg steroids, pressors, antibiotics  · Review and order of Clinical lab tests  · Review and Order of Radiology tests  · Review and Order of Medicine tests  · Independent visualization of radiologic Images  · Reviewed Ventilator / NiPPV  · I have personally reviewed the patients ECG / Telemetry  · Diagnostic endoscopies with identified risk factors    My assessment/management discussed with: Consultants, Nursing, Pharmacy, Case Management, PT, OT, Respiratory Therapy, Hospitalist and Family for coordination of care    I have provided total of 55  minutes of critical care time rendering care exclusive of any procedures. During this entire length of time the patient's condition was unstable, unpredictable and critically ill in the CCU/ ICU. I was immediately available to the patient whose care required several interactions with nursing, multidisciplinary team members leading to multiple interventions with fluid resuscitation and medication adjustments to optimize respiratory support, hemodynamic treatment, medication changes based on repeat labs results, reviews, exams and assessments.  The reason for providing this level of medical care was due to a critical illness that impaired one or more vital organ systems, such that there was a high probability of sudden or life threatening deterioration in the patient's condition.

## 2021-11-05 NOTE — CONSULTS
Gastroenterology Consult     Referring Physician: Rosario Robles MD     Consult Date: 11/5/2021     Subjective:     Chief Complaint: Unresponsiveness    History of Present Illness: Maik Schmid is a 73 y.o. male who is seen in consultation for concerns for peritonitis. History obtained from chart documentation. Patient was admitted to the hospital after finding patient unresponsive with low oxygen saturation in the 60's. 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 He was recently admitted on 10/23/21 for shortness of breath and discharged on 10/26. According to chart, SNF was recommended upon discharge but family declined. He was found unresponsive today. He had a similar incident of unresponsiveness recently. He is on lactulose and Xifaxan at home. Questionable compliance.     -Labs in ED showed low platelet 164, creatinine 1.51, normal LFTs, BNP 1,245 (405 on 10/23), ammonia 104. -CT head with no acute findings. -CT spine with no acute bony findings. -XR chest Similar hypoinflation. Improved airspace disease in the right lower lobe. Platelike atelectasis or scarring in the left lower lobe. No pleural effusion or pneumothorax. Patient is intubated and sedated, unresponsive. OGT connected to low intermittent suction, gastric aspirate clear yellow. IR is planning to do an US guided paracentesis once consent is available. Past Medical History:   Diagnosis Date    AICD (automatic cardioverter/defibrillator) present     CAD (coronary artery disease)     Cirrhosis of liver ([address]     Gallstones     Hypertension     Nodule of lower lobe of right lung     Renal stones      Past Surgical History:   Procedure Laterality Date    HX PACEMAKER PLACEMENT      IR PARACENTESIS ABD W IMAGE  12/30/2020      No family history on file.   Social History     Tobacco Use    Smoking status: Current Every Day Smoker     Packs/day: 1.00    Smokeless tobacco: Not on file   Substance Use Topics    Alcohol use: Yes      No Known Allergies  Current Facility-Administered Medications   Medication Dose Route Frequency    dexmedeTOMidine (PRECEDEX) 400 mcg in 0.9% sodium chloride (MBP/ADV) 100 mL MBP  0.1-1.5 mcg/kg/hr IntraVENous TITRATE    sodium chloride (NS) flush 5-40 mL  5-40 mL IntraVENous Q8H    sodium chloride (NS) flush 5-40 mL  5-40 mL IntraVENous PRN    acetaminophen (TYLENOL) tablet 650 mg  650 mg Oral Q6H PRN    Or    acetaminophen (TYLENOL) suppository 650 mg  650 mg Rectal Q6H PRN    polyethylene glycol (MIRALAX) packet 17 g  17 g Oral DAILY PRN    ondansetron (ZOFRAN ODT) tablet 4 mg  4 mg Oral Q8H PRN    Or    ondansetron (ZOFRAN) injection 4 mg  4 mg IntraVENous Q6H PRN    lactulose (CHRONULAC) 10 gram/15 mL solution 45 mL  45 mL Per NG tube TID    [START ON 11/6/2021] pantoprazole (PROTONIX) tablet 40 mg  40 mg Oral ACB    NOREPINephrine (LEVOPHED) 8 mg in 0.9% NS 250ml infusion  0.5-16 mcg/min IntraVENous TITRATE    piperacillin-tazobactam (ZOSYN) 3.375 g in 0.9% sodium chloride (MBP/ADV) 100 mL MBP  3.375 g IntraVENous Q8H    methylPREDNISolone (PF) (SOLU-MEDROL) injection 40 mg  40 mg IntraVENous Q8H    albuterol-ipratropium (DUO-NEB) 2.5 MG-0.5 MG/3 ML  3 mL Nebulization Q6HWA RT    0.9% sodium chloride infusion  75 mL/hr IntraVENous CONTINUOUS    [START ON 11/6/2021] heparin (porcine) injection 5,000 Units  5,000 Units SubCUTAneous Q12H    midazolam in normal saline (VERSED) 1 mg/mL infusion  0-10 mg/hr IntraVENous TITRATE        Review of Systems:  A detailed 10 organ review of systems is obtained with pertinent positives as listed in the History of Present Illness and Past Medical History. All others are negative.     Objective:     Physical Exam:  Visit Vitals  /66   Pulse 87   Temp 98.1 °F (36.7 °C)   Resp 16   Ht 6' (1.829 m)   Wt 113.4 kg (250 lb)   SpO2 100%   BMI 33.91 kg/m²        Skin:  Scabs upper and lower extremities. LUE wound with dressing. HEENT: ET/OT tube in place. Cardiovascular: Regular rate and rhythm. No murmurs, gallops, or rubs. PMI nondisplaced. Carotids without bruits. Respiratory: On vent  GI:  Abdomen distended, soft,  Rectal:  Deferred  Musculoskeletal:  No pitting edema of the lower legs. Extremities have good range of motion. No costovertebral tenderness. Neurological:  Intubated, sedated. Psychiatric:  Non-responsive, intubated. Lymphatic:  No cervical or supraclavicular adenopathy.     Lab/Data Review:  Recent Results (from the past 24 hour(s))   CULTURE, BLOOD, PAIRED    Collection Time: 11/05/21  9:00 AM    Specimen: Blood   Result Value Ref Range    Special Requests: No Special Requests      Culture result: No growth after 4 hours     URINALYSIS W/ REFLEX CULTURE    Collection Time: 11/05/21  9:00 AM    Specimen: Urine   Result Value Ref Range    Color Yellow/Straw      Appearance Turbid (A) Clear      Specific gravity 1.014 1.003 - 1.030      pH (UA) 5.0 5.0 - 8.0      Protein 30 (A) Negative mg/dL    Glucose Negative Negative mg/dL    Ketone Negative Negative mg/dL    Bilirubin Negative Negative      Blood Small (A) Negative      Urobilinogen 0.1 0.1 - 1.0 EU/dL    Nitrites Negative Negative      Leukocyte Esterase Negative Negative      WBC 0-4 0 - 4 /hpf    RBC 10-20 0 - 5 /hpf    Bacteria Negative Negative /hpf    UA:UC IF INDICATED Culture not indicated by UA result Culture not indicated by UA result      Hyaline cast 5-10 0 - 5 /lpf   DRUG SCREEN, URINE    Collection Time: 11/05/21  9:00 AM   Result Value Ref Range    AMPHETAMINES Negative Negative      BARBITURATES Negative Negative      BENZODIAZEPINES Negative Negative      COCAINE Negative Negative      METHADONE Negative Negative      OPIATES Negative Negative      PCP(PHENCYCLIDINE) Negative Negative      THC (TH-CANNABINOL) Negative Negative      Drug screen comment        This test is a screen for drugs of abuse in a medical setting only (i.e., they are unconfirmed results and as such must not be used for non-medical purposes, e.g.,employment testing, legal testing). Due to its inherent nature, false positive (FP) and false negative (FN) results may be obtained. Therefore, if necessary for medical care, recommend confirmation of positive findings by GC/MS. CBC WITH AUTOMATED DIFF    Collection Time: 11/05/21  9:03 AM   Result Value Ref Range    WBC 11.0 4.1 - 11.1 K/uL    RBC 3.88 (L) 4.10 - 5.70 M/uL    HGB 12.4 12.1 - 17.0 g/dL    HCT 41.1 36.6 - 50.3 %    .9 (H) 80.0 - 99.0 FL    MCH 32.0 26.0 - 34.0 PG    MCHC 30.2 30.0 - 36.5 g/dL    RDW 17.6 (H) 11.5 - 14.5 %    PLATELET 139 (L) 023 - 400 K/uL    MPV 10.8 8.9 - 12.9 FL    NRBC 0.0 0.0  WBC    ABSOLUTE NRBC 0.00 0.00 - 0.01 K/uL    NEUTROPHILS 71 32 - 75 %    LYMPHOCYTES 9 (L) 12 - 49 %    MONOCYTES 16 (H) 5 - 13 %    EOSINOPHILS 1 0 - 7 %    BASOPHILS 1 0 - 1 %    IMMATURE GRANULOCYTES 2 (H) 0 - 0.5 %    ABS. NEUTROPHILS 7.9 1.8 - 8.0 K/UL    ABS. LYMPHOCYTES 1.0 0.8 - 3.5 K/UL    ABS. MONOCYTES 1.7 (H) 0.0 - 1.0 K/UL    ABS. EOSINOPHILS 0.2 0.0 - 0.4 K/UL    ABS. BASOPHILS 0.1 0.0 - 0.1 K/UL    ABS. IMM. GRANS. 0.2 (H) 0.00 - 0.04 K/UL    DF AUTOMATED     METABOLIC PANEL, COMPREHENSIVE    Collection Time: 11/05/21  9:03 AM   Result Value Ref Range    Sodium 138 136 - 145 mmol/L    Potassium 4.6 3.5 - 5.1 mmol/L    Chloride 110 (H) 97 - 108 mmol/L    CO2 22 21 - 32 mmol/L    Anion gap 6 5 - 15 mmol/L    Glucose 129 (H) 65 - 100 mg/dL    BUN 21 (H) 6 - 20 mg/dL    Creatinine 1.51 (H) 0.70 - 1.30 mg/dL    BUN/Creatinine ratio 14 12 - 20      GFR est AA 56 (L) >60 ml/min/1.73m2    GFR est non-AA 47 (L) >60 ml/min/1.73m2    Calcium 7.4 (L) 8.5 - 10.1 mg/dL    Bilirubin, total 0.8 0.2 - 1.0 mg/dL    AST (SGOT) 37 15 - 37 U/L    ALT (SGPT) 25 12 - 78 U/L    Alk.  phosphatase 116 45 - 117 U/L    Protein, total 6.6 6.4 - 8.2 g/dL    Albumin 2.0 (L) 3.5 - 5.0 g/dL Globulin 4.6 (H) 2.0 - 4.0 g/dL    A-G Ratio 0.4 (L) 1.1 - 2.2     TROPONIN-HIGH SENSITIVITY    Collection Time: 11/05/21  9:03 AM   Result Value Ref Range    Troponin-High Sensitivity 10 0 - 76 ng/L   NT-PRO BNP    Collection Time: 11/05/21  9:03 AM   Result Value Ref Range    NT pro-BNP 1,245 (H) <125 pg/mL   AMMONIA    Collection Time: 11/05/21  9:03 AM   Result Value Ref Range    Ammonia 104 (H) <32 umol/L   LACTIC ACID    Collection Time: 11/05/21  9:03 AM   Result Value Ref Range    Lactic acid 0.8 0.4 - 2.0 mmol/L   PTT    Collection Time: 11/05/21  9:03 AM   Result Value Ref Range    aPTT 34.8 (H) 21.2 - 34.1 sec    aPTT, therapeutic range   82 - 109 sec   PROTHROMBIN TIME + INR    Collection Time: 11/05/21  9:03 AM   Result Value Ref Range    Prothrombin time 15.5 (H) 11.9 - 14.7 sec    INR 1.3 (H) 0.9 - 1.1     ETHYL ALCOHOL    Collection Time: 11/05/21  9:03 AM   Result Value Ref Range    ALCOHOL(ETHYL),SERUM <4 <10 mg/dL   MAGNESIUM    Collection Time: 11/05/21  9:03 AM   Result Value Ref Range    Magnesium 2.0 1.6 - 2.4 mg/dL   TYPE & SCREEN    Collection Time: 11/05/21  9:03 AM   Result Value Ref Range    Crossmatch Expiration 11/08/2021,2359     ABO/Rh(D) [place] Positive     Antibody screen Negative    COVID-19 RAPID TEST    Collection Time: 11/05/21  9:15 AM   Result Value Ref Range    Specimen source Please find results under separate order      COVID-19 rapid test Not Detected Not Detected     BLOOD GAS, ARTERIAL    Collection Time: 11/05/21 11:50 AM   Result Value Ref Range    pH 7.33 (L) 7.35 - 7.45      PCO2 42 35 - 45 mmHg    PO2 286 (H) 75 - 100 mmHg    O2 SAT >100 >95 %    BICARBONATE 22 22 - 26 mmol/L    BASE DEFICIT 3.8 (H) 0 - 2 mmol/L    O2 METHOD VENT      FIO2 100.0 %    MODE Assist Control/Volume Control      Tidal volume 500      SET RATE 16      EPAP/CPAP/PEEP 6.0      SITE Right Radial      ALVAREZ'S TEST PASS          CT HEAD WO CONT   Final Result   1. No acute intracranial findings. 2. Atrophy and small vessel ischemic disease. Pattern unchanged compared to   previous. CT SPINE CERV WO CONT   Final Result   1. No acute bony findings. XR CHEST SNGL V   Final Result   Addendum 1 of 1   Addendum: Addendum 10:21 AM 11/5/2021. Additional chest imaging in disc folder at 9:26 AM is after ET tube    advancement,   tip now 5.9 cm above osiris. Called report to Will at 1021am 11/5/2021. ET    tube   has not been advanced since the prior phone call. Final   FINDINGS: IMPRESSION: Single frontal view chest. ET tube above thoracic inlet,   distal tip approximately 11 cm above the osiris. Called report to emergency room   Will at 10:09 AM 11/5/2021. Enteric tube side-port passes below the left   hemidiaphragm. Left chest wall cardiac defibrillator. Unchanged cardiomediastinal silhouette. Similar hypoinflation. Improved airspace disease in the right lower lobe. Platelike atelectasis or scarring in the left lower lobe. No pleural effusion or   pneumothorax. No free air under the diaphragm. Right sixth rib deformity, chronic. IR PARACENTESIS ABD INIT    (Results Pending)   XR CHEST PORT    (Results Pending)          Assessment/Plan:   1. Ascites/possible peritonitis      -history of alcohol abuse      -Plan for paracentesis by IR once with consent. Send specimen to lab for WBC, culture, albumin.      -On IV Zosyn. Normal WBC. -repeat CMP in am.  2.acute hypoxic respiratory failure. -currently intubated. History of lung cancer squamous cell stage IV. -Pulmonary following  3. Hepatic encephalopathy      -continue lactulose and Xifaxan  4. CKD III      -creatinine 1.51      -avoid nephrotoxic meds.    5. Septic shock      -Blood culture pending.       -continue antibiotic    Active Problems:    Acute hypoxemic respiratory failure (HCC) (11/5/2021)         IP CONSULT TO PULMONOLOGY  IP CONSULT TO GASTROENTEROLOGY      Marc ROY  Thank you for allowing me to participate in this patients care  Cc Referring Physician   Doree Gottron, MD

## 2021-11-05 NOTE — ED PROVIDER NOTES
EMERGENCY DEPARTMENT HISTORY AND PHYSICAL EXAM      Date: 11/5/2021  Patient Name: Larena Bernheim    History of Presenting Illness     Chief Complaint   Patient presents with    Unresponsive       History Provided By: Patient    HPI: Larena Bernheim, 73 y.o. male with a past medical history significant liver disease presents to the ED with chief complaint of Unresponsive  . 72-year-old male with a history of hepatic cirrhosis liver disease. Recently in the hospital.  Also extensive cancer although where is the source is unknown. Presents via EMS. Acutely altered confused since yesterday. Unclear if there is any fall or trauma. Patient cannot provide any history of GCS of 8 for EMS. There are no other complaints, changes, or physical findings at this time.     PCP: Thanh Mcgarry MD    Current Facility-Administered Medications   Medication Dose Route Frequency Provider Last Rate Last Admin    dexmedeTOMidine (PRECEDEX) 400 mcg in 0.9% sodium chloride (MBP/ADV) 100 mL MBP  0.1-1.5 mcg/kg/hr IntraVENous TITRATE Marce Bell MD 28.4 mL/hr at 11/05/21 1045 1 mcg/kg/hr at 11/05/21 1045    sodium chloride 0.9 % bolus infusion 500 mL  500 mL IntraVENous ONCE Glory Pascual  mL/hr at 11/05/21 1034 500 mL at 11/05/21 1034    sodium chloride (NS) flush 5-40 mL  5-40 mL IntraVENous Q8H Matthew Ferrer MD        sodium chloride (NS) flush 5-40 mL  5-40 mL IntraVENous PRN Matthew Ferrer MD        acetaminophen (TYLENOL) tablet 650 mg  650 mg Oral Q6H PRN Matthew Ferrer MD        Or   [facility] acetaminophen (TYLENOL) suppository 650 mg  650 mg Rectal Q6H PRN Matthew Ferrer MD        polyethylene glycol (MIRALAX) packet 17 g  17 g Oral DAILY PRN Matthew Ferrer MD        ondansetron (ZOFRAN ODT) tablet 4 mg  4 mg Oral Q8H PRN Matthew Ferrer MD        Or    ondansetron [facility]) injection 4 mg  4 mg IntraVENous Q6H PRN Matthew Ferrer MD        enoxaparin (LOVENOX) injection 40 mg  40 mg SubCUTAneous DAILY Ashwin Mendez MD   40 mg at 11/05/21 1102    cefTRIAXone (ROCEPHIN) 1 g in sterile water (preservative free) 10 mL IV syringe  1 g IntraVENous Q24H Ashwin Mendez MD   1 g at 11/05/21 1101    lactulose (CHRONULAC) 10 gram/15 mL solution 45 mL  45 mL Per NG tube TID Macy Matthews MD       Tiffanie Sarkar Dys ON 11/6/2021] pantoprazole (PROTONIX) tablet 40 mg  40 mg Oral ACB Ashwin Mendez MD        NOREPINephrine (LEVOPHED) 8 mg in 0.9% NS 250ml infusion  0.5-16 mcg/min IntraVENous TITRATE Macy Matthews MD 9.4 mL/hr at 11/05/21 1105 5 mcg/min at 11/05/21 1105     Current Outpatient Medications   Medication Sig Dispense Refill    rifAXIMin (XIFAXAN) 550 mg tablet Take 1 Tablet by mouth three (3) times daily. 30 Tablet 0    lactulose (CHRONULAC) 10 gram/15 mL solution Take 30 mL by mouth three (3) times daily for 30 days. 2700 mL 2    furosemide (LASIX) 40 mg tablet Take 0.5 Tablets by mouth daily. 30 Tablet 2    pantoprazole (PROTONIX) 20 mg tablet Take 1 Tablet by mouth daily. 30 Tablet 2    propranoloL (INDERAL) 20 mg tablet Take 1 Tablet by mouth two (2) times a day for 30 days. 60 Tablet 2    albuterol (PROVENTIL HFA, VENTOLIN HFA, PROAIR HFA) 90 mcg/actuation inhaler Take 2 Puffs by inhalation every four to six (4-6) hours as needed.  diclofenac (VOLTAREN) 1 % gel Apply  to affected area daily.  ondansetron hcl (ZOFRAN) 4 mg tablet Take 1 Tablet by mouth three (3) times daily as needed.  ibuprofen (MOTRIN) 600 mg tablet Take 1 Tablet by mouth three (3) times daily as needed.  folic acid (FOLVITE) 1 mg tablet Take 1 mg by mouth daily.  isosorbide mononitrate ER (IMDUR) 30 mg tablet Take 30 mg by mouth daily.          Past History     Past Medical History:  Past Medical History:   Diagnosis Date    AICD (automatic cardioverter/defibrillator) present     CAD (coronary artery disease)     Cirrhosis of liver ([facility] [address]     Gallstones     Hypertension     Nodule of lower lobe of right lung     Renal stones        Past Surgical History:  Past Surgical History:   Procedure Laterality Date    HX PACEMAKER PLACEMENT      IR PARACENTESIS ABD W IMAGE  12/30/2020       Family History:  No family history on file. Social History:  Social History     Tobacco Use    Smoking status: Current Every Day Smoker     Packs/day: 1.00    Smokeless tobacco: Not on file   Substance Use Topics    Alcohol use: Yes    Drug use: Not Currently       Allergies:  No Known Allergies      Review of Systems   Review of Systems   Unable to perform ROS: Acuity of condition       Physical Exam   Physical Exam  Vitals and nursing note reviewed. Constitutional:       General: He is not in acute distress. Appearance: He is normal weight. He is not ill-appearing. HENT:      Head: Normocephalic and atraumatic. Right Ear: External ear normal.      Left Ear: External ear normal.      Nose: Nose normal. No rhinorrhea. Mouth/Throat:      Mouth: Mucous membranes are moist.      Pharynx: Oropharynx is clear. Eyes:      Extraocular Movements: Extraocular movements intact. Conjunctiva/sclera: Conjunctivae normal.      Pupils: Pupils are equal, round, and reactive to light. Comments: pinpint   Cardiovascular:      Rate and Rhythm: Normal rate and regular rhythm. Pulses: Normal pulses. Heart sounds: Normal heart sounds. Pulmonary:      Effort: Pulmonary effort is normal. No respiratory distress. Breath sounds: Normal breath sounds. Abdominal:      General: Abdomen is flat. Bowel sounds are normal. There is distension. Palpations: Abdomen is soft. Comments: Abdominal ascites with striae. Musculoskeletal:         General: No tenderness or deformity. Normal range of motion. Cervical back: Normal range of motion and neck supple. Skin:     General: Skin is warm and dry. Capillary Refill: Capillary refill takes less than 2 seconds. Findings: No bruising, lesion or rash. Comments: Abrasion of the chest wall   Neurological:      Mental Status: He is alert. Comments: gcs 7         Diagnostic Study Results     Labs -     Recent Results (from the past 12 hour(s))   URINALYSIS W/ REFLEX CULTURE    Collection Time: 11/05/21  9:00 AM    Specimen: Urine   Result Value Ref Range    Color Yellow/Straw      Appearance Turbid (A) Clear      Specific gravity 1.014 1.003 - 1.030      pH (UA) 5.0 5.0 - 8.0      Protein 30 (A) Negative mg/dL    Glucose Negative Negative mg/dL    Ketone Negative Negative mg/dL    Bilirubin Negative Negative      Blood Small (A) Negative      Urobilinogen 0.1 0.1 - 1.0 EU/dL    Nitrites Negative Negative      Leukocyte Esterase Negative Negative      WBC 0-4 0 - 4 /hpf    RBC 10-20 0 - 5 /hpf    Bacteria Negative Negative /hpf    UA:UC IF INDICATED Culture not indicated by UA result Culture not indicated by UA result      Hyaline cast 5-10 0 - 5 /lpf   DRUG SCREEN, URINE    Collection Time: 11/05/21  9:00 AM   Result Value Ref Range    AMPHETAMINES Negative Negative      BARBITURATES Negative Negative      BENZODIAZEPINES Negative Negative      COCAINE Negative Negative      METHADONE Negative Negative      OPIATES Negative Negative      PCP(PHENCYCLIDINE) Negative Negative      THC (TH-CANNABINOL) Negative Negative      Drug screen comment        This test is a screen for drugs of abuse in a medical setting only (i.e., they are unconfirmed results and as such must not be used for non-medical purposes, e.g.,employment testing, legal testing). Due to its inherent nature, false positive (FP) and false negative (FN) results may be obtained. Therefore, if necessary for medical care, recommend confirmation of positive findings by GC/MS.    CBC WITH AUTOMATED DIFF    Collection Time: 11/05/21  9:03 AM   Result Value Ref Range    WBC 11.0 4.1 - 11.1 K/uL    RBC 3.88 (L) 4.10 - 5.70 M/uL    HGB 12.4 12.1 - 17.0 g/dL    HCT 41.1 36.6 - 50.3 %    .9 (H) 80.0 - 99.0 FL    MCH 32.0 26.0 - 34.0 PG    MCHC 30.2 30.0 - 36.5 g/dL    RDW 17.6 (H) 11.5 - 14.5 %    PLATELET 685 (L) 231 - 400 K/uL    MPV 10.8 8.9 - 12.9 FL    NRBC 0.0 0.0  WBC    ABSOLUTE NRBC 0.00 0.00 - 0.01 K/uL    NEUTROPHILS 71 32 - 75 %    LYMPHOCYTES 9 (L) 12 - 49 %    MONOCYTES 16 (H) 5 - 13 %    EOSINOPHILS 1 0 - 7 %    BASOPHILS 1 0 - 1 %    IMMATURE GRANULOCYTES 2 (H) 0 - 0.5 %    ABS. NEUTROPHILS 7.9 1.8 - 8.0 K/UL    ABS. LYMPHOCYTES 1.0 0.8 - 3.5 K/UL    ABS. MONOCYTES 1.7 (H) 0.0 - 1.0 K/UL    ABS. EOSINOPHILS 0.2 0.0 - 0.4 K/UL    ABS. BASOPHILS 0.1 0.0 - 0.1 K/UL    ABS. IMM. GRANS. 0.2 (H) 0.00 - 0.04 K/UL    DF AUTOMATED     METABOLIC PANEL, COMPREHENSIVE    Collection Time: 11/05/21  9:03 AM   Result Value Ref Range    Sodium 138 136 - 145 mmol/L    Potassium 4.6 3.5 - 5.1 mmol/L    Chloride 110 (H) 97 - 108 mmol/L    CO2 22 21 - 32 mmol/L    Anion gap 6 5 - 15 mmol/L    Glucose 129 (H) 65 - 100 mg/dL    BUN 21 (H) 6 - 20 mg/dL    Creatinine 1.51 (H) 0.70 - 1.30 mg/dL    BUN/Creatinine ratio 14 12 - 20      GFR est AA 56 (L) >60 ml/min/1.73m2    GFR est non-AA 47 (L) >60 ml/min/1.73m2    Calcium 7.4 (L) 8.5 - 10.1 mg/dL    Bilirubin, total 0.8 0.2 - 1.0 mg/dL    AST (SGOT) 37 15 - 37 U/L    ALT (SGPT) 25 12 - 78 U/L    Alk.  phosphatase 116 45 - 117 U/L    Protein, total 6.6 6.4 - 8.2 g/dL    Albumin 2.0 (L) 3.5 - 5.0 g/dL    Globulin 4.6 (H) 2.0 - 4.0 g/dL    A-G Ratio 0.4 (L) 1.1 - 2.2     TROPONIN-HIGH SENSITIVITY    Collection Time: 11/05/21  9:03 AM   Result Value Ref Range    Troponin-High Sensitivity 10 0 - 76 ng/L   NT-PRO BNP    Collection Time: 11/05/21  9:03 AM   Result Value Ref Range    NT pro-BNP 1,245 (H) <125 pg/mL   AMMONIA    Collection Time: 11/05/21  9:03 AM   Result Value Ref Range    Ammonia 104 (H) <32 umol/L   LACTIC ACID    Collection Time: 11/05/21  9:03 AM   Result Value Ref Range    Lactic acid 0.8 0.4 - 2.0 mmol/L   PTT Collection Time: 11/05/21  9:03 AM   Result Value Ref Range    aPTT 34.8 (H) 21.2 - 34.1 sec    aPTT, therapeutic range   82 - 109 sec   PROTHROMBIN TIME + INR    Collection Time: 11/05/21  9:03 AM   Result Value Ref Range    Prothrombin time 15.5 (H) 11.9 - 14.7 sec    INR 1.3 (H) 0.9 - 1.1     ETHYL ALCOHOL    Collection Time: 11/05/21  9:03 AM   Result Value Ref Range    ALCOHOL(ETHYL),SERUM <4 <10 mg/dL   MAGNESIUM    Collection Time: 11/05/21  9:03 AM   Result Value Ref Range    Magnesium 2.0 1.6 - 2.4 mg/dL   TYPE & SCREEN    Collection Time: 11/05/21  9:03 AM   Result Value Ref Range    Crossmatch Expiration 11/08/2021,2359     ABO/Rh(D) Massimo Maurer Positive     Antibody screen Negative    COVID-19 RAPID TEST    Collection Time: 11/05/21  9:15 AM   Result Value Ref Range    Specimen source Please find results under separate order      COVID-19 rapid test Not Detected Not Detected           Radiologic Studies -   CT HEAD WO CONT   Final Result   1. No acute intracranial findings. 2. Atrophy and small vessel ischemic disease. Pattern unchanged compared to   previous. XR CHEST SNGL V   Final Result   Addendum 1 of 1   Addendum: Addendum 10:21 AM 11/5/2021. Additional chest imaging in disc folder at 9:26 AM is after ET tube    advancement,   tip now 5.9 cm above osiris. Called report to Will at 1021am 11/5/2021. ET    tube   has not been advanced since the prior phone call. Final   FINDINGS: IMPRESSION: Single frontal view chest. ET tube above thoracic inlet,   distal tip approximately 11 cm above the osiris. Called report to emergency room   Will at 10:09 AM 11/5/2021. Enteric tube side-port passes below the left   hemidiaphragm. Left chest wall cardiac defibrillator. Unchanged cardiomediastinal silhouette. Similar hypoinflation. Improved airspace disease in the right lower lobe. Platelike atelectasis or scarring in the left lower lobe. No pleural effusion or   pneumothorax.       No free air under the diaphragm. Right sixth rib deformity, chronic. CT SPINE CERV WO CONT    (Results Pending)   IR PARACENTESIS ABD INIT    (Results Pending)     CT Results  (Last 48 hours)               11/05/21 1031  CT HEAD WO CONT Final result    Impression:  1. No acute intracranial findings. 2. Atrophy and small vessel ischemic disease. Pattern unchanged compared to   previous. Narrative: Altered mental status. Comparison head CT 10/23/2021. Technique: Axial images head without IV contrast, with multiplanar reformatting. Multiplanar reformatting. Dose reduction: All CT scans at this facility are performed using dose reduction   optimization techniques as appropriate to a performed exam including the   following: Automated exposure control, adjustments of the mA and/or kV according   to patient's size, or use of iterative reconstruction technique. Findings: Bilateral periventricular white matter hypodensity. . No mass effect,   extra-axial fluid collection or hemorrhage. Normal position craniocervical   junction. Atrophy. Included facial sinuses and mastoid air cells are   unopacified. Calvarium intact. CXR Results  (Last 48 hours)    None          Medical Decision Making and ED Course   I am the first provider for this patient. I reviewed the vital signs, available nursing notes, past medical history, past surgical history, family history and social history. Vital Signs-Reviewed the patient's vital signs. Patient Vitals for the past 12 hrs:   Temp Pulse Resp BP SpO2   11/05/21 1035    (!) 54/35    11/05/21 0936  (!) 105 14 128/66 100 %   11/05/21 0920 99.3 °F (37.4 °C)       11/05/21 0915  (!) 110 16  100 %   11/05/21 0856  (!) 106 10 (!) 143/65 100 %       EKG interpretation:         Records Reviewed: Previous Hospital chart. EMS run report      ED Course:   Initial assessment performed.  The patients presenting problems have been discussed, and they are in agreement with the care plan formulated and outlined with them. I have encouraged them to ask questions as they arise throughout their visit. Orders Placed This Encounter    CULTURE, BLOOD, PAIRED     Standing Status:   Standing     Number of Occurrences:   1    COVID-19 RAPID TEST     Standing Status:   Standing     Number of Occurrences:   1     Order Specific Question:   Is this test for diagnosis or screening? Answer:   Diagnosis of ill patient     Order Specific Question:   Symptomatic for COVID-19 as defined by CDC? Answer:   Yes     Order Specific Question:   Date of Symptom Onset     Answer:   11/5/2021     Order Specific Question:   Hospitalized for COVID-19? Answer:   No     Order Specific Question:   Admitted to ICU for COVID-19? Answer:   No     Order Specific Question:   Employed in healthcare setting? Answer:   No     Order Specific Question:   Resident in a congregate (group) care setting? Answer:   No     Order Specific Question:   Previously tested for COVID-19? Answer:    Yes    CT HEAD WO CONT     Standing Status:   Standing     Number of Occurrences:   1     Order Specific Question:   Transport     Answer:   Stretcher [5]     Order Specific Question:   Reason for Exam     Answer:   AMS     Order Specific Question:   Decision Support Exception     Answer:   Emergency Medical Condition (MA) [1]    CT SPINE CERV WO CONT     Standing Status:   Standing     Number of Occurrences:   1     Order Specific Question:   Transport     Answer:   Stretcher [5]     Order Specific Question:   Reason for Exam     Answer:   FALL     Order Specific Question:   Decision Support Exception     Answer:   Emergency Medical Condition (MA) [1]    XR CHEST SNGL V     Standing Status:   Standing     Number of Occurrences:   1     Order Specific Question:   Transport     Answer:   Stretcher [5]     Order Specific Question:   Reason for Exam     Answer:   INTUBATED    IR PARACENTESIS ABD INIT     Standing Status:   Standing     Number of Occurrences:   1     Order Specific Question:   Transport     Answer:   BED [2]     Order Specific Question:   Reason for Exam     Answer:   concern for peritonitis     Order Specific Question:   Is patient in contact isolation? Answer:   No    CBC WITH AUTOMATED DIFF     Standing Status:   Standing     Number of Occurrences:   1    METABOLIC PANEL, COMPREHENSIVE     Standing Status:   Standing     Number of Occurrences:   1    TROPONIN-HIGH SENSITIVITY     Standing Status:   Standing     Number of Occurrences:   1    BNP (NT-PRO)     Standing Status:   Standing     Number of Occurrences:   1    AMMONIA     Standing Status:   Standing     Number of Occurrences:   1    LACTIC ACID     Standing Status:   Standing     Number of Occurrences:   1    PTT     Standing Status:   Standing     Number of Occurrences:   1    PROTHROMBIN TIME + INR     Standing Status:   Standing     Number of Occurrences:   1    URINALYSIS W/ REFLEX CULTURE     Standing Status:   Standing     Number of Occurrences:   1    DRUG SCREEN, URINE     Standing Status:   Standing     Number of Occurrences:   1    BLOOD ALCOHOL (Ethyl Alcohol)     Standing Status:   Standing     Number of Occurrences:   1    MAGNESIUM     Standing Status:   Standing     Number of Occurrences:   1    DIET NPO     Standing Status:   Standing     Number of Occurrences:   1    NG/OG TUBE, INSERTION/CARE     Standing Status:   Standing     Number of Occurrences:   1     Order Specific Question:   Type of tube: Answer:   OG     Order Specific Question:   Purpose     Answer:   Suction     Order Specific Question:   Type of suction     Answer:   Continuous     Order Specific Question:   Suction Settings     Answer:    Low    VITAL SIGNS     Per unit routine     Standing Status:   Standing     Number of Occurrences:   1    CARDIAC MONITORING     Standing Status:   Standing     Number of Occurrences:   1     Order Specific Question:   Type: Answer:   Bedside     Order Specific Question:   Patient may go off unit without monitor     Answer:   No    WEIGH PATIENT     Standing Status:   Standing     Number of Occurrences:   1    INTAKE AND OUTPUT     Call for urine output less than 0.5mL/kg/hr     Standing Status:   Standing     Number of Occurrences:   1    APPLY/MAINTAIN SEQUENTIAL COMPRESSION DEVICE     Standing Status:   Standing     Number of Occurrences:   1    BEDREST, COMPLETE     Standing Status:   Standing     Number of Occurrences:   1    FULL CODE     Standing Status:   Standing     Number of Occurrences:   1    RT--MECHANICAL VENTILATOR     Standing Status:   Standing     Number of Occurrences:   1     Order Specific Question:   Mode:     Answer:   ASSIST CONTROL     Order Specific Question:   PEEP     Answer:   6.0 CM/H2O     Order Specific Question:   Rate: Answer:   12     Order Specific Question:   Tidal Volume     Answer:   500     Order Specific Question:   FIO2     Answer:   100%    IP CONSULT TO RESPIRATORY CARE     Standing Status:   Standing     Number of Occurrences:   1    TYPE & SCREEN     ENTER SURGERY DATE IF FOR PRE-OP TESTING. Standing Status:   Standing     Number of Occurrences:   1     Order Specific Question:   Has patient been transfused or pregnant in the last 3 mos. ? Answer:   Unknown    DURABLE MEDICAL EQUIPMENT      Standing Status:   Standing     Number of Occurrences:   1     Order Specific Question:   Equipment:     Answer:   Cervical Collar, Soft Foam    DISCONTD: ondansetron (ZOFRAN) injection 4 mg    vecuronium (NORCURON) injection 10 mg    succinylcholine (ANECTINE) injection 150 mg    etomidate (AMIDATE) 2 mg/mL injection 20 mg    dexmedeTOMidine (PRECEDEX) 400 mcg in 0.9% sodium chloride (MBP/ADV) 100 mL MBP     Order Specific Question:   Titrate Infusion?      Answer:   Yes     Order Specific Question:   Initial Infusion Rate:     Answer:   0.4 mcg/kg/hr     Order Specific Question:   Titrate Every 15 Minutes to Achieve Goal of Therapy by: Answer:   0.1 mcg/kg/hr     Order Specific Question:   Goal of Therapy:     Answer:   RASS 0 to -1     Order Specific Question:   Contact Physician for: Answer:   SBP less than 90 mmHg     Order Specific Question:   Contact Physician for: Answer:   HR less than 50 bpm     Order Specific Question:   Contact Physician for: Answer:   Patient not achieving goal and is at max rate    DISCONTD: lactulose (CHRONULAC) 10 gram/15 mL solution 45 mL    sodium chloride 0.9 % bolus infusion 500 mL    DISCONTD: NOREPINephrine (LEVOPHED) 8 mg in 5% dextrose 250mL (32 mcg/mL) infusion     Order Specific Question:   Titrate Infusion? Answer:   Yes     Order Specific Question:   Initial Infusion Rate: Answer:   4 mcg/min     Order Specific Question:   Titrate Every 5 Minutes In Increments of: Answer:   1 mcg/min     Order Specific Question:   Goal of Therapy is: Answer:   MAP greater than 65 mmHg     Order Specific Question:   Contact Physician for:      Answer:   Patient is not achieving goal and is at max rate    sodium chloride (NS) flush 5-40 mL    sodium chloride (NS) flush 5-40 mL    OR Linked Order Group     acetaminophen (TYLENOL) tablet 650 mg     acetaminophen (TYLENOL) suppository 650 mg    polyethylene glycol (MIRALAX) packet 17 g    OR Linked Order Group     ondansetron (ZOFRAN ODT) tablet 4 mg     ondansetron (ZOFRAN) injection 4 mg    enoxaparin (LOVENOX) injection 40 mg    cefTRIAXone (ROCEPHIN) 1 g in sterile water (preservative free) 10 mL IV syringe     Order Specific Question:   Antibiotic Indications     Answer:   Intra-Abdominal Infection    lactulose (CHRONULAC) 10 gram/15 mL solution 45 mL    pantoprazole (PROTONIX) tablet 40 mg     Order Specific Question:   PPI INDICATION     Answer:   Symptomatic GERD    DISCONTD: NOREPINephrine (LEVOPHED) 4 mg in 5% dextrose 250 mL infusion     Order Specific Question:   Titrate Infusion? Answer:   Yes     Order Specific Question:   Initial Infusion Rate: Answer:   4 mcg/min     Order Specific Question:   Titrate Every 5 Minutes In Increments of: Answer:   1 mcg/min     Order Specific Question:   Goal of Therapy is: Answer:   MAP greater than 65 mmHg     Order Specific Question:   Contact Physician for: Answer:   Patient is not achieving goal and is at max rate    NOREPINephrine (LEVOPHED) 8 mg in 0.9% NS 250ml infusion     Order Specific Question:   Titrate Infusion? Answer:   Yes     Order Specific Question:   Initial Infusion Rate: Answer:   4 mcg/min     Order Specific Question:   Titrate Every 5 Minutes In Increments of: Answer:   1 mcg/min     Order Specific Question:   Goal of Therapy is: Answer:   MAP greater than 65 mmHg     Order Specific Question:   Contact Physician for: Answer:   Patient is not achieving goal and is at max rate    IP CONSULT TO PULMONOLOGY     Standing Status:   Standing     Number of Occurrences:   1     Order Specific Question:   Reason for Consult: Answer:   mechanical ventilation     Order Specific Question:   Did you call or speak to the consulting provider? Answer:   No     Order Specific Question:   Consult To     Answer:   pulmonary    INITIAL PHYSICIAN ORDER: INPATIENT Intensive Care; Yes; 4. Patient requires ICU level of care interventions (further clarification in H&P documentation)     Standing Status:   Standing     Number of Occurrences:   1     Order Specific Question:   Status: Answer:   INPATIENT [101]     Order Specific Question:   Type of Bed     Answer:   Intensive Care [6]     Order Specific Question:   Cardiac Monitoring Required? Answer:   Yes     Order Specific Question:   Inpatient Hospitalization Certified Necessary for the Following Reasons     Answer:   4.  Patient requires ICU level of care interventions (further clarification in H&P documentation)     Order Specific Question:   Admitting Diagnosis     Answer:   Acute hypoxemic respiratory failure [facility]) [[id number]]     Order Specific Question:   Admitting Physician     Answer:   [facility] [[id number]]     Order Specific Question:   Attending Physician     Answer:   [facility] [[id number]]     Order Specific Question:   Estimated Length of Stay     Answer:   5-7 Midnights     Order Specific Question:   Discharge Plan:     Answer:   [address] (e.g. Adult Home, Nursing Home, etc.)                 Provider Notes (Medical Decision Making):   21-year-old male known cirrhotic liver disease presents with altered mental status x1 day concern for trauma. CT head ordered. C-spine precautions taken. Also concern for sepsis. Empiric antibiotics ordered. Also concern for hepatic encephalopathy. Elevated ammonia does confirm this. Once OG tube was placed patient given lactulose. Patient's blood pressure was doing well with sedation it did drop to 54/35. Sedation weaned back. Levophed ordered central line placed. Admit ICU      Consults    riki admit           Admitted    Procedures         CRITICAL CARE NOTE :  11:09 AM  Amount of Critical Care Time: 60 minutes    IMPENDING DETERIORATION -Airway, Respiratory, Cardiovascular, CNS, Metabolic, Renal and Hepatic  ASSOCIATED RISK FACTORS - Hypotension, Shock, Hypoxia, Trauma, Dysrhythmia, Metabolic changes and CNS Decompensation  MANAGEMENT- Bedside Assessment and Supervision of Care  INTERPRETATION -  Xrays, CT Scan, Blood Gases, ECG and Blood Pressure  INTERVENTIONS - hemodynamic mngmt  CASE REVIEW - Hospitalist/Intensivist  TREATMENT RESPONSE -Improved  PERFORMED BY - Self    NOTES   :  I have spent critical care time involved in lab review, consultations with specialist, family decision- making, bedside attention and documentation. This time excludes time spent in any separate billed procedures.   During this entire length of time I was immediately available to the patient . Rose Yoon MD      Central line:  Triple-lumen central line placed in sterile procedure. Ultrasound guidance used. Right femoral compressible vessel noted that without a clot. 1 puncture venous return. Seldinger technique. Triple-lumen placed without difficulty. Scant blood loss. Sutured in place. Dressing placed. Patient tolerated procedure well. INTUBATION  RSI etomidate and succinylcholine. Patient intubated with an 8 oh ET tube. Glide scope utilized. Sterile procedures utilized. Direct visualization of the cord. Cord passed easily. Secured. Post chest x-ray ordered. Sats 100%. Concern for possible leak. Advance the tube and that resolved. Patient sats remained 100%. bilat bs                Disposition       Emergency Department Disposition:  Admitted      Diagnosis     Clinical Impression:   1. Hepatic encephalopathy ([facility] [address]        Attestations:    Rose Yoon MD    Please note that this dictation was completed with [employer], the computer voice recognition software. Quite often unanticipated grammatical, syntax, homophones, and other interpretive errors are inadvertently transcribed by the computer software. Please disregard these errors. Please excuse any errors that have escaped final proofreading. Thank you.

## 2021-11-05 NOTE — PROGRESS NOTES
Problem: Falls - Risk of 
Goal: *Absence of Falls Description: Document Clydene Bone Fall Risk and appropriate interventions in the flowsheet. Outcome: Progressing Towards Goal 
Note: Fall Risk Interventions: 
  
 
Mentation Interventions: Adequate sleep, hydration, pain control, Bed/chair exit alarm Medication Interventions: Bed/chair exit alarm Elimination Interventions: Bed/chair exit alarm, Call light in reach History of Falls Interventions: Bed/chair exit alarm Problem: Ventilator Management Goal: *Adequate oxygenation and ventilation Outcome: Progressing Towards Goal

## 2021-11-06 NOTE — PROGRESS NOTES
IMPRESSION:   1. Acute hypoxic respiratory failure  2. 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 COPD  4. Hypovolemic intravascular depletion with shock  5. History of EtOH and alcohol abuse  6. Hepatic encephalopathy  7. Cirrhosis of liver  8. Ascites  9. Chronic kidney disease stage III  10. History of AICD placement  11. Additional workup outlined below  12. Pt is at high risk of sudden decline and decompensation with life threatening consequenses and continued end organ dysfunction and failure  13. Pt is critically ill. Time spent with pt and staff actively rendering care, managing pt and coordinating care as stated below; 30 minutes, exclusive of any procedures      RECOMMENDATIONS/PLAN:   1. ICU monitoring  1. Ventilator for mechanical life support and prevent respiratory arrest with protective lung strategies  2. Patient on assist control mode 50% FiO2 will decrease FiO2  3. His lung cancer was managed at [facility] with stage IV squamous cell lung cancer metastatic disease to both lung  4. IV Solu-Medrol nebulizer treatment  5. Lactulose ammonia level 76 today  6. CVP monitoring chest x-ray shows right lower lobe infiltrate atelectasis chronic scarring  7. Patient on Levophed  8. IV vasopressors for circulatory shock refractory to fluids to maintain SBP> 90  9. Ascites possible need paracentesis GI evaluation  10. Transfuse prn to maintain Hgb > 7  11. Labs to follow electrolytes, renal function and and blood counts  12. Bronchial hygiene with respiratory therapy techniques, bronchodilators  13. Pt needs IV fluids with additives and Drug therapy requiring intensive monitoring for toxicity  14. Prescription drug management with home med reconciliation reviewed  15. DVT, SUP prophylaxis  16.  Will be available to assist in medical management while in the CCU pending disposition     [x] High complexity decision making was performed  [x] See my orders for details    PMH: has a past medical history of AICD (automatic cardioverter/defibrillator) present, CAD (coronary artery disease), Cirrhosis of liver ([address] Gallstones, Hypertension, Nodule of lower lobe of right lung, and Renal stones. PSH:   has a past surgical history that includes hx pacemaker placement and ir paracentesis abd w image (12/30/2020). FHX: family history is not on file. SHX:  reports that he has been smoking. He has been smoking about 1.00 pack per day. He does not have any smokeless tobacco history on file. He reports current alcohol use. He reports previous drug use.     ALL: No Known Allergies     MEDS:   [x] Reviewed - As Below   [] Not reviewed    Current Facility-Administered Medications   Medication    dexmedeTOMidine (PRECEDEX) 400 mcg in 0.9% sodium chloride (MBP/ADV) 100 mL MBP    sodium chloride (NS) flush 5-40 mL    sodium chloride (NS) flush 5-40 mL    acetaminophen (TYLENOL) tablet 650 mg    Or    acetaminophen (TYLENOL) suppository 650 mg    polyethylene glycol (MIRALAX) packet 17 g    ondansetron (ZOFRAN ODT) tablet 4 mg    Or    ondansetron (ZOFRAN) injection 4 mg    lactulose (CHRONULAC) 10 gram/15 mL solution 45 mL    pantoprazole (PROTONIX) tablet 40 mg    NOREPINephrine (LEVOPHED) 8 mg in 0.9% NS 250ml infusion    methylPREDNISolone (PF) (SOLU-MEDROL) injection 40 mg    albuterol-ipratropium (DUO-NEB) 2.5 MG-0.5 MG/3 ML    0.9% sodium chloride infusion    heparin (porcine) injection 5,000 Units    midazolam in normal saline (VERSED) 1 mg/mL infusion    piperacillin-tazobactam (ZOSYN) 3.375 g in 0.9% sodium chloride (MBP/ADV) 100 mL MBP      MAR reviewed and pertinent medications noted or modified as needed   Current Facility-Administered Medications   Medication    dexmedeTOMidine (PRECEDEX) 400 mcg in 0.9% sodium chloride (MBP/ADV) 100 mL MBP    sodium chloride (NS) flush 5-40 mL    sodium chloride (NS) flush 5-40 mL    acetaminophen (TYLENOL) tablet 650 mg    Or  acetaminophen (TYLENOL) suppository 650 mg    polyethylene glycol (MIRALAX) packet 17 g    ondansetron (ZOFRAN ODT) tablet 4 mg    Or    ondansetron (ZOFRAN) injection 4 mg    lactulose (CHRONULAC) 10 gram/15 mL solution 45 mL    pantoprazole (PROTONIX) tablet 40 mg    NOREPINephrine (LEVOPHED) 8 mg in 0.9% NS 250ml infusion    methylPREDNISolone (PF) (SOLU-MEDROL) injection 40 mg    albuterol-ipratropium (DUO-NEB) 2.5 MG-0.5 MG/3 ML    0.9% sodium chloride infusion    heparin (porcine) injection 5,000 Units    midazolam in normal saline (VERSED) 1 mg/mL infusion    piperacillin-tazobactam (ZOSYN) 3.375 g in 0.9% sodium chloride (MBP/ADV) 100 mL MBP      PMH:  has a past medical history of AICD (automatic cardioverter/defibrillator) present, CAD (coronary artery disease), Cirrhosis of liver ([address] Gallstones, Hypertension, Nodule of lower lobe of right lung, and Renal stones. PSH:   has a past surgical history that includes hx pacemaker placement and ir paracentesis abd w image (2020). FHX: family history is not on file. SHX:  reports that he has been smoking. He has been smoking about 1.00 pack per day. He does not have any smokeless tobacco history on file. He reports current alcohol use. He reports previous drug use. ROS:  Unable to obtain sedated on ventilator    Hemodynamics:    CO:    CI:    CVP:    SVR:   PAP Systolic:    PAP Diastolic:    PVR:    IF26:        Ventilator Settings:      Mode Rate TV Press PEEP FiO2 PIP Min.  Vent   Assist control, Volume control    500 ml    6 cm H20 50 %  26 cm H2O  7.03 l/min        Vital Signs: Telemetry:    normal sinus rhythm Intake/Output:   Visit Vitals  /73 (BP 1 Location: Left upper arm, BP Patient Position: At rest)   Pulse 82   Temp 97.9 °F (36.6 °C)   Resp 14   Ht 6' (1.829 m)   Wt 113.4 kg (250 lb)   SpO2 96%   BMI 33.91 kg/m²       Temp (24hrs), Av.6 °F (36.4 °C), Min:96.6 °F (35.9 °C), Max:99.3 °F (37.4 °C)        O2 Device: Ventilator O2 Flow Rate (L/min): 15 l/min       Wt Readings from Last 4 Encounters:   11/05/21 113.4 kg (250 lb)   10/23/21 104.3 kg (230 lb)   10/19/21 104.3 kg (230 lb)   01/02/21 105.2 kg (231 lb 14.8 oz)          Intake/Output Summary (Last 24 hours) at 11/6/2021 0827  Last data filed at 11/6/2021 0542  Gross per 24 hour   Intake 1165.73 ml   Output 1300 ml   Net -134.27 ml       Last shift:      No intake/output data recorded. Last 3 shifts: 11/04 1901 - 11/06 0700  In: 1165.7 [I.V.:1165.7]  Out: 1300 [Urine:1000]       Physical Exam:     General:  intubated on vent  HEENT: NCAT, poor dentition, lips and mucosa dry  Eyes: anicteric; conjunctiva clear  Neck: no nodes, no cuff leak, trach midline; no accessory MM use. Chest: no deformity,   Cardiac: R regular; no murmur;   Lungs: distant breath sounds; no wheezes  Abd: Distended positive for ascites from  Ext: no edema; no joint swelling;  No clubbing  : NO tellez, clear urine  Neuro: sedated;  Psych-  unable to assess  Skin: warm, dry, no cyanosis;   Pulses: 1-2+ Bilateral pedal, radial  Capillary: brisk; pale      DATA:    MAR reviewed and pertinent medications noted or modified as needed  MEDS:   Current Facility-Administered Medications   Medication    dexmedeTOMidine (PRECEDEX) 400 mcg in 0.9% sodium chloride (MBP/ADV) 100 mL MBP    sodium chloride (NS) flush 5-40 mL    sodium chloride (NS) flush 5-40 mL    acetaminophen (TYLENOL) tablet 650 mg    Or    acetaminophen (TYLENOL) suppository 650 mg    polyethylene glycol (MIRALAX) packet 17 g    ondansetron (ZOFRAN ODT) tablet 4 mg    Or    ondansetron (ZOFRAN) injection 4 mg    lactulose (CHRONULAC) 10 gram/15 mL solution 45 mL    pantoprazole (PROTONIX) tablet 40 mg    NOREPINephrine (LEVOPHED) 8 mg in 0.9% NS 250ml infusion    methylPREDNISolone (PF) (SOLU-MEDROL) injection 40 mg    albuterol-ipratropium (DUO-NEB) 2.5 MG-0.5 MG/3 ML    0.9% sodium chloride infusion    heparin (porcine) injection 5,000 Units    midazolam in normal saline (VERSED) 1 mg/mL infusion    piperacillin-tazobactam (ZOSYN) 3.375 g in 0.9% sodium chloride (MBP/ADV) 100 mL MBP        Labs:    Recent Labs     11/06/21  0253 11/05/21  0903   WBC 5.0 11.0   HGB 12.2 12.4   * 116*   INR  --  1.3*   APTT  --  34.8*     Recent Labs     11/06/21  0253 11/05/21  0903   * 138   K 5.0 4.6    110*   CO2 22 22   * 129*   BUN 28* 21*   CREA 1.81* 1.51*   CA 8.4* 7.4*   MG  --  2.0   LAC  --  0.8   ALB 2.1* 2.0*   ALT 25 25     Recent Labs     11/06/21  0450 11/05/21  1150   PH 7.39 7.33*   PCO2 33* 42   PO2 110* 286*   HCO3 22 22   FIO2 50 100.0     No results for input(s): CPK, CKNDX, TROIQ in the last 72 hours. No lab exists for component: CPKMB  No results found for: BNPP, BNP   Lab Results   Component Value Date/Time    Culture result: No growth after 4 hours 11/05/2021 09:00 AM    Culture result: No growth 6 days 10/23/2021 01:30 PM    Culture result: No growth 6 days 12/27/2020 08:15 PM     No results found for: TSH, TSHEXT, TSHEXT     Imaging:    Results from Hospital Encounter encounter on 11/05/21    XR CHEST PORT    Narrative  Study: Chest radiograph(s), 1 view    Clinical Indication: CHF. Comparison: Chest radiograph dated 11/5/2021. Findings: The tip of the endotracheal tube now terminates 4.5 cm superior to the osiris. A  gastric tube courses inferiorly out of the field of view. Unchanged positioning  of a left subclavian approach ICD. Overlying monitoring leads. The cardiac silhouette is unchanged in size. Central vascular congestion. Persistent patchy bibasilar opacities. No new large  pleural effusion. No discernible pneumothorax. Impression  1. ETT terminating approximately 5 cm above the osiris. 2.  Persistent bilateral airspace disease and/or atelectasis.       Results from [place] encounter on 11/05/21    [address] CONT    Narrative  Fall. No comparison. Technique: Axial imaging cervical spine with sagittal and coronal reformatting  and 3-D volume rendering performed by the technologist at the console. Dose reduction: All CT scans at this facility are performed using dose reduction  optimization techniques as appropriate to a performed exam including the  following: Automated exposure control, adjustments of the mA and/or kV according  to patient's size, or use of iterative reconstruction technique    Findings: No listhesis. T1 vertebral body mild superior endplate compression  unchanged compared to chest CT 10/20/2021. No acute fracture, or jumped or  perched facet. Multilevel disc narrowing and small osteophytosis from  degenerative disease. Lateral masses symmetric bilaterally. Odontoid intact. No  prevertebral soft tissue swelling. Lung apices clear. Support tubing present. Impression  1. No acute bony findings. This care involved high complexity decision making which includes independently reviewing the patient's past medical records, current laboratory results, medication profiles that were immediately available to me and actual Xray images at the bedside in order to assess, support vital system function, and to treat this degree of vital organ system failure, and to prevent further life threatening deterioration of the patients condition. I was in direct communication with the nursing staff throughout this time.     Medical Decision Making Today  · Reviewed the flowsheet and previous days notes  · Reviewed and summarized records or history from previous days note or discussions with staff, family  · Parenteral controlled substances - Reviewed/ Adjusted / Weaned / Started  · High Risk Drug therapy requiring intensive monitoring for toxicity: eg steroids, pressors, antibiotics  · Review and order of Clinical lab tests  · Review and Order of Radiology tests  · Review and Order of Medicine tests  · Independent visualization of radiologic Images  · Reviewed Ventilator /

## 2021-11-06 NOTE — PROGRESS NOTES
Hospitalist Progress Note    Subjective:   Daily Progress Note: 11/6/2021 2:37 PM    Hospital Course:    73 y.o. With hx of metastatic lung cancer, COPD, liver cirrhosis was brought in by EMS for altered mental status and acute hypoxic respiratory failure. Subjective:  Patient is currently intubated and sedated. I had goals of care discussion with patient's son, step daughter and girlfriend and they came to conclusion to try resuscitation for 5-10 minutes and if he doesn't respond then stop resuscitation.     Current Facility-Administered Medications   Medication Dose Route Frequency    rifAXIMin (XIFAXAN) tablet 550 mg  550 mg Oral ACB/HS    dexmedeTOMidine (PRECEDEX) 400 mcg in 0.9% sodium chloride (MBP/ADV) 100 mL MBP  0.1-1.5 mcg/kg/hr IntraVENous TITRATE    sodium chloride (NS) flush 5-40 mL  5-40 mL IntraVENous Q8H    sodium chloride (NS) flush 5-40 mL  5-40 mL IntraVENous PRN    acetaminophen (TYLENOL) tablet 650 mg  650 mg Oral Q6H PRN    Or    acetaminophen (TYLENOL) suppository 650 mg  650 mg Rectal Q6H PRN    polyethylene glycol (MIRALAX) packet 17 g  17 g Oral DAILY PRN    ondansetron (ZOFRAN ODT) tablet 4 mg  4 mg Oral Q8H PRN    Or    ondansetron (ZOFRAN) injection 4 mg  4 mg IntraVENous Q6H PRN    lactulose (CHRONULAC) 10 gram/15 mL solution 45 mL  45 mL Per NG tube TID    pantoprazole (PROTONIX) tablet 40 mg  40 mg Oral ACB    NOREPINephrine (LEVOPHED) 8 mg in 0.9% NS 250ml infusion  0.5-16 mcg/min IntraVENous TITRATE    methylPREDNISolone (PF) (SOLU-MEDROL) injection 40 mg  40 mg IntraVENous Q8H    albuterol-ipratropium (DUO-NEB) 2.5 MG-0.5 MG/3 ML  3 mL Nebulization Q6HWA RT    0.9% sodium chloride infusion  75 mL/hr IntraVENous CONTINUOUS    heparin (porcine) injection 5,000 Units  5,000 Units SubCUTAneous Q12H    midazolam in normal saline (VERSED) 1 mg/mL infusion  0-10 mg/hr IntraVENous TITRATE    piperacillin-tazobactam (ZOSYN) 3.375 g in 0.9% sodium chloride (MBP/ADV) 100 mL MBP  3.375 g IntraVENous Q8H        Review of Systems  Unable to obtain due to patient's condition      Objective:     Visit Vitals  /66 (BP 1 Location: Left upper arm, BP Patient Position: At rest)   Pulse 87   Temp 98.2 °F (36.8 °C)   Resp 15   Ht 6' (1.829 m)   Wt 113.4 kg (250 lb)   SpO2 95%   BMI 33.91 kg/m²    O2 Flow Rate (L/min): 15 l/min O2 Device: Ventilator    Temp (24hrs), Av.8 °F (36.6 °C), Min:97 °F (36.1 °C), Max:98.4 °F (36.9 °C)      701 - 1900  In: -   Out: 1000 [Urine:1000]  1901 - 700  In: 1165.7 [I.V.:1165.7]  Out: 1300 [Urine:1000]    PHYSICAL EXAM:  Constitutional: intubated and sedated  Skin: Extremities and face reveal no rashes. HEENT: Sclerae anicteric. Extra-occular muscles are intact. No oral ulcers. The neck is supple and no masses. Cardiovascular: Regular rate and rhythm. Respiratory:  Clear breath sounds bilaterally w  GI: Abdomen nondistended, soft, and nontender. Normal active bowel sounds. Musculoskeletal: No pitting edema of the lower   Neurological:  Intubated and sedated. Data Review    Recent Results (from the past 24 hour(s))   AMMONIA    Collection Time: 21  5:23 PM   Result Value Ref Range    Ammonia 76 (H) <32 umol/L   HEPATITIS PANEL, ACUTE    Collection Time: 21  2:53 AM   Result Value Ref Range    Hepatitis A, IgM PENDING      __        Hepatitis B surface Ag <0.10 Index    Hep B surface Ag Interp. Negative Negative    __        Hepatitis B core, IgM PENDING     __        Hepatitis C virus Ab PENDING Index    Hep C virus Ab Interp.  PENDING    METABOLIC PANEL, COMPREHENSIVE    Collection Time: 21  2:53 AM   Result Value Ref Range    Sodium 135 (L) 136 - 145 mmol/L    Potassium 5.0 3.5 - 5.1 mmol/L    Chloride 106 97 - 108 mmol/L    CO2 22 21 - 32 mmol/L    Anion gap 7 5 - 15 mmol/L    Glucose 177 (H) 65 - 100 mg/dL    BUN 28 (H) 6 - 20 mg/dL    Creatinine 1.81 (H) 0.70 - 1.30 mg/dL BUN/Creatinine ratio 15 12 - 20      GFR est AA 46 (L) >60 ml/min/1.73m2    GFR est non-AA 38 (L) >60 ml/min/1.73m2    Calcium 8.4 (L) 8.5 - 10.1 mg/dL    Bilirubin, total 1.3 (H) 0.2 - 1.0 mg/dL    AST (SGOT) 35 15 - 37 U/L    ALT (SGPT) 25 12 - 78 U/L    Alk. phosphatase 123 (H) 45 - 117 U/L    Protein, total 7.4 6.4 - 8.2 g/dL    Albumin 2.1 (L) 3.5 - 5.0 g/dL    Globulin 5.3 (H) 2.0 - 4.0 g/dL    A-G Ratio 0.4 (L) 1.1 - 2.2     CBC WITH AUTOMATED DIFF    Collection Time: 11/06/21  2:53 AM   Result Value Ref Range    WBC 5.0 4.1 - 11.1 K/uL    RBC 3.84 (L) 4.10 - 5.70 M/uL    HGB 12.2 12.1 - 17.0 g/dL    HCT 37.6 36.6 - 50.3 %    MCV 97.9 80.0 - 99.0 FL    MCH 31.8 26.0 - 34.0 PG    MCHC 32.4 30.0 - 36.5 g/dL    RDW 16.5 (H) 11.5 - 14.5 %    PLATELET 905 (L) 224 - 400 K/uL    MPV 10.3 8.9 - 12.9 FL    NRBC 0.0 0.0  WBC    ABSOLUTE NRBC 0.00 0.00 - 0.01 K/uL    NEUTROPHILS 92 (H) 32 - 75 %    LYMPHOCYTES 5 (L) 12 - 49 %    MONOCYTES 3 (L) 5 - 13 %    EOSINOPHILS 0 0 - 7 %    BASOPHILS 0 0 - 1 %    IMMATURE GRANULOCYTES 0 0 - 0.5 %    ABS. NEUTROPHILS 4.6 1.8 - 8.0 K/UL    ABS. LYMPHOCYTES 0.3 (L) 0.8 - 3.5 K/UL    ABS. MONOCYTES 0.1 0.0 - 1.0 K/UL    ABS. EOSINOPHILS 0.0 0.0 - 0.4 K/UL    ABS. BASOPHILS 0.0 0.0 - 0.1 K/UL    ABS. IMM. GRANS. 0.0 0.00 - 0.04 K/UL    DF AUTOMATED     BLOOD GAS, ARTERIAL    Collection Time: 11/06/21  4:50 AM   Result Value Ref Range    pH 7.39 7.35 - 7.45      PCO2 33 (L) 35 - 45 mmHg    PO2 110 (H) 75 - 100 mmHg    O2 SAT 99 >95 %    BICARBONATE 22 22 - 26 mmol/L    BASE DEFICIT 3.5 (H) 0 - 2 mmol/L    O2 METHOD VENT      FIO2 50 %    MODE Assist Control/Volume Control      Tidal volume 500      SET RATE 14      SITE Left Radial      ALVAREZ'S TEST Positive           Assessment / Plan:    73 y.o.   With hx of metastatic lung cancer, COPD, liver cirrhosis was brought in by EMS for altered mental status and acute hypoxic respiratory failure.      Acute hypoxemic respiratory failure:  Likely s/s Acute exacerbation of COPD and metastatic lung cancer  Appreciate pulmonary consult  Continue mechanical ventilation   Continue solumedrol     Septic shock:  Unclear source of infection. Continue antibiotics  Continue levophed  Paracentesis     Acute hepatic encephalopathy  Continue lactulose     CKD: avoid nephrotoxic medications           Code Status: full  Surrogate Decision Maker:Patient has biological son, girlfriend and step daughter. I had goals of care discussion with all three of them, and came to conclusion that if he were to have cardiac arrest, give it a try for 5-10 minutes, and then stop. DVT Prophylaxis: heparin sc  GI Prophylaxis: pantoprazole       Critical care time spent 35 minutes involving direct patient care as well as reviewing patient's labs and coordination of care with nursing staff     Care Plan discussed with: Patient/Family/RN/Case Management        Total time spent with patient: 35 minutes.

## 2021-11-06 NOTE — PROGRESS NOTES
Patient's step daughter Shelli Timmons called.  Update given, step daughter wanted to provide  home information \"just in case\"    [facility]  [address] [facility]

## 2021-11-06 NOTE — PROGRESS NOTES
Problem: Ventilator Management  Goal: *Adequate oxygenation and ventilation  Outcome: Progressing Towards Goal

## 2021-11-07 NOTE — PROGRESS NOTES
Hospitalist Progress Note    Subjective:   Daily Progress Note: 11/7/2021 2:37 PM    Hospital Course:    73 y.o. With hx of metastatic lung cancer, COPD, liver cirrhosis was brought in by EMS for altered mental status and acute hypoxic respiratory failure. Subjective:  Patient is currently intubated and sedated.       Current Facility-Administered Medications   Medication Dose Route Frequency    glycopyrrolate (ROBINUL) tablet 1 mg  1 mg Oral TID    rifAXIMin (XIFAXAN) tablet 550 mg  550 mg Oral ACB/HS    dexmedeTOMidine (PRECEDEX) 400 mcg in 0.9% sodium chloride (MBP/ADV) 100 mL MBP  0.1-1.5 mcg/kg/hr IntraVENous TITRATE    sodium chloride (NS) flush 5-40 mL  5-40 mL IntraVENous Q8H    sodium chloride (NS) flush 5-40 mL  5-40 mL IntraVENous PRN    acetaminophen (TYLENOL) tablet 650 mg  650 mg Oral Q6H PRN    Or    acetaminophen (TYLENOL) suppository 650 mg  650 mg Rectal Q6H PRN    polyethylene glycol (MIRALAX) packet 17 g  17 g Oral DAILY PRN    ondansetron (ZOFRAN ODT) tablet 4 mg  4 mg Oral Q8H PRN    Or    ondansetron (ZOFRAN) injection 4 mg  4 mg IntraVENous Q6H PRN    lactulose (CHRONULAC) 10 gram/15 mL solution 45 mL  45 mL Per NG tube TID    pantoprazole (PROTONIX) tablet 40 mg  40 mg Oral ACB    NOREPINephrine (LEVOPHED) 8 mg in 0.9% NS 250ml infusion  0.5-16 mcg/min IntraVENous TITRATE    methylPREDNISolone (PF) (SOLU-MEDROL) injection 40 mg  40 mg IntraVENous Q8H    albuterol-ipratropium (DUO-NEB) 2.5 MG-0.5 MG/3 ML  3 mL Nebulization Q6HWA RT    0.9% sodium chloride infusion  75 mL/hr IntraVENous CONTINUOUS    heparin (porcine) injection 5,000 Units  5,000 Units SubCUTAneous Q12H    midazolam in normal saline (VERSED) 1 mg/mL infusion  0-10 mg/hr IntraVENous TITRATE    piperacillin-tazobactam (ZOSYN) 3.375 g in 0.9% sodium chloride (MBP/ADV) 100 mL MBP  3.375 g IntraVENous Q8H        Review of Systems  Unable to obtain due to patient's condition      Objective:     Visit Vitals  BP (!) 107/57 (BP 1 Location: Left upper arm, BP Patient Position: At rest)   Pulse 80   Temp 100 °F (37.8 °C)   Resp 14   Ht 6' (1.829 m)   Wt 113.4 kg (250 lb)   SpO2 96%   BMI 33.91 kg/m²    O2 Flow Rate (L/min): 15 l/min O2 Device: Ventilator    Temp (24hrs), Av.2 °F (36.8 °C), Min:96.8 °F (36 °C), Max:100 °F (37.8 °C)      701 - 1900  In: 822.8 [I.V.:342.8]  Out: 961 [Urine:600]  1901 - 700  In: 2370.1 [I.V.:2370.1]  Out: 1975 [QNMTC:0919; Drains:25]    PHYSICAL EXAM:  Constitutional: intubated and sedated  Skin: Extremities and face reveal no rashes. HEENT: Sclerae anicteric. Extra-occular muscles are intact. No oral ulcers. The neck is supple and no masses. Cardiovascular: Regular rate and rhythm. Respiratory:  Clear breath sounds bilaterally w  GI: Abdomen nondistended, soft, and nontender. Normal active bowel sounds. Musculoskeletal: No pitting edema of the lower   Neurological:  Intubated and sedated. Data Review    Recent Results (from the past 24 hour(s))   CBC WITH AUTOMATED DIFF    Collection Time: 21  2:18 AM   Result Value Ref Range    WBC 5.8 4.1 - 11.1 K/uL    RBC 3.67 (L) 4.10 - 5.70 M/uL    HGB 11.8 (L) 12.1 - 17.0 g/dL    HCT 35.7 (L) 36.6 - 50.3 %    MCV 97.3 80.0 - 99.0 FL    MCH 32.2 26.0 - 34.0 PG    MCHC 33.1 30.0 - 36.5 g/dL    RDW 17.2 (H) 11.5 - 14.5 %    PLATELET 88 (L) 602 - 400 K/uL    MPV 9.9 8.9 - 12.9 FL    NRBC 0.0 0.0  WBC    ABSOLUTE NRBC 0.00 0.00 - 0.01 K/uL    NEUTROPHILS 91 (H) 32 - 75 %    LYMPHOCYTES 4 (L) 12 - 49 %    MONOCYTES 5 5 - 13 %    EOSINOPHILS 0 0 - 7 %    BASOPHILS 0 0 - 1 %    IMMATURE GRANULOCYTES 0 0 - 0.5 %    ABS. NEUTROPHILS 5.2 1.8 - 8.0 K/UL    ABS. LYMPHOCYTES 0.2 (L) 0.8 - 3.5 K/UL    ABS. MONOCYTES 0.3 0.0 - 1.0 K/UL    ABS. EOSINOPHILS 0.0 0.0 - 0.4 K/UL    ABS. BASOPHILS 0.0 0.0 - 0.1 K/UL    ABS. IMM.  GRANS. 0.0 0.00 - 0.04 K/UL    DF AUTOMATED     METABOLIC PANEL, BASIC    Collection Time: 11/07/21  2:18 AM   Result Value Ref Range    Sodium 139 136 - 145 mmol/L    Potassium 4.9 3.5 - 5.1 mmol/L    Chloride 113 (H) 97 - 108 mmol/L    CO2 21 21 - 32 mmol/L    Anion gap 5 5 - 15 mmol/L    Glucose 174 (H) 65 - 100 mg/dL    BUN 32 (H) 6 - 20 mg/dL    Creatinine 1.58 (H) 0.70 - 1.30 mg/dL    BUN/Creatinine ratio 20 12 - 20      GFR est AA 54 (L) >60 ml/min/1.73m2    GFR est non-AA 44 (L) >60 ml/min/1.73m2    Calcium 8.8 8.5 - 10.1 mg/dL   AMMONIA    Collection Time: 11/07/21  2:18 AM   Result Value Ref Range    Ammonia 31 <32 umol/L   BLOOD GAS, ARTERIAL    Collection Time: 11/07/21  3:35 AM   Result Value Ref Range    pH 7.39 7.35 - 7.45      PCO2 33 (L) 35 - 45 mmHg    PO2 82 75 - 100 mmHg    O2 SAT 97 >95 %    BICARBONATE 21 (L) 22 - 26 mmol/L    BASE DEFICIT 4.4 (H) 0 - 2 mmol/L    O2 METHOD VENT      FIO2 50.0 %    MODE Assist Control/Volume Control      Tidal volume 500      SET RATE 14      EPAP/CPAP/PEEP 6.0      SITE Right Brachial      ALVAREZ'S TEST PASS     GLUCOSE, POC    Collection Time: 11/07/21 12:00 PM   Result Value Ref Range    Glucose (POC) 153 (H) 65 - 117 mg/dL    Performed by Feng John          Assessment / Plan:    73 y.o. With hx of metastatic lung cancer, COPD, liver cirrhosis was brought in by EMS for altered mental status and acute hypoxic respiratory failure.      Acute hypoxemic respiratory failure:  Likely s/s Acute exacerbation of COPD and metastatic lung cancer  Appreciate pulmonary consult  Continue mechanical ventilation- wean as tolerated  Continue solumedrol     Septic shock:  Unclear source of infection. Continue antibiotics  Continue levophed  Paracentesis     Acute hepatic encephalopathy  Continue lactulose     CKD: avoid nephrotoxic medications           Code Status: full  Surrogate Decision Maker:Patient has biological son, girlfriend and step daughter.  I had goals of care discussion with all three of them, and came to conclusion that if he were to have cardiac arrest, give it a try for 5-10 minutes, and then stop. DVT Prophylaxis: heparin sc  GI Prophylaxis: pantoprazole       Critical care time spent 35 minutes involving direct patient care as well as reviewing patient's labs and coordination of care with nursing staff     Care Plan discussed with: Patient/Family/RN/Case Management        Total time spent with patient: 35 minutes.

## 2021-11-07 NOTE — PROGRESS NOTES
IMPRESSION:   1. Acute hypoxic respiratory failure  2. 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 COPD  4. Hypovolemic intravascular depletion with shock  5. History of EtOH and alcohol abuse  6. Hepatic encephalopathy  7. Cirrhosis of liver  8. Ascites  9. Chronic kidney disease stage III  10. History of AICD placement  11. Hypothermic  12. Additional workup outlined below  13. Pt is at high risk of sudden decline and decompensation with life threatening consequenses and continued end organ dysfunction and failure  14. Pt is critically ill. Time spent with pt and staff actively rendering care, managing pt and coordinating care as stated below; 30 minutes, exclusive of any procedures      RECOMMENDATIONS/PLAN:   1. ICU monitoring  1. Ventilator for mechanical life support and prevent respiratory arrest with protective lung strategies  2. Patient on assist control mode 50% FiO2 will decrease FiO2  3. His lung cancer was managed at [address] [place] [address] with stage IV squamous cell lung cancer metastatic disease to both lung  4. IV Solu-Medrol and nebulizer treatment  5. Lactulose ammonia level 31 today  6. CVP monitoring chest x-ray shows right lower lobe infiltrate atelectasis chronic scarring  7. Patient on Levophed  8. On warming blanket  9. Ascites possible need paracentesis GI evaluation  10. CXR bibasilar atelectasis  11. Transfuse prn to maintain Hgb > 7  12. Labs to follow electrolytes, renal function and and blood counts  13. Bronchial hygiene with respiratory therapy techniques, bronchodilators  14. Pt needs IV fluids with additives and Drug therapy requiring intensive monitoring for toxicity  15. Prescription drug management with home med reconciliation reviewed  16. DVT, SUP prophylaxis  17.  Will be available to assist in medical management while in the CCU pending disposition     [x] High complexity decision making was performed  [x] See my orders for details    PMH:  has a past medical history of AICD (automatic cardioverter/defibrillator) present, CAD (coronary artery disease), Cirrhosis of liver ([address] Gallstones, Hypertension, Nodule of lower lobe of right lung, and Renal stones. PSH:   has a past surgical history that includes hx pacemaker placement and ir paracentesis abd w image (12/30/2020). FHX: family history is not on file. SHX:  reports that he has been smoking. He has been smoking about 1.00 pack per day. He does not have any smokeless tobacco history on file. He reports current alcohol use. He reports previous drug use.     ALL: No Known Allergies     MEDS:   [x] Reviewed - As Below   [] Not reviewed    Current Facility-Administered Medications   Medication    rifAXIMin (XIFAXAN) tablet 550 mg    dexmedeTOMidine (PRECEDEX) 400 mcg in 0.9% sodium chloride (MBP/ADV) 100 mL MBP    sodium chloride (NS) flush 5-40 mL    sodium chloride (NS) flush 5-40 mL    acetaminophen (TYLENOL) tablet 650 mg    Or    acetaminophen (TYLENOL) suppository 650 mg    polyethylene glycol (MIRALAX) packet 17 g    ondansetron (ZOFRAN ODT) tablet 4 mg    Or    ondansetron (ZOFRAN) injection 4 mg    lactulose (CHRONULAC) 10 gram/15 mL solution 45 mL    pantoprazole (PROTONIX) tablet 40 mg    NOREPINephrine (LEVOPHED) 8 mg in 0.9% NS 250ml infusion    methylPREDNISolone (PF) (SOLU-MEDROL) injection 40 mg    albuterol-ipratropium (DUO-NEB) 2.5 MG-0.5 MG/3 ML    0.9% sodium chloride infusion    heparin (porcine) injection 5,000 Units    midazolam in normal saline (VERSED) 1 mg/mL infusion    piperacillin-tazobactam (ZOSYN) 3.375 g in 0.9% sodium chloride (MBP/ADV) 100 mL MBP      MAR reviewed and pertinent medications noted or modified as needed   Current Facility-Administered Medications   Medication    rifAXIMin (XIFAXAN) tablet 550 mg    dexmedeTOMidine (PRECEDEX) 400 mcg in 0.9% sodium chloride (MBP/ADV) 100 mL MBP    sodium chloride (NS) flush 5-40 mL    sodium chloride (NS) flush 5-40 mL    acetaminophen (TYLENOL) tablet 650 mg    Or    acetaminophen (TYLENOL) suppository 650 mg    polyethylene glycol (MIRALAX) packet 17 g    ondansetron (ZOFRAN ODT) tablet 4 mg    Or    ondansetron (ZOFRAN) injection 4 mg    lactulose (CHRONULAC) 10 gram/15 mL solution 45 mL    pantoprazole (PROTONIX) tablet 40 mg    NOREPINephrine (LEVOPHED) 8 mg in 0.9% NS 250ml infusion    methylPREDNISolone (PF) (SOLU-MEDROL) injection 40 mg    albuterol-ipratropium (DUO-NEB) 2.5 MG-0.5 MG/3 ML    0.9% sodium chloride infusion    heparin (porcine) injection 5,000 Units    midazolam in normal saline (VERSED) 1 mg/mL infusion    piperacillin-tazobactam (ZOSYN) 3.375 g in 0.9% sodium chloride (MBP/ADV) 100 mL MBP      PMH:  has a past medical history of AICD (automatic cardioverter/defibrillator) present, CAD (coronary artery disease), Cirrhosis of liver ([address] Gallstones, Hypertension, Nodule of lower lobe of right lung, and Renal stones. PSH:   has a past surgical history that includes hx pacemaker placement and ir paracentesis abd w image (2020). FHX: family history is not on file. SHX:  reports that he has been smoking. He has been smoking about 1.00 pack per day. He does not have any smokeless tobacco history on file. He reports current alcohol use. He reports previous drug use. ROS:  Unable to obtain sedated on ventilator    Hemodynamics:    CO:    CI:    CVP:    SVR:   PAP Systolic:    PAP Diastolic:    PVR:    QR86:        Ventilator Settings:      Mode Rate TV Press PEEP FiO2 PIP Min.  Vent   Assist control, Volume control    500 ml    6 cm H20 50 %  28 cm H2O  10.2 l/min        Vital Signs: Telemetry:    normal sinus rhythm Intake/Output:   Visit Vitals  /70   Pulse 68   Temp (P) 96.8 °F (36 °C)   Resp 14   Ht 6' (1.829 m)   Wt 113.4 kg (250 lb)   SpO2 97%   BMI 33.91 kg/m²       Temp (24hrs), Av.9 °F (36.6 °C), Min:96.8 °F (36 °C), Max:98.4 °F (36.9 °C)        O2 Device: Ventilator O2 Flow Rate (L/min): 15 l/min       Wt Readings from Last 4 Encounters:   11/05/21 113.4 kg (250 lb)   10/23/21 104.3 kg (230 lb)   10/19/21 104.3 kg (230 lb)   01/02/21 105.2 kg (231 lb 14.8 oz)          Intake/Output Summary (Last 24 hours) at 11/7/2021 0828  Last data filed at 11/7/2021 0459  Gross per 24 hour   Intake 2370.11 ml   Output 675 ml   Net 1695.11 ml       Last shift:      No intake/output data recorded. Last 3 shifts: 11/05 1901 - 11/07 0700  In: 2370.1 [I.V.:2370.1]  Out: 1975 [Urine:1650; Drains:25]       Physical Exam:     General:  intubated on vent  HEENT: NCAT, poor dentition, lips and mucosa dry  Eyes: anicteric; conjunctiva clear  Neck: no nodes, no cuff leak, trach midline; no accessory MM use. Chest: no deformity,   Cardiac: R regular; no murmur;   Lungs: distant breath sounds; no wheezes  Abd: Distended positive for ascites from  Ext: no edema; no joint swelling;  No clubbing  : NO tellez, clear urine  Neuro: sedated;  Psych-  unable to assess  Skin: warm, dry, no cyanosis;   Pulses: 1-2+ Bilateral pedal, radial  Capillary: brisk; pale      DATA:    MAR reviewed and pertinent medications noted or modified as needed  MEDS:   Current Facility-Administered Medications   Medication    rifAXIMin (XIFAXAN) tablet 550 mg    dexmedeTOMidine (PRECEDEX) 400 mcg in 0.9% sodium chloride (MBP/ADV) 100 mL MBP    sodium chloride (NS) flush 5-40 mL    sodium chloride (NS) flush 5-40 mL    acetaminophen (TYLENOL) tablet 650 mg    Or    acetaminophen (TYLENOL) suppository 650 mg    polyethylene glycol (MIRALAX) packet 17 g    ondansetron (ZOFRAN ODT) tablet 4 mg    Or    ondansetron (ZOFRAN) injection 4 mg    lactulose (CHRONULAC) 10 gram/15 mL solution 45 mL    pantoprazole (PROTONIX) tablet 40 mg    NOREPINephrine (LEVOPHED) 8 mg in 0.9% NS 250ml infusion    methylPREDNISolone (PF) (SOLU-MEDROL) injection 40 mg    albuterol-ipratropium (DUO-NEB) 2.5 MG-0.5 MG/3 ML    0.9% sodium chloride infusion    heparin (porcine) injection 5,000 Units    midazolam in normal saline (VERSED) 1 mg/mL infusion    piperacillin-tazobactam (ZOSYN) 3.375 g in 0.9% sodium chloride (MBP/ADV) 100 mL MBP        Labs:    Recent Labs     11/07/21  0218 11/06/21  0253 11/05/21  0903   WBC 5.8 5.0 11.0   HGB 11.8* 12.2 12.4   PLT 88* 120* 116*   INR  --   --  1.3*   APTT  --   --  34.8*     Recent Labs     11/07/21  0218 11/06/21  0253 11/05/21  0903    135* 138   K 4.9 5.0 4.6   * 106 110*   CO2 21 22 22   * 177* 129*   BUN 32* 28* 21*   CREA 1.58* 1.81* 1.51*   CA 8.8 8.4* 7.4*   MG  --   --  2.0   LAC  --   --  0.8   ALB  --  2.1* 2.0*   ALT  --  25 25     Recent Labs     11/07/21  0335 11/06/21  0450 11/05/21  1150   PH 7.39 7.39 7.33*   PCO2 33* 33* 42   PO2 82 110* 286*   HCO3 21* 22 22   FIO2 50.0 50 100.0     No results for input(s): CPK, CKNDX, TROIQ in the last 72 hours. No lab exists for component: CPKMB  No results found for: BNPP, BNP   Lab Results   Component Value Date/Time    Culture result: No growth after 22 hours 11/05/2021 09:00 AM    Culture result: No growth 6 days 10/23/2021 01:30 PM    Culture result: No growth 6 days 12/27/2020 08:15 PM     No results found for: TSH, TSHEXT, TSHEXT     Imaging:    Results from Hospital Encounter encounter on 11/05/21    XR CHEST PORT    Narrative  Portable chest, 0211 hours, compared with 11/6/2021. Rightward patient rotation. Impression  Cardiopericardial enlargement with AICD. Central vascular  congestion. Improved basilar atelectasis. No pneumothorax or other acute change. There is obscuration of a portion of the left hemithorax by the generator  device. Stable appearance of endotracheal tube. A nasogastric tube courses below  the diaphragm; its tip is not identified. Results from [place] encounter on 11/05/21    CT SPINE CERV WO CONT    Narrative  Fall. No comparison.     Technique: Axial imaging cervical spine with sagittal and coronal reformatting  and 3-D volume rendering performed by the technologist at the console. Dose reduction: All CT scans at this facility are performed using dose reduction  optimization techniques as appropriate to a performed exam including the  following: Automated exposure control, adjustments of the mA and/or kV according  to patient's size, or use of iterative reconstruction technique    Findings: No listhesis. T1 vertebral body mild superior endplate compression  unchanged compared to chest CT 10/20/2021. No acute fracture, or jumped or  perched facet. Multilevel disc narrowing and small osteophytosis from  degenerative disease. Lateral masses symmetric bilaterally. Odontoid intact. No  prevertebral soft tissue swelling. Lung apices clear. Support tubing present. Impression  1. No acute bony findings. This care involved high complexity decision making which includes independently reviewing the patient's past medical records, current laboratory results, medication profiles that were immediately available to me and actual Xray images at the bedside in order to assess, support vital system function, and to treat this degree of vital organ system failure, and to prevent further life threatening deterioration of the patients condition. I was in direct communication with the nursing staff throughout this time.     Medical Decision Making Today  · Reviewed the flowsheet and previous days notes  · Reviewed and summarized records or history from previous days note or discussions with staff, family  · Parenteral controlled substances - Reviewed/ Adjusted / [place] / Started  · High Risk Drug therapy requiring intensive monitoring for toxicity: eg steroids, pressors, antibiotics  · Review and order of Clinical lab tests  · Review and Order of Radiology tests  · Review and Order of Medicine tests  · Independent visualization of radiologic Images  · Reviewed Ventilator /

## 2021-11-08 NOTE — PROGRESS NOTES
Hospitalist Progress Note    Subjective:   Daily Progress Note: 11/8/2021 2:37 PM    Hospital Course:    73 y.o. With hx of metastatic lung cancer, COPD, liver cirrhosis was brought in by EMS for altered mental status and acute hypoxic respiratory failure. Subjective:  Patient is currently intubated and sedated. Spiked fever yesterday afternoon.       Current Facility-Administered Medications   Medication Dose Route Frequency    glycopyrrolate (ROBINUL) tablet 1 mg  1 mg Oral TID    rifAXIMin (XIFAXAN) tablet 550 mg  550 mg Oral ACB/HS    dexmedeTOMidine (PRECEDEX) 400 mcg in 0.9% sodium chloride (MBP/ADV) 100 mL MBP  0.1-1.5 mcg/kg/hr IntraVENous TITRATE    sodium chloride (NS) flush 5-40 mL  5-40 mL IntraVENous Q8H    sodium chloride (NS) flush 5-40 mL  5-40 mL IntraVENous PRN    acetaminophen (TYLENOL) tablet 650 mg  650 mg Oral Q6H PRN    Or    acetaminophen (TYLENOL) suppository 650 mg  650 mg Rectal Q6H PRN    polyethylene glycol (MIRALAX) packet 17 g  17 g Oral DAILY PRN    ondansetron (ZOFRAN ODT) tablet 4 mg  4 mg Oral Q8H PRN    Or    ondansetron (ZOFRAN) injection 4 mg  4 mg IntraVENous Q6H PRN    lactulose (CHRONULAC) 10 gram/15 mL solution 45 mL  45 mL Per NG tube TID    pantoprazole (PROTONIX) tablet 40 mg  40 mg Oral ACB    NOREPINephrine (LEVOPHED) 8 mg in 0.9% NS 250ml infusion  0.5-16 mcg/min IntraVENous TITRATE    methylPREDNISolone (PF) (SOLU-MEDROL) injection 40 mg  40 mg IntraVENous Q8H    albuterol-ipratropium (DUO-NEB) 2.5 MG-0.5 MG/3 ML  3 mL Nebulization Q6HWA RT    0.9% sodium chloride infusion  75 mL/hr IntraVENous CONTINUOUS    heparin (porcine) injection 5,000 Units  5,000 Units SubCUTAneous Q12H    midazolam in normal saline (VERSED) 1 mg/mL infusion  0-10 mg/hr IntraVENous TITRATE    piperacillin-tazobactam (ZOSYN) 3.375 g in 0.9% sodium chloride (MBP/ADV) 100 mL MBP  3.375 g IntraVENous Q8H        Review of Systems  Unable to obtain due to patient's condition      Objective:     Visit Vitals  BP (!) 148/77 (BP 1 Location: Left upper arm, BP Patient Position: At rest)   Pulse 60   Temp 97.3 °F (36.3 °C)   Resp 17   Ht 6' (1.829 m)   Wt 108.4 kg (238 lb 15.7 oz)   SpO2 97%   BMI 32.41 kg/m²    O2 Flow Rate (L/min): 15 l/min O2 Device: Ventilator    Temp (24hrs), Av.7 °F (37.1 °C), Min:97.3 °F (36.3 °C), Max:101.7 °F (38.7 °C)      701 - 1900  In: 969.1 [I.V.:399.1]  Out: -   1901 - 700  In: 2214.3 [I.V.:1069.3]  Out: 4592 [Urine:1200; Drains:25]    PHYSICAL EXAM:  Constitutional: intubated and sedated  Skin: Extremities and face reveal no rashes. HEENT: Sclerae anicteric. Extra-occular muscles are intact. No oral ulcers. The neck is supple and no masses. Cardiovascular: Regular rate and rhythm. Respiratory:  Clear breath sounds bilaterally w  GI: Abdomen nondistended, soft, and nontender. Normal active bowel sounds. Musculoskeletal: No pitting edema of the lower   Neurological:  Intubated and sedated. Data Review    Recent Results (from the past 24 hour(s))   MAGNESIUM    Collection Time: 21  3:20 AM   Result Value Ref Range    Magnesium 2.2 1.6 - 2.4 mg/dL   CBC WITH AUTOMATED DIFF    Collection Time: 21  3:20 AM   Result Value Ref Range    WBC 5.4 4.1 - 11.1 K/uL    RBC 3.90 (L) 4.10 - 5.70 M/uL    HGB 12.5 12.1 - 17.0 g/dL    HCT 38.3 36.6 - 50.3 %    MCV 98.2 80.0 - 99.0 FL    MCH 32.1 26.0 - 34.0 PG    MCHC 32.6 30.0 - 36.5 g/dL    RDW 17.4 (H) 11.5 - 14.5 %    PLATELET 85 (L) 196 - 400 K/uL    MPV 10.0 8.9 - 12.9 FL    NRBC 0.0 0.0  WBC    ABSOLUTE NRBC 0.00 0.00 - 0.01 K/uL    NEUTROPHILS 89 (H) 32 - 75 %    LYMPHOCYTES 3 (L) 12 - 49 %    MONOCYTES 8 5 - 13 %    EOSINOPHILS 0 0 - 7 %    BASOPHILS 0 0 - 1 %    IMMATURE GRANULOCYTES 0 0 - 0.5 %    ABS. NEUTROPHILS 4.7 1.8 - 8.0 K/UL    ABS. LYMPHOCYTES 0.2 (L) 0.8 - 3.5 K/UL    ABS. MONOCYTES 0.4 0.0 - 1.0 K/UL    ABS.  EOSINOPHILS 0.0 0.0 - 0.4 K/UL ABS. BASOPHILS 0.0 0.0 - 0.1 K/UL    ABS. IMM. GRANS. 0.0 0.00 - 0.04 K/UL    DF AUTOMATED     METABOLIC PANEL, COMPREHENSIVE    Collection Time: 11/08/21  3:20 AM   Result Value Ref Range    Sodium 139 136 - 145 mmol/L    Potassium 5.0 3.5 - 5.1 mmol/L    Chloride 114 (H) 97 - 108 mmol/L    CO2 20 (L) 21 - 32 mmol/L    Anion gap 5 5 - 15 mmol/L    Glucose 275 (H) 65 - 100 mg/dL    BUN 40 (H) 6 - 20 mg/dL    Creatinine 1.91 (H) 0.70 - 1.30 mg/dL    BUN/Creatinine ratio 21 (H) 12 - 20      GFR est AA 43 (L) >60 ml/min/1.73m2    GFR est non-AA 36 (L) >60 ml/min/1.73m2    Calcium 8.8 8.5 - 10.1 mg/dL    Bilirubin, total 0.7 0.2 - 1.0 mg/dL    AST (SGOT) 26 15 - 37 U/L    ALT (SGPT) 24 12 - 78 U/L    Alk. phosphatase 98 45 - 117 U/L    Protein, total 7.4 6.4 - 8.2 g/dL    Albumin 1.9 (L) 3.5 - 5.0 g/dL    Globulin 5.5 (H) 2.0 - 4.0 g/dL    A-G Ratio 0.3 (L) 1.1 - 2.2     BLOOD GAS, ARTERIAL    Collection Time: 11/08/21  3:55 AM   Result Value Ref Range    pH 7.34 (L) 7.35 - 7.45      PCO2 37 35 - 45 mmHg    PO2 85 75 - 100 mmHg    O2 SAT 97 >95 %    BICARBONATE 20 (L) 22 - 26 mmol/L    BASE DEFICIT 5.2 (H) 0 - 2 mmol/L    O2 METHOD VENT      FIO2 50 %    MODE Assist Control/Volume Control      Tidal volume 500      SET RATE 14      EPAP/CPAP/PEEP 6      Sample source Arterial      SITE Right Radial      ALVAREZ'S TEST PASS           Assessment / Plan:    73 y.o. With hx of metastatic lung cancer, COPD, liver cirrhosis was brought in by EMS for altered mental status and acute hypoxic respiratory failure.      Acute hypoxemic respiratory failure:  Likely s/s Acute exacerbation of COPD and metastatic lung cancer  Appreciate pulmonary consult  Continue mechanical ventilation- wean as tolerated  Continue solumedrol     Septic shock:  Likely aspiration pneumonia vs peritonitis.   Continue antibiotics  off levophed  Paracentesis     Acute hepatic encephalopathy  Continue lactulose     CKD: avoid nephrotoxic medications           Code Status: full  Surrogate Decision Maker:Patient has biological son, girlfriend and step daughter. I had goals of care discussion with all three of them, and came to conclusion that if he were to have cardiac arrest, give it a try for 5-10 minutes, and then stop. DVT Prophylaxis: heparin sc  GI Prophylaxis: pantoprazole       Critical care time spent 35 minutes involving direct patient care as well as reviewing patient's labs and coordination of care with nursing staff     Care Plan discussed with: Patient/Family/RN/Case Management        Total time spent with patient: 35 minutes.

## 2021-11-08 NOTE — PROGRESS NOTES
Progress Note    Patient: Fortino Roberts MRN: [id number]  SSN: [id number]    YOB: 1955  Age: 73 y.o. Sex: male      Admit Date: 11/5/2021    LOS: 3 days     Subjective:   GI is following for concerns for peritonitis    Patient seen in ICU, remains intubated and sedated. Non-responsive. OGT win ongoing tube feeding, dark green liquid stool. Abdomen is distended. For paracentesis pending consent. 11/5 GI consult note:   History obtained from chart documentation. Patient was admitted to the hospital after finding patient unresponsive with low oxygen saturation in the 60's. 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 He was recently admitted on 10/23/21 for shortness of breath and discharged on 10/26. According to chart, SNF was recommended upon discharge but family declined. He was found unresponsive today. He had a similar incident of unresponsiveness recently. He is on lactulose and Xifaxan at home. Questionable compliance.      -Labs in ED showed low platelet 866, creatinine 1.51, normal LFTs, BNP 1,245 (405 on 10/23), ammonia 104. -CT head with no acute findings. -CT spine with no acute bony findings. -XR chest Similar hypoinflation. Improved airspace disease in the right lower lobe. Platelike atelectasis or scarring in the left lower lobe. No pleural effusion or pneumothorax.  -Patient is intubated and sedated, unresponsive. OGT connected to low intermittent suction, gastric aspirate clear yellow.     IR is planning to do an US guided paracentesis once consent is available.        Objective:     Vitals:    11/08/21 1326 11/08/21 1400 11/08/21 1500 11/08/21 1552   BP:  (!) 147/77 (!) 143/73    Pulse:  (!) 59 (!) 58 60   Resp:  19 18 18   Temp:       SpO2: 97% 98% 99% 95%   Weight:       Height:            Intake and Output:  Current Shift: 11/08 0701 - 11/08 1900  In: 969.1 [I.V.:399.1]  Out: 425 [Urine:425]  Last three shifts: 11/06 1901 - 11/08 0700  In: 2214.3 [I.V.:1069.3]  Out: 1225 [Urine:1200; Drains:25]    Physical Exam:   Skin: Scabs upper and lower extremities. LUE wound with dressing. HEENT: ET/OGT tube in place. Cardiovascular: Regular rate and rhythm. Respiratory: On mechanical ventilation  GI:  Abdomen distended due to ascites  Rectal: rectal tube with dark green stool. Musculoskeletal: Non-responsive, sedated  Neurological:  Intubated, sedated. Psychiatric:  Intubated, sedated. Lymphatic:  No visible adenopathy      Lab/Data Review:  Recent Results (from the past 24 hour(s))   MAGNESIUM    Collection Time: 11/08/21  3:20 AM   Result Value Ref Range    Magnesium 2.2 1.6 - 2.4 mg/dL   CBC WITH AUTOMATED DIFF    Collection Time: 11/08/21  3:20 AM   Result Value Ref Range    WBC 5.4 4.1 - 11.1 K/uL    RBC 3.90 (L) 4.10 - 5.70 M/uL    HGB 12.5 12.1 - 17.0 g/dL    HCT 38.3 36.6 - 50.3 %    MCV 98.2 80.0 - 99.0 FL    MCH 32.1 26.0 - 34.0 PG    MCHC 32.6 30.0 - 36.5 g/dL    RDW 17.4 (H) 11.5 - 14.5 %    PLATELET 85 (L) 926 - 400 K/uL    MPV 10.0 8.9 - 12.9 FL    NRBC 0.0 0.0  WBC    ABSOLUTE NRBC 0.00 0.00 - 0.01 K/uL    NEUTROPHILS 89 (H) 32 - 75 %    LYMPHOCYTES 3 (L) 12 - 49 %    MONOCYTES 8 5 - 13 %    EOSINOPHILS 0 0 - 7 %    BASOPHILS 0 0 - 1 %    IMMATURE GRANULOCYTES 0 0 - 0.5 %    ABS. NEUTROPHILS 4.7 1.8 - 8.0 K/UL    ABS. LYMPHOCYTES 0.2 (L) 0.8 - 3.5 K/UL    ABS. MONOCYTES 0.4 0.0 - 1.0 K/UL    ABS. EOSINOPHILS 0.0 0.0 - 0.4 K/UL    ABS. BASOPHILS 0.0 0.0 - 0.1 K/UL    ABS. IMM.  GRANS. 0.0 0.00 - 0.04 K/UL    DF AUTOMATED     METABOLIC PANEL, COMPREHENSIVE    Collection Time: 11/08/21  3:20 AM   Result Value Ref Range    Sodium 139 136 - 145 mmol/L    Potassium 5.0 3.5 - 5.1 mmol/L    Chloride 114 (H) 97 - 108 mmol/L    CO2 20 (L) 21 - 32 mmol/L    Anion gap 5 5 - 15 mmol/L    Glucose 275 (H) 65 - 100 mg/dL    BUN 40 (H) 6 - 20 mg/dL    Creatinine 1.91 (H) 0.70 - 1.30 mg/dL    BUN/Creatinine ratio 21 (H) 12 - 20      GFR est AA 43 (L) >60 ml/min/1.73m2    GFR est non-AA 36 (L) >60 ml/min/1.73m2    Calcium 8.8 8.5 - 10.1 mg/dL    Bilirubin, total 0.7 0.2 - 1.0 mg/dL    AST (SGOT) 26 15 - 37 U/L    ALT (SGPT) 24 12 - 78 U/L    Alk. phosphatase 98 45 - 117 U/L    Protein, total 7.4 6.4 - 8.2 g/dL    Albumin 1.9 (L) 3.5 - 5.0 g/dL    Globulin 5.5 (H) 2.0 - 4.0 g/dL    A-G Ratio 0.3 (L) 1.1 - 2.2     BLOOD GAS, ARTERIAL    Collection Time: 11/08/21  3:55 AM   Result Value Ref Range    pH 7.34 (L) 7.35 - 7.45      PCO2 37 35 - 45 mmHg    PO2 85 75 - 100 mmHg    O2 SAT 97 >95 %    BICARBONATE 20 (L) 22 - 26 mmol/L    BASE DEFICIT 5.2 (H) 0 - 2 mmol/L    O2 METHOD VENT      FIO2 50 %    MODE Assist Control/Volume Control      Tidal volume 500      SET RATE 14      EPAP/CPAP/PEEP 6      Sample source Arterial      SITE Right Radial      ALVAREZ'S TEST PASS                XR CHEST PORT   Final Result   Stable enlargement of cardiopericardial silhouette with right   ventricular AICD lead. Central vascular congestion. Bilateral basilar opacities,   left greater than right, consistent with atelectasis and possible mild edema. No   pneumothorax. Stable appearance of ET tube. A nasogastric tube courses below the   diaphragm; its tip is not identified. There is obscuration of a portion of the   left hemithorax by the generator device. XR CHEST PORT   Final Result   Cardiopericardial enlargement with AICD. Central vascular   congestion. Improved basilar atelectasis. No pneumothorax or other acute change. There is obscuration of a portion of the left hemithorax by the generator   device. Stable appearance of endotracheal tube. A nasogastric tube courses below   the diaphragm; its tip is not identified. XR CHEST PORT   Final Result   1. ETT terminating approximately 5 cm above the osiris. 2.  Persistent bilateral airspace disease and/or atelectasis. CT HEAD WO CONT   Final Result   1.  No acute intracranial findings. 2. Atrophy and small vessel ischemic disease. Pattern unchanged compared to   previous. CT SPINE CERV WO CONT   Final Result   1. No acute bony findings. XR CHEST SNGL V   Final Result   Addendum 1 of 1   Addendum: Addendum 10:21 AM 11/5/2021. Additional chest imaging in disc folder at 9:26 AM is after ET tube    advancement,   tip now 5.9 cm above osiris. Called report to Will at 1021am 11/5/2021. ET    tube   has not been advanced since the prior phone call. Final   FINDINGS: IMPRESSION: Single frontal view chest. ET tube above thoracic inlet,   distal tip approximately 11 cm above the osiris. Called report to emergency room   Will at 10:09 AM 11/5/2021. Enteric tube side-port passes below the left   hemidiaphragm. Left chest wall cardiac defibrillator. Unchanged cardiomediastinal silhouette. Similar hypoinflation. Improved airspace disease in the right lower lobe. Platelike atelectasis or scarring in the left lower lobe. No pleural effusion or   pneumothorax. No free air under the diaphragm. Right sixth rib deformity, chronic. IR PARACENTESIS ABD INIT    (Results Pending)   XR CHEST PORT    (Results Pending)       Assessment:     Active Problems:    Acute hypoxemic respiratory failure ([address] (11/5/2021)        Plan:   1. Ascites/possible peritonitis      -history of alcohol abuse      -Plan for paracentesis by IR once with consent. I called patient's son, Alessia Caceres and left a message. Send specimen to lab for WBC, culture, albumin.      -On IV Zosyn. Normal WBC. -repeat CMP in am.  2.acute hypoxic respiratory failure. -currently intubated. History of lung cancer squamous cell stage IV. -Pulmonary following  3. Hepatic encephalopathy      -continue lactulose and Xifaxan  4. CKD III      -creatinine 1.91      -avoid nephrotoxic meds. 5. Septic shock      -Blood culture pending.       -continue antibiotic  6.  Thrombocytopenia -  Most likely r/t liver cirrhosis      -Platelet 85      -CMP in am    Patient discussed with Dr Raman Coker and agrees to above impression and plan. Thank you for allowing me to participate in this patients care    Signed By: Rudy Hanson NP     November 8, 2021

## 2021-11-08 NOTE — PROGRESS NOTES
Writer reviewed clinical record. CM TEAM will continue to monitor patient for needs at discharge.      Nancy Hoffman

## 2021-11-08 NOTE — PROGRESS NOTES
IMPRESSION:   1. Acute hypoxic respiratory failure  2. 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 COPD  4. Hypovolemic intravascular depletion with shock improving  5. History of EtOH and alcohol abuse  6. Hepatic encephalopathy  7. Cirrhosis of liver  8. Ascites  9. Chronic kidney disease stage III  10. History of AICD placement  11. Hypothermia much improved  12. Additional workup outlined below  13. Pt is critically ill. Time spent with pt and staff actively rendering care, managing pt and coordinating care as stated below; 30 minutes, exclusive of any procedures      RECOMMENDATIONS/PLAN:   1. ICU monitoring  1. Ventilator for mechanical life support and prevent respiratory arrest with protective lung strategies ABG acceptable but lung compliance is still reduced  2. Patient on assist control mode 50% FiO2 will decrease FiO2  3. His lung cancer was managed at [facility] with stage IV squamous cell lung cancer metastatic disease to both lung  4. IV Solu-Medrol and nebulizer treatment  5. On Lactulose ammonia level 31   6. CVP monitoring chest x-ray shows right lower lobe infiltrate atelectasis chronic scarring  7. Patient off Levophed  8. Ascites possible need paracentesis GI evaluation  9. CXR bibasilar atelectasis  10. Will be available to assist in medical management while in the CCU pending disposition     [x] High complexity decision making was performed  [x] See my orders for details    PMH:  has a past medical history of AICD (automatic cardioverter/defibrillator) present, CAD (coronary artery disease), Cirrhosis of liver ([address] Gallstones, Hypertension, Nodule of lower lobe of right lung, and Renal stones. PSH:   has a past surgical history that includes hx pacemaker placement and ir paracentesis abd w image (12/30/2020). FHX: family history is not on file. SHX:  reports that he has been smoking. He has been smoking about 1.00 pack per day.  He does not have any smokeless tobacco history on file. He reports current alcohol use. He reports previous drug use.     ALL: No Known Allergies     MEDS:   [x] Reviewed - As Below   [] Not reviewed    Current Facility-Administered Medications   Medication    glycopyrrolate (ROBINUL) tablet 1 mg    rifAXIMin (XIFAXAN) tablet 550 mg    dexmedeTOMidine (PRECEDEX) 400 mcg in 0.9% sodium chloride (MBP/ADV) 100 mL MBP    sodium chloride (NS) flush 5-40 mL    sodium chloride (NS) flush 5-40 mL    acetaminophen (TYLENOL) tablet 650 mg    Or    acetaminophen (TYLENOL) suppository 650 mg    polyethylene glycol (MIRALAX) packet 17 g    ondansetron (ZOFRAN ODT) tablet 4 mg    Or    ondansetron (ZOFRAN) injection 4 mg    lactulose (CHRONULAC) 10 gram/15 mL solution 45 mL    pantoprazole (PROTONIX) tablet 40 mg    NOREPINephrine (LEVOPHED) 8 mg in 0.9% NS 250ml infusion    methylPREDNISolone (PF) (SOLU-MEDROL) injection 40 mg    albuterol-ipratropium (DUO-NEB) 2.5 MG-0.5 MG/3 ML    0.9% sodium chloride infusion    heparin (porcine) injection 5,000 Units    midazolam in normal saline (VERSED) 1 mg/mL infusion    piperacillin-tazobactam (ZOSYN) 3.375 g in 0.9% sodium chloride (MBP/ADV) 100 mL MBP      MAR reviewed and pertinent medications noted or modified as needed   Current Facility-Administered Medications   Medication    glycopyrrolate (ROBINUL) tablet 1 mg    rifAXIMin (XIFAXAN) tablet 550 mg    dexmedeTOMidine (PRECEDEX) 400 mcg in 0.9% sodium chloride (MBP/ADV) 100 mL MBP    sodium chloride (NS) flush 5-40 mL    sodium chloride (NS) flush 5-40 mL    acetaminophen (TYLENOL) tablet 650 mg    Or    acetaminophen (TYLENOL) suppository 650 mg    polyethylene glycol (MIRALAX) packet 17 g    ondansetron (ZOFRAN ODT) tablet 4 mg    Or    ondansetron (ZOFRAN) injection 4 mg    lactulose (CHRONULAC) 10 gram/15 mL solution 45 mL    pantoprazole (PROTONIX) tablet 40 mg    NOREPINephrine (LEVOPHED) 8 mg in 0.9% NS 250ml infusion    methylPREDNISolone (PF) (SOLU-MEDROL) injection 40 mg    albuterol-ipratropium (DUO-NEB) 2.5 MG-0.5 MG/3 ML    0.9% sodium chloride infusion    heparin (porcine) injection 5,000 Units    midazolam in normal saline (VERSED) 1 mg/mL infusion    piperacillin-tazobactam (ZOSYN) 3.375 g in 0.9% sodium chloride (MBP/ADV) 100 mL MBP      PMH:  has a past medical history of AICD (automatic cardioverter/defibrillator) present, CAD (coronary artery disease), Cirrhosis of liver ([address] Gallstones, Hypertension, Nodule of lower lobe of right lung, and Renal stones. PSH:   has a past surgical history that includes hx pacemaker placement and ir paracentesis abd w image (2020). FHX: family history is not on file. SHX:  reports that he has been smoking. He has been smoking about 1.00 pack per day. He does not have any smokeless tobacco history on file. He reports current alcohol use. He reports previous drug use. ROS:  Unable to obtain sedated on ventilator    Hemodynamics:    CO:    CI:    CVP: CVP (mmHg): 483 mmHg (21 1800)  SVR:   PAP Systolic:    PAP Diastolic:    PVR:    RW34:        Ventilator Settings:      Mode Rate TV Press PEEP FiO2 PIP Min.  Vent   Assist control, Volume control    500 ml    6 cm H20 50 %  33 cm H2O  8.67 l/min        Vital Signs: Telemetry:    normal sinus rhythm Intake/Output:   Visit Vitals  BP (!) 145/76 (BP 1 Location: Left upper arm, BP Patient Position: At rest)   Pulse (!) 59   Temp 97.5 °F (36.4 °C)   Resp 17   Ht 6' (1.829 m)   Wt 108.4 kg (238 lb 15.7 oz)   SpO2 94%   BMI 32.41 kg/m²       Temp (24hrs), Av.4 °F (37.4 °C), Min:97.5 °F (36.4 °C), Max:101.7 °F (38.7 °C)        O2 Device: Ventilator O2 Flow Rate (L/min): 15 l/min       Wt Readings from Last 4 Encounters:   21 108.4 kg (238 lb 15.7 oz)   10/23/21 104.3 kg (230 lb)   10/19/21 104.3 kg (230 lb)   21 105.2 kg (231 lb 14.8 oz)          Intake/Output Summary (Last 24 hours) at 11/8/2021 0844  Last data filed at 11/8/2021 0438  Gross per 24 hour   Intake 1963.62 ml   Output 1050 ml   Net 913.62 ml       Last shift:      No intake/output data recorded. Last 3 shifts: 11/06 1901 - 11/08 0700  In: 2214.3 [I.V.:1069.3]  Out: 1225 [Urine:1200; Drains:25]       Physical Exam:     General:  intubated on vent  HEENT: NCAT, poor dentition, lips and mucosa dry  Eyes: anicteric; conjunctiva clear  Neck: no nodes, no cuff leak, trach midline; no accessory MM use. Chest: no deformity,   Cardiac: R regular; no murmur;   Lungs: distant breath sounds; no wheezes  Abd: Distended positive for ascites from  Ext: no edema; no joint swelling;  No clubbing  : NO tellez, clear urine  Neuro: sedated;  Psych-  unable to assess  Skin: warm, dry, no cyanosis;   Pulses: 1-2+ Bilateral pedal, radial  Capillary: brisk; pale      DATA:    MAR reviewed and pertinent medications noted or modified as needed  MEDS:   Current Facility-Administered Medications   Medication    glycopyrrolate (ROBINUL) tablet 1 mg    rifAXIMin (XIFAXAN) tablet 550 mg    dexmedeTOMidine (PRECEDEX) 400 mcg in 0.9% sodium chloride (MBP/ADV) 100 mL MBP    sodium chloride (NS) flush 5-40 mL    sodium chloride (NS) flush 5-40 mL    acetaminophen (TYLENOL) tablet 650 mg    Or    acetaminophen (TYLENOL) suppository 650 mg    polyethylene glycol (MIRALAX) packet 17 g    ondansetron (ZOFRAN ODT) tablet 4 mg    Or    ondansetron (ZOFRAN) injection 4 mg    lactulose (CHRONULAC) 10 gram/15 mL solution 45 mL    pantoprazole (PROTONIX) tablet 40 mg    NOREPINephrine (LEVOPHED) 8 mg in 0.9% NS 250ml infusion    methylPREDNISolone (PF) (SOLU-MEDROL) injection 40 mg    albuterol-ipratropium (DUO-NEB) 2.5 MG-0.5 MG/3 ML    0.9% sodium chloride infusion    heparin (porcine) injection 5,000 Units    midazolam in normal saline (VERSED) 1 mg/mL infusion    piperacillin-tazobactam (ZOSYN) 3.375 g in 0.9% sodium chloride (MBP/ADV) 100 mL MBP Labs:    Recent Labs     11/08/21  0320 11/07/21 0218 11/06/21  0253   WBC 5.4 5.8 5.0   HGB 12.5 11.8* 12.2   PLT 85* 88* 120*     Recent Labs     11/08/21  0320 11/07/21 0218 11/06/21  0253    139 135*   K 5.0 4.9 5.0   * 113* 106   CO2 20* 21 22   * 174* 177*   BUN 40* 32* 28*   CREA 1.91* 1.58* 1.81*   CA 8.8 8.8 8.4*   MG 2.2  --   --    ALB 1.9*  --  2.1*   ALT 24  --  25     Recent Labs     11/08/21  0355 11/07/21  0335 11/06/21  0450   PH 7.34* 7.39 7.39   PCO2 37 33* 33*   PO2 85 82 110*   HCO3 20* 21* 22   FIO2 50 50.0 50     No results for input(s): CPK, CKNDX, TROIQ in the last 72 hours. No lab exists for component: CPKMB  No results found for: BNPP, BNP   Lab Results   Component Value Date/Time    Culture result: No growth after 22 hours 11/05/2021 09:00 AM    Culture result: No growth 6 days 10/23/2021 01:30 PM    Culture result: No growth 6 days 12/27/2020 08:15 PM     No results found for: TSH, TSHEXT, TSHEXT     Imaging:    Results from Hospital Encounter encounter on 11/05/21    XR CHEST PORT    Narrative  Portable chest, 0240 hours, compared with 11/7/2021. Impression  Stable enlargement of cardiopericardial silhouette with right  ventricular AICD lead. Central vascular congestion. Bilateral basilar opacities,  left greater than right, consistent with atelectasis and possible mild edema. No  pneumothorax. Stable appearance of ET tube. A nasogastric tube courses below the  diaphragm; its tip is not identified. There is obscuration of a portion of the  left hemithorax by the generator device. Results from [place] encounter on 11/05/21    CT SPINE CERV WO CONT    Narrative  Fall. No comparison. Technique: Axial imaging cervical spine with sagittal and coronal reformatting  and 3-D volume rendering performed by the technologist at the console.   Dose reduction: All CT scans at this facility are performed using dose reduction  optimization techniques as appropriate to a performed exam including the  following: Automated exposure control, adjustments of the mA and/or kV according  to patient's size, or use of iterative reconstruction technique    Findings: No listhesis. T1 vertebral body mild superior endplate compression  unchanged compared to chest CT 10/20/2021. No acute fracture, or jumped or  perched facet. Multilevel disc narrowing and small osteophytosis from  degenerative disease. Lateral masses symmetric bilaterally. Odontoid intact. No  prevertebral soft tissue swelling. Lung apices clear. Support tubing present. Impression  1. No acute bony findings. This care involved high complexity decision making which includes independently reviewing the patient's past medical records, current laboratory results, medication profiles that were immediately available to me and actual Xray images at the bedside in order to assess, support vital system function, and to treat this degree of vital organ system failure, and to prevent further life threatening deterioration of the patients condition. I was in direct communication with the nursing staff throughout this time.     Medical Decision Making Today  · Reviewed the flowsheet and previous days notes  · Reviewed and summarized records or history from previous days note or discussions with staff, family  · Parenteral controlled substances - Reviewed/ Adjusted / [place] / Started  · High Risk Drug therapy requiring intensive monitoring for toxicity: eg steroids, pressors, antibiotics  · Review and order of Clinical lab tests  · Review and Order of Radiology tests  · Review and Order of Medicine tests  · Independent visualization of radiologic Images  · Reviewed Ventilator

## 2021-11-09 NOTE — PROGRESS NOTES
Hospitalist Progress Note    Subjective:   Daily Progress Note: 11/9/2021 2:37 PM    Hospital Course:    73 y.o. With hx of metastatic lung cancer, COPD, liver cirrhosis was brought in by EMS for altered mental status and acute hypoxic respiratory failure. Subjective:  Remains intubated and sedated. Significant other of ~ 43 years at bedside. Not yet able to contact son for consent for paracentesis and pts S. O. will contact son to call us for consent today. Pulmonology input appreciated. Pt not tolerating weaning at this point. It's felt that increased ascites/abd distention may be contributing as well. Paracentesis delayed as have not been able to obtain consent from son.        Current Facility-Administered Medications   Medication Dose Route Frequency    methylPREDNISolone (PF) (SOLU-MEDROL) injection 20 mg  20 mg IntraVENous Q12H    furosemide (LASIX) injection 40 mg  40 mg IntraVENous DAILY    glycopyrrolate (ROBINUL) tablet 1 mg  1 mg Oral TID    rifAXIMin (XIFAXAN) tablet 550 mg  550 mg Oral ACB/HS    dexmedeTOMidine (PRECEDEX) 400 mcg in 0.9% sodium chloride (MBP/ADV) 100 mL MBP  0.1-1.5 mcg/kg/hr IntraVENous TITRATE    sodium chloride (NS) flush 5-40 mL  5-40 mL IntraVENous Q8H    sodium chloride (NS) flush 5-40 mL  5-40 mL IntraVENous PRN    acetaminophen (TYLENOL) tablet 650 mg  650 mg Oral Q6H PRN    Or    acetaminophen (TYLENOL) suppository 650 mg  650 mg Rectal Q6H PRN    polyethylene glycol (MIRALAX) packet 17 g  17 g Oral DAILY PRN    ondansetron (ZOFRAN ODT) tablet 4 mg  4 mg Oral Q8H PRN    Or    ondansetron (ZOFRAN) injection 4 mg  4 mg IntraVENous Q6H PRN    lactulose (CHRONULAC) 10 gram/15 mL solution 45 mL  45 mL Per NG tube TID    pantoprazole (PROTONIX) tablet 40 mg  40 mg Oral ACB    NOREPINephrine (LEVOPHED) 8 mg in 0.9% NS 250ml infusion  0.5-16 mcg/min IntraVENous TITRATE    albuterol-ipratropium (DUO-NEB) 2.5 MG-0.5 MG/3 ML  3 mL Nebulization Q6HWA RT    0.9% sodium chloride infusion  75 mL/hr IntraVENous CONTINUOUS    heparin (porcine) injection 5,000 Units  5,000 Units SubCUTAneous Q12H    midazolam in normal saline (VERSED) 1 mg/mL infusion  0-10 mg/hr IntraVENous TITRATE    piperacillin-tazobactam (ZOSYN) 3.375 g in 0.9% sodium chloride (MBP/ADV) 100 mL MBP  3.375 g IntraVENous Q8H        Review of Systems  Unable to obtain due to patient's condition      Objective:     Visit Vitals  /68   Pulse 62   Temp 97.2 °F (36.2 °C)   Resp 19   Ht 6' (1.829 m)   Wt 114.4 kg (252 lb 3.3 oz)   SpO2 92%   BMI 34.21 kg/m²    O2 Flow Rate (L/min): 15 l/min O2 Device: Ventilator    Temp (24hrs), Av.8 °F (36.6 °C), Min:97 °F (36.1 °C), Max:98.2 °F (36.8 °C)      701 - 1900  In: 175   Out: -   1901 - 700  In: 5605.1 [I.V.:2512.1]  Out: 4787 [Urine:1650; Drains:1000]    PHYSICAL EXAM:  Constitutional: intubated and sedated, calm, responsive to painful stimuli. Skin: Extremities and face reveal no rashes. HEENT: Sclerae anicteric. Extra-occular muscles are intact. No oral ulcers. The neck is supple and no masses. Cardiovascular: Regular rate and rhythm. Respiratory:  Diminished bs diffusely  GI: Abdomen firm, distended  Musculoskeletal: No pitting edema of the lower   Neurological:  Intubated and sedated.   Data Review    Recent Results (from the past 24 hour(s))   BLOOD GAS, ARTERIAL    Collection Time: 21  4:00 AM   Result Value Ref Range    pH 7.34 (L) 7.35 - 7.45      PCO2 35 35 - 45 mmHg    PO2 67 (L) 75 - 100 mmHg    O2 SAT 92 (L) >95 %    BICARBONATE 19 (L) 22 - 26 mmol/L    BASE DEFICIT 5.9 (H) 0 - 2 mmol/L    FIO2 40 %   CBC WITH AUTOMATED DIFF    Collection Time: 21  4:15 AM   Result Value Ref Range    WBC 4.8 4.1 - 11.1 K/uL    RBC 3.91 (L) 4.10 - 5.70 M/uL    HGB 12.3 12.1 - 17.0 g/dL    HCT 39.3 36.6 - 50.3 %    .5 (H) 80.0 - 99.0 FL    MCH 31.5 26.0 - 34.0 PG    MCHC 31.3 30.0 - 36.5 g/dL    RDW 17.6 (H) 11.5 - 14.5 %    PLATELET 77 (L) 154 - 400 K/uL    MPV 11.0 8.9 - 12.9 FL    NRBC 0.0 0.0  WBC    ABSOLUTE NRBC 0.00 0.00 - 0.01 K/uL    NEUTROPHILS 90 (H) 32 - 75 %    LYMPHOCYTES 2 (L) 12 - 49 %    MONOCYTES 7 5 - 13 %    EOSINOPHILS 0 0 - 7 %    BASOPHILS 0 0 - 1 %    IMMATURE GRANULOCYTES 1 (H) 0 - 0.5 %    ABS. NEUTROPHILS 4.3 1.8 - 8.0 K/UL    ABS. LYMPHOCYTES 0.1 (L) 0.8 - 3.5 K/UL    ABS. MONOCYTES 0.3 0.0 - 1.0 K/UL    ABS. EOSINOPHILS 0.0 0.0 - 0.4 K/UL    ABS. BASOPHILS 0.0 0.0 - 0.1 K/UL    ABS. IMM. GRANS. 0.0 0.00 - 0.04 K/UL    DF AUTOMATED     METABOLIC PANEL, BASIC    Collection Time: 11/09/21  4:15 AM   Result Value Ref Range    Sodium 142 136 - 145 mmol/L    Potassium 5.1 3.5 - 5.1 mmol/L    Chloride 117 (H) 97 - 108 mmol/L    CO2 20 (L) 21 - 32 mmol/L    Anion gap 5 5 - 15 mmol/L    Glucose 295 (H) 65 - 100 mg/dL    BUN 38 (H) 6 - 20 mg/dL    Creatinine 1.62 (H) 0.70 - 1.30 mg/dL    BUN/Creatinine ratio 23 (H) 12 - 20      GFR est AA 52 (L) >60 ml/min/1.73m2    GFR est non-AA 43 (L) >60 ml/min/1.73m2    Calcium 8.6 8.5 - 10.1 mg/dL         Assessment / Plan:    73 y.o. With hx of metastatic lung cancer, COPD, liver cirrhosis was brought in by EMS for altered mental status and acute hypoxic respiratory failure.      Acute hypoxemic respiratory failure:  Likely s/s Acute exacerbation of COPD and metastatic lung cancer  Appreciate pulmonary consult  Continue mechanical ventilation- wean as tolerated  Continue solumedrol     Septic shock:  Likely aspiration pneumonia vs peritonitis.   Continue antibiotics  off levophed  Paracentesis pending consent from son we've not been able to contact- patients S.O. to talk with him today to have him call for consent.      Acute hepatic encephalopathy  Continue lactulose, rifaximin.     CKD: avoid nephrotoxic medications    Hx Squamous cell lung ca stage 4  No ctx yet, prognosis expected poor in the setting of stage 4 ca alone.            Code Status: full  Surrogate Decision Maker:Patient has biological son, girlfriend and step daughter. Dr Eric Bee had goals of care discussion with all three of them, and came to conclusion that if he were to have cardiac arrest, give it a try for 5-10 minutes, and then stop. Nonetheless, remains full code at this point. Hopefully can get consent and paracentesis today and see how that may help in the weaning process. DVT Prophylaxis: heparin sc  GI Prophylaxis: pantoprazole       Critical care time spent 35 minutes involving direct patient care as well as reviewing patient's labs and coordination of care with nursing staff     Care Plan discussed with: Patient/Family/RN/Case Management        Total time spent with patient: 35 minutes.

## 2021-11-09 NOTE — PROGRESS NOTES
IMPRESSION:   1. Acute hypoxic respiratory failure  2. 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 COPD  4. Hypovolemic intravascular depletion with shock improving  5. History of EtOH and alcohol abuse  6. Hepatic encephalopathy  7. Cirrhosis of liver  8. Ascites  9. Chronic kidney disease stage III  10. History of AICD placement  11. Hypothermia resolved  12. Additional workup outlined below  13. Pt is critically ill. Time spent with pt and staff actively rendering care, managing pt and coordinating care as stated below; 30 minutes, exclusive of any procedures      RECOMMENDATIONS/PLAN:   1. ICU monitoring  1. Ventilator for mechanical life support and prevent respiratory arrest with protective lung strategies ABG acceptable but lung compliance is still reduced  2. Patient on assist control mode 50% FiO2 will decrease FiO2  3. His lung cancer was managed at [address] [place] [address] with stage IV squamous cell lung cancer metastatic disease to both lung chest x-ray shows bibasilar atelectasis  4. Small pleural effusion we will give Lasix  5. Awaiting for paracentesis  6. IV Solu-Medrol and nebulizer treatment  7. On Lactulose ammonia level 31   8. CVP monitoring chest x-ray shows right lower lobe infiltrate atelectasis chronic scarring  9. Patient off Levophed  10. Ascites possible need paracentesis GI evaluation  11. CXR bibasilar atelectasis  12. Will be available to assist in medical management while in the CCU pending disposition     [x] High complexity decision making was performed  [x] See my orders for details    PMH:  has a past medical history of AICD (automatic cardioverter/defibrillator) present, CAD (coronary artery disease), Cirrhosis of liver ([address] Gallstones, Hypertension, Nodule of lower lobe of right lung, and Renal stones. PSH:   has a past surgical history that includes hx pacemaker placement and ir paracentesis abd w image (12/30/2020).      FHX: family history is not on file. SHX:  reports that he has been smoking. He has been smoking about 1.00 pack per day. He does not have any smokeless tobacco history on file. He reports current alcohol use. He reports previous drug use.     ALL: No Known Allergies     MEDS:   [x] Reviewed - As Below   [] Not reviewed    Current Facility-Administered Medications   Medication    glycopyrrolate (ROBINUL) tablet 1 mg    rifAXIMin (XIFAXAN) tablet 550 mg    dexmedeTOMidine (PRECEDEX) 400 mcg in 0.9% sodium chloride (MBP/ADV) 100 mL MBP    sodium chloride (NS) flush 5-40 mL    sodium chloride (NS) flush 5-40 mL    acetaminophen (TYLENOL) tablet 650 mg    Or    acetaminophen (TYLENOL) suppository 650 mg    polyethylene glycol (MIRALAX) packet 17 g    ondansetron (ZOFRAN ODT) tablet 4 mg    Or    ondansetron (ZOFRAN) injection 4 mg    lactulose (CHRONULAC) 10 gram/15 mL solution 45 mL    pantoprazole (PROTONIX) tablet 40 mg    NOREPINephrine (LEVOPHED) 8 mg in 0.9% NS 250ml infusion    methylPREDNISolone (PF) (SOLU-MEDROL) injection 40 mg    albuterol-ipratropium (DUO-NEB) 2.5 MG-0.5 MG/3 ML    0.9% sodium chloride infusion    heparin (porcine) injection 5,000 Units    midazolam in normal saline (VERSED) 1 mg/mL infusion    piperacillin-tazobactam (ZOSYN) 3.375 g in 0.9% sodium chloride (MBP/ADV) 100 mL MBP      MAR reviewed and pertinent medications noted or modified as needed   Current Facility-Administered Medications   Medication    glycopyrrolate (ROBINUL) tablet 1 mg    rifAXIMin (XIFAXAN) tablet 550 mg    dexmedeTOMidine (PRECEDEX) 400 mcg in 0.9% sodium chloride (MBP/ADV) 100 mL MBP    sodium chloride (NS) flush 5-40 mL    sodium chloride (NS) flush 5-40 mL    acetaminophen (TYLENOL) tablet 650 mg    Or    acetaminophen (TYLENOL) suppository 650 mg    polyethylene glycol (MIRALAX) packet 17 g    ondansetron (ZOFRAN ODT) tablet 4 mg    Or    ondansetron (ZOFRAN) injection 4 mg    lactulose (CHRONULAC) 10 gram/15 mL solution 45 mL    pantoprazole (PROTONIX) tablet 40 mg    NOREPINephrine (LEVOPHED) 8 mg in 0.9% NS 250ml infusion    methylPREDNISolone (PF) (SOLU-MEDROL) injection 40 mg    albuterol-ipratropium (DUO-NEB) 2.5 MG-0.5 MG/3 ML    0.9% sodium chloride infusion    heparin (porcine) injection 5,000 Units    midazolam in normal saline (VERSED) 1 mg/mL infusion    piperacillin-tazobactam (ZOSYN) 3.375 g in 0.9% sodium chloride (MBP/ADV) 100 mL MBP      PMH:  has a past medical history of AICD (automatic cardioverter/defibrillator) present, CAD (coronary artery disease), Cirrhosis of liver ([address] Gallstones, Hypertension, Nodule of lower lobe of right lung, and Renal stones. PSH:   has a past surgical history that includes hx pacemaker placement and ir paracentesis abd w image (2020). FHX: family history is not on file. SHX:  reports that he has been smoking. He has been smoking about 1.00 pack per day. He does not have any smokeless tobacco history on file. He reports current alcohol use. He reports previous drug use. ROS:  Unable to obtain sedated on ventilator    Hemodynamics:    CO:    CI:    CVP: CVP (mmHg): 483 mmHg (21 1800)  SVR:   PAP Systolic:    PAP Diastolic:    PVR:    TO28:        Ventilator Settings:      Mode Rate TV Press PEEP FiO2 PIP Min.  Vent   Assist control    500 ml    6 cm H20 30 % (decrease fio2 to 30%)  26 cm H2O  10 l/min        Vital Signs: Telemetry:    normal sinus rhythm Intake/Output:   Visit Vitals  /68 (BP 1 Location: Left upper arm, BP Patient Position: At rest)   Pulse 67   Temp 97 °F (36.1 °C)   Resp 18   Ht 6' (1.829 m)   Wt 114.4 kg (252 lb 3.3 oz)   SpO2 92%   BMI 34.21 kg/m²       Temp (24hrs), Av.8 °F (36.6 °C), Min:97 °F (36.1 °C), Max:98.2 °F (36.8 °C)        O2 Device: Ventilator O2 Flow Rate (L/min): 15 l/min       Wt Readings from Last 4 Encounters:   21 114.4 kg (252 lb 3.3 oz)   10/23/21 104.3 kg (230 lb)   10/19/21 104.3 kg (230 lb)   01/02/21 105.2 kg (231 lb 14.8 oz)          Intake/Output Summary (Last 24 hours) at 11/9/2021 0854  Last data filed at 11/9/2021 0636  Gross per 24 hour   Intake 5336.58 ml   Output 2200 ml   Net 3136.58 ml       Last shift:      No intake/output data recorded. Last 3 shifts: 11/07 1901 - 11/09 0700  In: 5605.1 [I.V.:2512.1]  Out: 2650 [Urine:1650; Drains:1000]       Physical Exam:     General:  intubated on vent  HEENT: NCAT, poor dentition, lips and mucosa dry  Eyes: anicteric; conjunctiva clear  Neck: no nodes, no cuff leak, trach midline; no accessory MM use. Chest: no deformity,   Cardiac: R regular; no murmur;   Lungs: distant breath sounds; no wheezes  Abd: Distended positive for ascites from  Ext: no edema; no joint swelling;  No clubbing  : NO tellez, clear urine  Neuro: sedated;  Psych-  unable to assess  Skin: warm, dry, no cyanosis;   Pulses: 1-2+ Bilateral pedal, radial  Capillary: brisk; pale      DATA:    MAR reviewed and pertinent medications noted or modified as needed  MEDS:   Current Facility-Administered Medications   Medication    glycopyrrolate (ROBINUL) tablet 1 mg    rifAXIMin (XIFAXAN) tablet 550 mg    dexmedeTOMidine (PRECEDEX) 400 mcg in 0.9% sodium chloride (MBP/ADV) 100 mL MBP    sodium chloride (NS) flush 5-40 mL    sodium chloride (NS) flush 5-40 mL    acetaminophen (TYLENOL) tablet 650 mg    Or    acetaminophen (TYLENOL) suppository 650 mg    polyethylene glycol (MIRALAX) packet 17 g    ondansetron (ZOFRAN ODT) tablet 4 mg    Or    ondansetron (ZOFRAN) injection 4 mg    lactulose (CHRONULAC) 10 gram/15 mL solution 45 mL    pantoprazole (PROTONIX) tablet 40 mg    NOREPINephrine (LEVOPHED) 8 mg in 0.9% NS 250ml infusion    methylPREDNISolone (PF) (SOLU-MEDROL) injection 40 mg    albuterol-ipratropium (DUO-NEB) 2.5 MG-0.5 MG/3 ML    0.9% sodium chloride infusion    heparin (porcine) injection 5,000 Units    midazolam in normal saline (VERSED) 1 mg/mL infusion    piperacillin-tazobactam (ZOSYN) 3.375 g in 0.9% sodium chloride (MBP/ADV) 100 mL MBP        Labs:    Recent Labs     11/09/21 0415 11/08/21 0320 11/07/21 0218   WBC 4.8 5.4 5.8   HGB 12.3 12.5 11.8*   PLT 77* 85* 88*     Recent Labs     11/09/21  0415 11/08/21 0320 11/07/21 0218    139 139   K 5.1 5.0 4.9   * 114* 113*   CO2 20* 20* 21   * 275* 174*   BUN 38* 40* 32*   CREA 1.62* 1.91* 1.58*   CA 8.6 8.8 8.8   MG  --  2.2  --    ALB  --  1.9*  --    ALT  --  24  --      Recent Labs     11/09/21 0400 11/08/21 0355 11/07/21 0335   PH 7.34* 7.34* 7.39   PCO2 35 37 33*   PO2 67* 85 82   HCO3 19* 20* 21*   FIO2 40 50 50.0     No results for input(s): CPK, CKNDX, TROIQ in the last 72 hours. No lab exists for component: CPKMB  No results found for: BNPP, BNP   Lab Results   Component Value Date/Time    Culture result: No growth 4 days 11/05/2021 08:00 AM    Culture result: No growth 6 days 10/23/2021 01:30 PM    Culture result: No growth 6 days 12/27/2020 08:15 PM     No results found for: TSH, TSHEXT, TSHEXT     Imaging:    Results from Hospital Encounter encounter on 11/05/21    XR CHEST PORT    Narrative  Chest single view. Comparison single view chest November 8, 2021. Stable tubes and catheters. Combination basilar atelectasis and estimated small to moderate volume dependent  pleural effusions, right greater than left. Left-sided cardiac device. Cardiac and mediastinal structures unchanged. No  pneumothorax. Results from [place] encounter on 11/05/21    CT SPINE CERV WO CONT    Narrative  Fall. No comparison. Technique: Axial imaging cervical spine with sagittal and coronal reformatting  and 3-D volume rendering performed by the technologist at the console.   Dose reduction: All CT scans at this facility are performed using dose reduction  optimization techniques as appropriate to a performed exam including the  following: Automated exposure control, adjustments of the mA and/or kV according  to patient's size, or use of iterative reconstruction technique    Findings: No listhesis. T1 vertebral body mild superior endplate compression  unchanged compared to chest CT 10/20/2021. No acute fracture, or jumped or  perched facet. Multilevel disc narrowing and small osteophytosis from  degenerative disease. Lateral masses symmetric bilaterally. Odontoid intact. No  prevertebral soft tissue swelling. Lung apices clear. Support tubing present. Impression  1. No acute bony findings. This care involved high complexity decision making which includes independently reviewing the patient's past medical records, current laboratory results, medication profiles that were immediately available to me and actual Xray images at the bedside in order to assess, support vital system function, and to treat this degree of vital organ system failure, and to prevent further life threatening deterioration of the patients condition. I was in direct communication with the nursing staff throughout this time.     Medical Decision Making Today  · Reviewed the flowsheet and previous days notes  · Reviewed and summarized records or history from previous days note or discussions with staff, family  · Parenteral controlled substances - Reviewed/ Adjusted / Martine Limb / Started  · High Risk Drug therapy requiring intensive monitoring for toxicity: eg steroids, pressors, antibiotics  · Review and order of Clinical lab tests  · Review and Order of Radiology tests  · Review and Order of Medicine tests  · Independent visualization of radiologic Images  · Reviewed Ventilator

## 2021-11-09 NOTE — PROGRESS NOTES
Progress Note    Patient: Vero Koroma MRN: [id number]  SSN: [id number]    YOB: 1955  Age: 73 y.o. Sex: male      Admit Date: 11/5/2021    LOS: 4 days     Subjective:   GI is following for concerns for peritonitis     Patient seen in ICU, remains intubated and sedated. Non-responsive. OGT with ongoing tube feeding, rectal tube with dark green liquid stool. Abdomen is distended. For paracentesis pending consent. -11/8 XR chest - Combination basilar atelectasis and estimated small to moderate volume dependent pleural effusions, right greater than left. Left-sided cardiac device. Cardiac and mediastinal structures unchanged. No  pneumothorax.     11/5 GI consult note:   History obtained from chart documentation. Patient was admitted to the hospital after finding patient unresponsive with low oxygen saturation in the 60's. 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 He was recently admitted on 10/23/21 for shortness of breath and discharged on 10/26. According to chart, SNF was recommended upon discharge but family declined. He was found unresponsive today. He had a similar incident of unresponsiveness recently. He is on lactulose and Xifaxan at home. Questionable compliance.      -Labs in ED showed low platelet 912, creatinine 1.51, normal LFTs, BNP 1,245 (405 on 10/23), ammonia 104. -CT head with no acute findings. -CT spine with no acute bony findings.  -Patient is intubated and sedated, unresponsive.  OGT connected to low intermittent suction, gastric aspirate clear yellow.   Delfina Vanegas is planning to do an US guided paracentesis once consent is available.      Objective:     Vitals:    11/09/21 1100 11/09/21 1106 11/09/21 1206 11/09/21 1334   BP:   119/68    Pulse:  62 62    Resp:  19     Temp: 97.2 °F (36.2 °C)      SpO2:  92%  92%   Weight:       Height:            Intake and Output:  Current Shift: 11/09 0701 - 11/09 1900  In: 175   Out: -   Last three shifts: 11/07 1901 - 11/09 0700  In: 5605.1 [I.V.:2512.1]  Out: 2650 [Urine:1650; Drains:1000]    Physical Exam:   Skin: Scabs upper and lower extremities. LUE wound with dressing.   HEENT: ET/OGT tube in place.   Cardiovascular: Regular rate and rhythm. Respiratory: On mechanical ventilation  GI:  Abdomen distended due to ascites  Rectal: Rectal tube, dark green liquid stools. Musculoskeletal:Non-responsive, sedated  Neurological:  Intubated, sedated. Psychiatric:   Intubated, sedated. Lymphatic:  No visible adenopathy      Lab/Data Review:  Recent Results (from the past 24 hour(s))   BLOOD GAS, ARTERIAL    Collection Time: 11/09/21  4:00 AM   Result Value Ref Range    pH 7.34 (L) 7.35 - 7.45      PCO2 35 35 - 45 mmHg    PO2 67 (L) 75 - 100 mmHg    O2 SAT 92 (L) >95 %    BICARBONATE 19 (L) 22 - 26 mmol/L    BASE DEFICIT 5.9 (H) 0 - 2 mmol/L    FIO2 40 %   CBC WITH AUTOMATED DIFF    Collection Time: 11/09/21  4:15 AM   Result Value Ref Range    WBC 4.8 4.1 - 11.1 K/uL    RBC 3.91 (L) 4.10 - 5.70 M/uL    HGB 12.3 12.1 - 17.0 g/dL    HCT 39.3 36.6 - 50.3 %    .5 (H) 80.0 - 99.0 FL    MCH 31.5 26.0 - 34.0 PG    MCHC 31.3 30.0 - 36.5 g/dL    RDW 17.6 (H) 11.5 - 14.5 %    PLATELET 77 (L) 980 - 400 K/uL    MPV 11.0 8.9 - 12.9 FL    NRBC 0.0 0.0  WBC    ABSOLUTE NRBC 0.00 0.00 - 0.01 K/uL    NEUTROPHILS 90 (H) 32 - 75 %    LYMPHOCYTES 2 (L) 12 - 49 %    MONOCYTES 7 5 - 13 %    EOSINOPHILS 0 0 - 7 %    BASOPHILS 0 0 - 1 %    IMMATURE GRANULOCYTES 1 (H) 0 - 0.5 %    ABS. NEUTROPHILS 4.3 1.8 - 8.0 K/UL    ABS. LYMPHOCYTES 0.1 (L) 0.8 - 3.5 K/UL    ABS. MONOCYTES 0.3 0.0 - 1.0 K/UL    ABS. EOSINOPHILS 0.0 0.0 - 0.4 K/UL    ABS. BASOPHILS 0.0 0.0 - 0.1 K/UL    ABS. IMM.  GRANS. 0.0 0.00 - 0.04 K/UL    DF AUTOMATED     METABOLIC PANEL, BASIC    Collection Time: 11/09/21  4:15 AM   Result Value Ref Range    Sodium 142 136 - 145 mmol/L    Potassium 5.1 3.5 - 5.1 mmol/L Chloride 117 (H) 97 - 108 mmol/L    CO2 20 (L) 21 - 32 mmol/L    Anion gap 5 5 - 15 mmol/L    Glucose 295 (H) 65 - 100 mg/dL    BUN 38 (H) 6 - 20 mg/dL    Creatinine 1.62 (H) 0.70 - 1.30 mg/dL    BUN/Creatinine ratio 23 (H) 12 - 20      GFR est AA 52 (L) >60 ml/min/1.73m2    GFR est non-AA 43 (L) >60 ml/min/1.73m2    Calcium 8.6 8.5 - 10.1 mg/dL              XR CHEST PORT   Final Result      XR CHEST PORT   Final Result   Stable enlargement of cardiopericardial silhouette with right   ventricular AICD lead. Central vascular congestion. Bilateral basilar opacities,   left greater than right, consistent with atelectasis and possible mild edema. No   pneumothorax. Stable appearance of ET tube. A nasogastric tube courses below the   diaphragm; its tip is not identified. There is obscuration of a portion of the   left hemithorax by the generator device. XR CHEST PORT   Final Result   Cardiopericardial enlargement with AICD. Central vascular   congestion. Improved basilar atelectasis. No pneumothorax or other acute change. There is obscuration of a portion of the left hemithorax by the generator   device. Stable appearance of endotracheal tube. A nasogastric tube courses below   the diaphragm; its tip is not identified. XR CHEST PORT   Final Result   1. ETT terminating approximately 5 cm above the osiris. 2.  Persistent bilateral airspace disease and/or atelectasis. CT HEAD WO CONT   Final Result   1. No acute intracranial findings. 2. Atrophy and small vessel ischemic disease. Pattern unchanged compared to   previous. CT SPINE CERV WO CONT   Final Result   1. No acute bony findings. XR CHEST SNGL V   Final Result   Addendum 1 of 1   Addendum: Addendum 10:21 AM 11/5/2021. Additional chest imaging in disc folder at 9:26 AM is after ET tube    advancement,   tip now 5.9 cm above osiris. Called report to Will at 1021am 11/5/2021.  ET    tube   has not been advanced since the prior phone call.      Final   FINDINGS: IMPRESSION: Single frontal view chest. ET tube above thoracic inlet,   distal tip approximately 11 cm above the osiris. Called report to emergency room   Will at 10:09 AM 11/5/2021. Enteric tube side-port passes below the left   hemidiaphragm. Left chest wall cardiac defibrillator. Unchanged cardiomediastinal silhouette. Similar hypoinflation. Improved airspace disease in the right lower lobe. Platelike atelectasis or scarring in the left lower lobe. No pleural effusion or   pneumothorax. No free air under the diaphragm. Right sixth rib deformity, chronic. IR PARACENTESIS ABD INIT    (Results Pending)   XR CHEST PORT    (Results Pending)       Assessment:     Active Problems:    Acute hypoxemic respiratory failure ([address] (11/5/2021)        Plan:   1. Ascites/possible peritonitis      -history of alcohol abuse      -Plan for paracentesis by IR once with consent. I called patient's son, Gumaro Christensen and left a message. Send specimen to lab for WBC, culture, albumin.      -On IV Zosyn. Normal WBC.     -repeat CMP in am.  2.acute hypoxic respiratory failure.      -currently intubated. History of lung cancer squamous cell stage IV.     -Pulmonary following  3. Hepatic encephalopathy      -continue lactulose and Xifaxan  4. CKD III      -creatinine 1.62      -avoid nephrotoxic meds.   5. Septic shock      -Blood culture pending.       -continue antibiotic  6. Thrombocytopenia -  Most likely r/t liver cirrhosis      -Platelet 77      -CMP in am       Patient discussed with Dr Vivienne Anderson and agrees to above impression and plan. Thank you for allowing me to participate in this patients care    Signed By: Galileo Tapia.  DEANA Hanson     November 9, 2021

## 2021-11-10 NOTE — PROGRESS NOTES
Hospitalist Progress Note    Subjective:   Daily Progress Note: 11/10/2021 2:37 PM    Hospital Course:    73 y.o. With hx of metastatic lung cancer, COPD, liver cirrhosis was brought in by EMS for altered mental status and acute hypoxic respiratory failure. Subjective:  Remains intubated and sedated. Consent obtained for paracentesis, pending. Son to [place], goals of care discussion.   Pt is now DNR        Current Facility-Administered Medications   Medication Dose Route Frequency    metoclopramide HCl (REGLAN) injection 5 mg  5 mg IntraVENous Q6H    methylPREDNISolone (PF) (SOLU-MEDROL) injection 20 mg  20 mg IntraVENous Q12H    furosemide (LASIX) injection 40 mg  40 mg IntraVENous DAILY    glycopyrrolate (ROBINUL) tablet 1 mg  1 mg Oral TID    rifAXIMin (XIFAXAN) tablet 550 mg  550 mg Oral ACB/HS    dexmedeTOMidine (PRECEDEX) 400 mcg in 0.9% sodium chloride (MBP/ADV) 100 mL MBP  0.1-1.5 mcg/kg/hr IntraVENous TITRATE    sodium chloride (NS) flush 5-40 mL  5-40 mL IntraVENous Q8H    sodium chloride (NS) flush 5-40 mL  5-40 mL IntraVENous PRN    acetaminophen (TYLENOL) tablet 650 mg  650 mg Oral Q6H PRN    Or    acetaminophen (TYLENOL) suppository 650 mg  650 mg Rectal Q6H PRN    polyethylene glycol (MIRALAX) packet 17 g  17 g Oral DAILY PRN    ondansetron (ZOFRAN ODT) tablet 4 mg  4 mg Oral Q8H PRN    Or    ondansetron (ZOFRAN) injection 4 mg  4 mg IntraVENous Q6H PRN    lactulose (CHRONULAC) 10 gram/15 mL solution 45 mL  45 mL Per NG tube TID    pantoprazole (PROTONIX) tablet 40 mg  40 mg Oral ACB    NOREPINephrine (LEVOPHED) 8 mg in 0.9% NS 250ml infusion  0.5-16 mcg/min IntraVENous TITRATE    albuterol-ipratropium (DUO-NEB) 2.5 MG-0.5 MG/3 ML  3 mL Nebulization Q6HWA RT    0.9% sodium chloride infusion  75 mL/hr IntraVENous CONTINUOUS    heparin (porcine) injection 5,000 Units  5,000 Units SubCUTAneous Q12H    midazolam in normal saline (VERSED) 1 mg/mL infusion  0-10 mg/hr IntraVENous TITRATE    piperacillin-tazobactam (ZOSYN) 3.375 g in 0.9% sodium chloride (MBP/ADV) 100 mL MBP  3.375 g IntraVENous Q8H        Review of Systems  Unable to obtain due to patient's condition      Objective:     Visit Vitals  /70 (BP 1 Location: Left upper arm, BP Patient Position: At rest)   Pulse 91   Temp 98.2 °F (36.8 °C)   Resp 22   Ht 6' (1.829 m)   Wt 106.7 kg (235 lb 3.7 oz)   SpO2 90%   BMI 31.90 kg/m²    O2 Flow Rate (L/min): 15 l/min O2 Device: Ventilator    Temp (24hrs), Av.8 °F (36.6 °C), Min:97 °F (36.1 °C), Max:98.8 °F (37.1 °C)      11/10 0701 - 11/10 1900  In: 300   Out: 800 [Urine:800]  1901 - 11/10 07  In: 6607.1 [I.V.:2566.1]  Out: 6325 [Urine:3725; Drains:2600]    PHYSICAL EXAM:  Constitutional: intubated and sedated, calm, responsive to painful stimuli. Skin: Extremities and face reveal no rashes. HEENT: Sclerae anicteric. Extra-occular muscles are intact. No oral ulcers. The neck is supple and no masses. Cardiovascular: Regular rate and rhythm. Respiratory:  Diminished bs diffusely  GI: Abdomen firm, distended  Musculoskeletal: No pitting edema of the lower   Neurological:  Intubated and sedated.   Data Review    Recent Results (from the past 24 hour(s))   BLOOD GAS, ARTERIAL    Collection Time: 11/10/21  3:37 AM   Result Value Ref Range    pH 7.37 7.35 - 7.45      PCO2 39 35 - 45 mmHg    PO2 67 (L) 75 - 100 mmHg    O2 SAT 92 (L) >95 %    BICARBONATE 22 22 - 26 mmol/L    BASE DEFICIT 2.5 (H) 0 - 2 mmol/L    O2 METHOD VENT      FIO2 60.0 %    MODE Assist Control/Volume Control      Tidal volume 500      SET RATE 14      IPAP/PIP 0      EPAP/CPAP/PEEP 6.0      SITE Right Radial      ALVAREZ'S TEST PASS     CBC WITH AUTOMATED DIFF    Collection Time: 11/10/21  4:00 AM   Result Value Ref Range    WBC 5.6 4.1 - 11.1 K/uL    RBC 3.84 (L) 4.10 - 5.70 M/uL    HGB 12.3 12.1 - 17.0 g/dL    HCT 38.4 36.6 - 50.3 %    .0 (H) 80.0 - 99.0 FL    MCH 32.0 26.0 - 34.0 PG MCHC 32.0 30.0 - 36.5 g/dL    RDW 18.0 (H) 11.5 - 14.5 %    PLATELET 70 (L) 259 - 400 K/uL    MPV 11.1 8.9 - 12.9 FL    NRBC 0.0 0.0  WBC    ABSOLUTE NRBC 0.00 0.00 - 0.01 K/uL    NEUTROPHILS 88 (H) 32 - 75 %    LYMPHOCYTES 3 (L) 12 - 49 %    MONOCYTES 8 5 - 13 %    EOSINOPHILS 0 0 - 7 %    BASOPHILS 0 0 - 1 %    IMMATURE GRANULOCYTES 1 (H) 0 - 0.5 %    ABS. NEUTROPHILS 5.0 1.8 - 8.0 K/UL    ABS. LYMPHOCYTES 0.1 (L) 0.8 - 3.5 K/UL    ABS. MONOCYTES 0.4 0.0 - 1.0 K/UL    ABS. EOSINOPHILS 0.0 0.0 - 0.4 K/UL    ABS. BASOPHILS 0.0 0.0 - 0.1 K/UL    ABS. IMM. GRANS. 0.0 0.00 - 0.04 K/UL    DF AUTOMATED     METABOLIC PANEL, BASIC    Collection Time: 11/10/21  4:00 AM   Result Value Ref Range    Sodium 144 136 - 145 mmol/L    Potassium 4.7 3.5 - 5.1 mmol/L    Chloride 116 (H) 97 - 108 mmol/L    CO2 22 21 - 32 mmol/L    Anion gap 6 5 - 15 mmol/L    Glucose 232 (H) 65 - 100 mg/dL    BUN 41 (H) 6 - 20 mg/dL    Creatinine 1.55 (H) 0.70 - 1.30 mg/dL    BUN/Creatinine ratio 26 (H) 12 - 20      GFR est AA 55 (L) >60 ml/min/1.73m2    GFR est non-AA 45 (L) >60 ml/min/1.73m2    Calcium 8.6 8.5 - 10.1 mg/dL         Assessment / Plan:    73 y.o. With hx of metastatic lung cancer, COPD, liver cirrhosis was brought in by EMS for altered mental status and acute hypoxic respiratory failure.      Acute hypoxemic respiratory failure:  Likely s/s Acute exacerbation of COPD and metastatic lung cancer  Appreciate pulmonary consult  Continue mechanical ventilation- wean as tolerated  Continue solumedrol     Septic shock:  Likely aspiration pneumonia vs peritonitis.   Continue antibiotics  off levophed  Paracentesis pending consent from son we've not been able to contact- patients S.O. to talk with him today to have him call for consent.      Acute hepatic encephalopathy  Continue lactulose, rifaximin.     CKD: avoid nephrotoxic medications    Hx Squamous cell lung ca stage 4  No ctx yet, prognosis expected poor in the setting of stage 4 ca alone.            Code Status: full  Surrogate Decision Maker:Patient has biological son, girlfriend and step daughter. Dr Fatimah Moore had goals of care discussion with all three of them, and came to conclusion that if he were to have cardiac arrest, give it a try for 5-10 minutes, and then stop. Nonetheless, remains full code at this point. Hopefully can get consent and paracentesis today and see how that may help in the weaning process. DVT Prophylaxis: heparin sc  GI Prophylaxis: pantoprazole       Critical care time spent 35 minutes involving direct patient care as well as reviewing patient's labs and coordination of care with nursing staff     Care Plan discussed with: Patient/Family/RN/Case Management        Total time spent with patient: 35 minutes.

## 2021-11-10 NOTE — PROGRESS NOTES
IMPRESSION:   1. Acute hypoxic respiratory failure  2. 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 COPD  4. Hypovolemic intravascular depletion with shock improving  5. History of EtOH and alcohol abuse  6. Hepatic encephalopathy  7. Cirrhosis of liver  8. Ascites  9. Chronic kidney disease stage III  10. History of AICD placement  11. Hypothermia resolved  12. Additional workup outlined below  13. Pt is critically ill. Time spent with pt and staff actively rendering care, managing pt and coordinating care as stated below; 30 minutes, exclusive of any procedures      RECOMMENDATIONS/PLAN:   1. ICU monitoring  1. Ventilator for mechanical life support and prevent respiratory arrest with protective lung strategies ABG acceptable but lung compliance is still reduced  2. Patient on assist control mode 50% FiO2 will decrease FiO2 will increase it to 60%  3. His lung cancer was managed at [facility] with stage IV squamous cell lung cancer metastatic disease to both lung chest x-ray shows bibasilar atelectasis  4. Small pleural effusion on Lasix we will add Aldactone  5. Awaiting for paracentesis  6. IV Solu-Medrol and nebulizer treatment  7. On Lactulose ammonia level 31   8. CVP monitoring chest x-ray shows right lower lobe infiltrate atelectasis chronic scarring  9. Patient off Levophed  10. Ascites need paracentesis   11. CXR bibasilar atelectasis  12. Will be available to assist in medical management while in the CCU pending disposition     [x] High complexity decision making was performed  [x] See my orders for details    PMH:  has a past medical history of AICD (automatic cardioverter/defibrillator) present, CAD (coronary artery disease), Cirrhosis of liver ([address] Gallstones, Hypertension, Nodule of lower lobe of right lung, and Renal stones. PSH:   has a past surgical history that includes hx pacemaker placement and ir paracentesis abd w image (12/30/2020).      FHX: family history is not on file. SHX:  reports that he has been smoking. He has been smoking about 1.00 pack per day. He does not have any smokeless tobacco history on file. He reports current alcohol use. He reports previous drug use.     ALL: No Known Allergies     MEDS:   [x] Reviewed - As Below   [] Not reviewed    Current Facility-Administered Medications   Medication    methylPREDNISolone (PF) (SOLU-MEDROL) injection 20 mg    furosemide (LASIX) injection 40 mg    glycopyrrolate (ROBINUL) tablet 1 mg    rifAXIMin (XIFAXAN) tablet 550 mg    dexmedeTOMidine (PRECEDEX) 400 mcg in 0.9% sodium chloride (MBP/ADV) 100 mL MBP    sodium chloride (NS) flush 5-40 mL    sodium chloride (NS) flush 5-40 mL    acetaminophen (TYLENOL) tablet 650 mg    Or    acetaminophen (TYLENOL) suppository 650 mg    polyethylene glycol (MIRALAX) packet 17 g    ondansetron (ZOFRAN ODT) tablet 4 mg    Or    ondansetron (ZOFRAN) injection 4 mg    lactulose (CHRONULAC) 10 gram/15 mL solution 45 mL    pantoprazole (PROTONIX) tablet 40 mg    NOREPINephrine (LEVOPHED) 8 mg in 0.9% NS 250ml infusion    albuterol-ipratropium (DUO-NEB) 2.5 MG-0.5 MG/3 ML    0.9% sodium chloride infusion    heparin (porcine) injection 5,000 Units    midazolam in normal saline (VERSED) 1 mg/mL infusion    piperacillin-tazobactam (ZOSYN) 3.375 g in 0.9% sodium chloride (MBP/ADV) 100 mL MBP      MAR reviewed and pertinent medications noted or modified as needed   Current Facility-Administered Medications   Medication    methylPREDNISolone (PF) (SOLU-MEDROL) injection 20 mg    furosemide (LASIX) injection 40 mg    glycopyrrolate (ROBINUL) tablet 1 mg    rifAXIMin (XIFAXAN) tablet 550 mg    dexmedeTOMidine (PRECEDEX) 400 mcg in 0.9% sodium chloride (MBP/ADV) 100 mL MBP    sodium chloride (NS) flush 5-40 mL    sodium chloride (NS) flush 5-40 mL    acetaminophen (TYLENOL) tablet 650 mg    Or    acetaminophen (TYLENOL) suppository 650 mg    polyethylene glycol (MIRALAX) packet 17 g    ondansetron (ZOFRAN ODT) tablet 4 mg    Or    ondansetron (ZOFRAN) injection 4 mg    lactulose (CHRONULAC) 10 gram/15 mL solution 45 mL    pantoprazole (PROTONIX) tablet 40 mg    NOREPINephrine (LEVOPHED) 8 mg in 0.9% NS 250ml infusion    albuterol-ipratropium (DUO-NEB) 2.5 MG-0.5 MG/3 ML    0.9% sodium chloride infusion    heparin (porcine) injection 5,000 Units    midazolam in normal saline (VERSED) 1 mg/mL infusion    piperacillin-tazobactam (ZOSYN) 3.375 g in 0.9% sodium chloride (MBP/ADV) 100 mL MBP      PMH:  has a past medical history of AICD (automatic cardioverter/defibrillator) present, CAD (coronary artery disease), Cirrhosis of liver ([address] Gallstones, Hypertension, Nodule of lower lobe of right lung, and Renal stones. PSH:   has a past surgical history that includes hx pacemaker placement and ir paracentesis abd w image (2020). FHX: family history is not on file. SHX:  reports that he has been smoking. He has been smoking about 1.00 pack per day. He does not have any smokeless tobacco history on file. He reports current alcohol use. He reports previous drug use. ROS:  Unable to obtain sedated on ventilator    Hemodynamics:    CO:    CI:    CVP: CVP (mmHg): 483 mmHg (21 1800)  SVR:   PAP Systolic:    PAP Diastolic:    PVR:    XW70:        Ventilator Settings:      Mode Rate TV Press PEEP FiO2 PIP Min.  Vent   Assist control, Volume control    500 ml    6 cm H20 60 %  28 cm H2O  9.36 l/min        Vital Signs: Telemetry:    normal sinus rhythm Intake/Output:   Visit Vitals  /72 (BP 1 Location: Left upper arm, BP Patient Position: At rest)   Pulse 65   Temp 97.5 °F (36.4 °C)   Resp 16   Ht 6' (1.829 m)   Wt 106.7 kg (235 lb 3.7 oz)   SpO2 95%   BMI 31.90 kg/m²       Temp (24hrs), Av.4 °F (36.3 °C), Min:96.6 °F (35.9 °C), Max:98.8 °F (37.1 °C)        O2 Device: Ventilator O2 Flow Rate (L/min): 15 l/min       Wt Readings from Last 4 Encounters:   11/10/21 106.7 kg (235 lb 3.7 oz)   10/23/21 104.3 kg (230 lb)   10/19/21 104.3 kg (230 lb)   01/02/21 105.2 kg (231 lb 14.8 oz)          Intake/Output Summary (Last 24 hours) at 11/10/2021 0832  Last data filed at 11/10/2021 0630  Gross per 24 hour   Intake 3322.7 ml   Output 4550 ml   Net -1227.3 ml       Last shift:      No intake/output data recorded. Last 3 shifts: 11/08 1901 - 11/10 0700  In: 6607.1 [I.V.:2566.1]  Out: 6325 [Urine:3725; Drains:2600]       Physical Exam:     General:  intubated on vent  HEENT: NCAT, poor dentition, lips and mucosa dry  Eyes: anicteric; conjunctiva clear  Neck: no nodes, no cuff leak, trach midline; no accessory MM use. Chest: no deformity,   Cardiac: R regular; no murmur;   Lungs: distant breath sounds; no wheezes  Abd: Distended positive for ascites from  Ext: no edema; no joint swelling;  No clubbing  : NO tellez, clear urine  Neuro: sedated;  Psych-  unable to assess  Skin: warm, dry, no cyanosis;   Pulses: 1-2+ Bilateral pedal, radial  Capillary: brisk; pale      DATA:    MAR reviewed and pertinent medications noted or modified as needed  MEDS:   Current Facility-Administered Medications   Medication    methylPREDNISolone (PF) (SOLU-MEDROL) injection 20 mg    furosemide (LASIX) injection 40 mg    glycopyrrolate (ROBINUL) tablet 1 mg    rifAXIMin (XIFAXAN) tablet 550 mg    dexmedeTOMidine (PRECEDEX) 400 mcg in 0.9% sodium chloride (MBP/ADV) 100 mL MBP    sodium chloride (NS) flush 5-40 mL    sodium chloride (NS) flush 5-40 mL    acetaminophen (TYLENOL) tablet 650 mg    Or    acetaminophen (TYLENOL) suppository 650 mg    polyethylene glycol (MIRALAX) packet 17 g    ondansetron (ZOFRAN ODT) tablet 4 mg    Or    ondansetron (ZOFRAN) injection 4 mg    lactulose (CHRONULAC) 10 gram/15 mL solution 45 mL    pantoprazole (PROTONIX) tablet 40 mg    NOREPINephrine (LEVOPHED) 8 mg in 0.9% NS 250ml infusion    albuterol-ipratropium (DUO-NEB) 2.5 MG-0.5 MG/3 ML    0.9% sodium chloride infusion    heparin (porcine) injection 5,000 Units    midazolam in normal saline (VERSED) 1 mg/mL infusion    piperacillin-tazobactam (ZOSYN) 3.375 g in 0.9% sodium chloride (MBP/ADV) 100 mL MBP        Labs:    Recent Labs     11/10/21  0400 11/09/21 0415 11/08/21  0320   WBC 5.6 4.8 5.4   HGB 12.3 12.3 12.5   PLT 70* 77* 85*     Recent Labs     11/10/21  0400 11/09/21 0415 11/08/21  0320    142 139   K 4.7 5.1 5.0   * 117* 114*   CO2 22 20* 20*   * 295* 275*   BUN 41* 38* 40*   CREA 1.55* 1.62* 1.91*   CA 8.6 8.6 8.8   MG  --   --  2.2   ALB  --   --  1.9*   ALT  --   --  24     Recent Labs     11/10/21  0337 11/09/21 0400 11/08/21  0355   PH 7.37 7.34* 7.34*   PCO2 39 35 37   PO2 67* 67* 85   HCO3 22 19* 20*   FIO2 60.0 40 50     No results for input(s): CPK, CKNDX, TROIQ in the last 72 hours. No lab exists for component: CPKMB  No results found for: BNPP, BNP   Lab Results   Component Value Date/Time    Culture result: No growth 4 days 11/05/2021 08:00 AM    Culture result: No growth 6 days 10/23/2021 01:30 PM    Culture result: No growth 6 days 12/27/2020 08:15 PM     No results found for: TSH, TSHEXT, TSHEXT     Imaging:    Results from Hospital Encounter encounter on 11/05/21    XR CHEST PORT    Narrative  Portable chest, AP supine, 0244 hours. Comparison with 11/9/2021. Endotracheal  tube, gastric tube, and right ventricular AICD lead appear appropriately  positioned, stable from previous. Stable enlargement of cardiopericardial  silhouette. Central vascular congestion. Bilateral perihilar and basilar  opacities, right greater than left, probably pulmonary edema but correlate with  clinical suspicion of pneumonia.  Masslike opacity on the right appears stable;  CT from 10/20/2021 shows multiple bilateral lung masses, more numerous on the  right, most of which are obscured by pulmonary or pleural opacities on the  current radiograph. Impression  CHF/volume overload. Multiple bilateral lung masses, most of which  are obscured by pleural and parenchymal opacities on current imaging. Results from [facility] encounter on 11/05/21    CT SPINE CERV WO CONT    Narrative  Fall. No comparison. Technique: Axial imaging cervical spine with sagittal and coronal reformatting  and 3-D volume rendering performed by the technologist at the console. Dose reduction: All CT scans at this facility are performed using dose reduction  optimization techniques as appropriate to a performed exam including the  following: Automated exposure control, adjustments of the mA and/or kV according  to patient's size, or use of iterative reconstruction technique    Findings: No listhesis. T1 vertebral body mild superior endplate compression  unchanged compared to chest CT 10/20/2021. No acute fracture, or jumped or  perched facet. Multilevel disc narrowing and small osteophytosis from  degenerative disease. Lateral masses symmetric bilaterally. Odontoid intact. No  prevertebral soft tissue swelling. Lung apices clear. Support tubing present. Impression  1. No acute bony findings. This care involved high complexity decision making which includes independently reviewing the patient's past medical records, current laboratory results, medication profiles that were immediately available to me and actual Xray images at the bedside in order to assess, support vital system function, and to treat this degree of vital organ system failure, and to prevent further life threatening deterioration of the patients condition. I was in direct communication with the nursing staff throughout this time.     Medical Decision Making Today  · Reviewed the flowsheet and previous days notes  · Reviewed and summarized records or history from previous days note or discussions with staff, family  · Parenteral controlled substances - Reviewed/ Adjusted / [place] / Started  · High Risk Drug therapy requiring intensive monitoring for toxicity: eg steroids, pressors, antibiotics  · Review and order of Clinical lab tests  · Review and Order of Radiology tests  · Review and Order of Medicine tests  · Independent visualization of radiologic Images  · Reviewed Ventilator

## 2021-11-10 NOTE — PROGRESS NOTES
Progress Note    Patient: Leo Lepe MRN: [id number]  SSN: [id number]    YOB: 1955  Age: 73 y.o. Sex: male      Admit Date: 11/5/2021    LOS: 5 days     Subjective:   GI is following for concerns for peritonitis     Patient seen in ICU, remains intubated and sedated. Non-responsive. OGT with ongoing tube feeding, rectal tube with brown liquid stool. Abdomen is distended. For paracentesis pending consent.       -11/10 XR chest - CHF/volume overload. Multiple bilateral lung masses, most of which are obscured by pleural and parenchymal opacities on current imaging.     11/5 GI consult note:   History obtained from chart documentation. Patient was admitted to the hospital after finding patient unresponsive with low oxygen saturation in the 60's. 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 He was recently admitted on 10/23/21 for shortness of breath and discharged on 10/26. According to chart, SNF was recommended upon discharge but family declined. He was found unresponsive today. He had a similar incident of unresponsiveness recently. He is on lactulose and Xifaxan at home. Questionable compliance.      -Labs in ED showed low platelet 794, creatinine 1.51, normal LFTs, BNP 1,245 (405 on 10/23), ammonia 104. -CT head with no acute findings. -CT spine with no acute bony findings.  -Patient is intubated and sedated, unresponsive.  OGT connected to low intermittent suction, gastric aspirate clear yellow.   Margarito Dawson is planning to do an US guided paracentesis once consent is available.        Objective:     Vitals:    11/10/21 1204 11/10/21 1300 11/10/21 1338 11/10/21 1400   BP: 115/70 119/73  (!) 98/59   Pulse:       Resp: 22 23  18   Temp:       SpO2: 92% 91% 90% 92%   Weight:       Height:            Intake and Output:  Current Shift: 11/10 0701 - 11/10 1900  In: 300   Out: 800 [Urine:800]  Last three shifts: 11/08 1901 - 11/10 0700  In: 6607.1 [I.V.:2566.1]  Out: 6325 [Urine:3725; Drains:2600]    Physical Exam:   Skin:  Scabs upper and lower extremities. LUE wound with dressing.   HEENT: ET/OGT tube in place.   Cardiovascular: Regular rate and rhythm. Respiratory: On mechanical ventilation  GIAbdomen distended due to ascites:   Rectal: Rectal tube, dark brown liquid stools. Musculoskeletal: Non-responsive, sedated  Neurological: Intubated, sedated. Psychiatric: Intubated, sedated. Lymphatic:  No visible adenopathy      Lab/Data Review:  Recent Results (from the past 24 hour(s))   BLOOD GAS, ARTERIAL    Collection Time: 11/10/21  3:37 AM   Result Value Ref Range    pH 7.37 7.35 - 7.45      PCO2 39 35 - 45 mmHg    PO2 67 (L) 75 - 100 mmHg    O2 SAT 92 (L) >95 %    BICARBONATE 22 22 - 26 mmol/L    BASE DEFICIT 2.5 (H) 0 - 2 mmol/L    O2 METHOD VENT      FIO2 60.0 %    MODE Assist Control/Volume Control      Tidal volume 500      SET RATE 14      IPAP/PIP 0      EPAP/CPAP/PEEP 6.0      SITE Right Radial      ALVAREZ'S TEST PASS     CBC WITH AUTOMATED DIFF    Collection Time: 11/10/21  4:00 AM   Result Value Ref Range    WBC 5.6 4.1 - 11.1 K/uL    RBC 3.84 (L) 4.10 - 5.70 M/uL    HGB 12.3 12.1 - 17.0 g/dL    HCT 38.4 36.6 - 50.3 %    .0 (H) 80.0 - 99.0 FL    MCH 32.0 26.0 - 34.0 PG    MCHC 32.0 30.0 - 36.5 g/dL    RDW 18.0 (H) 11.5 - 14.5 %    PLATELET 70 (L) 521 - 400 K/uL    MPV 11.1 8.9 - 12.9 FL    NRBC 0.0 0.0  WBC    ABSOLUTE NRBC 0.00 0.00 - 0.01 K/uL    NEUTROPHILS 88 (H) 32 - 75 %    LYMPHOCYTES 3 (L) 12 - 49 %    MONOCYTES 8 5 - 13 %    EOSINOPHILS 0 0 - 7 %    BASOPHILS 0 0 - 1 %    IMMATURE GRANULOCYTES 1 (H) 0 - 0.5 %    ABS. NEUTROPHILS 5.0 1.8 - 8.0 K/UL    ABS. LYMPHOCYTES 0.1 (L) 0.8 - 3.5 K/UL    ABS. MONOCYTES 0.4 0.0 - 1.0 K/UL    ABS. EOSINOPHILS 0.0 0.0 - 0.4 K/UL    ABS. BASOPHILS 0.0 0.0 - 0.1 K/UL    ABS. IMM.  GRANS. 0.0 0.00 - 0.04 K/UL    DF AUTOMATED     METABOLIC PANEL, BASIC    Collection Time: 11/10/21  4:00 AM   Result Value Ref Range    Sodium 144 136 - 145 mmol/L    Potassium 4.7 3.5 - 5.1 mmol/L    Chloride 116 (H) 97 - 108 mmol/L    CO2 22 21 - 32 mmol/L    Anion gap 6 5 - 15 mmol/L    Glucose 232 (H) 65 - 100 mg/dL    BUN 41 (H) 6 - 20 mg/dL    Creatinine 1.55 (H) 0.70 - 1.30 mg/dL    BUN/Creatinine ratio 26 (H) 12 - 20      GFR est AA 55 (L) >60 ml/min/1.73m2    GFR est non-AA 45 (L) >60 ml/min/1.73m2    Calcium 8.6 8.5 - 10.1 mg/dL              XR CHEST PORT   Final Result   CHF/volume overload. Multiple bilateral lung masses, most of which   are obscured by pleural and parenchymal opacities on current imaging. XR CHEST PORT   Final Result      XR CHEST PORT   Final Result   Stable enlargement of cardiopericardial silhouette with right   ventricular AICD lead. Central vascular congestion. Bilateral basilar opacities,   left greater than right, consistent with atelectasis and possible mild edema. No   pneumothorax. Stable appearance of ET tube. A nasogastric tube courses below the   diaphragm; its tip is not identified. There is obscuration of a portion of the   left hemithorax by the generator device. XR CHEST PORT   Final Result   Cardiopericardial enlargement with AICD. Central vascular   congestion. Improved basilar atelectasis. No pneumothorax or other acute change. There is obscuration of a portion of the left hemithorax by the generator   device. Stable appearance of endotracheal tube. A nasogastric tube courses below   the diaphragm; its tip is not identified. XR CHEST PORT   Final Result   1. ETT terminating approximately 5 cm above the osiris. 2.  Persistent bilateral airspace disease and/or atelectasis. CT HEAD WO CONT   Final Result   1. No acute intracranial findings. 2. Atrophy and small vessel ischemic disease. Pattern unchanged compared to   previous. CT SPINE CERV WO CONT   Final Result   1. No acute bony findings.       XR CHEST SNGL V   Final Result Addendum 1 of 1   Addendum: Addendum 10:21 AM 11/5/2021. Additional chest imaging in disc folder at 9:26 AM is after ET tube    advancement,   tip now 5.9 cm above osiris. Called report to Will at 1021am 11/5/2021. ET    tube   has not been advanced since the prior phone call. Final   FINDINGS: IMPRESSION: Single frontal view chest. ET tube above thoracic inlet,   distal tip approximately 11 cm above the osiris. Called report to emergency room   Will at 10:09 AM 11/5/2021. Enteric tube side-port passes below the left   hemidiaphragm. Left chest wall cardiac defibrillator. Unchanged cardiomediastinal silhouette. Similar hypoinflation. Improved airspace disease in the right lower lobe. Platelike atelectasis or scarring in the left lower lobe. No pleural effusion or   pneumothorax. No free air under the diaphragm. Right sixth rib deformity, chronic. IR PARACENTESIS ABD INIT    (Results Pending)       Assessment:     Active Problems:    Acute hypoxemic respiratory failure ([address] (11/5/2021)        Plan:   1. Ascites/possible peritonitis      -history of alcohol abuse      -Plan for paracentesis by IR once with consent. I called patient's son, Solis Rodriguez and left a message. Send specimen to lab for WBC, culture, albumin.      -On IV Zosyn. Normal WBC.     -repeat CMP in am.  2.acute hypoxic respiratory failure.      -currently intubated. History of lung cancer squamous cell stage IV.     -Pulmonary following  3. Hepatic encephalopathy      -continue lactulose and Xifaxan  4. CKD III      -creatinine 1.55      -avoid nephrotoxic meds.   5. Septic shock      -Blood culture pending.       -continue antibiotic  6. Thrombocytopenia -  Most likely r/t liver cirrhosis      -Platelet 70      -CMP in am     Patient discussed with Dr Jeremy Carter and agrees to above impression and plan. Thank you for allowing me to participate in this patients care    Signed By: Anaya Mart.  DEANA Hanson November 10, 2021

## 2021-11-11 NOTE — PROGRESS NOTES
Son up to see patient and speak with Doctor Dawna. Son decided to make patient comfort care. Orders received.  called. Patient extubated at 1125 to nasal cannula. Morphine and Ativan given IVP for comfort. Provided emotional and spiritual support to family. [facility] called prior to extubation.

## 2021-11-11 NOTE — PROGRESS NOTES
IMPRESSION:   1. Acute hypoxic respiratory failure  2. 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 COPD  4. Hypovolemic intravascular depletion with shock improving  5. History of EtOH and alcohol abuse  6. Hepatic encephalopathy  7. Cirrhosis of liver  8. Ascites  9. Chronic kidney disease stage III  10. History of AICD placement  11. Hypothermia resolved  12. Additional workup outlined below  13. Pt is critically ill. Time spent with pt and staff actively rendering care, managing pt and coordinating care as stated below; 30 minutes, exclusive of any procedures      RECOMMENDATIONS/PLAN:   1. ICU monitoring  1. Ventilator for mechanical life support and prevent respiratory arrest with protective lung strategies ABG acceptable but lung compliance is still reduced condition got worse yesterday now is 100% FiO2 tidal volume 500 rate of 14 PEEP of 6  2. Patient on assist control mode   3. His lung cancer was managed at [facility] with stage IV squamous cell lung cancer metastatic disease to both lung chest x-ray shows CHF pulmonary edema with bilateral infiltrate and density secondary to cancer  4. On Lasix and Aldactone  5. Awaiting for paracentesis for almost 5 days  6. IV Solu-Medrol and nebulizer treatment  7. On Lactulose ammonia level 31   8. CVP monitoring chest x-ray shows right lower lobe infiltrate atelectasis chronic scarring  9. Patient off Levophed  10. Ascites need paracentesis   11. CXR bibasilar atelectasis  12. Will be available to assist in medical management while in the CCU pending disposition     [x] High complexity decision making was performed  [x] See my orders for details    PMH:  has a past medical history of AICD (automatic cardioverter/defibrillator) present, CAD (coronary artery disease), Cirrhosis of liver ([address] Gallstones, Hypertension, Nodule of lower lobe of right lung, and Renal stones.     PSH:   has a past surgical history that includes hx pacemaker placement and ir paracentesis abd w image (12/30/2020). FHX: family history is not on file. SHX:  reports that he has been smoking. He has been smoking about 1.00 pack per day. He does not have any smokeless tobacco history on file. He reports current alcohol use. He reports previous drug use.     ALL: No Known Allergies     MEDS:   [x] Reviewed - As Below   [] Not reviewed    Current Facility-Administered Medications   Medication    metoclopramide HCl (REGLAN) injection 5 mg    methylPREDNISolone (PF) (SOLU-MEDROL) injection 20 mg    furosemide (LASIX) injection 40 mg    glycopyrrolate (ROBINUL) tablet 1 mg    rifAXIMin (XIFAXAN) tablet 550 mg    dexmedeTOMidine (PRECEDEX) 400 mcg in 0.9% sodium chloride (MBP/ADV) 100 mL MBP    sodium chloride (NS) flush 5-40 mL    sodium chloride (NS) flush 5-40 mL    acetaminophen (TYLENOL) tablet 650 mg    Or    acetaminophen (TYLENOL) suppository 650 mg    polyethylene glycol (MIRALAX) packet 17 g    ondansetron (ZOFRAN ODT) tablet 4 mg    Or    ondansetron (ZOFRAN) injection 4 mg    lactulose (CHRONULAC) 10 gram/15 mL solution 45 mL    pantoprazole (PROTONIX) tablet 40 mg    NOREPINephrine (LEVOPHED) 8 mg in 0.9% NS 250ml infusion    albuterol-ipratropium (DUO-NEB) 2.5 MG-0.5 MG/3 ML    0.9% sodium chloride infusion    heparin (porcine) injection 5,000 Units    midazolam in normal saline (VERSED) 1 mg/mL infusion    piperacillin-tazobactam (ZOSYN) 3.375 g in 0.9% sodium chloride (MBP/ADV) 100 mL MBP      MAR reviewed and pertinent medications noted or modified as needed   Current Facility-Administered Medications   Medication    metoclopramide HCl (REGLAN) injection 5 mg    methylPREDNISolone (PF) (SOLU-MEDROL) injection 20 mg    furosemide (LASIX) injection 40 mg    glycopyrrolate (ROBINUL) tablet 1 mg    rifAXIMin (XIFAXAN) tablet 550 mg    dexmedeTOMidine (PRECEDEX) 400 mcg in 0.9% sodium chloride (MBP/ADV) 100 mL MBP    sodium chloride (NS) flush 5-40 mL    sodium chloride (NS) flush 5-40 mL    acetaminophen (TYLENOL) tablet 650 mg    Or    acetaminophen (TYLENOL) suppository 650 mg    polyethylene glycol (MIRALAX) packet 17 g    ondansetron (ZOFRAN ODT) tablet 4 mg    Or    ondansetron (ZOFRAN) injection 4 mg    lactulose (CHRONULAC) 10 gram/15 mL solution 45 mL    pantoprazole (PROTONIX) tablet 40 mg    NOREPINephrine (LEVOPHED) 8 mg in 0.9% NS 250ml infusion    albuterol-ipratropium (DUO-NEB) 2.5 MG-0.5 MG/3 ML    0.9% sodium chloride infusion    heparin (porcine) injection 5,000 Units    midazolam in normal saline (VERSED) 1 mg/mL infusion    piperacillin-tazobactam (ZOSYN) 3.375 g in 0.9% sodium chloride (MBP/ADV) 100 mL MBP      PMH:  has a past medical history of AICD (automatic cardioverter/defibrillator) present, CAD (coronary artery disease), Cirrhosis of liver ([address] Gallstones, Hypertension, Nodule of lower lobe of right lung, and Renal stones. PSH:   has a past surgical history that includes hx pacemaker placement and ir paracentesis abd w image (12/30/2020). FHX: family history is not on file. SHX:  reports that he has been smoking. He has been smoking about 1.00 pack per day. He does not have any smokeless tobacco history on file. He reports current alcohol use. He reports previous drug use. ROS:  Unable to obtain sedated on ventilator    Hemodynamics:    CO:    CI:    CVP: CVP (mmHg): 483 mmHg (11/07/21 1800)  SVR:   PAP Systolic:    PAP Diastolic:    PVR:    ON63:        Ventilator Settings:      Mode Rate TV Press PEEP FiO2 PIP Min.  Vent   Assist control, Volume control    500 ml    8 cm H20 100 %  27 cm H2O  9.7 l/min        Vital Signs: Telemetry:    normal sinus rhythm Intake/Output:   Visit Vitals  /65 (BP 1 Location: Left upper arm, BP Patient Position: At rest)   Pulse 83   Temp 97.7 °F (36.5 °C)   Resp 18   Ht 6' (1.829 m)   Wt 106.7 kg (235 lb 3.7 oz)   SpO2 93%   BMI 31.90 kg/m² Temp (24hrs), Av.7 °F (37.1 °C), Min:97.7 °F (36.5 °C), Max:99.7 °F (37.6 °C)        O2 Device: Ventilator O2 Flow Rate (L/min): 15 l/min       Wt Readings from Last 4 Encounters:   11/10/21 106.7 kg (235 lb 3.7 oz)   10/23/21 104.3 kg (230 lb)   10/19/21 104.3 kg (230 lb)   21 105.2 kg (231 lb 14.8 oz)          Intake/Output Summary (Last 24 hours) at 2021 0752  Last data filed at 2021 0749  Gross per 24 hour   Intake 6054.16 ml   Output 5475 ml   Net 579.16 ml       Last shift:      701 - 1900  In: 125   Out: -   Last 3 shifts: 1901 - 700  In: 9169.4 [I.V.:3597.4]  Out: 7125 [Urine:3025; Drains:4100]       Physical Exam:     General:  intubated on vent  HEENT: NCAT, poor dentition, lips and mucosa dry  Eyes: anicteric; conjunctiva clear  Neck: no nodes, no cuff leak, trach midline; no accessory MM use. Chest: no deformity,   Cardiac: R regular; no murmur;   Lungs: distant breath sounds; no wheezes  Abd: Distended positive for ascites from  Ext: no edema; no joint swelling;  No clubbing  : NO tellez, clear urine  Neuro: sedated;  Psych-  unable to assess  Skin: warm, dry, no cyanosis;   Pulses: 1-2+ Bilateral pedal, radial  Capillary: brisk; pale      DATA:    MAR reviewed and pertinent medications noted or modified as needed  MEDS:   Current Facility-Administered Medications   Medication    metoclopramide HCl (REGLAN) injection 5 mg    methylPREDNISolone (PF) (SOLU-MEDROL) injection 20 mg    furosemide (LASIX) injection 40 mg    glycopyrrolate (ROBINUL) tablet 1 mg    rifAXIMin (XIFAXAN) tablet 550 mg    dexmedeTOMidine (PRECEDEX) 400 mcg in 0.9% sodium chloride (MBP/ADV) 100 mL MBP    sodium chloride (NS) flush 5-40 mL    sodium chloride (NS) flush 5-40 mL    acetaminophen (TYLENOL) tablet 650 mg    Or    acetaminophen (TYLENOL) suppository 650 mg    polyethylene glycol (MIRALAX) packet 17 g    ondansetron (ZOFRAN ODT) tablet 4 mg    Or    ondansetron (ZOFRAN) injection 4 mg    lactulose (CHRONULAC) 10 gram/15 mL solution 45 mL    pantoprazole (PROTONIX) tablet 40 mg    NOREPINephrine (LEVOPHED) 8 mg in 0.9% NS 250ml infusion    albuterol-ipratropium (DUO-NEB) 2.5 MG-0.5 MG/3 ML    0.9% sodium chloride infusion    heparin (porcine) injection 5,000 Units    midazolam in normal saline (VERSED) 1 mg/mL infusion    piperacillin-tazobactam (ZOSYN) 3.375 g in 0.9% sodium chloride (MBP/ADV) 100 mL MBP        Labs:    Recent Labs     11/11/21  0416 11/10/21  0400 11/09/21  0415   WBC 5.0 5.6 4.8   HGB 12.2 12.3 12.3   PLT 61* 70* 77*     Recent Labs     11/11/21  0416 11/10/21  0400 11/09/21  0415   * 144 142   K 4.5 4.7 5.1   * 116* 117*   CO2 23 22 20*   * 232* 295*   BUN 53* 41* 38*   CREA 1.64* 1.55* 1.62*   CA 8.6 8.6 8.6     Recent Labs     11/10/21  0337 11/09/21  0400   PH 7.37 7.34*   PCO2 39 35   PO2 67* 67*   HCO3 22 19*   FIO2 60.0 40     No results for input(s): CPK, CKNDX, TROIQ in the last 72 hours. No lab exists for component: CPKMB  No results found for: BNPP, BNP   Lab Results   Component Value Date/Time    Culture result: No growth 5 days 11/05/2021 08:00 AM    Culture result: No growth 6 days 10/23/2021 01:30 PM    Culture result: No growth 6 days 12/27/2020 08:15 PM     No results found for: TSH, TSHEXT, TSHEXT     Imaging:    Results from Hospital Encounter encounter on 11/05/21    XR CHEST PORT    Narrative  Portable chest, AP supine, 0244 hours. Comparison with 11/9/2021. Endotracheal  tube, gastric tube, and right ventricular AICD lead appear appropriately  positioned, stable from previous. Stable enlargement of cardiopericardial  silhouette. Central vascular congestion. Bilateral perihilar and basilar  opacities, right greater than left, probably pulmonary edema but correlate with  clinical suspicion of pneumonia.  Masslike opacity on the right appears stable;  CT from 10/20/2021 shows multiple bilateral lung masses, more numerous on the  right, most of which are obscured by pulmonary or pleural opacities on the  current radiograph. Impression  CHF/volume overload. Multiple bilateral lung masses, most of which  are obscured by pleural and parenchymal opacities on current imaging. Results from [facility] encounter on 11/05/21    CT SPINE CERV WO CONT    Narrative  Fall. No comparison. Technique: Axial imaging cervical spine with sagittal and coronal reformatting  and 3-D volume rendering performed by the technologist at the console. Dose reduction: All CT scans at this facility are performed using dose reduction  optimization techniques as appropriate to a performed exam including the  following: Automated exposure control, adjustments of the mA and/or kV according  to patient's size, or use of iterative reconstruction technique    Findings: No listhesis. T1 vertebral body mild superior endplate compression  unchanged compared to chest CT 10/20/2021. No acute fracture, or jumped or  perched facet. Multilevel disc narrowing and small osteophytosis from  degenerative disease. Lateral masses symmetric bilaterally. Odontoid intact. No  prevertebral soft tissue swelling. Lung apices clear. Support tubing present. Impression  1. No acute bony findings. This care involved high complexity decision making which includes independently reviewing the patient's past medical records, current laboratory results, medication profiles that were immediately available to me and actual Xray images at the bedside in order to assess, support vital system function, and to treat this degree of vital organ system failure, and to prevent further life threatening deterioration of the patients condition. I was in direct communication with the nursing staff throughout this time.     Medical Decision Making Today  · Reviewed the flowsheet and previous days notes  · Reviewed and summarized records or history from previous days note or discussions with staff, family  · Parenteral controlled substances - Reviewed/ Adjusted / Stevphen Austell / Started  · High Risk Drug therapy requiring intensive monitoring for toxicity: eg steroids, pressors, antibiotics  · Review and order of Clinical lab tests  · Review and Order of Radiology tests  · Review and Order of Medicine tests  · Independent visualization of radiologic Images  · Reviewed Ventilator

## 2021-11-11 NOTE — PROGRESS NOTES
Hospitalist Progress Note    Subjective:   Daily Progress Note: 2021 2:37 PM    Hospital Course:    73 y.o. With hx of metastatic lung cancer, COPD, liver cirrhosis was brought in by EMS for altered mental status and acute hypoxic respiratory failure. Subjective:  Remains intubated and sedated. Family at bedside, brother and niece awaiting son. Decision made it ppears for comfort care only, anticipating extubation when son here. Current Facility-Administered Medications   Medication Dose Route Frequency    morphine injection 2 mg  2 mg IntraVENous Q1H PRN    LORazepam (ATIVAN) injection 2 mg  2 mg IntraVENous Q1H PRN        Review of Systems  Unable to obtain due to patient's condition      Objective:     Visit Vitals  /64 (BP 1 Location: Left upper arm, BP Patient Position: At rest)   Pulse 98   Temp 98.2 °F (36.8 °C)   Resp 28   Ht 6' (1.829 m)   Wt 106.7 kg (235 lb 3.7 oz)   SpO2 93%   BMI 31.90 kg/m²    O2 Flow Rate (L/min): 5 l/min O2 Device: Nasal cannula    Temp (24hrs), Av.7 °F (37.1 °C), Min:97.7 °F (36.5 °C), Max:99.7 °F (37.6 °C)      701 - 1900  In: 250   Out: -   1901 - 700  In: 9169.4 [I.V.:3597.4]  Out: 7125 [Urine:3025; Drains:4100]    PHYSICAL EXAM:  Constitutional: intubated and sedated, calm, responsive to painful stimuli. Skin: Extremities and face reveal no rashes. HEENT: Sclerae anicteric. Extra-occular muscles are intact. No oral ulcers. The neck is supple and no masses. Cardiovascular: Regular rate and rhythm. Respiratory:  Diminished bs diffusely  GI: Abdomen firm, distended  Musculoskeletal: No pitting edema of the lower   Neurological:  Intubated and sedated.   Data Review    Recent Results (from the past 24 hour(s))   CBC WITH AUTOMATED DIFF    Collection Time: 21  4:16 AM   Result Value Ref Range    WBC 5.0 4.1 - 11.1 K/uL    RBC 3.89 (L) 4.10 - 5.70 M/uL    HGB 12.2 12.1 - 17.0 g/dL    HCT 39.5 36.6 - 50.3 %    MCV 101.5 (H) 80.0 - 99.0 FL    MCH 31.4 26.0 - 34.0 PG    MCHC 30.9 30.0 - 36.5 g/dL    RDW 18.7 (H) 11.5 - 14.5 %    PLATELET 61 (L) 821 - 400 K/uL    MPV 11.0 8.9 - 12.9 FL    NRBC 0.0 0.0  WBC    ABSOLUTE NRBC 0.00 0.00 - 0.01 K/uL    NEUTROPHILS 89 (H) 32 - 75 %    LYMPHOCYTES 2 (L) 12 - 49 %    MONOCYTES 8 5 - 13 %    EOSINOPHILS 0 0 - 7 %    BASOPHILS 0 0 - 1 %    IMMATURE GRANULOCYTES 1 (H) 0 - 0.5 %    ABS. NEUTROPHILS 4.5 1.8 - 8.0 K/UL    ABS. LYMPHOCYTES 0.1 (L) 0.8 - 3.5 K/UL    ABS. MONOCYTES 0.4 0.0 - 1.0 K/UL    ABS. EOSINOPHILS 0.0 0.0 - 0.4 K/UL    ABS. BASOPHILS 0.0 0.0 - 0.1 K/UL    ABS. IMM. GRANS. 0.0 0.00 - 0.04 K/UL    DF AUTOMATED     METABOLIC PANEL, BASIC    Collection Time: 11/11/21  4:16 AM   Result Value Ref Range    Sodium 147 (H) 136 - 145 mmol/L    Potassium 4.5 3.5 - 5.1 mmol/L    Chloride 118 (H) 97 - 108 mmol/L    CO2 23 21 - 32 mmol/L    Anion gap 6 5 - 15 mmol/L    Glucose 310 (H) 65 - 100 mg/dL    BUN 53 (H) 6 - 20 mg/dL    Creatinine 1.64 (H) 0.70 - 1.30 mg/dL    BUN/Creatinine ratio 32 (H) 12 - 20      GFR est AA 51 (L) >60 ml/min/1.73m2    GFR est non-AA 42 (L) >60 ml/min/1.73m2    Calcium 8.6 8.5 - 10.1 mg/dL         Assessment / Plan:    73 y.o. With hx of metastatic lung cancer, COPD, liver cirrhosis was brought in by EMS for altered mental status and acute hypoxic respiratory failure. Remins criticlly ill with poor prognosis nd has been unable to wean from vent. Family made him dnr yesterday and decision reportedly has been made for comfort care and terminal extubation when son is present.       Acute hypoxemic respiratory failure:  Septic shock:   Acute hepatic encephalopathy  CKD:   Hx Squamous cell lung ca stage 4    Plan:  Comfort care and terminal extubation anticipated when son is present this morning.            Code Status: DNR  DVT Prophylaxis: heparin sc  Dispo: CMO/Terminal extubation today is anticipted.        Critical care time spent 40 minutes involving direct patient care as well as reviewing patient's labs and coordination of care with nursing staff     Care Plan discussed with: Patient/Family/RN/Case Management        Total time spent with patient: 35 minutes.

## 2021-11-11 NOTE — PROGRESS NOTES
Progress Note    Patient: Vero Koroma MRN: [id number]  SSN: [id number]    YOB: 1955  Age: 73 y.o. Sex: male      Admit Date: 11/5/2021    LOS: 6 days     Subjective:   GI is following for concerns for peritonitis     Patient extubated today. Now on comfort care.     11/5 GI consult note:   History obtained from chart documentation. Patient was admitted to the hospital after finding patient unresponsive with low oxygen saturation in the 60's. 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 He was recently admitted on 10/23/21 for shortness of breath and discharged on 10/26. According to chart, SNF was recommended upon discharge but family declined. He was found unresponsive today. He had a similar incident of unresponsiveness recently. He is on lactulose and Xifaxan at home. Questionable compliance.      -Labs in ED showed low platelet 464, creatinine 1.51, normal LFTs, BNP 1,245 (405 on 10/23), ammonia 104. -CT head with no acute findings. -CT spine with no acute bony findings.  -Patient is intubated and sedated, unresponsive.  OGT connected to low intermittent suction, gastric aspirate clear yellow.    IR is planning to do an US guided paracentesis once consent is available.          Objective:     Vitals:    11/11/21 0900 11/11/21 1000 11/11/21 1034 11/11/21 1100   BP: 110/66 111/64  112/64   Pulse:   81 98   Resp: 18 19 20 28   Temp:    98.2 °F (36.8 °C)   SpO2: 92% 92% 91% 93%   Weight:       Height:            Intake and Output:  Current Shift: 11/11 0701 - 11/11 1900  In: 250   Out: -   Last three shifts: 11/09 1901 - 11/11 0700  In: 9169.4 [I.V.:3597.4]  Out: 7125 [Urine:3025; Drains:4100]    Physical Exam:   Skin: Scabs upper and lower extremities. LUE wound with dressing.   HEENT: Sclerae anicteric. Extra-occular muscles are intact. No abnormal pigmentation of the lips. The neck is supple.   Cardiovascular: Regular rate and rhythm. Respiratory:  Comfortable breathing with no accessory muscle use. GI:  Abdomen nondistended, soft, and nontender. No enlargement of the liver or spleen. No masses palpable. Rectal: flexiseal, dark brown output. Musculoskeletal:non-responsive  Neurological: Non-responsive  Psychiatric:  Non-responsive  Lymphatic:  No visible adenopathy      Lab/Data Review:  Recent Results (from the past 24 hour(s))   CBC WITH AUTOMATED DIFF    Collection Time: 11/11/21  4:16 AM   Result Value Ref Range    WBC 5.0 4.1 - 11.1 K/uL    RBC 3.89 (L) 4.10 - 5.70 M/uL    HGB 12.2 12.1 - 17.0 g/dL    HCT 39.5 36.6 - 50.3 %    .5 (H) 80.0 - 99.0 FL    MCH 31.4 26.0 - 34.0 PG    MCHC 30.9 30.0 - 36.5 g/dL    RDW 18.7 (H) 11.5 - 14.5 %    PLATELET 61 (L) 968 - 400 K/uL    MPV 11.0 8.9 - 12.9 FL    NRBC 0.0 0.0  WBC    ABSOLUTE NRBC 0.00 0.00 - 0.01 K/uL    NEUTROPHILS 89 (H) 32 - 75 %    LYMPHOCYTES 2 (L) 12 - 49 %    MONOCYTES 8 5 - 13 %    EOSINOPHILS 0 0 - 7 %    BASOPHILS 0 0 - 1 %    IMMATURE GRANULOCYTES 1 (H) 0 - 0.5 %    ABS. NEUTROPHILS 4.5 1.8 - 8.0 K/UL    ABS. LYMPHOCYTES 0.1 (L) 0.8 - 3.5 K/UL    ABS. MONOCYTES 0.4 0.0 - 1.0 K/UL    ABS. EOSINOPHILS 0.0 0.0 - 0.4 K/UL    ABS. BASOPHILS 0.0 0.0 - 0.1 K/UL    ABS. IMM. GRANS. 0.0 0.00 - 0.04 K/UL    DF AUTOMATED     METABOLIC PANEL, BASIC    Collection Time: 11/11/21  4:16 AM   Result Value Ref Range    Sodium 147 (H) 136 - 145 mmol/L    Potassium 4.5 3.5 - 5.1 mmol/L    Chloride 118 (H) 97 - 108 mmol/L    CO2 23 21 - 32 mmol/L    Anion gap 6 5 - 15 mmol/L    Glucose 310 (H) 65 - 100 mg/dL    BUN 53 (H) 6 - 20 mg/dL    Creatinine 1.64 (H) 0.70 - 1.30 mg/dL    BUN/Creatinine ratio 32 (H) 12 - 20      GFR est AA 51 (L) >60 ml/min/1.73m2    GFR est non-AA 42 (L) >60 ml/min/1.73m2    Calcium 8.6 8.5 - 10.1 mg/dL              XR CHEST PORT   Final Result   CHF/volume overload.  Multiple bilateral lung masses, most of which   are obscured by pleural and parenchymal opacities on current imaging. XR CHEST PORT   Final Result      XR CHEST PORT   Final Result   Stable enlargement of cardiopericardial silhouette with right   ventricular AICD lead. Central vascular congestion. Bilateral basilar opacities,   left greater than right, consistent with atelectasis and possible mild edema. No   pneumothorax. Stable appearance of ET tube. A nasogastric tube courses below the   diaphragm; its tip is not identified. There is obscuration of a portion of the   left hemithorax by the generator device. XR CHEST PORT   Final Result   Cardiopericardial enlargement with AICD. Central vascular   congestion. Improved basilar atelectasis. No pneumothorax or other acute change. There is obscuration of a portion of the left hemithorax by the generator   device. Stable appearance of endotracheal tube. A nasogastric tube courses below   the diaphragm; its tip is not identified. XR CHEST PORT   Final Result   1. ETT terminating approximately 5 cm above the osiris. 2.  Persistent bilateral airspace disease and/or atelectasis. CT HEAD WO CONT   Final Result   1. No acute intracranial findings. 2. Atrophy and small vessel ischemic disease. Pattern unchanged compared to   previous. CT SPINE CERV WO CONT   Final Result   1. No acute bony findings. XR CHEST SNGL V   Final Result   Addendum 1 of 1   Addendum: Addendum 10:21 AM 11/5/2021. Additional chest imaging in disc folder at 9:26 AM is after ET tube    advancement,   tip now 5.9 cm above osiris. Called report to Will at 1021am 11/5/2021. ET    tube   has not been advanced since the prior phone call. Final   FINDINGS: IMPRESSION: Single frontal view chest. ET tube above thoracic inlet,   distal tip approximately 11 cm above the osiris. Called report to emergency room   Will at 10:09 AM 11/5/2021. Enteric tube side-port passes below the left   hemidiaphragm. Left chest wall cardiac defibrillator. Unchanged cardiomediastinal silhouette. Similar hypoinflation. Improved airspace disease in the right lower lobe. Platelike atelectasis or scarring in the left lower lobe. No pleural effusion or   pneumothorax. No free air under the diaphragm. Right sixth rib deformity, chronic. Assessment:     Active Problems:    Acute hypoxemic respiratory failure ([address] (11/5/2021)        Plan:   Patient now on comfort care. GI will sign off. Patient discussed with Dr Nicholas Moya and agrees to above impression and plan. Thank you for allowing me to participate in this patients care    Signed By: Virginia Sim.  DEANA Hanson     November 11, 2021

## 2021-11-11 NOTE — PROGRESS NOTES
follow up visit in  per family request. Heather Hamilton engaged in conversation with patient's son in law, later additional family members.  and snl reflected on family dynamics, pt's life choices, and the special love of is granddaughter.  provided words of caring and support, family expressed appreciation for  support earlier this day.  Advised nurse to contact [facility] for any further referrals.       Visited by Champ Jay [facility], [facility]    can be contacted by calling  and requesting  on call

## 2021-11-11 NOTE — PROGRESS NOTES
Spiritual Care Assessment/Progress Note  [facility]      NAME: Diego Urban      MRN: [id number]  AGE: 73 y.o. SEX: male  Catholic Affiliation: No preference   Language: English     11/11/2021     Total Time (in minutes): 19     Spiritual Assessment begun in [facility] CCU through conversation with:         []Patient        [x] Family    [] Friend(s)        Reason for Consult: Initial/Spiritual assessment, patient floor, Request by staff     Spiritual beliefs: (Please include comment if needed)     [] Identifies with a armando tradition:         [] Supported by a armando community:            [] Claims no spiritual orientation:           [] Seeking spiritual identity:                [] Adheres to an individual form of spirituality:           [x] Not able to assess:                           Identified resources for coping:      [] Prayer                               [] Music                  [] Guided Imagery     [x] Family/friends                 [] Pet visits     [] Devotional reading                         [] Unknown     [] Other:                                              Interventions offered during this visit: (See comments for more details)          Family/Friend(s):  Affirmation of emotions/emotional suffering, Bridging, Catharsis/review of pertinent events in supportive environment, Coordination with community clergy, End of life issues discussed, Initial Assessment, Normalization of emotional/spiritual concerns, Prayer (assurance of)     Plan of Care:     [] Support spiritual and/or cultural needs    [] Support AMD and/or advance care planning process      [] Support grieving process   [] Coordinate Rites and/or Rituals    [] Coordination with community clergy   [] No spiritual needs identified at this time   [] Detailed Plan of Care below (See Comments)  [] Make referral to Music Therapy  [] Make referral to Pet Therapy     [] Make referral to Addiction services  [] Make referral to [facility]  [] Make referral to Spiritual Care Partner  [] No future visits requested        [x] Contact Spiritual Care for further referrals     Comments:  Provided care to several visitors of patient in the ICU unit who were in the parking lot when staff brought them to 's attention. One of the family members expressed gratitude for expressing concern. Some of them seem to acknowledge the inevitably of death and did not appear to be distressed externally. They shared about their life and bantered with each other. Provided chaplaincy education and listened with empathy while exploring their needs. Normalized their emotions and affirmed their outlook and supportive care. Advised of prayer and advised of  availability. Chaplains will follow up if further referrals are requested. Chaplain Kurt Payne M.Div.    can be reached by calling the  at [facility]  [phone]

## 2021-11-11 NOTE — PROGRESS NOTES
Spiritual Care Assessment/Progress Note  [facility]      NAME: Sky Montoya      MRN: [id number]  AGE: 73 y.o. SEX: male  Gnosticist Affiliation: No preference   Language: English     11/11/2021     Total Time (in minutes): 35     Spiritual Assessment begun in [facility] 2 CCU through conversation with:         [x]Patient        [x] Family    [] Friend(s)        Reason for Consult: Initial/Spiritual assessment, patient floor, Request by staff     Spiritual beliefs: (Please include comment if needed)     [] Identifies with a armando tradition:         [] Supported by a armando community:            [] Claims no spiritual orientation:           [] Seeking spiritual identity:                [] Adheres to an individual form of spirituality:           [x] Not able to assess:                           Identified resources for coping:      [] Prayer                               [] Music                  [] Guided Imagery     [] Family/friends                 [] Pet visits     [] Devotional reading                         [x] Unknown     [] Other:                                             Interventions offered during this visit: (See comments for more details)          Family/Friend(s):  Affirmation of emotions/emotional suffering, Affirmation of armando, Catharsis/review of pertinent events in supportive environment, Coping skills reviewed/reinforced, End of life issues discussed, Decision support (comment), Guidance concerning next steps/process to be expected, Prayer (actual), Life review/legacy, Issues around forgiveness/closure, Initial Assessment, Iconic (affirming the presence of God/Higher Power)     Plan of Care:     [] Support spiritual and/or cultural needs    [] Support AMD and/or advance care planning process      [] Support grieving process   [] Coordinate Rites and/or Rituals    [] Coordination with community clergy   [] No spiritual needs identified at this time   [] Detailed Plan of Care below (See Comments)  [] Make referral to Music Therapy  [] Make referral to Pet Therapy     [] Make referral to Addiction services  [] Make referral to [facility]  [] Make referral to Spiritual Care Partner  [] No future visits requested        [x] Contact Spiritual Care for further referrals     Comments: The purpose of the visit was in response to staff request to provide decision support for family dealing end of life matters. The patient was unable to share during the visit. He was joined at the bedside by his son and family. The patient's significant other (tearfully) mentioned how they met and how they had enjoyed their life together. The each shared how they believed God was in control and they were at peace with God's decision for his life. Patient's son shared that he was struggling having to make the decision to change the patient's care status. The  listened empathically and reflectively as the shared tearfully.  explored painful feelings and provided the support of spiritual care. Mark Anthony Scales D.Min, M.Div.    can be reached by calling the  at [facility]  [phone]

## 2021-11-11 NOTE — ROUTINE PROCESS
Extubated patient to comfort care. Placed patient on 5 lpm nc.  Monitors have been turned off and family is at bedside

## 2021-11-12 NOTE — PROGRESS NOTES
Problem: Falls - Risk of  Goal: *Absence of Falls  Description: Document Ismael Bazan Fall Risk and appropriate interventions in the flowsheet.   11/12/2021 1225 by Prateek Coelho RN  Outcome: Progressing Towards Goal  Note: Fall Risk Interventions:       Mentation Interventions: Adequate sleep, hydration, pain control, Evaluate medications/consider consulting pharmacy    Medication Interventions: Bed/chair exit alarm    Elimination Interventions: Bed/chair exit alarm, Call light in reach    History of Falls Interventions: Bed/chair exit alarm      11/12/2021 1225 by Prateek Coelho RN  Outcome: Not Progressing Towards Goal  Note: Fall Risk Interventions:       Mentation Interventions: Adequate sleep, hydration, pain control, Evaluate medications/consider consulting pharmacy    Medication Interventions: Bed/chair exit alarm    Elimination Interventions: Bed/chair exit alarm, Call light in reach    History of Falls Interventions: Bed/chair exit alarm

## 2021-11-12 NOTE — PROGRESS NOTES
IMPRESSION:   1. Acute hypoxic respiratory failure  2. 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 COPD  4. Hypovolemic intravascular depletion with shock improving  5. History of EtOH and alcohol abuse  6. Hepatic encephalopathy  7. Cirrhosis of liver  8. Ascites  9. Chronic kidney disease stage III  10. History of AICD placement      RECOMMENDATIONS/PLAN:     1. Patient was terminally extubated yesterday now on 6 L nasal cannula  2. His lung cancer was managed at [facility] with stage IV squamous cell lung cancer metastatic disease to both lung chest x-ray shows CHF pulmonary edema with bilateral infiltrate and density secondary to cancer  3. on comfort measure     [x] High complexity decision making was performed  [x] See my orders for details    PMH:  has a past medical history of AICD (automatic cardioverter/defibrillator) present, CAD (coronary artery disease), Cirrhosis of liver ([address] Gallstones, Hypertension, Nodule of lower lobe of right lung, and Renal stones. PSH:   has a past surgical history that includes hx pacemaker placement and ir paracentesis abd w image (12/30/2020). FHX: family history is not on file. SHX:  reports that he has been smoking. He has been smoking about 1.00 pack per day. He does not have any smokeless tobacco history on file. He reports current alcohol use. He reports previous drug use.     ALL: No Known Allergies     MEDS:   [x] Reviewed - As Below   [] Not reviewed    Current Facility-Administered Medications   Medication    morphine injection 2 mg    LORazepam (ATIVAN) injection 2 mg      MAR reviewed and pertinent medications noted or modified as needed   Current Facility-Administered Medications   Medication    morphine injection 2 mg    LORazepam (ATIVAN) injection 2 mg      PMH:  has a past medical history of AICD (automatic cardioverter/defibrillator) present, CAD (coronary artery disease), Cirrhosis of liver ([address] Gallstones, Hypertension, Nodule of lower lobe of right lung, and Renal stones. PSH:   has a past surgical history that includes hx pacemaker placement and ir paracentesis abd w image (2020). FHX: family history is not on file. SHX:  reports that he has been smoking. He has been smoking about 1.00 pack per day. He does not have any smokeless tobacco history on file. He reports current alcohol use. He reports previous drug use. ROS:  Unable to obtain     Hemodynamics:    CO:    CI:    CVP: CVP (mmHg): 483 mmHg (21 1800)  SVR:   PAP Systolic:    PAP Diastolic:    PVR:    PV11:        Ventilator Settings:      Mode Rate TV Press PEEP FiO2 PIP Min. Vent   Assist control, Volume control    500 ml    8 cm H20 100 %  27 cm H2O  11.2 l/min        Vital Signs: Telemetry:    normal sinus rhythm Intake/Output:   Visit Vitals  /64   Pulse (!) 115   Temp 97.3 °F (36.3 °C)   Resp 16   Ht 6' (1.829 m)   Wt 106.7 kg (235 lb 3.7 oz)   SpO2 92%   BMI 31.90 kg/m²       Temp (24hrs), Av.7 °F (36.5 °C), Min:97.3 °F (36.3 °C), Max:98.2 °F (36.8 °C)        O2 Device: Nasal cannula O2 Flow Rate (L/min): 6 l/min       Wt Readings from Last 4 Encounters:   11/10/21 106.7 kg (235 lb 3.7 oz)   10/23/21 104.3 kg (230 lb)   10/19/21 104.3 kg (230 lb)   21 105.2 kg (231 lb 14.8 oz)          Intake/Output Summary (Last 24 hours) at 2021 1008  Last data filed at 2021 1554  Gross per 24 hour   Intake 125 ml   Output 600 ml   Net -475 ml       Last shift:      No intake/output data recorded. Last 3 shifts: 11/10 1901 -  0700  In: 3384.7 [I.V.:1175.7]  Out: 1789 [Urine:1075; Drains:2000]       Physical Exam:     General:  intubated on vent  HEENT: NCAT, poor dentition, lips and mucosa dry  Eyes: anicteric; conjunctiva clear  Neck: no nodes, no cuff leak, trach midline; no accessory MM use.   Chest: no deformity,   Cardiac: R regular; no murmur;   Lungs: distant breath sounds; no wheezes  Abd: Distended positive for ascites from  Ext: no edema; no joint swelling; No clubbing  : NO tellez, clear urine  Neuro: sedated;  Psych-  unable to assess  Skin: warm, dry, no cyanosis;   Pulses: 1-2+ Bilateral pedal, radial  Capillary: brisk; pale      DATA:    MAR reviewed and pertinent medications noted or modified as needed  MEDS:   Current Facility-Administered Medications   Medication    morphine injection 2 mg    LORazepam (ATIVAN) injection 2 mg        Labs:    Recent Labs     11/11/21 0416 11/10/21  0400   WBC 5.0 5.6   HGB 12.2 12.3   PLT 61* 70*     Recent Labs     11/11/21 0416 11/10/21  0400   * 144   K 4.5 4.7   * 116*   CO2 23 22   * 232*   BUN 53* 41*   CREA 1.64* 1.55*   CA 8.6 8.6     Recent Labs     11/10/21  0337   PH 7.37   PCO2 39   PO2 67*   HCO3 22   FIO2 60.0     No results for input(s): CPK, CKNDX, TROIQ in the last 72 hours. No lab exists for component: CPKMB  No results found for: BNPP, BNP   Lab Results   Component Value Date/Time    Culture result: No growth 6 days 11/05/2021 08:00 AM    Culture result: No growth 6 days 10/23/2021 01:30 PM    Culture result: No growth 6 days 12/27/2020 08:15 PM     No results found for: TSH, TSHEXT, TSHEXT     Imaging:    Results from Hospital Encounter encounter on 11/05/21    XR CHEST PORT    Narrative  Portable chest, AP supine, 0244 hours. Comparison with 11/9/2021. Endotracheal  tube, gastric tube, and right ventricular AICD lead appear appropriately  positioned, stable from previous. Stable enlargement of cardiopericardial  silhouette. Central vascular congestion. Bilateral perihilar and basilar  opacities, right greater than left, probably pulmonary edema but correlate with  clinical suspicion of pneumonia.  Masslike opacity on the right appears stable;  CT from 10/20/2021 shows multiple bilateral lung masses, more numerous on the  right, most of which are obscured by pulmonary or pleural opacities on the  current radiograph. Impression  CHF/volume overload. Multiple bilateral lung masses, most of which  are obscured by pleural and parenchymal opacities on current imaging. Results from [facility] encounter on 11/05/21    CT SPINE CERV WO CONT    Narrative  Fall. No comparison. Technique: Axial imaging cervical spine with sagittal and coronal reformatting  and 3-D volume rendering performed by the technologist at the console. Dose reduction: All CT scans at this facility are performed using dose reduction  optimization techniques as appropriate to a performed exam including the  following: Automated exposure control, adjustments of the mA and/or kV according  to patient's size, or use of iterative reconstruction technique    Findings: No listhesis. T1 vertebral body mild superior endplate compression  unchanged compared to chest CT 10/20/2021. No acute fracture, or jumped or  perched facet. Multilevel disc narrowing and small osteophytosis from  degenerative disease. Lateral masses symmetric bilaterally. Odontoid intact. No  prevertebral soft tissue swelling. Lung apices clear. Support tubing present. Impression  1. No acute bony findings.     ·

## 2021-11-12 NOTE — HOSPICE
[facility]  [phone]     Patient Name: Dominique Contreras  YOB: 1955  Age: 73 y.o. [facility] RN Note:  Hospice consult received, reviewing chart. Will follow up with Unit Nurse and Care Manager to discuss plan of care, patient status and discharge disposition. Thank you for the opportunity to be of service to this patient.     Jeanette Pope, [facility]  [phone]

## 2021-11-12 NOTE — HOSPICE
[facility]  [phone]    Patient Name: Gen Polanco  YOB: 1955  Age: 73 y.o. [facility] RN Note:  Hospice consult noted. Chart reviewed. Plan of care discussed with patients nurse & care manager. Called son Viki Valdez and left a voice mail message. Waiting for return call. Thank you for the opportunity to be of service to this patient.   [id number]  Riki [facility] Liaison  [phone] c  [phone] o

## 2021-11-12 NOTE — PROGRESS NOTES
Report given to SHRAVAN SIERRA [facility]. Family at bedside and aware of new room number.

## 2021-11-12 NOTE — PROGRESS NOTES
Consult to hospice through [facility] has been placed.      DC plan is [facility]IP hospice vs home with hospice

## 2021-11-12 NOTE — PROGRESS NOTES
Hospitalist Progress Note    Subjective:   Daily Progress Note: 2021 12:50 PM    Hospital Course: The patient is a 61-year-old male with a PMH significant for metastatic lung cancer, COPD and liver cirrhosis. He was brought in via EMS for altered mental status and acute hypoxic respiratory failure. Placed in the ICU the patient remains critically ill with poor prognosis. Unable to wean from the vent. Family members have placed him under DNR status and want comfort care with terminal extubation. Will place consult for hospice. Subjective:  Unresponsive    Current Facility-Administered Medications   Medication Dose Route Frequency    morphine injection 2 mg  2 mg IntraVENous Q1H PRN    LORazepam (ATIVAN) injection 2 mg  2 mg IntraVENous Q1H PRN        REVIEW OF SYSTEMS    Review of Systems   Unable to perform ROS: Patient unresponsive        Objective:     Visit Vitals  /66   Pulse (!) 102   Temp 97.6 °F (36.4 °C)   Resp 16   Ht 6' (1.829 m)   Wt 106.7 kg (235 lb 3.7 oz)   SpO2 92%   BMI 31.90 kg/m²    O2 Flow Rate (L/min): 6 l/min O2 Device: Nasal cannula    Temp (24hrs), Av.5 °F (36.4 °C), Min:97.1 °F (36.2 °C), Max:97.7 °F (36.5 °C)      No intake/output data recorded. 11/10 1901 -  0700  In: 3384.7 [I.V.:1175.7]  Out: 2680 [Urine:1075; Drains:2000]    PHYSICAL EXAM:    Physical Exam  Constitutional:       Appearance: He is obese. He is ill-appearing and diaphoretic. Cardiovascular:      Rate and Rhythm: Tachycardia present. Pulmonary:      Effort: Respiratory distress present. Breath sounds: Rales present. Abdominal:      General: There is distension. Musculoskeletal:      Right lower leg: Edema present. Left lower leg: Edema present. Neurological:      Mental Status: He is disoriented. Comments: Unresponsive          Data Review    No results found for this or any previous visit (from the past 24 hour(s)).     XR CHEST PORT   Final Result   CHF/volume overload. Multiple bilateral lung masses, most of which   are obscured by pleural and parenchymal opacities on current imaging. XR CHEST PORT   Final Result      XR CHEST PORT   Final Result   Stable enlargement of cardiopericardial silhouette with right   ventricular AICD lead. Central vascular congestion. Bilateral basilar opacities,   left greater than right, consistent with atelectasis and possible mild edema. No   pneumothorax. Stable appearance of ET tube. A nasogastric tube courses below the   diaphragm; its tip is not identified. There is obscuration of a portion of the   left hemithorax by the generator device. XR CHEST PORT   Final Result   Cardiopericardial enlargement with AICD. Central vascular   congestion. Improved basilar atelectasis. No pneumothorax or other acute change. There is obscuration of a portion of the left hemithorax by the generator   device. Stable appearance of endotracheal tube. A nasogastric tube courses below   the diaphragm; its tip is not identified. XR CHEST PORT   Final Result   1. ETT terminating approximately 5 cm above the osiris. 2.  Persistent bilateral airspace disease and/or atelectasis. CT HEAD WO CONT   Final Result   1. No acute intracranial findings. 2. Atrophy and small vessel ischemic disease. Pattern unchanged compared to   previous. CT SPINE CERV WO CONT   Final Result   1. No acute bony findings. XR CHEST SNGL V   Final Result   Addendum 1 of 1   Addendum: Addendum 10:21 AM 11/5/2021. Additional chest imaging in disc folder at 9:26 AM is after ET tube    advancement,   tip now 5.9 cm above osiris. Called report to Will at 1021am 11/5/2021. ET    tube   has not been advanced since the prior phone call. Final   FINDINGS: IMPRESSION: Single frontal view chest. ET tube above thoracic inlet,   distal tip approximately 11 cm above the osiris. Called report to emergency room   Will at 10:09 AM 11/5/2021.  Enteric tube side-port passes below the left   hemidiaphragm. Left chest wall cardiac defibrillator. Unchanged cardiomediastinal silhouette. Similar hypoinflation. Improved airspace disease in the right lower lobe. Platelike atelectasis or scarring in the left lower lobe. No pleural effusion or   pneumothorax. No free air under the diaphragm. Right sixth rib deformity, chronic. Active Problems:    Acute hypoxemic respiratory failure ([address] (11/5/2021)        Assessment/Plan:     Acute hypoxemic respiratory failure:  Septic shock:   Acute hepatic encephalopathy  CKD:   Hx Squamous cell lung ca stage 4     Terminally extubated  Comfort care with adequate pain and anxiety support  Withdrawal of aggressive treatments  Consult placed for hospice      DVT Prophylaxis: Not needed  Code Status: DNR  POA/NOK: Son    Disposition and discharge barriers:   · Hospice consult  Care Plan discussed with: IDR team  _____________________________________________________________________________  Time spent in direct care including coordination of service, review of data and examination: > 35 minutes    ______________________________________________________________________________    Charly Abraham NP    This is dictation was done by dragon, computer voice recognition software. Quite often unanticipated grammatical, syntax, homophones and other interpretive errors or inadvertently transcribed by the computer software. Please excuse errors that have escaped final proofreading. Thank you.

## 2021-11-13 NOTE — PROGRESS NOTES
Pts time of death 21:27. Dr. Karen Carr and Nursing supervisor notified at 21:29. Attempted to call son and no answer. Pts girlfriend Damien Godinez notified and updated on pts condition at 21:34. Attemped to call son again at 22:45, and still no answer. Called girlfriend at 22:47 in regards to pt and if her and family were planning to come and see pt, and get any belongings that were left. Finally was able to speak with son and he stated that he will be coming up to the hospital shortly to see his father. [facility] called at 22:00, and notification of death form filled out.